# Patient Record
Sex: MALE | Race: WHITE | NOT HISPANIC OR LATINO | Employment: OTHER | URBAN - METROPOLITAN AREA
[De-identification: names, ages, dates, MRNs, and addresses within clinical notes are randomized per-mention and may not be internally consistent; named-entity substitution may affect disease eponyms.]

---

## 2017-10-11 VITALS — WEIGHT: 193.38 LBS | BODY MASS INDEX: 29.31 KG/M2 | HEIGHT: 68 IN

## 2017-12-20 ENCOUNTER — OFFICE VISIT (OUTPATIENT)
Dept: FAMILY MEDICINE | Facility: CLINIC | Age: 57
End: 2017-12-20
Payer: COMMERCIAL

## 2017-12-20 VITALS
SYSTOLIC BLOOD PRESSURE: 120 MMHG | HEART RATE: 97 BPM | BODY MASS INDEX: 27.58 KG/M2 | HEIGHT: 68 IN | DIASTOLIC BLOOD PRESSURE: 82 MMHG | OXYGEN SATURATION: 98 % | WEIGHT: 182 LBS

## 2017-12-20 DIAGNOSIS — L02.13 CARBUNCLE AND FURUNCLE OF NECK: ICD-10-CM

## 2017-12-20 DIAGNOSIS — L02.12 CARBUNCLE AND FURUNCLE OF NECK: ICD-10-CM

## 2017-12-20 PROCEDURE — 99213 OFFICE O/P EST LOW 20 MIN: CPT | Mod: ,,, | Performed by: FAMILY MEDICINE

## 2017-12-20 RX ORDER — DOXYCYCLINE HYCLATE 100 MG
100 TABLET ORAL 2 TIMES DAILY
Qty: 20 TABLET | Refills: 0 | Status: SHIPPED | OUTPATIENT
Start: 2017-12-20 | End: 2021-08-17

## 2017-12-20 RX ORDER — MUPIROCIN 20 MG/G
OINTMENT TOPICAL 3 TIMES DAILY
Qty: 1 TUBE | Refills: 1 | Status: SHIPPED | OUTPATIENT
Start: 2017-12-20 | End: 2019-01-07 | Stop reason: SDUPTHER

## 2017-12-20 NOTE — PROGRESS NOTES
Subjective:       Patient ID: Ernst May is a 57 y.o. male.    Chief Complaint: Rash (on neck for the past three months. treating topicall only  blistering)    Rash   This is a chronic problem. The current episode started more than 1 year ago. The problem has been gradually worsening since onset. The affected locations include the neck. The rash is characterized by blistering and draining. He was exposed to nothing. Pertinent negatives include no congestion, cough, diarrhea, fever, shortness of breath or sore throat. Past treatments include antibiotics and antibiotic cream. The treatment provided no relief. There is no history of allergies.     Review of Systems   Constitutional: Negative for activity change, appetite change and fever.   HENT: Negative for congestion and sore throat.    Eyes: Negative for visual disturbance.   Respiratory: Negative for cough, chest tightness and shortness of breath.    Cardiovascular: Negative for chest pain.   Gastrointestinal: Negative for abdominal pain, constipation, diarrhea and nausea.   Genitourinary: Negative for difficulty urinating.   Musculoskeletal: Negative for back pain and myalgias.   Skin: Positive for rash.   Neurological: Negative for headaches.   Hematological: Negative for adenopathy.   Psychiatric/Behavioral: Negative for suicidal ideas.       Past Medical History:   Diagnosis Date    Hypertension       Past Surgical History:   Procedure Laterality Date    CYST REMOVAL  2012       Family History   Problem Relation Age of Onset    Diabetes Mother     Heart disease Mother     Arthritis Father     Diabetes Father     Heart disease Father     Hypertension Father     Diabetes Maternal Grandmother        Social History     Social History    Marital status: Single     Spouse name: N/A    Number of children: N/A    Years of education: N/A     Social History Main Topics    Smoking status: Current Some Day Smoker     Types: Cigars    Smokeless tobacco:  "Never Used    Alcohol use No    Drug use: No    Sexual activity: Not Asked     Other Topics Concern    None     Social History Narrative    None       No current outpatient prescriptions on file.     No current facility-administered medications for this visit.        Review of patient's allergies indicates:  No Known Allergies  Objective:    HPI     Rash    Additional comments: on neck for the past three months. treating topicall   only  blistering       Last edited by ABEL Gonzales MD on 12/20/2017  2:57 PM. (History)      Blood pressure 120/82, pulse 97, height 5' 7.5" (1.715 m), weight 82.6 kg (182 lb), SpO2 98 %. Body mass index is 28.08 kg/m².   Physical Exam   Constitutional: He is oriented to person, place, and time. He appears well-developed and well-nourished.   HENT:   Head: Atraumatic.   Eyes: EOM are normal. Pupils are equal, round, and reactive to light. Right pupil is round. Left pupil is round.   Neck: Normal range of motion. Neck supple. No thyromegaly present.       Draining skin lesions with carbuncles   Cardiovascular: Normal rate and regular rhythm.    No murmur heard.  Pulmonary/Chest: Effort normal and breath sounds normal. No respiratory distress.   Abdominal: There is no hepatosplenomegaly. There is no tenderness.   Musculoskeletal: Normal range of motion. He exhibits no edema.   Lymphadenopathy:     He has no cervical adenopathy.        Right: No supraclavicular adenopathy present.        Left: No supraclavicular adenopathy present.   Neurological: He is alert and oriented to person, place, and time. He has normal strength. No cranial nerve deficit or sensory deficit.   Skin: Skin is warm and dry.   Psychiatric: He has a normal mood and affect. His behavior is normal. Judgment and thought content normal. He expresses no suicidal plans.           Assessment:       1. Carbuncle and furuncle of neck        Plan:       Ernst was seen today for rash.    Diagnoses and all orders for this " visit:    Carbuncle and furuncle of neck    Asked to come back within one month or worsening. Biopsy next. Culture pending

## 2019-01-07 ENCOUNTER — OFFICE VISIT (OUTPATIENT)
Dept: URGENT CARE | Facility: CLINIC | Age: 59
End: 2019-01-07
Payer: COMMERCIAL

## 2019-01-07 VITALS
HEART RATE: 88 BPM | DIASTOLIC BLOOD PRESSURE: 88 MMHG | OXYGEN SATURATION: 97 % | HEIGHT: 67 IN | SYSTOLIC BLOOD PRESSURE: 133 MMHG | RESPIRATION RATE: 16 BRPM | TEMPERATURE: 99 F | BODY MASS INDEX: 28.88 KG/M2 | WEIGHT: 184 LBS

## 2019-01-07 DIAGNOSIS — L02.13 CARBUNCLE AND FURUNCLE OF NECK: ICD-10-CM

## 2019-01-07 DIAGNOSIS — L02.12 CARBUNCLE AND FURUNCLE OF NECK: ICD-10-CM

## 2019-01-07 DIAGNOSIS — L02.811 ABSCESS OF HEAD: Primary | ICD-10-CM

## 2019-01-07 PROCEDURE — 99204 OFFICE O/P NEW MOD 45 MIN: CPT | Mod: S$GLB,,, | Performed by: NURSE PRACTITIONER

## 2019-01-07 PROCEDURE — 99204 PR OFFICE/OUTPT VISIT, NEW, LEVL IV, 45-59 MIN: ICD-10-PCS | Mod: S$GLB,,, | Performed by: NURSE PRACTITIONER

## 2019-01-07 RX ORDER — MUPIROCIN 20 MG/G
OINTMENT TOPICAL 3 TIMES DAILY
Qty: 1 TUBE | Refills: 1 | Status: SHIPPED | OUTPATIENT
Start: 2019-01-07 | End: 2021-08-17 | Stop reason: SDUPTHER

## 2019-01-07 RX ORDER — SULFAMETHOXAZOLE AND TRIMETHOPRIM 800; 160 MG/1; MG/1
1 TABLET ORAL 2 TIMES DAILY
Qty: 14 TABLET | Refills: 0 | Status: SHIPPED | OUTPATIENT
Start: 2019-01-07 | End: 2019-01-14

## 2019-01-07 NOTE — PATIENT INSTRUCTIONS
Abscess (Antibiotic Treatment Only)  An abscess (sometimes called a boil) happens when bacteria get trapped under the skin and start to grow. Pus forms inside the abscess as the body responds to the bacteria. An abscess can happen with an insect bite, ingrown hair, blocked oil gland, pimple, cyst, or puncture wound.  In the early stages, your wound may be red and tender. For this stage, you may get antibiotics. If the abscess does not get better with antibiotics, it will need to be drained with a small cut.  Home care  These tips will help you care for your abscess at home:  · Soak the wound in hot water or apply hot packs (small towel soaked in hot water) to the area for 20 minutes at a time. Do this 3 to 4 times a day.  · Do not cut, squeeze, or pop the boil yourself.  · Apply antibiotic cream or ointment to the skin 3 to 4 times a day, unless something else was prescribed. Some ointments include an antibiotic plus a pain reliever.  · If your doctor prescribed antibiotics, do not stop taking them until you have finished the medicine or the doctor tells you to stop.  · You may use an over-the-counter pain medicine to control pain, unless another pain medicine was prescribed. If you have chronic liver or kidney disease or ever had a stomach ulcer or gastrointestinal bleeding, talk with your doctor before using these any of these.  Follow-up care  Follow up with your healthcare provider, or as advised. Check your wound each day for the signs of worsening infection listed below.  When to seek medical advice  Get prompt medical attention if any of these occur:  · An increase in redness or swelling  · Red streaks in the skin leading away from the abscess  · An increase in local pain or swelling  · Fever of 100.4ºF (38ºC) or higher, or as directed by your healthcare provider  · Pus or fluid coming from the abscess  · Boil returns after getting better  Date Last Reviewed: 9/1/2016  © 5127-4092 [employer] [address] [place] [address]. All rights reserved. This information is not intended as a substitute for professional medical care. Always follow your healthcare professional's instructions.

## 2019-01-07 NOTE — PROGRESS NOTES
"Subjective:       Patient ID: Ernst May is a 58 y.o. male.    Vitals:  height is 5' 7" (1.702 m) and weight is 83.5 kg (184 lb). His oral temperature is 99.1 °F (37.3 °C). His blood pressure is 133/88 and his pulse is 88. His respiration is 16 and oxygen saturation is 97%.     Chief Complaint: Abscess    Patient complains of an abscess to right side of posterior head for 1 week. States he has been putting black suave on it and it is "drawing the pus out". Denies fever. States he was treated 3 months ago for multiple abscesses in his head.       Abscess   Chronicity:  RecurrentProgression Since Onset: worsening  Size:  3-5cm  Location:  Head/neck  Associated Symptoms: no fever, no chills  Characteristics: itching and blistering    Treatments Tried:  Topical antibiotics and oral antibiotics  Worsened by:  Topical antibiotics      Constitution: Negative for chills, fatigue and fever.   HENT: Negative for congestion and sore throat.    Neck: Negative for painful lymph nodes.   Cardiovascular: Negative for chest pain and leg swelling.   Eyes: Negative for double vision and blurred vision.   Respiratory: Negative for cough and shortness of breath.    Gastrointestinal: Negative for nausea, vomiting and diarrhea.   Genitourinary: Negative for dysuria, frequency and urgency.   Musculoskeletal: Negative for joint pain, joint swelling, muscle cramps and muscle ache.   Skin: Positive for abscess. Negative for color change, pale and rash.   Allergic/Immunologic: Negative for seasonal allergies.   Neurological: Negative for dizziness, history of vertigo, light-headedness, passing out and headaches.   Hematologic/Lymphatic: Negative for swollen lymph nodes, easy bruising/bleeding and history of blood clots. Does not bruise/bleed easily.   Psychiatric/Behavioral: Negative for nervous/anxious, sleep disturbance and depression. The patient is not nervous/anxious.        Objective:      Physical Exam   Constitutional: He is " oriented to person, place, and time. Vital signs are normal. He appears well-developed and well-nourished. He is cooperative.  Non-toxic appearance. He does not have a sickly appearance. He does not appear ill. No distress.   HENT:   Head: Normocephalic and atraumatic.   Right Ear: Hearing, tympanic membrane, external ear and ear canal normal.   Left Ear: Hearing, tympanic membrane, external ear and ear canal normal.   Nose: Nose normal. No mucosal edema, rhinorrhea or nasal deformity. No epistaxis. Right sinus exhibits no maxillary sinus tenderness and no frontal sinus tenderness. Left sinus exhibits no maxillary sinus tenderness and no frontal sinus tenderness.   Mouth/Throat: Uvula is midline, oropharynx is clear and moist and mucous membranes are normal. No trismus in the jaw. Normal dentition. No uvula swelling. No posterior oropharyngeal erythema.   Eyes: Conjunctivae and lids are normal. Right eye exhibits no discharge. Left eye exhibits no discharge. No scleral icterus.   Sclera clear bilat   Neck: Trachea normal, normal range of motion, full passive range of motion without pain and phonation normal. Neck supple.   Cardiovascular: Normal rate, regular rhythm, normal heart sounds, intact distal pulses and normal pulses.   Pulmonary/Chest: Effort normal and breath sounds normal. No respiratory distress.   Abdominal: Soft. Normal appearance and bowel sounds are normal. He exhibits no distension, no pulsatile midline mass and no mass. There is no tenderness.   Musculoskeletal: Normal range of motion. He exhibits no edema or deformity.   Lymphadenopathy:     He has no cervical adenopathy.   Neurological: He is alert and oriented to person, place, and time. He exhibits normal muscle tone. Coordination normal. GCS eye subscore is 4. GCS verbal subscore is 5. GCS motor subscore is 6.   Skin: Skin is warm, dry and intact. He is not diaphoretic. No pallor.   2cm abscess to right side of posterior head, +purulent  drainage. No swelling. Mild induration. No fluctuance. +erythema.    Psychiatric: He has a normal mood and affect. His speech is normal and behavior is normal. Judgment and thought content normal. Cognition and memory are normal.   Nursing note and vitals reviewed.      Assessment:       1. Abscess of head    2. Carbuncle and furuncle of neck        Plan:       Abscess was already draining, will not I&D at this time.  The patient will be placed on antibiotics.  The patient has no signs of systemic symptoms or significant cellulitis to warrant admission at this time.  The patient has been instructed to follow up with their regular doctor, dermatology or the clinic in 2 - 3 days.  Advised patient to take the full course of antibiotics. I have given the patient specific return precautions.  Return for any worsening of symptoms.      Abscess of head    Carbuncle and furuncle of neck  -     mupirocin (BACTROBAN) 2 % ointment; Apply topically 3 (three) times daily.  Dispense: 1 Tube; Refill: 1    Other orders  -     sulfamethoxazole-trimethoprim 800-160mg (BACTRIM DS) 800-160 mg Tab; Take 1 tablet by mouth 2 (two) times daily. for 7 days  Dispense: 14 tablet; Refill: 0

## 2021-08-17 ENCOUNTER — OFFICE VISIT (OUTPATIENT)
Dept: FAMILY MEDICINE | Facility: CLINIC | Age: 61
End: 2021-08-17
Payer: COMMERCIAL

## 2021-08-17 VITALS
HEART RATE: 101 BPM | DIASTOLIC BLOOD PRESSURE: 72 MMHG | BODY MASS INDEX: 27.78 KG/M2 | OXYGEN SATURATION: 98 % | HEIGHT: 67 IN | RESPIRATION RATE: 18 BRPM | SYSTOLIC BLOOD PRESSURE: 128 MMHG | WEIGHT: 177 LBS

## 2021-08-17 DIAGNOSIS — L02.13 CARBUNCLE AND FURUNCLE OF NECK: ICD-10-CM

## 2021-08-17 DIAGNOSIS — L02.12 CARBUNCLE AND FURUNCLE OF NECK: ICD-10-CM

## 2021-08-17 DIAGNOSIS — L03.90 CELLULITIS, UNSPECIFIED CELLULITIS SITE: ICD-10-CM

## 2021-08-17 DIAGNOSIS — L98.499 SKIN ULCER, UNSPECIFIED ULCER STAGE: Primary | ICD-10-CM

## 2021-08-17 PROCEDURE — 99213 PR OFFICE/OUTPT VISIT, EST, LEVL III, 20-29 MIN: ICD-10-PCS | Mod: S$GLB,,, | Performed by: FAMILY MEDICINE

## 2021-08-17 PROCEDURE — 99213 OFFICE O/P EST LOW 20 MIN: CPT | Mod: S$GLB,,, | Performed by: FAMILY MEDICINE

## 2021-08-17 RX ORDER — MUPIROCIN 20 MG/G
OINTMENT TOPICAL 3 TIMES DAILY
Qty: 1 TUBE | Refills: 1 | Status: ON HOLD | OUTPATIENT
Start: 2021-08-17 | End: 2021-09-29 | Stop reason: HOSPADM

## 2021-08-17 RX ORDER — TRAMADOL HYDROCHLORIDE 50 MG/1
50 TABLET ORAL EVERY 6 HOURS PRN
Qty: 28 TABLET | Refills: 3 | Status: SHIPPED | OUTPATIENT
Start: 2021-08-17 | End: 2021-08-24

## 2021-08-17 RX ORDER — CLINDAMYCIN HYDROCHLORIDE 300 MG/1
300 CAPSULE ORAL 3 TIMES DAILY
Qty: 30 CAPSULE | Refills: 0 | Status: ON HOLD | OUTPATIENT
Start: 2021-08-17 | End: 2021-09-29 | Stop reason: HOSPADM

## 2021-08-19 ENCOUNTER — DOCUMENTATION ONLY (OUTPATIENT)
Dept: FAMILY MEDICINE | Facility: CLINIC | Age: 61
End: 2021-08-19

## 2021-08-23 ENCOUNTER — TELEPHONE (OUTPATIENT)
Dept: FAMILY MEDICINE | Facility: CLINIC | Age: 61
End: 2021-08-23

## 2021-08-26 ENCOUNTER — OFFICE VISIT (OUTPATIENT)
Dept: WOUND CARE | Facility: HOSPITAL | Age: 61
End: 2021-08-26
Attending: FAMILY MEDICINE
Payer: COMMERCIAL

## 2021-08-26 VITALS
TEMPERATURE: 96 F | HEART RATE: 75 BPM | SYSTOLIC BLOOD PRESSURE: 126 MMHG | RESPIRATION RATE: 18 BRPM | DIASTOLIC BLOOD PRESSURE: 70 MMHG

## 2021-08-26 DIAGNOSIS — S21.202A OPEN WOUND OF LEFT SIDE OF BACK, INITIAL ENCOUNTER: Primary | ICD-10-CM

## 2021-08-26 DIAGNOSIS — S91.002A OPEN WOUND OF LEFT ANKLE, INITIAL ENCOUNTER: ICD-10-CM

## 2021-08-26 PROCEDURE — 99202 PR OFFICE/OUTPT VISIT, NEW, LEVL II, 15-29 MIN: ICD-10-PCS | Mod: ,,, | Performed by: FAMILY MEDICINE

## 2021-08-26 PROCEDURE — 99202 OFFICE O/P NEW SF 15 MIN: CPT | Mod: ,,, | Performed by: FAMILY MEDICINE

## 2021-08-26 PROCEDURE — 99214 OFFICE O/P EST MOD 30 MIN: CPT | Mod: PN | Performed by: FAMILY MEDICINE

## 2021-09-02 ENCOUNTER — HOSPITAL ENCOUNTER (INPATIENT)
Facility: HOSPITAL | Age: 61
LOS: 27 days | Discharge: NURSING, REHAB OR LONG-TERM CARE | DRG: 853 | End: 2021-09-29
Attending: EMERGENCY MEDICINE | Admitting: INTERNAL MEDICINE
Payer: MEDICAID

## 2021-09-02 DIAGNOSIS — E11.10 DIABETIC KETOACIDOSIS WITHOUT COMA ASSOCIATED WITH TYPE 2 DIABETES MELLITUS: ICD-10-CM

## 2021-09-02 DIAGNOSIS — I73.9 PVD (PERIPHERAL VASCULAR DISEASE): ICD-10-CM

## 2021-09-02 DIAGNOSIS — E11.65 UNCONTROLLED TYPE 2 DIABETES MELLITUS WITH HYPERGLYCEMIA: ICD-10-CM

## 2021-09-02 DIAGNOSIS — I49.9 ARRHYTHMIA: ICD-10-CM

## 2021-09-02 DIAGNOSIS — R07.9 CHEST PAIN: ICD-10-CM

## 2021-09-02 DIAGNOSIS — R73.9 HYPERGLYCEMIA: ICD-10-CM

## 2021-09-02 DIAGNOSIS — L97.501: ICD-10-CM

## 2021-09-02 DIAGNOSIS — I73.9: ICD-10-CM

## 2021-09-02 DIAGNOSIS — L02.91 ABSCESS: Primary | ICD-10-CM

## 2021-09-02 DIAGNOSIS — I47.20 V-TACH: ICD-10-CM

## 2021-09-02 DIAGNOSIS — R78.81 BACTEREMIA: ICD-10-CM

## 2021-09-02 DIAGNOSIS — A40.9 SEPSIS DUE TO STREPTOCOCCUS SPECIES, UNSPECIFIED WHETHER ACUTE ORGAN DYSFUNCTION PRESENT: ICD-10-CM

## 2021-09-02 DIAGNOSIS — Z20.822 SUSPECTED COVID-19 VIRUS INFECTION: ICD-10-CM

## 2021-09-02 LAB
ALBUMIN SERPL BCP-MCNC: 2.1 G/DL (ref 3.5–5.2)
ALP SERPL-CCNC: 92 U/L (ref 55–135)
ALT SERPL W/O P-5'-P-CCNC: 17 U/L (ref 10–44)
ANION GAP SERPL CALC-SCNC: 16 MMOL/L (ref 8–16)
APTT PPP: 35.6 SEC (ref 25.6–35.8)
APTT PPP: 35.6 SEC (ref 25.6–35.8)
AST SERPL-CCNC: 22 U/L (ref 10–40)
BACTERIA #/AREA URNS HPF: NEGATIVE /HPF
BASOPHILS # BLD AUTO: 0.04 K/UL (ref 0–0.2)
BASOPHILS NFR BLD: 0.3 % (ref 0–1.9)
BILIRUB SERPL-MCNC: 0.8 MG/DL (ref 0.1–1)
BILIRUB UR QL STRIP: NEGATIVE
BNP SERPL-MCNC: 186 PG/ML (ref 0–99)
BNP SERPL-MCNC: 186 PG/ML (ref 0–99)
BUN SERPL-MCNC: 10 MG/DL (ref 8–23)
CALCIUM SERPL-MCNC: 8.5 MG/DL (ref 8.7–10.5)
CHLORIDE SERPL-SCNC: 87 MMOL/L (ref 95–110)
CK SERPL-CCNC: 32 U/L (ref 20–200)
CK SERPL-CCNC: 32 U/L (ref 20–200)
CLARITY UR: CLEAR
CO2 SERPL-SCNC: 24 MMOL/L (ref 23–29)
COLOR UR: YELLOW
CREAT SERPL-MCNC: 1 MG/DL (ref 0.5–1.4)
CRP SERPL-MCNC: 34.69 MG/DL
DIFFERENTIAL METHOD: ABNORMAL
EOSINOPHIL # BLD AUTO: 0 K/UL (ref 0–0.5)
EOSINOPHIL NFR BLD: 0 % (ref 0–8)
ERYTHROCYTE [DISTWIDTH] IN BLOOD BY AUTOMATED COUNT: 12.7 % (ref 11.5–14.5)
EST. GFR  (AFRICAN AMERICAN): >60 ML/MIN/1.73 M^2
EST. GFR  (NON AFRICAN AMERICAN): >60 ML/MIN/1.73 M^2
GLUCOSE SERPL-MCNC: 487 MG/DL (ref 70–110)
GLUCOSE UR QL STRIP: ABNORMAL
HCT VFR BLD AUTO: 32.9 % (ref 40–54)
HGB BLD-MCNC: 11.3 G/DL (ref 14–18)
HGB UR QL STRIP: NEGATIVE
HYALINE CASTS #/AREA URNS LPF: 1 /LPF
IMM GRANULOCYTES # BLD AUTO: 0.21 K/UL (ref 0–0.04)
IMM GRANULOCYTES NFR BLD AUTO: 1.4 % (ref 0–0.5)
INR PPP: 1.3
INR PPP: 1.3
KETONES UR QL STRIP: ABNORMAL
LACTATE SERPL-SCNC: 2.1 MMOL/L (ref 0.5–1.9)
LEUKOCYTE ESTERASE UR QL STRIP: NEGATIVE
LIPASE SERPL-CCNC: 22 U/L (ref 4–60)
LIPASE SERPL-CCNC: 22 U/L (ref 4–60)
LYMPHOCYTES # BLD AUTO: 0.9 K/UL (ref 1–4.8)
LYMPHOCYTES NFR BLD: 5.6 % (ref 18–48)
MAGNESIUM SERPL-MCNC: 1.5 MG/DL (ref 1.6–2.6)
MAGNESIUM SERPL-MCNC: 1.5 MG/DL (ref 1.6–2.6)
MCH RBC QN AUTO: 28.8 PG (ref 27–31)
MCHC RBC AUTO-ENTMCNC: 34.3 G/DL (ref 32–36)
MCV RBC AUTO: 84 FL (ref 82–98)
MICROSCOPIC COMMENT: NORMAL
MONOCYTES # BLD AUTO: 1.7 K/UL (ref 0.3–1)
MONOCYTES NFR BLD: 11 % (ref 4–15)
NEUTROPHILS # BLD AUTO: 12.4 K/UL (ref 1.8–7.7)
NEUTROPHILS NFR BLD: 81.7 % (ref 38–73)
NITRITE UR QL STRIP: NEGATIVE
NRBC BLD-RTO: 0 /100 WBC
PH UR STRIP: 6 [PH] (ref 5–8)
PHOSPHATE SERPL-MCNC: 3.5 MG/DL (ref 2.7–4.5)
PLATELET # BLD AUTO: 370 K/UL (ref 150–450)
PMV BLD AUTO: 9.6 FL (ref 9.2–12.9)
POTASSIUM SERPL-SCNC: 4.2 MMOL/L (ref 3.5–5.1)
PROCALCITONIN SERPL IA-MCNC: 1.47 NG/ML (ref 0–0.5)
PROCALCITONIN SERPL IA-MCNC: 1.47 NG/ML (ref 0–0.5)
PROT SERPL-MCNC: 7 G/DL (ref 6–8.4)
PROT UR QL STRIP: ABNORMAL
PROTHROMBIN TIME: 15.2 SEC (ref 11.8–14.3)
PROTHROMBIN TIME: 15.2 SEC (ref 11.8–14.3)
RBC # BLD AUTO: 3.92 M/UL (ref 4.6–6.2)
RBC #/AREA URNS HPF: 2 /HPF (ref 0–4)
SARS-COV-2 RDRP RESP QL NAA+PROBE: NEGATIVE
SODIUM SERPL-SCNC: 127 MMOL/L (ref 136–145)
SP GR UR STRIP: >1.03 (ref 1–1.03)
SQUAMOUS #/AREA URNS HPF: 1 /HPF
TROPONIN I SERPL DL<=0.01 NG/ML-MCNC: 0.03 NG/ML
TROPONIN I SERPL DL<=0.01 NG/ML-MCNC: 0.03 NG/ML
TSH SERPL DL<=0.005 MIU/L-ACNC: 1.73 UIU/ML (ref 0.34–5.6)
URN SPEC COLLECT METH UR: ABNORMAL
UROBILINOGEN UR STRIP-ACNC: NEGATIVE EU/DL
WBC # BLD AUTO: 15.12 K/UL (ref 3.9–12.7)
WBC #/AREA URNS HPF: 0 /HPF (ref 0–5)
YEAST URNS QL MICRO: NORMAL

## 2021-09-02 PROCEDURE — 85730 THROMBOPLASTIN TIME PARTIAL: CPT | Performed by: EMERGENCY MEDICINE

## 2021-09-02 PROCEDURE — 83880 ASSAY OF NATRIURETIC PEPTIDE: CPT | Performed by: EMERGENCY MEDICINE

## 2021-09-02 PROCEDURE — U0002 COVID-19 LAB TEST NON-CDC: HCPCS | Performed by: EMERGENCY MEDICINE

## 2021-09-02 PROCEDURE — 87186 SC STD MICRODIL/AGAR DIL: CPT | Performed by: EMERGENCY MEDICINE

## 2021-09-02 PROCEDURE — 63600175 PHARM REV CODE 636 W HCPCS: Performed by: EMERGENCY MEDICINE

## 2021-09-02 PROCEDURE — 82550 ASSAY OF CK (CPK): CPT | Performed by: EMERGENCY MEDICINE

## 2021-09-02 PROCEDURE — 96365 THER/PROPH/DIAG IV INF INIT: CPT

## 2021-09-02 PROCEDURE — 12000002 HC ACUTE/MED SURGE SEMI-PRIVATE ROOM

## 2021-09-02 PROCEDURE — 81001 URINALYSIS AUTO W/SCOPE: CPT | Performed by: EMERGENCY MEDICINE

## 2021-09-02 PROCEDURE — 25000003 PHARM REV CODE 250: Performed by: EMERGENCY MEDICINE

## 2021-09-02 PROCEDURE — 85610 PROTHROMBIN TIME: CPT | Performed by: EMERGENCY MEDICINE

## 2021-09-02 PROCEDURE — 87077 CULTURE AEROBIC IDENTIFY: CPT | Performed by: EMERGENCY MEDICINE

## 2021-09-02 PROCEDURE — 84145 PROCALCITONIN (PCT): CPT | Performed by: EMERGENCY MEDICINE

## 2021-09-02 PROCEDURE — 99285 EMERGENCY DEPT VISIT HI MDM: CPT | Mod: 25

## 2021-09-02 PROCEDURE — 83605 ASSAY OF LACTIC ACID: CPT | Performed by: EMERGENCY MEDICINE

## 2021-09-02 PROCEDURE — 83036 HEMOGLOBIN GLYCOSYLATED A1C: CPT | Performed by: EMERGENCY MEDICINE

## 2021-09-02 PROCEDURE — 84100 ASSAY OF PHOSPHORUS: CPT | Performed by: EMERGENCY MEDICINE

## 2021-09-02 PROCEDURE — 80053 COMPREHEN METABOLIC PANEL: CPT | Performed by: EMERGENCY MEDICINE

## 2021-09-02 PROCEDURE — 87040 BLOOD CULTURE FOR BACTERIA: CPT | Performed by: EMERGENCY MEDICINE

## 2021-09-02 PROCEDURE — 83690 ASSAY OF LIPASE: CPT | Performed by: EMERGENCY MEDICINE

## 2021-09-02 PROCEDURE — 83735 ASSAY OF MAGNESIUM: CPT | Performed by: EMERGENCY MEDICINE

## 2021-09-02 PROCEDURE — 96367 TX/PROPH/DG ADDL SEQ IV INF: CPT

## 2021-09-02 PROCEDURE — 96375 TX/PRO/DX INJ NEW DRUG ADDON: CPT

## 2021-09-02 PROCEDURE — 84484 ASSAY OF TROPONIN QUANT: CPT | Performed by: EMERGENCY MEDICINE

## 2021-09-02 PROCEDURE — 85025 COMPLETE CBC W/AUTO DIFF WBC: CPT | Performed by: EMERGENCY MEDICINE

## 2021-09-02 PROCEDURE — 25500020 PHARM REV CODE 255: Performed by: NURSE PRACTITIONER

## 2021-09-02 PROCEDURE — 86140 C-REACTIVE PROTEIN: CPT | Performed by: EMERGENCY MEDICINE

## 2021-09-02 PROCEDURE — 84443 ASSAY THYROID STIM HORMONE: CPT | Performed by: EMERGENCY MEDICINE

## 2021-09-02 RX ORDER — CLINDAMYCIN PHOSPHATE 900 MG/50ML
900 INJECTION, SOLUTION INTRAVENOUS
Status: COMPLETED | OUTPATIENT
Start: 2021-09-02 | End: 2021-09-03

## 2021-09-02 RX ORDER — HYDROMORPHONE HYDROCHLORIDE 1 MG/ML
1 INJECTION, SOLUTION INTRAMUSCULAR; INTRAVENOUS; SUBCUTANEOUS
Status: COMPLETED | OUTPATIENT
Start: 2021-09-02 | End: 2021-09-02

## 2021-09-02 RX ADMIN — IOHEXOL 100 ML: 350 INJECTION, SOLUTION INTRAVENOUS at 10:09

## 2021-09-02 RX ADMIN — SODIUM CHLORIDE, SODIUM LACTATE, POTASSIUM CHLORIDE, AND CALCIUM CHLORIDE 2313 ML: .6; .31; .03; .02 INJECTION, SOLUTION INTRAVENOUS at 10:09

## 2021-09-02 RX ADMIN — CLINDAMYCIN IN 5 PERCENT DEXTROSE 900 MG: 18 INJECTION, SOLUTION INTRAVENOUS at 11:09

## 2021-09-02 RX ADMIN — HYDROMORPHONE HYDROCHLORIDE 1 MG: 1 INJECTION, SOLUTION INTRAMUSCULAR; INTRAVENOUS; SUBCUTANEOUS at 11:09

## 2021-09-02 RX ADMIN — PIPERACILLIN AND TAZOBACTAM 4.5 G: 4; .5 INJECTION, POWDER, LYOPHILIZED, FOR SOLUTION INTRAVENOUS; PARENTERAL at 10:09

## 2021-09-03 PROBLEM — R73.9 HYPERGLYCEMIA: Status: ACTIVE | Noted: 2021-09-03

## 2021-09-03 PROBLEM — L02.91 ABSCESS: Status: ACTIVE | Noted: 2021-09-03

## 2021-09-03 LAB
ALBUMIN SERPL BCP-MCNC: 1.7 G/DL (ref 3.5–5.2)
ALP SERPL-CCNC: 85 U/L (ref 55–135)
ALT SERPL W/O P-5'-P-CCNC: 14 U/L (ref 10–44)
ANION GAP SERPL CALC-SCNC: 9 MMOL/L (ref 8–16)
ANISOCYTOSIS BLD QL SMEAR: SLIGHT
AST SERPL-CCNC: 15 U/L (ref 10–40)
BASOPHILS NFR BLD: 0 % (ref 0–1.9)
BILIRUB SERPL-MCNC: 1 MG/DL (ref 0.1–1)
BUN SERPL-MCNC: 9 MG/DL (ref 8–23)
CALCIUM SERPL-MCNC: 7.8 MG/DL (ref 8.7–10.5)
CHLORIDE SERPL-SCNC: 91 MMOL/L (ref 95–110)
CO2 SERPL-SCNC: 26 MMOL/L (ref 23–29)
CREAT SERPL-MCNC: 0.7 MG/DL (ref 0.5–1.4)
DIFFERENTIAL METHOD: ABNORMAL
EOSINOPHIL NFR BLD: 0 % (ref 0–8)
ERYTHROCYTE [DISTWIDTH] IN BLOOD BY AUTOMATED COUNT: 12.4 % (ref 11.5–14.5)
EST. GFR  (AFRICAN AMERICAN): >60 ML/MIN/1.73 M^2
EST. GFR  (NON AFRICAN AMERICAN): >60 ML/MIN/1.73 M^2
ESTIMATED AVG GLUCOSE: 355 MG/DL (ref 68–131)
GLUCOSE SERPL-MCNC: 260 MG/DL (ref 70–110)
GLUCOSE SERPL-MCNC: 271 MG/DL (ref 70–110)
GLUCOSE SERPL-MCNC: 307 MG/DL (ref 70–110)
GLUCOSE SERPL-MCNC: 339 MG/DL (ref 70–110)
GLUCOSE SERPL-MCNC: 344 MG/DL (ref 70–110)
GLUCOSE SERPL-MCNC: 409 MG/DL (ref 70–110)
HBA1C MFR BLD: 14 % (ref 4.5–6.2)
HCT VFR BLD AUTO: 29.3 % (ref 40–54)
HGB BLD-MCNC: 10 G/DL (ref 14–18)
HYPOCHROMIA BLD QL SMEAR: ABNORMAL
IMM GRANULOCYTES # BLD AUTO: ABNORMAL K/UL (ref 0–0.04)
IMM GRANULOCYTES NFR BLD AUTO: ABNORMAL % (ref 0–0.5)
LACTATE SERPL-SCNC: 1.5 MMOL/L (ref 0.5–1.9)
LYMPHOCYTES NFR BLD: 5 % (ref 18–48)
MAGNESIUM SERPL-MCNC: 1.7 MG/DL (ref 1.6–2.6)
MCH RBC QN AUTO: 29.3 PG (ref 27–31)
MCHC RBC AUTO-ENTMCNC: 34.1 G/DL (ref 32–36)
MCV RBC AUTO: 86 FL (ref 82–98)
MONOCYTES NFR BLD: 12 % (ref 4–15)
NEUTROPHILS NFR BLD: 49 % (ref 38–73)
NEUTS BAND NFR BLD MANUAL: 34 %
NRBC BLD-RTO: 0 /100 WBC
PLATELET # BLD AUTO: 312 K/UL (ref 150–450)
PLATELET BLD QL SMEAR: ABNORMAL
PMV BLD AUTO: 10.3 FL (ref 9.2–12.9)
POTASSIUM SERPL-SCNC: 3.8 MMOL/L (ref 3.5–5.1)
PROT SERPL-MCNC: 5.8 G/DL (ref 6–8.4)
RBC # BLD AUTO: 3.41 M/UL (ref 4.6–6.2)
SODIUM SERPL-SCNC: 126 MMOL/L (ref 136–145)
WBC # BLD AUTO: 15.29 K/UL (ref 3.9–12.7)

## 2021-09-03 PROCEDURE — 85027 COMPLETE CBC AUTOMATED: CPT | Performed by: NURSE PRACTITIONER

## 2021-09-03 PROCEDURE — 25000003 PHARM REV CODE 250: Performed by: INTERNAL MEDICINE

## 2021-09-03 PROCEDURE — 25000003 PHARM REV CODE 250: Performed by: EMERGENCY MEDICINE

## 2021-09-03 PROCEDURE — 83605 ASSAY OF LACTIC ACID: CPT | Performed by: EMERGENCY MEDICINE

## 2021-09-03 PROCEDURE — 96367 TX/PROPH/DG ADDL SEQ IV INF: CPT

## 2021-09-03 PROCEDURE — C9399 UNCLASSIFIED DRUGS OR BIOLOG: HCPCS | Performed by: STUDENT IN AN ORGANIZED HEALTH CARE EDUCATION/TRAINING PROGRAM

## 2021-09-03 PROCEDURE — 96361 HYDRATE IV INFUSION ADD-ON: CPT

## 2021-09-03 PROCEDURE — 83735 ASSAY OF MAGNESIUM: CPT | Performed by: NURSE PRACTITIONER

## 2021-09-03 PROCEDURE — 36415 COLL VENOUS BLD VENIPUNCTURE: CPT | Performed by: EMERGENCY MEDICINE

## 2021-09-03 PROCEDURE — 85007 BL SMEAR W/DIFF WBC COUNT: CPT | Performed by: NURSE PRACTITIONER

## 2021-09-03 PROCEDURE — 80053 COMPREHEN METABOLIC PANEL: CPT | Performed by: NURSE PRACTITIONER

## 2021-09-03 PROCEDURE — 12000002 HC ACUTE/MED SURGE SEMI-PRIVATE ROOM

## 2021-09-03 PROCEDURE — 96375 TX/PRO/DX INJ NEW DRUG ADDON: CPT

## 2021-09-03 PROCEDURE — 63600175 PHARM REV CODE 636 W HCPCS: Performed by: NURSE PRACTITIONER

## 2021-09-03 PROCEDURE — 25000003 PHARM REV CODE 250: Performed by: NURSE PRACTITIONER

## 2021-09-03 PROCEDURE — 63600175 PHARM REV CODE 636 W HCPCS: Performed by: EMERGENCY MEDICINE

## 2021-09-03 PROCEDURE — 96372 THER/PROPH/DIAG INJ SC/IM: CPT

## 2021-09-03 PROCEDURE — 82962 GLUCOSE BLOOD TEST: CPT

## 2021-09-03 PROCEDURE — 63600175 PHARM REV CODE 636 W HCPCS: Performed by: INTERNAL MEDICINE

## 2021-09-03 PROCEDURE — 87040 BLOOD CULTURE FOR BACTERIA: CPT | Performed by: STUDENT IN AN ORGANIZED HEALTH CARE EDUCATION/TRAINING PROGRAM

## 2021-09-03 PROCEDURE — 93010 EKG 12-LEAD: ICD-10-PCS | Mod: ,,, | Performed by: INTERNAL MEDICINE

## 2021-09-03 PROCEDURE — 96376 TX/PRO/DX INJ SAME DRUG ADON: CPT

## 2021-09-03 PROCEDURE — 93010 ELECTROCARDIOGRAM REPORT: CPT | Mod: ,,, | Performed by: INTERNAL MEDICINE

## 2021-09-03 PROCEDURE — 63600175 PHARM REV CODE 636 W HCPCS: Performed by: STUDENT IN AN ORGANIZED HEALTH CARE EDUCATION/TRAINING PROGRAM

## 2021-09-03 PROCEDURE — 99221 PR INITIAL HOSPITAL CARE,LEVL I: ICD-10-PCS | Mod: ,,, | Performed by: SURGERY

## 2021-09-03 PROCEDURE — 36415 COLL VENOUS BLD VENIPUNCTURE: CPT | Performed by: STUDENT IN AN ORGANIZED HEALTH CARE EDUCATION/TRAINING PROGRAM

## 2021-09-03 PROCEDURE — 99221 1ST HOSP IP/OBS SF/LOW 40: CPT | Mod: ,,, | Performed by: SURGERY

## 2021-09-03 PROCEDURE — 93005 ELECTROCARDIOGRAM TRACING: CPT | Performed by: INTERNAL MEDICINE

## 2021-09-03 PROCEDURE — 36415 COLL VENOUS BLD VENIPUNCTURE: CPT | Performed by: NURSE PRACTITIONER

## 2021-09-03 PROCEDURE — 25000003 PHARM REV CODE 250: Performed by: STUDENT IN AN ORGANIZED HEALTH CARE EDUCATION/TRAINING PROGRAM

## 2021-09-03 RX ORDER — POTASSIUM CHLORIDE 7.45 MG/ML
20 INJECTION INTRAVENOUS
Status: DISCONTINUED | OUTPATIENT
Start: 2021-09-03 | End: 2021-09-29 | Stop reason: HOSPADM

## 2021-09-03 RX ORDER — MAGNESIUM SULFATE HEPTAHYDRATE 40 MG/ML
2 INJECTION, SOLUTION INTRAVENOUS
Status: DISCONTINUED | OUTPATIENT
Start: 2021-09-03 | End: 2021-09-29 | Stop reason: HOSPADM

## 2021-09-03 RX ORDER — GLUCAGON 1 MG
1 KIT INJECTION
Status: DISCONTINUED | OUTPATIENT
Start: 2021-09-03 | End: 2021-09-29 | Stop reason: HOSPADM

## 2021-09-03 RX ORDER — SODIUM CHLORIDE 9 MG/ML
INJECTION, SOLUTION INTRAVENOUS CONTINUOUS
Status: DISCONTINUED | OUTPATIENT
Start: 2021-09-03 | End: 2021-09-10

## 2021-09-03 RX ORDER — SODIUM CHLORIDE 0.9 % (FLUSH) 0.9 %
10 SYRINGE (ML) INJECTION
Status: DISCONTINUED | OUTPATIENT
Start: 2021-09-03 | End: 2021-09-29 | Stop reason: HOSPADM

## 2021-09-03 RX ORDER — INSULIN ASPART 100 [IU]/ML
1-10 INJECTION, SOLUTION INTRAVENOUS; SUBCUTANEOUS
Status: DISCONTINUED | OUTPATIENT
Start: 2021-09-03 | End: 2021-09-10

## 2021-09-03 RX ORDER — AMOXICILLIN 250 MG
1 CAPSULE ORAL 2 TIMES DAILY PRN
Status: DISCONTINUED | OUTPATIENT
Start: 2021-09-03 | End: 2021-09-29 | Stop reason: HOSPADM

## 2021-09-03 RX ORDER — POTASSIUM CHLORIDE 7.45 MG/ML
40 INJECTION INTRAVENOUS
Status: DISCONTINUED | OUTPATIENT
Start: 2021-09-03 | End: 2021-09-29 | Stop reason: HOSPADM

## 2021-09-03 RX ORDER — MAGNESIUM SULFATE HEPTAHYDRATE 40 MG/ML
4 INJECTION, SOLUTION INTRAVENOUS
Status: DISCONTINUED | OUTPATIENT
Start: 2021-09-03 | End: 2021-09-29 | Stop reason: HOSPADM

## 2021-09-03 RX ORDER — MAGNESIUM SULFATE 1 G/100ML
1 INJECTION INTRAVENOUS
Status: DISCONTINUED | OUTPATIENT
Start: 2021-09-03 | End: 2021-09-29 | Stop reason: HOSPADM

## 2021-09-03 RX ORDER — POTASSIUM CHLORIDE 20 MEQ/1
40 TABLET, EXTENDED RELEASE ORAL
Status: DISCONTINUED | OUTPATIENT
Start: 2021-09-03 | End: 2021-09-29 | Stop reason: HOSPADM

## 2021-09-03 RX ORDER — ONDANSETRON 2 MG/ML
4 INJECTION INTRAMUSCULAR; INTRAVENOUS EVERY 6 HOURS PRN
Status: DISCONTINUED | OUTPATIENT
Start: 2021-09-03 | End: 2021-09-29 | Stop reason: HOSPADM

## 2021-09-03 RX ORDER — POTASSIUM CHLORIDE 20 MEQ/1
20 TABLET, EXTENDED RELEASE ORAL
Status: DISCONTINUED | OUTPATIENT
Start: 2021-09-03 | End: 2021-09-29 | Stop reason: HOSPADM

## 2021-09-03 RX ORDER — HYDROMORPHONE HYDROCHLORIDE 1 MG/ML
1 INJECTION, SOLUTION INTRAMUSCULAR; INTRAVENOUS; SUBCUTANEOUS EVERY 6 HOURS PRN
Status: DISCONTINUED | OUTPATIENT
Start: 2021-09-03 | End: 2021-09-29 | Stop reason: HOSPADM

## 2021-09-03 RX ORDER — MAGNESIUM SULFATE 1 G/100ML
1 INJECTION INTRAVENOUS ONCE
Status: COMPLETED | OUTPATIENT
Start: 2021-09-03 | End: 2021-09-03

## 2021-09-03 RX ORDER — ACETAMINOPHEN 325 MG/1
650 TABLET ORAL EVERY 4 HOURS PRN
Status: DISCONTINUED | OUTPATIENT
Start: 2021-09-03 | End: 2021-09-29 | Stop reason: HOSPADM

## 2021-09-03 RX ORDER — HYDROCODONE BITARTRATE AND ACETAMINOPHEN 5; 325 MG/1; MG/1
1 TABLET ORAL EVERY 6 HOURS PRN
Status: DISCONTINUED | OUTPATIENT
Start: 2021-09-03 | End: 2021-09-29 | Stop reason: HOSPADM

## 2021-09-03 RX ORDER — LANOLIN ALCOHOL/MO/W.PET/CERES
800 CREAM (GRAM) TOPICAL
Status: DISCONTINUED | OUTPATIENT
Start: 2021-09-03 | End: 2021-09-29 | Stop reason: HOSPADM

## 2021-09-03 RX ORDER — IBUPROFEN 200 MG
24 TABLET ORAL
Status: DISCONTINUED | OUTPATIENT
Start: 2021-09-03 | End: 2021-09-29 | Stop reason: HOSPADM

## 2021-09-03 RX ORDER — IBUPROFEN 200 MG
16 TABLET ORAL
Status: DISCONTINUED | OUTPATIENT
Start: 2021-09-03 | End: 2021-09-29 | Stop reason: HOSPADM

## 2021-09-03 RX ADMIN — VANCOMYCIN HYDROCHLORIDE 1250 MG: 1.25 INJECTION, POWDER, LYOPHILIZED, FOR SOLUTION INTRAVENOUS at 09:09

## 2021-09-03 RX ADMIN — VANCOMYCIN HYDROCHLORIDE 1500 MG: 1.5 INJECTION, POWDER, LYOPHILIZED, FOR SOLUTION INTRAVENOUS at 12:09

## 2021-09-03 RX ADMIN — SODIUM CHLORIDE: 0.9 INJECTION, SOLUTION INTRAVENOUS at 03:09

## 2021-09-03 RX ADMIN — HYDROMORPHONE HYDROCHLORIDE 1 MG: 1 INJECTION, SOLUTION INTRAMUSCULAR; INTRAVENOUS; SUBCUTANEOUS at 12:09

## 2021-09-03 RX ADMIN — PIPERACILLIN SODIUM AND TAZOBACTAM SODIUM 3.38 G: 3; .375 INJECTION, POWDER, LYOPHILIZED, FOR SOLUTION INTRAVENOUS at 03:09

## 2021-09-03 RX ADMIN — HUMAN INSULIN 6 UNITS: 100 INJECTION, SOLUTION SUBCUTANEOUS at 01:09

## 2021-09-03 RX ADMIN — HYDROCODONE BITARTRATE AND ACETAMINOPHEN 1 TABLET: 5; 325 TABLET ORAL at 03:09

## 2021-09-03 RX ADMIN — HYDROMORPHONE HYDROCHLORIDE 1 MG: 1 INJECTION, SOLUTION INTRAMUSCULAR; INTRAVENOUS; SUBCUTANEOUS at 09:09

## 2021-09-03 RX ADMIN — HYDROCODONE BITARTRATE AND ACETAMINOPHEN 1 TABLET: 5; 325 TABLET ORAL at 05:09

## 2021-09-03 RX ADMIN — HUMAN INSULIN 10 UNITS: 100 INJECTION, SOLUTION SUBCUTANEOUS at 08:09

## 2021-09-03 RX ADMIN — HYDROCODONE BITARTRATE AND ACETAMINOPHEN 1 TABLET: 5; 325 TABLET ORAL at 10:09

## 2021-09-03 RX ADMIN — INSULIN DETEMIR 10 UNITS: 100 INJECTION, SOLUTION SUBCUTANEOUS at 09:09

## 2021-09-03 RX ADMIN — MAGNESIUM SULFATE 1 G: 1 INJECTION INTRAVENOUS at 12:09

## 2021-09-03 RX ADMIN — INSULIN ASPART 8 UNITS: 100 INJECTION, SOLUTION INTRAVENOUS; SUBCUTANEOUS at 02:09

## 2021-09-03 RX ADMIN — PIPERACILLIN SODIUM AND TAZOBACTAM SODIUM 3.38 G: 3; .375 INJECTION, POWDER, LYOPHILIZED, FOR SOLUTION INTRAVENOUS at 05:09

## 2021-09-04 ENCOUNTER — ANESTHESIA (OUTPATIENT)
Dept: SURGERY | Facility: HOSPITAL | Age: 61
DRG: 853 | End: 2021-09-04
Payer: MEDICAID

## 2021-09-04 ENCOUNTER — ANESTHESIA EVENT (OUTPATIENT)
Dept: SURGERY | Facility: HOSPITAL | Age: 61
DRG: 853 | End: 2021-09-04
Payer: MEDICAID

## 2021-09-04 PROBLEM — E11.65 UNCONTROLLED TYPE 2 DIABETES MELLITUS WITH HYPERGLYCEMIA: Status: ACTIVE | Noted: 2021-09-04

## 2021-09-04 LAB
ALBUMIN SERPL BCP-MCNC: 1.7 G/DL (ref 3.5–5.2)
ALP SERPL-CCNC: 78 U/L (ref 55–135)
ALT SERPL W/O P-5'-P-CCNC: 13 U/L (ref 10–44)
ANION GAP SERPL CALC-SCNC: 9 MMOL/L (ref 8–16)
AST SERPL-CCNC: 19 U/L (ref 10–40)
BASOPHILS NFR BLD: 0 % (ref 0–1.9)
BILIRUB SERPL-MCNC: 0.5 MG/DL (ref 0.1–1)
BUN SERPL-MCNC: 13 MG/DL (ref 8–23)
CALCIUM SERPL-MCNC: 7.8 MG/DL (ref 8.7–10.5)
CHLORIDE SERPL-SCNC: 91 MMOL/L (ref 95–110)
CO2 SERPL-SCNC: 27 MMOL/L (ref 23–29)
CREAT SERPL-MCNC: 1 MG/DL (ref 0.5–1.4)
DIFFERENTIAL METHOD: ABNORMAL
EOSINOPHIL NFR BLD: 0 % (ref 0–8)
ERYTHROCYTE [DISTWIDTH] IN BLOOD BY AUTOMATED COUNT: 12.5 % (ref 11.5–14.5)
EST. GFR  (AFRICAN AMERICAN): >60 ML/MIN/1.73 M^2
EST. GFR  (NON AFRICAN AMERICAN): >60 ML/MIN/1.73 M^2
GLUCOSE SERPL-MCNC: 183 MG/DL (ref 70–110)
GLUCOSE SERPL-MCNC: 240 MG/DL (ref 70–110)
GLUCOSE SERPL-MCNC: 264 MG/DL (ref 70–110)
GLUCOSE SERPL-MCNC: 279 MG/DL (ref 70–110)
GLUCOSE SERPL-MCNC: 285 MG/DL (ref 70–110)
GLUCOSE SERPL-MCNC: 366 MG/DL (ref 70–110)
HCT VFR BLD AUTO: 31.4 % (ref 40–54)
HGB BLD-MCNC: 10.7 G/DL (ref 14–18)
IMM GRANULOCYTES # BLD AUTO: ABNORMAL K/UL (ref 0–0.04)
IMM GRANULOCYTES NFR BLD AUTO: ABNORMAL % (ref 0–0.5)
LYMPHOCYTES NFR BLD: 6 % (ref 18–48)
MAGNESIUM SERPL-MCNC: 1.5 MG/DL (ref 1.6–2.6)
MCH RBC QN AUTO: 29.2 PG (ref 27–31)
MCHC RBC AUTO-ENTMCNC: 34.1 G/DL (ref 32–36)
MCV RBC AUTO: 86 FL (ref 82–98)
MONOCYTES NFR BLD: 5 % (ref 4–15)
NEUTROPHILS NFR BLD: 65 % (ref 38–73)
NEUTS BAND NFR BLD MANUAL: 24 %
NRBC BLD-RTO: 0 /100 WBC
PLATELET # BLD AUTO: 352 K/UL (ref 150–450)
PMV BLD AUTO: 10 FL (ref 9.2–12.9)
POTASSIUM SERPL-SCNC: 3.8 MMOL/L (ref 3.5–5.1)
PROT SERPL-MCNC: 5.9 G/DL (ref 6–8.4)
RBC # BLD AUTO: 3.66 M/UL (ref 4.6–6.2)
SODIUM SERPL-SCNC: 127 MMOL/L (ref 136–145)
WBC # BLD AUTO: 13.35 K/UL (ref 3.9–12.7)

## 2021-09-04 PROCEDURE — 63600175 PHARM REV CODE 636 W HCPCS: Performed by: NURSE PRACTITIONER

## 2021-09-04 PROCEDURE — 87116 MYCOBACTERIA CULTURE: CPT | Performed by: SURGERY

## 2021-09-04 PROCEDURE — 80053 COMPREHEN METABOLIC PANEL: CPT | Performed by: NURSE PRACTITIONER

## 2021-09-04 PROCEDURE — 82962 GLUCOSE BLOOD TEST: CPT

## 2021-09-04 PROCEDURE — 87070 CULTURE OTHR SPECIMN AEROBIC: CPT | Performed by: SURGERY

## 2021-09-04 PROCEDURE — 27000656 HC EYE GOGGLES: Performed by: STUDENT IN AN ORGANIZED HEALTH CARE EDUCATION/TRAINING PROGRAM

## 2021-09-04 PROCEDURE — 10061 PR DRAIN SKIN ABSCESS COMPLIC: ICD-10-PCS | Mod: ,,, | Performed by: SURGERY

## 2021-09-04 PROCEDURE — 27000671 HC TUBING MICROBORE EXT: Performed by: STUDENT IN AN ORGANIZED HEALTH CARE EDUCATION/TRAINING PROGRAM

## 2021-09-04 PROCEDURE — 63600175 PHARM REV CODE 636 W HCPCS: Performed by: NURSE ANESTHETIST, CERTIFIED REGISTERED

## 2021-09-04 PROCEDURE — 27000080 OPTIME MED/SURG SUP & DEVICES GENERAL CLASSIFICATION: Performed by: SURGERY

## 2021-09-04 PROCEDURE — 85027 COMPLETE CBC AUTOMATED: CPT | Performed by: NURSE PRACTITIONER

## 2021-09-04 PROCEDURE — 63600175 PHARM REV CODE 636 W HCPCS: Performed by: SURGERY

## 2021-09-04 PROCEDURE — 87102 FUNGUS ISOLATION CULTURE: CPT | Performed by: SURGERY

## 2021-09-04 PROCEDURE — 36415 COLL VENOUS BLD VENIPUNCTURE: CPT | Performed by: NURSE PRACTITIONER

## 2021-09-04 PROCEDURE — 25000003 PHARM REV CODE 250: Performed by: INTERNAL MEDICINE

## 2021-09-04 PROCEDURE — 27201480 HC GLIDESCOPE: Performed by: STUDENT IN AN ORGANIZED HEALTH CARE EDUCATION/TRAINING PROGRAM

## 2021-09-04 PROCEDURE — 71000039 HC RECOVERY, EACH ADD'L HOUR: Performed by: SURGERY

## 2021-09-04 PROCEDURE — 87075 CULTR BACTERIA EXCEPT BLOOD: CPT | Performed by: SURGERY

## 2021-09-04 PROCEDURE — 87205 SMEAR GRAM STAIN: CPT | Performed by: SURGERY

## 2021-09-04 PROCEDURE — 83735 ASSAY OF MAGNESIUM: CPT | Performed by: NURSE PRACTITIONER

## 2021-09-04 PROCEDURE — 36000705 HC OR TIME LEV I EA ADD 15 MIN: Performed by: SURGERY

## 2021-09-04 PROCEDURE — 12000002 HC ACUTE/MED SURGE SEMI-PRIVATE ROOM

## 2021-09-04 PROCEDURE — 87147 CULTURE TYPE IMMUNOLOGIC: CPT | Performed by: SURGERY

## 2021-09-04 PROCEDURE — 25000003 PHARM REV CODE 250: Performed by: NURSE ANESTHETIST, CERTIFIED REGISTERED

## 2021-09-04 PROCEDURE — 10061 I&D ABSCESS COMP/MULTIPLE: CPT | Mod: ,,, | Performed by: SURGERY

## 2021-09-04 PROCEDURE — 85007 BL SMEAR W/DIFF WBC COUNT: CPT | Performed by: NURSE PRACTITIONER

## 2021-09-04 PROCEDURE — 37000009 HC ANESTHESIA EA ADD 15 MINS: Performed by: SURGERY

## 2021-09-04 PROCEDURE — 27202107 HC XP QUATRO SENSOR: Performed by: STUDENT IN AN ORGANIZED HEALTH CARE EDUCATION/TRAINING PROGRAM

## 2021-09-04 PROCEDURE — 87118 MYCOBACTERIC IDENTIFICATION: CPT | Performed by: SURGERY

## 2021-09-04 PROCEDURE — 71000033 HC RECOVERY, INTIAL HOUR: Performed by: SURGERY

## 2021-09-04 PROCEDURE — 25000003 PHARM REV CODE 250: Performed by: NURSE PRACTITIONER

## 2021-09-04 PROCEDURE — 82962 GLUCOSE BLOOD TEST: CPT | Performed by: SURGERY

## 2021-09-04 PROCEDURE — 63600175 PHARM REV CODE 636 W HCPCS: Performed by: INTERNAL MEDICINE

## 2021-09-04 PROCEDURE — 27000663 HC PAD, ARM CRADLE PRONE: Performed by: STUDENT IN AN ORGANIZED HEALTH CARE EDUCATION/TRAINING PROGRAM

## 2021-09-04 PROCEDURE — 27000657 HC HEADREST, PRONE: Performed by: STUDENT IN AN ORGANIZED HEALTH CARE EDUCATION/TRAINING PROGRAM

## 2021-09-04 PROCEDURE — 63600175 PHARM REV CODE 636 W HCPCS: Performed by: STUDENT IN AN ORGANIZED HEALTH CARE EDUCATION/TRAINING PROGRAM

## 2021-09-04 PROCEDURE — 27000284 HC CANNULA NASAL: Performed by: STUDENT IN AN ORGANIZED HEALTH CARE EDUCATION/TRAINING PROGRAM

## 2021-09-04 PROCEDURE — 87206 SMEAR FLUORESCENT/ACID STAI: CPT | Performed by: SURGERY

## 2021-09-04 PROCEDURE — 36000704 HC OR TIME LEV I 1ST 15 MIN: Performed by: SURGERY

## 2021-09-04 PROCEDURE — 27000673 HC TUBING BLOOD Y: Performed by: STUDENT IN AN ORGANIZED HEALTH CARE EDUCATION/TRAINING PROGRAM

## 2021-09-04 PROCEDURE — 25000003 PHARM REV CODE 250: Performed by: STUDENT IN AN ORGANIZED HEALTH CARE EDUCATION/TRAINING PROGRAM

## 2021-09-04 PROCEDURE — 27201107 HC STYLET, STANDARD: Performed by: STUDENT IN AN ORGANIZED HEALTH CARE EDUCATION/TRAINING PROGRAM

## 2021-09-04 PROCEDURE — 37000008 HC ANESTHESIA 1ST 15 MINUTES: Performed by: SURGERY

## 2021-09-04 PROCEDURE — 25000003 PHARM REV CODE 250: Performed by: SURGERY

## 2021-09-04 RX ORDER — LIDOCAINE HYDROCHLORIDE 20 MG/ML
INJECTION, SOLUTION EPIDURAL; INFILTRATION; INTRACAUDAL; PERINEURAL
Status: DISCONTINUED | OUTPATIENT
Start: 2021-09-04 | End: 2021-09-04

## 2021-09-04 RX ORDER — PROPOFOL 10 MG/ML
VIAL (ML) INTRAVENOUS
Status: DISCONTINUED | OUTPATIENT
Start: 2021-09-04 | End: 2021-09-04

## 2021-09-04 RX ORDER — DIPHENHYDRAMINE HYDROCHLORIDE 50 MG/ML
12.5 INJECTION INTRAMUSCULAR; INTRAVENOUS
Status: DISCONTINUED | OUTPATIENT
Start: 2021-09-04 | End: 2021-09-04

## 2021-09-04 RX ORDER — FAMOTIDINE 10 MG/ML
INJECTION INTRAVENOUS
Status: DISCONTINUED | OUTPATIENT
Start: 2021-09-04 | End: 2021-09-04

## 2021-09-04 RX ORDER — LIDOCAINE HYDROCHLORIDE 20 MG/ML
JELLY TOPICAL
Status: DISCONTINUED | OUTPATIENT
Start: 2021-09-04 | End: 2021-09-04

## 2021-09-04 RX ORDER — HYDROMORPHONE HYDROCHLORIDE 1 MG/ML
0.2 INJECTION, SOLUTION INTRAMUSCULAR; INTRAVENOUS; SUBCUTANEOUS
Status: DISCONTINUED | OUTPATIENT
Start: 2021-09-04 | End: 2021-09-04

## 2021-09-04 RX ORDER — SODIUM CHLORIDE 0.9 % (FLUSH) 0.9 %
10 SYRINGE (ML) INJECTION
Status: DISCONTINUED | OUTPATIENT
Start: 2021-09-04 | End: 2021-09-29 | Stop reason: HOSPADM

## 2021-09-04 RX ORDER — ROCURONIUM BROMIDE 10 MG/ML
INJECTION, SOLUTION INTRAVENOUS
Status: DISCONTINUED | OUTPATIENT
Start: 2021-09-04 | End: 2021-09-04

## 2021-09-04 RX ORDER — MIDAZOLAM HYDROCHLORIDE 1 MG/ML
INJECTION INTRAMUSCULAR; INTRAVENOUS
Status: DISCONTINUED | OUTPATIENT
Start: 2021-09-04 | End: 2021-09-04

## 2021-09-04 RX ORDER — ONDANSETRON 2 MG/ML
INJECTION INTRAMUSCULAR; INTRAVENOUS
Status: DISCONTINUED | OUTPATIENT
Start: 2021-09-04 | End: 2021-09-04

## 2021-09-04 RX ORDER — MEPERIDINE HYDROCHLORIDE 50 MG/ML
12.5 INJECTION INTRAMUSCULAR; INTRAVENOUS; SUBCUTANEOUS EVERY 10 MIN PRN
Status: DISCONTINUED | OUTPATIENT
Start: 2021-09-04 | End: 2021-09-04

## 2021-09-04 RX ORDER — PHENYLEPHRINE HYDROCHLORIDE 10 MG/ML
INJECTION INTRAVENOUS
Status: DISCONTINUED | OUTPATIENT
Start: 2021-09-04 | End: 2021-09-04

## 2021-09-04 RX ORDER — ACETAMINOPHEN 10 MG/ML
INJECTION, SOLUTION INTRAVENOUS
Status: DISCONTINUED | OUTPATIENT
Start: 2021-09-04 | End: 2021-09-04

## 2021-09-04 RX ORDER — SUCCINYLCHOLINE CHLORIDE 20 MG/ML
INJECTION INTRAMUSCULAR; INTRAVENOUS
Status: DISCONTINUED | OUTPATIENT
Start: 2021-09-04 | End: 2021-09-04

## 2021-09-04 RX ORDER — SODIUM CHLORIDE, SODIUM LACTATE, POTASSIUM CHLORIDE, CALCIUM CHLORIDE 600; 310; 30; 20 MG/100ML; MG/100ML; MG/100ML; MG/100ML
INJECTION, SOLUTION INTRAVENOUS CONTINUOUS PRN
Status: DISCONTINUED | OUTPATIENT
Start: 2021-09-04 | End: 2021-09-04

## 2021-09-04 RX ORDER — ONDANSETRON 2 MG/ML
4 INJECTION INTRAMUSCULAR; INTRAVENOUS DAILY PRN
Status: DISCONTINUED | OUTPATIENT
Start: 2021-09-04 | End: 2021-09-04

## 2021-09-04 RX ORDER — OXYCODONE HYDROCHLORIDE 5 MG/1
5 TABLET ORAL
Status: DISCONTINUED | OUTPATIENT
Start: 2021-09-04 | End: 2021-09-04

## 2021-09-04 RX ORDER — FENTANYL CITRATE 50 UG/ML
INJECTION, SOLUTION INTRAMUSCULAR; INTRAVENOUS
Status: DISCONTINUED | OUTPATIENT
Start: 2021-09-04 | End: 2021-09-04

## 2021-09-04 RX ADMIN — ROCURONIUM BROMIDE 5 MG: 10 INJECTION, SOLUTION INTRAVENOUS at 08:09

## 2021-09-04 RX ADMIN — LIDOCAINE HYDROCHLORIDE 80 MG: 20 INJECTION, SOLUTION INTRAVENOUS at 08:09

## 2021-09-04 RX ADMIN — LIDOCAINE HYDROCHLORIDE 3 ML: 20 JELLY TOPICAL at 08:09

## 2021-09-04 RX ADMIN — PIPERACILLIN SODIUM AND TAZOBACTAM SODIUM 3.38 G: 3; .375 INJECTION, POWDER, LYOPHILIZED, FOR SOLUTION INTRAVENOUS at 01:09

## 2021-09-04 RX ADMIN — PHENYLEPHRINE HYDROCHLORIDE 100 MCG: 10 INJECTION INTRAVENOUS at 09:09

## 2021-09-04 RX ADMIN — VANCOMYCIN HYDROCHLORIDE 1250 MG: 1.25 INJECTION, POWDER, LYOPHILIZED, FOR SOLUTION INTRAVENOUS at 02:09

## 2021-09-04 RX ADMIN — PHENYLEPHRINE HYDROCHLORIDE 100 MCG: 10 INJECTION INTRAVENOUS at 08:09

## 2021-09-04 RX ADMIN — FENTANYL CITRATE 50 MCG: 50 INJECTION INTRAMUSCULAR; INTRAVENOUS at 09:09

## 2021-09-04 RX ADMIN — HYDROCODONE BITARTRATE AND ACETAMINOPHEN 1 TABLET: 5; 325 TABLET ORAL at 09:09

## 2021-09-04 RX ADMIN — ONDANSETRON 4 MG: 2 INJECTION INTRAMUSCULAR; INTRAVENOUS at 08:09

## 2021-09-04 RX ADMIN — FENTANYL CITRATE 50 MCG: 50 INJECTION INTRAMUSCULAR; INTRAVENOUS at 08:09

## 2021-09-04 RX ADMIN — HYDROMORPHONE HYDROCHLORIDE 0.2 MG: 1 INJECTION, SOLUTION INTRAMUSCULAR; INTRAVENOUS; SUBCUTANEOUS at 10:09

## 2021-09-04 RX ADMIN — PIPERACILLIN SODIUM AND TAZOBACTAM SODIUM 3.38 G: 3; .375 INJECTION, POWDER, LYOPHILIZED, FOR SOLUTION INTRAVENOUS at 05:09

## 2021-09-04 RX ADMIN — PIPERACILLIN SODIUM AND TAZOBACTAM SODIUM 3.38 G: 3; .375 INJECTION, POWDER, LYOPHILIZED, FOR SOLUTION INTRAVENOUS at 08:09

## 2021-09-04 RX ADMIN — FAMOTIDINE 20 MG: 10 INJECTION, SOLUTION INTRAVENOUS at 08:09

## 2021-09-04 RX ADMIN — ACETAMINOPHEN 1000 MG: 10 INJECTION, SOLUTION INTRAVENOUS at 08:09

## 2021-09-04 RX ADMIN — HYDROCODONE BITARTRATE AND ACETAMINOPHEN 1 TABLET: 5; 325 TABLET ORAL at 03:09

## 2021-09-04 RX ADMIN — PHENYLEPHRINE HYDROCHLORIDE 200 MCG: 10 INJECTION INTRAVENOUS at 08:09

## 2021-09-04 RX ADMIN — MIDAZOLAM HYDROCHLORIDE 1 MG: 1 INJECTION, SOLUTION INTRAMUSCULAR; INTRAVENOUS at 08:09

## 2021-09-04 RX ADMIN — INSULIN ASPART 5 UNITS: 100 INJECTION, SOLUTION INTRAVENOUS; SUBCUTANEOUS at 09:09

## 2021-09-04 RX ADMIN — SODIUM CHLORIDE, SODIUM LACTATE, POTASSIUM CHLORIDE, AND CALCIUM CHLORIDE: .6; .31; .03; .02 INJECTION, SOLUTION INTRAVENOUS at 08:09

## 2021-09-04 RX ADMIN — SUCCINYLCHOLINE CHLORIDE 120 MG: 20 INJECTION, SOLUTION INTRAMUSCULAR; INTRAVENOUS at 08:09

## 2021-09-04 RX ADMIN — OXYCODONE HYDROCHLORIDE 5 MG: 5 TABLET ORAL at 10:09

## 2021-09-04 RX ADMIN — PROPOFOL 20 MG: 10 INJECTION, EMULSION INTRAVENOUS at 09:09

## 2021-09-04 RX ADMIN — HUMAN INSULIN 6 UNITS: 100 INJECTION, SOLUTION SUBCUTANEOUS at 10:09

## 2021-09-04 RX ADMIN — PROPOFOL 120 MG: 10 INJECTION, EMULSION INTRAVENOUS at 08:09

## 2021-09-05 LAB
ALBUMIN SERPL BCP-MCNC: 1.7 G/DL (ref 3.5–5.2)
ALP SERPL-CCNC: 96 U/L (ref 55–135)
ALT SERPL W/O P-5'-P-CCNC: 14 U/L (ref 10–44)
ANION GAP SERPL CALC-SCNC: 11 MMOL/L (ref 8–16)
ANION GAP SERPL CALC-SCNC: 9 MMOL/L (ref 8–16)
AST SERPL-CCNC: 19 U/L (ref 10–40)
B-OH-BUTYR BLD STRIP-SCNC: 0.1 MMOL/L (ref 0–0.5)
BACTERIA BLD CULT: ABNORMAL
BASOPHILS # BLD AUTO: 0.02 K/UL (ref 0–0.2)
BASOPHILS NFR BLD: 0.1 % (ref 0–1.9)
BILIRUB SERPL-MCNC: 0.9 MG/DL (ref 0.1–1)
BUN SERPL-MCNC: 22 MG/DL (ref 8–23)
BUN SERPL-MCNC: 22 MG/DL (ref 8–23)
CALCIUM SERPL-MCNC: 7.8 MG/DL (ref 8.7–10.5)
CALCIUM SERPL-MCNC: 7.8 MG/DL (ref 8.7–10.5)
CHLORIDE SERPL-SCNC: 95 MMOL/L (ref 95–110)
CHLORIDE SERPL-SCNC: 98 MMOL/L (ref 95–110)
CO2 SERPL-SCNC: 25 MMOL/L (ref 23–29)
CO2 SERPL-SCNC: 26 MMOL/L (ref 23–29)
CREAT SERPL-MCNC: 2 MG/DL (ref 0.5–1.4)
CREAT SERPL-MCNC: 2.3 MG/DL (ref 0.5–1.4)
CRP SERPL-MCNC: 17.82 MG/DL
DIFFERENTIAL METHOD: ABNORMAL
EOSINOPHIL # BLD AUTO: 0.1 K/UL (ref 0–0.5)
EOSINOPHIL NFR BLD: 0.6 % (ref 0–8)
ERYTHROCYTE [DISTWIDTH] IN BLOOD BY AUTOMATED COUNT: 13.1 % (ref 11.5–14.5)
EST. GFR  (AFRICAN AMERICAN): 34.2 ML/MIN/1.73 M^2
EST. GFR  (AFRICAN AMERICAN): 40.4 ML/MIN/1.73 M^2
EST. GFR  (NON AFRICAN AMERICAN): 29.5 ML/MIN/1.73 M^2
EST. GFR  (NON AFRICAN AMERICAN): 35 ML/MIN/1.73 M^2
GLUCOSE SERPL-MCNC: 340 MG/DL (ref 70–110)
GLUCOSE SERPL-MCNC: 380 MG/DL (ref 70–110)
GLUCOSE SERPL-MCNC: 420 MG/DL (ref 70–110)
GLUCOSE SERPL-MCNC: 426 MG/DL (ref 70–110)
GLUCOSE SERPL-MCNC: 441 MG/DL (ref 70–110)
GLUCOSE SERPL-MCNC: 480 MG/DL (ref 70–110)
HCT VFR BLD AUTO: 27.3 % (ref 40–54)
HGB BLD-MCNC: 9.1 G/DL (ref 14–18)
IMM GRANULOCYTES # BLD AUTO: 0.23 K/UL (ref 0–0.04)
IMM GRANULOCYTES NFR BLD AUTO: 1.5 % (ref 0–0.5)
LYMPHOCYTES # BLD AUTO: 1.2 K/UL (ref 1–4.8)
LYMPHOCYTES NFR BLD: 7.8 % (ref 18–48)
MAGNESIUM SERPL-MCNC: 1.6 MG/DL (ref 1.6–2.6)
MCH RBC QN AUTO: 29.1 PG (ref 27–31)
MCHC RBC AUTO-ENTMCNC: 33.3 G/DL (ref 32–36)
MCV RBC AUTO: 87 FL (ref 82–98)
MONOCYTES # BLD AUTO: 1.1 K/UL (ref 0.3–1)
MONOCYTES NFR BLD: 7.5 % (ref 4–15)
NEUTROPHILS # BLD AUTO: 12.6 K/UL (ref 1.8–7.7)
NEUTROPHILS NFR BLD: 82.5 % (ref 38–73)
NRBC BLD-RTO: 0 /100 WBC
PLATELET # BLD AUTO: 343 K/UL (ref 150–450)
PMV BLD AUTO: 9.7 FL (ref 9.2–12.9)
POTASSIUM SERPL-SCNC: 4 MMOL/L (ref 3.5–5.1)
POTASSIUM SERPL-SCNC: 4.4 MMOL/L (ref 3.5–5.1)
PROT SERPL-MCNC: 5.9 G/DL (ref 6–8.4)
RBC # BLD AUTO: 3.13 M/UL (ref 4.6–6.2)
SODIUM SERPL-SCNC: 132 MMOL/L (ref 136–145)
SODIUM SERPL-SCNC: 132 MMOL/L (ref 136–145)
VANCOMYCIN TROUGH SERPL-MCNC: 24.8 UG/ML
WBC # BLD AUTO: 15.23 K/UL (ref 3.9–12.7)

## 2021-09-05 PROCEDURE — 99900031 HC PATIENT EDUCATION (STAT)

## 2021-09-05 PROCEDURE — 94761 N-INVAS EAR/PLS OXIMETRY MLT: CPT

## 2021-09-05 PROCEDURE — 86140 C-REACTIVE PROTEIN: CPT | Performed by: STUDENT IN AN ORGANIZED HEALTH CARE EDUCATION/TRAINING PROGRAM

## 2021-09-05 PROCEDURE — 36415 COLL VENOUS BLD VENIPUNCTURE: CPT | Performed by: SURGERY

## 2021-09-05 PROCEDURE — 63600175 PHARM REV CODE 636 W HCPCS: Performed by: STUDENT IN AN ORGANIZED HEALTH CARE EDUCATION/TRAINING PROGRAM

## 2021-09-05 PROCEDURE — 82962 GLUCOSE BLOOD TEST: CPT

## 2021-09-05 PROCEDURE — 82010 KETONE BODYS QUAN: CPT | Performed by: STUDENT IN AN ORGANIZED HEALTH CARE EDUCATION/TRAINING PROGRAM

## 2021-09-05 PROCEDURE — 85025 COMPLETE CBC W/AUTO DIFF WBC: CPT | Performed by: SURGERY

## 2021-09-05 PROCEDURE — 80048 BASIC METABOLIC PNL TOTAL CA: CPT | Performed by: STUDENT IN AN ORGANIZED HEALTH CARE EDUCATION/TRAINING PROGRAM

## 2021-09-05 PROCEDURE — 94799 UNLISTED PULMONARY SVC/PX: CPT

## 2021-09-05 PROCEDURE — 99223 1ST HOSP IP/OBS HIGH 75: CPT | Mod: ,,, | Performed by: INTERNAL MEDICINE

## 2021-09-05 PROCEDURE — 12000002 HC ACUTE/MED SURGE SEMI-PRIVATE ROOM

## 2021-09-05 PROCEDURE — 80202 ASSAY OF VANCOMYCIN: CPT | Performed by: SURGERY

## 2021-09-05 PROCEDURE — 36415 COLL VENOUS BLD VENIPUNCTURE: CPT | Performed by: STUDENT IN AN ORGANIZED HEALTH CARE EDUCATION/TRAINING PROGRAM

## 2021-09-05 PROCEDURE — 99223 PR INITIAL HOSPITAL CARE,LEVL III: ICD-10-PCS | Mod: ,,, | Performed by: INTERNAL MEDICINE

## 2021-09-05 PROCEDURE — 25000003 PHARM REV CODE 250: Performed by: SURGERY

## 2021-09-05 PROCEDURE — 83735 ASSAY OF MAGNESIUM: CPT | Performed by: SURGERY

## 2021-09-05 PROCEDURE — 25000003 PHARM REV CODE 250: Performed by: STUDENT IN AN ORGANIZED HEALTH CARE EDUCATION/TRAINING PROGRAM

## 2021-09-05 PROCEDURE — 63600175 PHARM REV CODE 636 W HCPCS: Performed by: SURGERY

## 2021-09-05 PROCEDURE — 80053 COMPREHEN METABOLIC PANEL: CPT | Performed by: SURGERY

## 2021-09-05 PROCEDURE — 99900035 HC TECH TIME PER 15 MIN (STAT)

## 2021-09-05 RX ORDER — INSULIN ASPART 100 [IU]/ML
10 INJECTION, SOLUTION INTRAVENOUS; SUBCUTANEOUS
Status: DISCONTINUED | OUTPATIENT
Start: 2021-09-05 | End: 2021-09-07

## 2021-09-05 RX ADMIN — PIPERACILLIN SODIUM AND TAZOBACTAM SODIUM 3.38 G: 3; .375 INJECTION, POWDER, LYOPHILIZED, FOR SOLUTION INTRAVENOUS at 04:09

## 2021-09-05 RX ADMIN — SODIUM CHLORIDE: 0.9 INJECTION, SOLUTION INTRAVENOUS at 10:09

## 2021-09-05 RX ADMIN — PIPERACILLIN SODIUM AND TAZOBACTAM SODIUM 3.38 G: 3; .375 INJECTION, POWDER, LYOPHILIZED, FOR SOLUTION INTRAVENOUS at 02:09

## 2021-09-05 RX ADMIN — SODIUM CHLORIDE: 0.9 INJECTION, SOLUTION INTRAVENOUS at 02:09

## 2021-09-05 RX ADMIN — INSULIN ASPART 10 UNITS: 100 INJECTION, SOLUTION INTRAVENOUS; SUBCUTANEOUS at 01:09

## 2021-09-05 RX ADMIN — HYDROMORPHONE HYDROCHLORIDE 1 MG: 1 INJECTION, SOLUTION INTRAMUSCULAR; INTRAVENOUS; SUBCUTANEOUS at 12:09

## 2021-09-05 RX ADMIN — HYDROCODONE BITARTRATE AND ACETAMINOPHEN 1 TABLET: 5; 325 TABLET ORAL at 11:09

## 2021-09-05 RX ADMIN — PIPERACILLIN SODIUM AND TAZOBACTAM SODIUM 3.38 G: 3; .375 INJECTION, POWDER, LYOPHILIZED, FOR SOLUTION INTRAVENOUS at 09:09

## 2021-09-05 RX ADMIN — HYDROMORPHONE HYDROCHLORIDE 1 MG: 1 INJECTION, SOLUTION INTRAMUSCULAR; INTRAVENOUS; SUBCUTANEOUS at 11:09

## 2021-09-05 RX ADMIN — INSULIN ASPART 5 UNITS: 100 INJECTION, SOLUTION INTRAVENOUS; SUBCUTANEOUS at 10:09

## 2021-09-05 RX ADMIN — SODIUM CHLORIDE 1000 ML: 0.9 INJECTION, SOLUTION INTRAVENOUS at 09:09

## 2021-09-05 RX ADMIN — INSULIN ASPART 10 UNITS: 100 INJECTION, SOLUTION INTRAVENOUS; SUBCUTANEOUS at 04:09

## 2021-09-06 LAB
ALBUMIN SERPL BCP-MCNC: 1.7 G/DL (ref 3.5–5.2)
ALP SERPL-CCNC: 97 U/L (ref 55–135)
ALT SERPL W/O P-5'-P-CCNC: 13 U/L (ref 10–44)
ANION GAP SERPL CALC-SCNC: 14 MMOL/L (ref 8–16)
AST SERPL-CCNC: 20 U/L (ref 10–40)
BACTERIA BLD CULT: ABNORMAL
BACTERIA SPEC AEROBE CULT: ABNORMAL
BACTERIA SPEC ANAEROBE CULT: NORMAL
BASOPHILS # BLD AUTO: 0.04 K/UL (ref 0–0.2)
BASOPHILS NFR BLD: 0.3 % (ref 0–1.9)
BILIRUB SERPL-MCNC: 0.7 MG/DL (ref 0.1–1)
BUN SERPL-MCNC: 22 MG/DL (ref 8–23)
CALCIUM SERPL-MCNC: 7.7 MG/DL (ref 8.7–10.5)
CHLORIDE SERPL-SCNC: 95 MMOL/L (ref 95–110)
CO2 SERPL-SCNC: 21 MMOL/L (ref 23–29)
CREAT SERPL-MCNC: 2.2 MG/DL (ref 0.5–1.4)
CRP SERPL-MCNC: 11 MG/DL
DIFFERENTIAL METHOD: ABNORMAL
EOSINOPHIL # BLD AUTO: 0.2 K/UL (ref 0–0.5)
EOSINOPHIL NFR BLD: 1.7 % (ref 0–8)
ERYTHROCYTE [DISTWIDTH] IN BLOOD BY AUTOMATED COUNT: 12.9 % (ref 11.5–14.5)
EST. GFR  (AFRICAN AMERICAN): 36 ML/MIN/1.73 M^2
EST. GFR  (NON AFRICAN AMERICAN): 31.2 ML/MIN/1.73 M^2
GLUCOSE SERPL-MCNC: 106 MG/DL (ref 70–110)
GLUCOSE SERPL-MCNC: 125 MG/DL (ref 70–110)
GLUCOSE SERPL-MCNC: 177 MG/DL (ref 70–110)
GLUCOSE SERPL-MCNC: 214 MG/DL (ref 70–110)
GLUCOSE SERPL-MCNC: 262 MG/DL (ref 70–110)
GLUCOSE SERPL-MCNC: 276 MG/DL (ref 70–110)
GLUCOSE SERPL-MCNC: 424 MG/DL (ref 70–110)
HCT VFR BLD AUTO: 27.4 % (ref 40–54)
HGB BLD-MCNC: 9.1 G/DL (ref 14–18)
IMM GRANULOCYTES # BLD AUTO: 0.17 K/UL (ref 0–0.04)
IMM GRANULOCYTES NFR BLD AUTO: 1.3 % (ref 0–0.5)
LYMPHOCYTES # BLD AUTO: 1.4 K/UL (ref 1–4.8)
LYMPHOCYTES NFR BLD: 11.2 % (ref 18–48)
MAGNESIUM SERPL-MCNC: 1.6 MG/DL (ref 1.6–2.6)
MCH RBC QN AUTO: 29.5 PG (ref 27–31)
MCHC RBC AUTO-ENTMCNC: 33.2 G/DL (ref 32–36)
MCV RBC AUTO: 89 FL (ref 82–98)
MONOCYTES # BLD AUTO: 1.1 K/UL (ref 0.3–1)
MONOCYTES NFR BLD: 8.2 % (ref 4–15)
NEUTROPHILS # BLD AUTO: 9.9 K/UL (ref 1.8–7.7)
NEUTROPHILS NFR BLD: 77.3 % (ref 38–73)
NRBC BLD-RTO: 0 /100 WBC
PLATELET # BLD AUTO: 365 K/UL (ref 150–450)
PMV BLD AUTO: 9.5 FL (ref 9.2–12.9)
POTASSIUM SERPL-SCNC: 4.4 MMOL/L (ref 3.5–5.1)
PROT SERPL-MCNC: 5.5 G/DL (ref 6–8.4)
RBC # BLD AUTO: 3.08 M/UL (ref 4.6–6.2)
SODIUM SERPL-SCNC: 130 MMOL/L (ref 136–145)
WBC # BLD AUTO: 12.8 K/UL (ref 3.9–12.7)

## 2021-09-06 PROCEDURE — 12000002 HC ACUTE/MED SURGE SEMI-PRIVATE ROOM

## 2021-09-06 PROCEDURE — 99900035 HC TECH TIME PER 15 MIN (STAT)

## 2021-09-06 PROCEDURE — C9399 UNCLASSIFIED DRUGS OR BIOLOG: HCPCS | Performed by: STUDENT IN AN ORGANIZED HEALTH CARE EDUCATION/TRAINING PROGRAM

## 2021-09-06 PROCEDURE — 36415 COLL VENOUS BLD VENIPUNCTURE: CPT | Performed by: SURGERY

## 2021-09-06 PROCEDURE — 99232 SBSQ HOSP IP/OBS MODERATE 35: CPT | Mod: ,,, | Performed by: INTERNAL MEDICINE

## 2021-09-06 PROCEDURE — 97161 PT EVAL LOW COMPLEX 20 MIN: CPT

## 2021-09-06 PROCEDURE — 25000003 PHARM REV CODE 250: Performed by: INTERNAL MEDICINE

## 2021-09-06 PROCEDURE — 80053 COMPREHEN METABOLIC PANEL: CPT | Performed by: SURGERY

## 2021-09-06 PROCEDURE — 99900031 HC PATIENT EDUCATION (STAT)

## 2021-09-06 PROCEDURE — 85025 COMPLETE CBC W/AUTO DIFF WBC: CPT | Performed by: SURGERY

## 2021-09-06 PROCEDURE — 25000003 PHARM REV CODE 250: Performed by: SURGERY

## 2021-09-06 PROCEDURE — 99232 PR SUBSEQUENT HOSPITAL CARE,LEVL II: ICD-10-PCS | Mod: ,,, | Performed by: INTERNAL MEDICINE

## 2021-09-06 PROCEDURE — 99222 1ST HOSP IP/OBS MODERATE 55: CPT | Mod: ,,, | Performed by: PODIATRIST

## 2021-09-06 PROCEDURE — 25000003 PHARM REV CODE 250: Performed by: PODIATRIST

## 2021-09-06 PROCEDURE — 83735 ASSAY OF MAGNESIUM: CPT | Performed by: SURGERY

## 2021-09-06 PROCEDURE — 97607 NEG PRS WND THR NDME<=50SQCM: CPT

## 2021-09-06 PROCEDURE — 99222 PR INITIAL HOSPITAL CARE,LEVL II: ICD-10-PCS | Mod: ,,, | Performed by: PODIATRIST

## 2021-09-06 PROCEDURE — 63600175 PHARM REV CODE 636 W HCPCS: Performed by: SURGERY

## 2021-09-06 PROCEDURE — 25000003 PHARM REV CODE 250: Performed by: STUDENT IN AN ORGANIZED HEALTH CARE EDUCATION/TRAINING PROGRAM

## 2021-09-06 PROCEDURE — 94761 N-INVAS EAR/PLS OXIMETRY MLT: CPT

## 2021-09-06 PROCEDURE — 97116 GAIT TRAINING THERAPY: CPT

## 2021-09-06 PROCEDURE — 86140 C-REACTIVE PROTEIN: CPT | Performed by: STUDENT IN AN ORGANIZED HEALTH CARE EDUCATION/TRAINING PROGRAM

## 2021-09-06 RX ORDER — DIPHENHYDRAMINE HCL 25 MG
25 CAPSULE ORAL EVERY 6 HOURS PRN
Status: DISCONTINUED | OUTPATIENT
Start: 2021-09-06 | End: 2021-09-29 | Stop reason: HOSPADM

## 2021-09-06 RX ADMIN — INSULIN DETEMIR 10 UNITS: 100 INJECTION, SOLUTION SUBCUTANEOUS at 08:09

## 2021-09-06 RX ADMIN — HYDROMORPHONE HYDROCHLORIDE 1 MG: 1 INJECTION, SOLUTION INTRAMUSCULAR; INTRAVENOUS; SUBCUTANEOUS at 01:09

## 2021-09-06 RX ADMIN — INSULIN ASPART 10 UNITS: 100 INJECTION, SOLUTION INTRAVENOUS; SUBCUTANEOUS at 08:09

## 2021-09-06 RX ADMIN — HYDROMORPHONE HYDROCHLORIDE 1 MG: 1 INJECTION, SOLUTION INTRAMUSCULAR; INTRAVENOUS; SUBCUTANEOUS at 08:09

## 2021-09-06 RX ADMIN — SODIUM CHLORIDE: 0.9 INJECTION, SOLUTION INTRAVENOUS at 09:09

## 2021-09-06 RX ADMIN — DIPHENHYDRAMINE HYDROCHLORIDE 25 MG: 25 CAPSULE ORAL at 09:09

## 2021-09-06 RX ADMIN — PIPERACILLIN SODIUM AND TAZOBACTAM SODIUM 3.38 G: 3; .375 INJECTION, POWDER, LYOPHILIZED, FOR SOLUTION INTRAVENOUS at 08:09

## 2021-09-06 RX ADMIN — INSULIN ASPART 10 UNITS: 100 INJECTION, SOLUTION INTRAVENOUS; SUBCUTANEOUS at 04:09

## 2021-09-06 RX ADMIN — PIPERACILLIN SODIUM AND TAZOBACTAM SODIUM 3.38 G: 3; .375 INJECTION, POWDER, LYOPHILIZED, FOR SOLUTION INTRAVENOUS at 01:09

## 2021-09-06 RX ADMIN — HYDROCODONE BITARTRATE AND ACETAMINOPHEN 1 TABLET: 5; 325 TABLET ORAL at 11:09

## 2021-09-06 RX ADMIN — COLLAGENASE SANTYL: 250 OINTMENT TOPICAL at 11:09

## 2021-09-06 RX ADMIN — PIPERACILLIN SODIUM AND TAZOBACTAM SODIUM 3.38 G: 3; .375 INJECTION, POWDER, LYOPHILIZED, FOR SOLUTION INTRAVENOUS at 04:09

## 2021-09-07 ENCOUNTER — CLINICAL SUPPORT (OUTPATIENT)
Dept: CARDIOLOGY | Facility: HOSPITAL | Age: 61
DRG: 853 | End: 2021-09-07
Attending: PODIATRIST
Payer: MEDICAID

## 2021-09-07 LAB
ALBUMIN SERPL BCP-MCNC: 1.6 G/DL (ref 3.5–5.2)
ALP SERPL-CCNC: 75 U/L (ref 55–135)
ALT SERPL W/O P-5'-P-CCNC: 11 U/L (ref 10–44)
ANION GAP SERPL CALC-SCNC: 8 MMOL/L (ref 8–16)
AST SERPL-CCNC: 15 U/L (ref 10–40)
BACTERIA BLD CULT: NORMAL
BASOPHILS # BLD AUTO: 0.03 K/UL (ref 0–0.2)
BASOPHILS NFR BLD: 0.3 % (ref 0–1.9)
BILIRUB SERPL-MCNC: 0.8 MG/DL (ref 0.1–1)
BUN SERPL-MCNC: 23 MG/DL (ref 8–23)
CALCIUM SERPL-MCNC: 7.6 MG/DL (ref 8.7–10.5)
CHLORIDE SERPL-SCNC: 101 MMOL/L (ref 95–110)
CO2 SERPL-SCNC: 26 MMOL/L (ref 23–29)
CREAT SERPL-MCNC: 2.3 MG/DL (ref 0.5–1.4)
CRP SERPL-MCNC: 8.67 MG/DL
DIFFERENTIAL METHOD: ABNORMAL
EOSINOPHIL # BLD AUTO: 0.2 K/UL (ref 0–0.5)
EOSINOPHIL NFR BLD: 2.1 % (ref 0–8)
ERYTHROCYTE [DISTWIDTH] IN BLOOD BY AUTOMATED COUNT: 13.2 % (ref 11.5–14.5)
EST. GFR  (AFRICAN AMERICAN): 34.2 ML/MIN/1.73 M^2
EST. GFR  (NON AFRICAN AMERICAN): 29.5 ML/MIN/1.73 M^2
GLUCOSE SERPL-MCNC: 150 MG/DL (ref 70–110)
GLUCOSE SERPL-MCNC: 152 MG/DL (ref 70–110)
GLUCOSE SERPL-MCNC: 171 MG/DL (ref 70–110)
GLUCOSE SERPL-MCNC: 295 MG/DL (ref 70–110)
GLUCOSE SERPL-MCNC: 321 MG/DL (ref 70–110)
GRAM STN SPEC: NORMAL
GRAM STN SPEC: NORMAL
HCT VFR BLD AUTO: 25.1 % (ref 40–54)
HGB BLD-MCNC: 8.1 G/DL (ref 14–18)
IMM GRANULOCYTES # BLD AUTO: 0.17 K/UL (ref 0–0.04)
IMM GRANULOCYTES NFR BLD AUTO: 1.5 % (ref 0–0.5)
LYMPHOCYTES # BLD AUTO: 1.6 K/UL (ref 1–4.8)
LYMPHOCYTES NFR BLD: 13.9 % (ref 18–48)
MAGNESIUM SERPL-MCNC: 1.7 MG/DL (ref 1.6–2.6)
MCH RBC QN AUTO: 28.5 PG (ref 27–31)
MCHC RBC AUTO-ENTMCNC: 32.3 G/DL (ref 32–36)
MCV RBC AUTO: 88 FL (ref 82–98)
MONOCYTES # BLD AUTO: 1.1 K/UL (ref 0.3–1)
MONOCYTES NFR BLD: 9.5 % (ref 4–15)
NEUTROPHILS # BLD AUTO: 8.1 K/UL (ref 1.8–7.7)
NEUTROPHILS NFR BLD: 72.7 % (ref 38–73)
NRBC BLD-RTO: 0 /100 WBC
PLATELET # BLD AUTO: 348 K/UL (ref 150–450)
PMV BLD AUTO: 9.5 FL (ref 9.2–12.9)
POTASSIUM SERPL-SCNC: 3.9 MMOL/L (ref 3.5–5.1)
PROT SERPL-MCNC: 5.7 G/DL (ref 6–8.4)
RBC # BLD AUTO: 2.84 M/UL (ref 4.6–6.2)
SODIUM SERPL-SCNC: 135 MMOL/L (ref 136–145)
WBC # BLD AUTO: 11.17 K/UL (ref 3.9–12.7)

## 2021-09-07 PROCEDURE — 25000003 PHARM REV CODE 250: Performed by: SURGERY

## 2021-09-07 PROCEDURE — 80053 COMPREHEN METABOLIC PANEL: CPT | Performed by: SURGERY

## 2021-09-07 PROCEDURE — 93923 SEGMENTAL PRESSURE LOWER EXTREMITY: ICD-10-PCS | Mod: 26,,, | Performed by: INTERNAL MEDICINE

## 2021-09-07 PROCEDURE — 97165 OT EVAL LOW COMPLEX 30 MIN: CPT

## 2021-09-07 PROCEDURE — 12000002 HC ACUTE/MED SURGE SEMI-PRIVATE ROOM

## 2021-09-07 PROCEDURE — 93923 UPR/LXTR ART STDY 3+ LVLS: CPT | Mod: 26,,, | Performed by: INTERNAL MEDICINE

## 2021-09-07 PROCEDURE — 85025 COMPLETE CBC W/AUTO DIFF WBC: CPT | Performed by: SURGERY

## 2021-09-07 PROCEDURE — 99232 PR SUBSEQUENT HOSPITAL CARE,LEVL II: ICD-10-PCS | Mod: ,,, | Performed by: INTERNAL MEDICINE

## 2021-09-07 PROCEDURE — 25000003 PHARM REV CODE 250: Performed by: INTERNAL MEDICINE

## 2021-09-07 PROCEDURE — 99232 SBSQ HOSP IP/OBS MODERATE 35: CPT | Mod: ,,, | Performed by: INTERNAL MEDICINE

## 2021-09-07 PROCEDURE — 83735 ASSAY OF MAGNESIUM: CPT | Performed by: SURGERY

## 2021-09-07 PROCEDURE — 63600175 PHARM REV CODE 636 W HCPCS: Performed by: SURGERY

## 2021-09-07 PROCEDURE — 97535 SELF CARE MNGMENT TRAINING: CPT

## 2021-09-07 PROCEDURE — 86140 C-REACTIVE PROTEIN: CPT | Performed by: STUDENT IN AN ORGANIZED HEALTH CARE EDUCATION/TRAINING PROGRAM

## 2021-09-07 PROCEDURE — 93923 UPR/LXTR ART STDY 3+ LVLS: CPT | Mod: 50

## 2021-09-07 PROCEDURE — 25000003 PHARM REV CODE 250: Performed by: STUDENT IN AN ORGANIZED HEALTH CARE EDUCATION/TRAINING PROGRAM

## 2021-09-07 PROCEDURE — 87040 BLOOD CULTURE FOR BACTERIA: CPT | Performed by: INTERNAL MEDICINE

## 2021-09-07 PROCEDURE — 97116 GAIT TRAINING THERAPY: CPT | Mod: CQ

## 2021-09-07 PROCEDURE — 63600175 PHARM REV CODE 636 W HCPCS: Performed by: STUDENT IN AN ORGANIZED HEALTH CARE EDUCATION/TRAINING PROGRAM

## 2021-09-07 RX ORDER — MUPIROCIN 20 MG/G
OINTMENT TOPICAL 3 TIMES DAILY
Status: DISCONTINUED | OUTPATIENT
Start: 2021-09-07 | End: 2021-09-29 | Stop reason: HOSPADM

## 2021-09-07 RX ORDER — VALACYCLOVIR HYDROCHLORIDE 500 MG/1
1000 TABLET, FILM COATED ORAL 2 TIMES DAILY
Status: DISCONTINUED | OUTPATIENT
Start: 2021-09-07 | End: 2021-09-14

## 2021-09-07 RX ORDER — HYDRALAZINE HYDROCHLORIDE 25 MG/1
25 TABLET, FILM COATED ORAL EVERY 8 HOURS
Status: DISCONTINUED | OUTPATIENT
Start: 2021-09-07 | End: 2021-09-09

## 2021-09-07 RX ORDER — INSULIN ASPART 100 [IU]/ML
5 INJECTION, SOLUTION INTRAVENOUS; SUBCUTANEOUS
Status: DISCONTINUED | OUTPATIENT
Start: 2021-09-07 | End: 2021-09-10

## 2021-09-07 RX ORDER — HYDRALAZINE HYDROCHLORIDE 20 MG/ML
10 INJECTION INTRAMUSCULAR; INTRAVENOUS EVERY 6 HOURS PRN
Status: DISCONTINUED | OUTPATIENT
Start: 2021-09-07 | End: 2021-09-11

## 2021-09-07 RX ADMIN — HYDROCODONE BITARTRATE AND ACETAMINOPHEN 1 TABLET: 5; 325 TABLET ORAL at 02:09

## 2021-09-07 RX ADMIN — VALACYCLOVIR HYDROCHLORIDE 1000 MG: 500 TABLET, FILM COATED ORAL at 09:09

## 2021-09-07 RX ADMIN — HYDROMORPHONE HYDROCHLORIDE 1 MG: 1 INJECTION, SOLUTION INTRAMUSCULAR; INTRAVENOUS; SUBCUTANEOUS at 07:09

## 2021-09-07 RX ADMIN — INSULIN ASPART 4 UNITS: 100 INJECTION, SOLUTION INTRAVENOUS; SUBCUTANEOUS at 09:09

## 2021-09-07 RX ADMIN — HYDRALAZINE HYDROCHLORIDE 25 MG: 25 TABLET, FILM COATED ORAL at 09:09

## 2021-09-07 RX ADMIN — INSULIN ASPART 2 UNITS: 100 INJECTION, SOLUTION INTRAVENOUS; SUBCUTANEOUS at 07:09

## 2021-09-07 RX ADMIN — DIPHENHYDRAMINE HYDROCHLORIDE 25 MG: 25 CAPSULE ORAL at 09:09

## 2021-09-07 RX ADMIN — HYDROCODONE BITARTRATE AND ACETAMINOPHEN 1 TABLET: 5; 325 TABLET ORAL at 10:09

## 2021-09-07 RX ADMIN — PIPERACILLIN SODIUM AND TAZOBACTAM SODIUM 3.38 G: 3; .375 INJECTION, POWDER, LYOPHILIZED, FOR SOLUTION INTRAVENOUS at 12:09

## 2021-09-07 RX ADMIN — INSULIN ASPART 6 UNITS: 100 INJECTION, SOLUTION INTRAVENOUS; SUBCUTANEOUS at 04:09

## 2021-09-07 RX ADMIN — INSULIN ASPART 10 UNITS: 100 INJECTION, SOLUTION INTRAVENOUS; SUBCUTANEOUS at 07:09

## 2021-09-07 RX ADMIN — HYDRALAZINE HYDROCHLORIDE 10 MG: 20 INJECTION INTRAMUSCULAR; INTRAVENOUS at 05:09

## 2021-09-07 RX ADMIN — INSULIN ASPART 5 UNITS: 100 INJECTION, SOLUTION INTRAVENOUS; SUBCUTANEOUS at 04:09

## 2021-09-07 RX ADMIN — INSULIN ASPART 10 UNITS: 100 INJECTION, SOLUTION INTRAVENOUS; SUBCUTANEOUS at 11:09

## 2021-09-07 RX ADMIN — INSULIN ASPART 2 UNITS: 100 INJECTION, SOLUTION INTRAVENOUS; SUBCUTANEOUS at 11:09

## 2021-09-07 RX ADMIN — HYDROCODONE BITARTRATE AND ACETAMINOPHEN 1 TABLET: 5; 325 TABLET ORAL at 04:09

## 2021-09-07 RX ADMIN — COLLAGENASE SANTYL: 250 OINTMENT TOPICAL at 07:09

## 2021-09-07 RX ADMIN — HYDROMORPHONE HYDROCHLORIDE 1 MG: 1 INJECTION, SOLUTION INTRAMUSCULAR; INTRAVENOUS; SUBCUTANEOUS at 09:09

## 2021-09-07 RX ADMIN — MUPIROCIN: 20 OINTMENT TOPICAL at 09:09

## 2021-09-07 RX ADMIN — PIPERACILLIN SODIUM AND TAZOBACTAM SODIUM 3.38 G: 3; .375 INJECTION, POWDER, LYOPHILIZED, FOR SOLUTION INTRAVENOUS at 04:09

## 2021-09-07 RX ADMIN — PIPERACILLIN SODIUM AND TAZOBACTAM SODIUM 3.38 G: 3; .375 INJECTION, POWDER, LYOPHILIZED, FOR SOLUTION INTRAVENOUS at 07:09

## 2021-09-08 LAB
ANION GAP SERPL CALC-SCNC: 9 MMOL/L (ref 8–16)
BUN SERPL-MCNC: 22 MG/DL (ref 8–23)
CALCIUM SERPL-MCNC: 7.4 MG/DL (ref 8.7–10.5)
CHLORIDE SERPL-SCNC: 102 MMOL/L (ref 95–110)
CO2 SERPL-SCNC: 24 MMOL/L (ref 23–29)
CREAT SERPL-MCNC: 2.1 MG/DL (ref 0.5–1.4)
CRP SERPL-MCNC: 8.8 MG/DL
ERYTHROCYTE [DISTWIDTH] IN BLOOD BY AUTOMATED COUNT: 13.2 % (ref 11.5–14.5)
EST. GFR  (AFRICAN AMERICAN): 38.1 ML/MIN/1.73 M^2
EST. GFR  (NON AFRICAN AMERICAN): 33 ML/MIN/1.73 M^2
GLUCOSE SERPL-MCNC: 225 MG/DL (ref 70–110)
GLUCOSE SERPL-MCNC: 262 MG/DL (ref 70–110)
GLUCOSE SERPL-MCNC: 423 MG/DL (ref 70–110)
GLUCOSE SERPL-MCNC: 89 MG/DL (ref 70–110)
GLUCOSE SERPL-MCNC: 90 MG/DL (ref 70–110)
HCT VFR BLD AUTO: 25.8 % (ref 40–54)
HGB BLD-MCNC: 8.7 G/DL (ref 14–18)
MAGNESIUM SERPL-MCNC: 1.6 MG/DL (ref 1.6–2.6)
MCH RBC QN AUTO: 29.9 PG (ref 27–31)
MCHC RBC AUTO-ENTMCNC: 33.7 G/DL (ref 32–36)
MCV RBC AUTO: 89 FL (ref 82–98)
PLATELET # BLD AUTO: 383 K/UL (ref 150–450)
PMV BLD AUTO: 8.9 FL (ref 9.2–12.9)
POTASSIUM SERPL-SCNC: 3.3 MMOL/L (ref 3.5–5.1)
POTASSIUM SERPL-SCNC: 4.6 MMOL/L (ref 3.5–5.1)
RBC # BLD AUTO: 2.91 M/UL (ref 4.6–6.2)
SODIUM SERPL-SCNC: 135 MMOL/L (ref 136–145)
WBC # BLD AUTO: 12.14 K/UL (ref 3.9–12.7)

## 2021-09-08 PROCEDURE — 80048 BASIC METABOLIC PNL TOTAL CA: CPT | Performed by: STUDENT IN AN ORGANIZED HEALTH CARE EDUCATION/TRAINING PROGRAM

## 2021-09-08 PROCEDURE — 36415 COLL VENOUS BLD VENIPUNCTURE: CPT | Performed by: SURGERY

## 2021-09-08 PROCEDURE — 25000003 PHARM REV CODE 250: Performed by: SURGERY

## 2021-09-08 PROCEDURE — 99231 SBSQ HOSP IP/OBS SF/LOW 25: CPT | Mod: ,,, | Performed by: INTERNAL MEDICINE

## 2021-09-08 PROCEDURE — 12000002 HC ACUTE/MED SURGE SEMI-PRIVATE ROOM

## 2021-09-08 PROCEDURE — 25000003 PHARM REV CODE 250: Performed by: INTERNAL MEDICINE

## 2021-09-08 PROCEDURE — 84132 ASSAY OF SERUM POTASSIUM: CPT | Performed by: STUDENT IN AN ORGANIZED HEALTH CARE EDUCATION/TRAINING PROGRAM

## 2021-09-08 PROCEDURE — 83735 ASSAY OF MAGNESIUM: CPT | Performed by: SURGERY

## 2021-09-08 PROCEDURE — 85027 COMPLETE CBC AUTOMATED: CPT | Performed by: STUDENT IN AN ORGANIZED HEALTH CARE EDUCATION/TRAINING PROGRAM

## 2021-09-08 PROCEDURE — 25000003 PHARM REV CODE 250: Performed by: STUDENT IN AN ORGANIZED HEALTH CARE EDUCATION/TRAINING PROGRAM

## 2021-09-08 PROCEDURE — 97116 GAIT TRAINING THERAPY: CPT

## 2021-09-08 PROCEDURE — 36415 COLL VENOUS BLD VENIPUNCTURE: CPT | Performed by: STUDENT IN AN ORGANIZED HEALTH CARE EDUCATION/TRAINING PROGRAM

## 2021-09-08 PROCEDURE — 86140 C-REACTIVE PROTEIN: CPT | Performed by: STUDENT IN AN ORGANIZED HEALTH CARE EDUCATION/TRAINING PROGRAM

## 2021-09-08 PROCEDURE — 99231 PR SUBSEQUENT HOSPITAL CARE,LEVL I: ICD-10-PCS | Mod: ,,, | Performed by: INTERNAL MEDICINE

## 2021-09-08 PROCEDURE — 63600175 PHARM REV CODE 636 W HCPCS: Performed by: SURGERY

## 2021-09-08 RX ORDER — POTASSIUM CHLORIDE 20 MEQ/1
20 TABLET, EXTENDED RELEASE ORAL 2 TIMES DAILY
Status: DISCONTINUED | OUTPATIENT
Start: 2021-09-08 | End: 2021-09-10

## 2021-09-08 RX ADMIN — HYDRALAZINE HYDROCHLORIDE 25 MG: 25 TABLET, FILM COATED ORAL at 10:09

## 2021-09-08 RX ADMIN — HYDROCODONE BITARTRATE AND ACETAMINOPHEN 1 TABLET: 5; 325 TABLET ORAL at 10:09

## 2021-09-08 RX ADMIN — HYDROMORPHONE HYDROCHLORIDE 1 MG: 1 INJECTION, SOLUTION INTRAMUSCULAR; INTRAVENOUS; SUBCUTANEOUS at 08:09

## 2021-09-08 RX ADMIN — INSULIN ASPART 4 UNITS: 100 INJECTION, SOLUTION INTRAVENOUS; SUBCUTANEOUS at 04:09

## 2021-09-08 RX ADMIN — HYDRALAZINE HYDROCHLORIDE 25 MG: 25 TABLET, FILM COATED ORAL at 05:09

## 2021-09-08 RX ADMIN — INSULIN ASPART 5 UNITS: 100 INJECTION, SOLUTION INTRAVENOUS; SUBCUTANEOUS at 10:09

## 2021-09-08 RX ADMIN — VALACYCLOVIR HYDROCHLORIDE 1000 MG: 500 TABLET, FILM COATED ORAL at 08:09

## 2021-09-08 RX ADMIN — MUPIROCIN: 20 OINTMENT TOPICAL at 03:09

## 2021-09-08 RX ADMIN — POTASSIUM CHLORIDE 20 MEQ: 20 TABLET, EXTENDED RELEASE ORAL at 08:09

## 2021-09-08 RX ADMIN — PIPERACILLIN SODIUM AND TAZOBACTAM SODIUM 3.38 G: 3; .375 INJECTION, POWDER, LYOPHILIZED, FOR SOLUTION INTRAVENOUS at 08:09

## 2021-09-08 RX ADMIN — PIPERACILLIN SODIUM AND TAZOBACTAM SODIUM 3.38 G: 3; .375 INJECTION, POWDER, LYOPHILIZED, FOR SOLUTION INTRAVENOUS at 12:09

## 2021-09-08 RX ADMIN — POTASSIUM CHLORIDE 40 MEQ: 20 TABLET, EXTENDED RELEASE ORAL at 10:09

## 2021-09-08 RX ADMIN — COLLAGENASE SANTYL: 250 OINTMENT TOPICAL at 08:09

## 2021-09-08 RX ADMIN — INSULIN ASPART 5 UNITS: 100 INJECTION, SOLUTION INTRAVENOUS; SUBCUTANEOUS at 04:09

## 2021-09-08 RX ADMIN — HYDRALAZINE HYDROCHLORIDE 25 MG: 25 TABLET, FILM COATED ORAL at 03:09

## 2021-09-08 RX ADMIN — INSULIN ASPART 6 UNITS: 100 INJECTION, SOLUTION INTRAVENOUS; SUBCUTANEOUS at 12:09

## 2021-09-08 RX ADMIN — MUPIROCIN: 20 OINTMENT TOPICAL at 08:09

## 2021-09-08 RX ADMIN — HYDROCODONE BITARTRATE AND ACETAMINOPHEN 1 TABLET: 5; 325 TABLET ORAL at 04:09

## 2021-09-08 RX ADMIN — MUPIROCIN: 20 OINTMENT TOPICAL at 07:09

## 2021-09-08 RX ADMIN — INSULIN ASPART 5 UNITS: 100 INJECTION, SOLUTION INTRAVENOUS; SUBCUTANEOUS at 12:09

## 2021-09-08 RX ADMIN — PIPERACILLIN SODIUM AND TAZOBACTAM SODIUM 3.38 G: 3; .375 INJECTION, POWDER, LYOPHILIZED, FOR SOLUTION INTRAVENOUS at 06:09

## 2021-09-08 RX ADMIN — DIPHENHYDRAMINE HYDROCHLORIDE 25 MG: 25 CAPSULE ORAL at 08:09

## 2021-09-09 LAB
ANION GAP SERPL CALC-SCNC: 10 MMOL/L (ref 8–16)
BUN SERPL-MCNC: 23 MG/DL (ref 8–23)
CALCIUM SERPL-MCNC: 8.5 MG/DL (ref 8.7–10.5)
CHLORIDE SERPL-SCNC: 105 MMOL/L (ref 95–110)
CO2 SERPL-SCNC: 25 MMOL/L (ref 23–29)
CREAT SERPL-MCNC: 2 MG/DL (ref 0.5–1.4)
ERYTHROCYTE [DISTWIDTH] IN BLOOD BY AUTOMATED COUNT: 13.6 % (ref 11.5–14.5)
EST. GFR  (AFRICAN AMERICAN): 40.4 ML/MIN/1.73 M^2
EST. GFR  (NON AFRICAN AMERICAN): 35 ML/MIN/1.73 M^2
GLUCOSE SERPL-MCNC: 206 MG/DL (ref 70–110)
GLUCOSE SERPL-MCNC: 244 MG/DL (ref 70–110)
GLUCOSE SERPL-MCNC: 269 MG/DL (ref 70–110)
GLUCOSE SERPL-MCNC: 291 MG/DL (ref 70–110)
GLUCOSE SERPL-MCNC: 84 MG/DL (ref 70–110)
GLUCOSE SERPL-MCNC: 92 MG/DL (ref 70–110)
HCT VFR BLD AUTO: 26.4 % (ref 40–54)
HGB BLD-MCNC: 8.6 G/DL (ref 14–18)
MAGNESIUM SERPL-MCNC: 1.8 MG/DL (ref 1.6–2.6)
MCH RBC QN AUTO: 29.2 PG (ref 27–31)
MCHC RBC AUTO-ENTMCNC: 32.6 G/DL (ref 32–36)
MCV RBC AUTO: 90 FL (ref 82–98)
PLATELET # BLD AUTO: 493 K/UL (ref 150–450)
PMV BLD AUTO: 9.4 FL (ref 9.2–12.9)
POTASSIUM SERPL-SCNC: 4.7 MMOL/L (ref 3.5–5.1)
RBC # BLD AUTO: 2.95 M/UL (ref 4.6–6.2)
SODIUM SERPL-SCNC: 140 MMOL/L (ref 136–145)
WBC # BLD AUTO: 11.37 K/UL (ref 3.9–12.7)

## 2021-09-09 PROCEDURE — 25000003 PHARM REV CODE 250: Performed by: SURGERY

## 2021-09-09 PROCEDURE — 63600175 PHARM REV CODE 636 W HCPCS: Performed by: SURGERY

## 2021-09-09 PROCEDURE — 25000003 PHARM REV CODE 250: Performed by: INTERNAL MEDICINE

## 2021-09-09 PROCEDURE — 12000002 HC ACUTE/MED SURGE SEMI-PRIVATE ROOM

## 2021-09-09 PROCEDURE — 25000003 PHARM REV CODE 250: Performed by: STUDENT IN AN ORGANIZED HEALTH CARE EDUCATION/TRAINING PROGRAM

## 2021-09-09 PROCEDURE — 85027 COMPLETE CBC AUTOMATED: CPT | Performed by: STUDENT IN AN ORGANIZED HEALTH CARE EDUCATION/TRAINING PROGRAM

## 2021-09-09 PROCEDURE — 80048 BASIC METABOLIC PNL TOTAL CA: CPT | Performed by: STUDENT IN AN ORGANIZED HEALTH CARE EDUCATION/TRAINING PROGRAM

## 2021-09-09 PROCEDURE — 63600175 PHARM REV CODE 636 W HCPCS: Performed by: STUDENT IN AN ORGANIZED HEALTH CARE EDUCATION/TRAINING PROGRAM

## 2021-09-09 PROCEDURE — 36415 COLL VENOUS BLD VENIPUNCTURE: CPT | Performed by: STUDENT IN AN ORGANIZED HEALTH CARE EDUCATION/TRAINING PROGRAM

## 2021-09-09 PROCEDURE — 83735 ASSAY OF MAGNESIUM: CPT | Performed by: SURGERY

## 2021-09-09 PROCEDURE — 97606 NEG PRS WND THER DME>50 SQCM: CPT

## 2021-09-09 RX ORDER — HYDRALAZINE HYDROCHLORIDE 25 MG/1
25 TABLET, FILM COATED ORAL ONCE
Status: COMPLETED | OUTPATIENT
Start: 2021-09-09 | End: 2021-09-09

## 2021-09-09 RX ORDER — HYDRALAZINE HYDROCHLORIDE 25 MG/1
50 TABLET, FILM COATED ORAL EVERY 8 HOURS
Status: DISCONTINUED | OUTPATIENT
Start: 2021-09-09 | End: 2021-09-11

## 2021-09-09 RX ADMIN — POTASSIUM CHLORIDE 20 MEQ: 20 TABLET, EXTENDED RELEASE ORAL at 08:09

## 2021-09-09 RX ADMIN — VALACYCLOVIR HYDROCHLORIDE 1000 MG: 500 TABLET, FILM COATED ORAL at 08:09

## 2021-09-09 RX ADMIN — HYDRALAZINE HYDROCHLORIDE 50 MG: 25 TABLET, FILM COATED ORAL at 09:09

## 2021-09-09 RX ADMIN — INSULIN ASPART 6 UNITS: 100 INJECTION, SOLUTION INTRAVENOUS; SUBCUTANEOUS at 05:09

## 2021-09-09 RX ADMIN — COLLAGENASE SANTYL: 250 OINTMENT TOPICAL at 09:09

## 2021-09-09 RX ADMIN — PIPERACILLIN SODIUM AND TAZOBACTAM SODIUM 3.38 G: 3; .375 INJECTION, POWDER, LYOPHILIZED, FOR SOLUTION INTRAVENOUS at 01:09

## 2021-09-09 RX ADMIN — SODIUM CHLORIDE: 0.9 INJECTION, SOLUTION INTRAVENOUS at 01:09

## 2021-09-09 RX ADMIN — INSULIN ASPART 2 UNITS: 100 INJECTION, SOLUTION INTRAVENOUS; SUBCUTANEOUS at 09:09

## 2021-09-09 RX ADMIN — INSULIN ASPART 5 UNITS: 100 INJECTION, SOLUTION INTRAVENOUS; SUBCUTANEOUS at 12:09

## 2021-09-09 RX ADMIN — MUPIROCIN: 20 OINTMENT TOPICAL at 03:09

## 2021-09-09 RX ADMIN — HYDRALAZINE HYDROCHLORIDE 25 MG: 25 TABLET, FILM COATED ORAL at 09:09

## 2021-09-09 RX ADMIN — HYDRALAZINE HYDROCHLORIDE 10 MG: 20 INJECTION INTRAMUSCULAR; INTRAVENOUS at 05:09

## 2021-09-09 RX ADMIN — HYDROMORPHONE HYDROCHLORIDE 1 MG: 1 INJECTION, SOLUTION INTRAMUSCULAR; INTRAVENOUS; SUBCUTANEOUS at 09:09

## 2021-09-09 RX ADMIN — INSULIN ASPART 5 UNITS: 100 INJECTION, SOLUTION INTRAVENOUS; SUBCUTANEOUS at 05:09

## 2021-09-09 RX ADMIN — HYDRALAZINE HYDROCHLORIDE 50 MG: 25 TABLET, FILM COATED ORAL at 02:09

## 2021-09-09 RX ADMIN — HYDROMORPHONE HYDROCHLORIDE 1 MG: 1 INJECTION, SOLUTION INTRAMUSCULAR; INTRAVENOUS; SUBCUTANEOUS at 08:09

## 2021-09-09 RX ADMIN — PIPERACILLIN SODIUM AND TAZOBACTAM SODIUM 3.38 G: 3; .375 INJECTION, POWDER, LYOPHILIZED, FOR SOLUTION INTRAVENOUS at 05:09

## 2021-09-09 RX ADMIN — HYDRALAZINE HYDROCHLORIDE 25 MG: 25 TABLET, FILM COATED ORAL at 06:09

## 2021-09-09 RX ADMIN — MUPIROCIN: 20 OINTMENT TOPICAL at 08:09

## 2021-09-09 RX ADMIN — HYDROCODONE BITARTRATE AND ACETAMINOPHEN 1 TABLET: 5; 325 TABLET ORAL at 03:09

## 2021-09-09 RX ADMIN — PIPERACILLIN SODIUM AND TAZOBACTAM SODIUM 3.38 G: 3; .375 INJECTION, POWDER, LYOPHILIZED, FOR SOLUTION INTRAVENOUS at 08:09

## 2021-09-10 PROBLEM — E87.1 HYPONATREMIA: Status: ACTIVE | Noted: 2021-09-10

## 2021-09-10 PROBLEM — N17.9 AKI (ACUTE KIDNEY INJURY): Status: ACTIVE | Noted: 2021-09-10

## 2021-09-10 PROBLEM — D64.9 ANEMIA: Chronic | Status: ACTIVE | Noted: 2021-09-10

## 2021-09-10 PROBLEM — I10 ESSENTIAL HYPERTENSION: Chronic | Status: ACTIVE | Noted: 2021-09-10

## 2021-09-10 PROBLEM — E87.1 HYPONATREMIA: Status: RESOLVED | Noted: 2021-09-10 | Resolved: 2021-09-10

## 2021-09-10 PROBLEM — K80.20 CHOLELITHIASES: Chronic | Status: ACTIVE | Noted: 2021-09-10

## 2021-09-10 PROBLEM — L98.499: Chronic | Status: ACTIVE | Noted: 2021-09-10

## 2021-09-10 PROBLEM — R78.81 BACTEREMIA: Status: ACTIVE | Noted: 2021-09-10

## 2021-09-10 LAB
ANION GAP SERPL CALC-SCNC: 6 MMOL/L (ref 8–16)
BUN SERPL-MCNC: 27 MG/DL (ref 8–23)
CALCIUM SERPL-MCNC: 8.4 MG/DL (ref 8.7–10.5)
CHLORIDE SERPL-SCNC: 106 MMOL/L (ref 95–110)
CO2 SERPL-SCNC: 25 MMOL/L (ref 23–29)
CREAT SERPL-MCNC: 2.2 MG/DL (ref 0.5–1.4)
ERYTHROCYTE [DISTWIDTH] IN BLOOD BY AUTOMATED COUNT: 13.9 % (ref 11.5–14.5)
EST. GFR  (AFRICAN AMERICAN): 36 ML/MIN/1.73 M^2
EST. GFR  (NON AFRICAN AMERICAN): 31.2 ML/MIN/1.73 M^2
GLUCOSE SERPL-MCNC: 135 MG/DL (ref 70–110)
GLUCOSE SERPL-MCNC: 141 MG/DL (ref 70–110)
GLUCOSE SERPL-MCNC: 291 MG/DL (ref 70–110)
GLUCOSE SERPL-MCNC: 330 MG/DL (ref 70–110)
GLUCOSE SERPL-MCNC: 330 MG/DL (ref 70–110)
HCT VFR BLD AUTO: 24.1 % (ref 40–54)
HGB BLD-MCNC: 7.9 G/DL (ref 14–18)
MAGNESIUM SERPL-MCNC: 1.8 MG/DL (ref 1.6–2.6)
MCH RBC QN AUTO: 29.3 PG (ref 27–31)
MCHC RBC AUTO-ENTMCNC: 32.8 G/DL (ref 32–36)
MCV RBC AUTO: 89 FL (ref 82–98)
PLATELET # BLD AUTO: 459 K/UL (ref 150–450)
PMV BLD AUTO: 9.2 FL (ref 9.2–12.9)
POTASSIUM SERPL-SCNC: 5.1 MMOL/L (ref 3.5–5.1)
RBC # BLD AUTO: 2.7 M/UL (ref 4.6–6.2)
SODIUM SERPL-SCNC: 137 MMOL/L (ref 136–145)
WBC # BLD AUTO: 12.25 K/UL (ref 3.9–12.7)

## 2021-09-10 PROCEDURE — 36415 COLL VENOUS BLD VENIPUNCTURE: CPT | Performed by: STUDENT IN AN ORGANIZED HEALTH CARE EDUCATION/TRAINING PROGRAM

## 2021-09-10 PROCEDURE — 25000003 PHARM REV CODE 250: Performed by: SURGERY

## 2021-09-10 PROCEDURE — 85027 COMPLETE CBC AUTOMATED: CPT | Performed by: STUDENT IN AN ORGANIZED HEALTH CARE EDUCATION/TRAINING PROGRAM

## 2021-09-10 PROCEDURE — 25000003 PHARM REV CODE 250: Performed by: INTERNAL MEDICINE

## 2021-09-10 PROCEDURE — 63600175 PHARM REV CODE 636 W HCPCS: Performed by: SURGERY

## 2021-09-10 PROCEDURE — 80048 BASIC METABOLIC PNL TOTAL CA: CPT | Performed by: STUDENT IN AN ORGANIZED HEALTH CARE EDUCATION/TRAINING PROGRAM

## 2021-09-10 PROCEDURE — 25000003 PHARM REV CODE 250: Performed by: STUDENT IN AN ORGANIZED HEALTH CARE EDUCATION/TRAINING PROGRAM

## 2021-09-10 PROCEDURE — 99232 PR SUBSEQUENT HOSPITAL CARE,LEVL II: ICD-10-PCS | Mod: ,,, | Performed by: INTERNAL MEDICINE

## 2021-09-10 PROCEDURE — 83735 ASSAY OF MAGNESIUM: CPT | Performed by: SURGERY

## 2021-09-10 PROCEDURE — 12000002 HC ACUTE/MED SURGE SEMI-PRIVATE ROOM

## 2021-09-10 PROCEDURE — 63600175 PHARM REV CODE 636 W HCPCS: Performed by: STUDENT IN AN ORGANIZED HEALTH CARE EDUCATION/TRAINING PROGRAM

## 2021-09-10 PROCEDURE — 63600175 PHARM REV CODE 636 W HCPCS: Performed by: INTERNAL MEDICINE

## 2021-09-10 PROCEDURE — 99232 SBSQ HOSP IP/OBS MODERATE 35: CPT | Mod: ,,, | Performed by: INTERNAL MEDICINE

## 2021-09-10 RX ORDER — ENOXAPARIN SODIUM 100 MG/ML
40 INJECTION SUBCUTANEOUS
Status: DISCONTINUED | OUTPATIENT
Start: 2021-09-10 | End: 2021-09-29 | Stop reason: HOSPADM

## 2021-09-10 RX ORDER — HYDROCODONE BITARTRATE AND ACETAMINOPHEN 10; 325 MG/1; MG/1
1 TABLET ORAL EVERY 6 HOURS PRN
Status: DISCONTINUED | OUTPATIENT
Start: 2021-09-10 | End: 2021-09-29 | Stop reason: HOSPADM

## 2021-09-10 RX ADMIN — INSULIN ASPART 5 UNITS: 100 INJECTION, SOLUTION INTRAVENOUS; SUBCUTANEOUS at 08:09

## 2021-09-10 RX ADMIN — ENOXAPARIN SODIUM 40 MG: 40 INJECTION SUBCUTANEOUS at 10:09

## 2021-09-10 RX ADMIN — PIPERACILLIN SODIUM AND TAZOBACTAM SODIUM 3.38 G: 3; .375 INJECTION, POWDER, LYOPHILIZED, FOR SOLUTION INTRAVENOUS at 09:09

## 2021-09-10 RX ADMIN — INSULIN ASPART 5 UNITS: 100 INJECTION, SOLUTION INTRAVENOUS; SUBCUTANEOUS at 04:09

## 2021-09-10 RX ADMIN — HYDRALAZINE HYDROCHLORIDE 50 MG: 25 TABLET, FILM COATED ORAL at 10:09

## 2021-09-10 RX ADMIN — HYDROCODONE BITARTRATE AND ACETAMINOPHEN 1 TABLET: 5; 325 TABLET ORAL at 06:09

## 2021-09-10 RX ADMIN — VALACYCLOVIR HYDROCHLORIDE 1000 MG: 500 TABLET, FILM COATED ORAL at 10:09

## 2021-09-10 RX ADMIN — PIPERACILLIN SODIUM AND TAZOBACTAM SODIUM 3.38 G: 3; .375 INJECTION, POWDER, LYOPHILIZED, FOR SOLUTION INTRAVENOUS at 01:09

## 2021-09-10 RX ADMIN — COLLAGENASE SANTYL: 250 OINTMENT TOPICAL at 09:09

## 2021-09-10 RX ADMIN — INSULIN ASPART 5 UNITS: 100 INJECTION, SOLUTION INTRAVENOUS; SUBCUTANEOUS at 11:09

## 2021-09-10 RX ADMIN — PIPERACILLIN SODIUM AND TAZOBACTAM SODIUM 3.38 G: 3; .375 INJECTION, POWDER, LYOPHILIZED, FOR SOLUTION INTRAVENOUS at 04:09

## 2021-09-10 RX ADMIN — HYDRALAZINE HYDROCHLORIDE 10 MG: 20 INJECTION INTRAMUSCULAR; INTRAVENOUS at 03:09

## 2021-09-10 RX ADMIN — MUPIROCIN: 20 OINTMENT TOPICAL at 10:09

## 2021-09-10 RX ADMIN — HYDROCODONE BITARTRATE AND ACETAMINOPHEN 1 TABLET: 5; 325 TABLET ORAL at 03:09

## 2021-09-10 RX ADMIN — INSULIN DETEMIR 30 UNITS: 100 INJECTION, SOLUTION SUBCUTANEOUS at 10:09

## 2021-09-10 RX ADMIN — HUMAN INSULIN 12 UNITS: 100 INJECTION, SOLUTION SUBCUTANEOUS at 10:09

## 2021-09-10 RX ADMIN — DIPHENHYDRAMINE HYDROCHLORIDE 25 MG: 25 CAPSULE ORAL at 10:09

## 2021-09-10 RX ADMIN — HYDROCODONE BITARTRATE AND ACETAMINOPHEN 1 TABLET: 10; 325 TABLET ORAL at 10:09

## 2021-09-10 RX ADMIN — MUPIROCIN: 20 OINTMENT TOPICAL at 02:09

## 2021-09-10 RX ADMIN — VALACYCLOVIR HYDROCHLORIDE 1000 MG: 500 TABLET, FILM COATED ORAL at 09:09

## 2021-09-10 RX ADMIN — POTASSIUM CHLORIDE 20 MEQ: 20 TABLET, EXTENDED RELEASE ORAL at 09:09

## 2021-09-10 RX ADMIN — HYDRALAZINE HYDROCHLORIDE 50 MG: 25 TABLET, FILM COATED ORAL at 02:09

## 2021-09-10 RX ADMIN — HYDRALAZINE HYDROCHLORIDE 50 MG: 25 TABLET, FILM COATED ORAL at 06:09

## 2021-09-10 RX ADMIN — MUPIROCIN: 20 OINTMENT TOPICAL at 09:09

## 2021-09-11 PROBLEM — E87.5 HYPERKALEMIA: Status: ACTIVE | Noted: 2021-09-11

## 2021-09-11 PROBLEM — D75.839 THROMBOCYTOSIS: Status: ACTIVE | Noted: 2021-09-11

## 2021-09-11 LAB
ANION GAP SERPL CALC-SCNC: 10 MMOL/L (ref 8–16)
BUN SERPL-MCNC: 31 MG/DL (ref 8–23)
CALCIUM SERPL-MCNC: 8.2 MG/DL (ref 8.7–10.5)
CHLORIDE SERPL-SCNC: 101 MMOL/L (ref 95–110)
CO2 SERPL-SCNC: 25 MMOL/L (ref 23–29)
CREAT SERPL-MCNC: 2.1 MG/DL (ref 0.5–1.4)
ERYTHROCYTE [DISTWIDTH] IN BLOOD BY AUTOMATED COUNT: 13.9 % (ref 11.5–14.5)
EST. GFR  (AFRICAN AMERICAN): 38.1 ML/MIN/1.73 M^2
EST. GFR  (NON AFRICAN AMERICAN): 33 ML/MIN/1.73 M^2
FERRITIN SERPL-MCNC: 248 NG/ML (ref 20–300)
FOLATE SERPL-MCNC: 10.2 NG/ML (ref 4–24)
GLUCOSE SERPL-MCNC: 220 MG/DL (ref 70–110)
GLUCOSE SERPL-MCNC: 230 MG/DL (ref 70–110)
GLUCOSE SERPL-MCNC: 236 MG/DL (ref 70–110)
GLUCOSE SERPL-MCNC: 70 MG/DL (ref 70–110)
GLUCOSE SERPL-MCNC: 81 MG/DL (ref 70–110)
HCT VFR BLD AUTO: 22.5 % (ref 40–54)
HGB BLD-MCNC: 7.2 G/DL (ref 14–18)
IRON SERPL-MCNC: 28 UG/DL (ref 45–160)
MAGNESIUM SERPL-MCNC: 1.9 MG/DL (ref 1.6–2.6)
MCH RBC QN AUTO: 29.3 PG (ref 27–31)
MCHC RBC AUTO-ENTMCNC: 32 G/DL (ref 32–36)
MCV RBC AUTO: 92 FL (ref 82–98)
PHOSPHATE SERPL-MCNC: 3.8 MG/DL (ref 2.7–4.5)
PLATELET # BLD AUTO: 479 K/UL (ref 150–450)
PMV BLD AUTO: 9.2 FL (ref 9.2–12.9)
POTASSIUM SERPL-SCNC: 5.3 MMOL/L (ref 3.5–5.1)
RBC # BLD AUTO: 2.46 M/UL (ref 4.6–6.2)
SATURATED IRON: 15 % (ref 20–50)
SODIUM SERPL-SCNC: 136 MMOL/L (ref 136–145)
TOTAL IRON BINDING CAPACITY: 182 UG/DL (ref 250–450)
TRANSFERRIN SERPL-MCNC: 130 MG/DL (ref 200–375)
TRANSFERRIN SERPL-MCNC: 130 MG/DL (ref 200–375)
VIT B12 SERPL-MCNC: 1425 PG/ML (ref 210–950)
WBC # BLD AUTO: 12.26 K/UL (ref 3.9–12.7)

## 2021-09-11 PROCEDURE — 25000003 PHARM REV CODE 250: Performed by: SURGERY

## 2021-09-11 PROCEDURE — 82607 VITAMIN B-12: CPT | Performed by: INTERNAL MEDICINE

## 2021-09-11 PROCEDURE — C9399 UNCLASSIFIED DRUGS OR BIOLOG: HCPCS | Performed by: INTERNAL MEDICINE

## 2021-09-11 PROCEDURE — 25000003 PHARM REV CODE 250: Performed by: INTERNAL MEDICINE

## 2021-09-11 PROCEDURE — 84100 ASSAY OF PHOSPHORUS: CPT | Performed by: INTERNAL MEDICINE

## 2021-09-11 PROCEDURE — 85027 COMPLETE CBC AUTOMATED: CPT | Performed by: INTERNAL MEDICINE

## 2021-09-11 PROCEDURE — 99024 POSTOP FOLLOW-UP VISIT: CPT | Mod: ,,, | Performed by: STUDENT IN AN ORGANIZED HEALTH CARE EDUCATION/TRAINING PROGRAM

## 2021-09-11 PROCEDURE — 83735 ASSAY OF MAGNESIUM: CPT | Performed by: INTERNAL MEDICINE

## 2021-09-11 PROCEDURE — 12000002 HC ACUTE/MED SURGE SEMI-PRIVATE ROOM

## 2021-09-11 PROCEDURE — 25000003 PHARM REV CODE 250: Performed by: STUDENT IN AN ORGANIZED HEALTH CARE EDUCATION/TRAINING PROGRAM

## 2021-09-11 PROCEDURE — 63600175 PHARM REV CODE 636 W HCPCS: Performed by: INTERNAL MEDICINE

## 2021-09-11 PROCEDURE — 63600175 PHARM REV CODE 636 W HCPCS: Performed by: SURGERY

## 2021-09-11 PROCEDURE — 36415 COLL VENOUS BLD VENIPUNCTURE: CPT | Performed by: INTERNAL MEDICINE

## 2021-09-11 PROCEDURE — 84466 ASSAY OF TRANSFERRIN: CPT | Performed by: INTERNAL MEDICINE

## 2021-09-11 PROCEDURE — 82746 ASSAY OF FOLIC ACID SERUM: CPT | Performed by: INTERNAL MEDICINE

## 2021-09-11 PROCEDURE — 80048 BASIC METABOLIC PNL TOTAL CA: CPT | Performed by: INTERNAL MEDICINE

## 2021-09-11 PROCEDURE — 82728 ASSAY OF FERRITIN: CPT | Performed by: INTERNAL MEDICINE

## 2021-09-11 PROCEDURE — 99024 PR POST-OP FOLLOW-UP VISIT: ICD-10-PCS | Mod: ,,, | Performed by: STUDENT IN AN ORGANIZED HEALTH CARE EDUCATION/TRAINING PROGRAM

## 2021-09-11 RX ORDER — HYDRALAZINE HYDROCHLORIDE 20 MG/ML
10 INJECTION INTRAMUSCULAR; INTRAVENOUS EVERY 6 HOURS PRN
Status: DISCONTINUED | OUTPATIENT
Start: 2021-09-11 | End: 2021-09-29 | Stop reason: HOSPADM

## 2021-09-11 RX ORDER — CARVEDILOL 6.25 MG/1
6.25 TABLET ORAL 2 TIMES DAILY
Status: DISCONTINUED | OUTPATIENT
Start: 2021-09-11 | End: 2021-09-16

## 2021-09-11 RX ADMIN — HYDRALAZINE HYDROCHLORIDE 50 MG: 25 TABLET, FILM COATED ORAL at 05:09

## 2021-09-11 RX ADMIN — PIPERACILLIN SODIUM AND TAZOBACTAM SODIUM 3.38 G: 3; .375 INJECTION, POWDER, LYOPHILIZED, FOR SOLUTION INTRAVENOUS at 05:09

## 2021-09-11 RX ADMIN — MUPIROCIN: 20 OINTMENT TOPICAL at 08:09

## 2021-09-11 RX ADMIN — CARVEDILOL 6.25 MG: 6.25 TABLET, FILM COATED ORAL at 08:09

## 2021-09-11 RX ADMIN — MUPIROCIN: 20 OINTMENT TOPICAL at 02:09

## 2021-09-11 RX ADMIN — HUMAN INSULIN 6 UNITS: 100 INJECTION, SOLUTION SUBCUTANEOUS at 11:09

## 2021-09-11 RX ADMIN — PIPERACILLIN SODIUM AND TAZOBACTAM SODIUM 3.38 G: 3; .375 INJECTION, POWDER, LYOPHILIZED, FOR SOLUTION INTRAVENOUS at 01:09

## 2021-09-11 RX ADMIN — HYDROCODONE BITARTRATE AND ACETAMINOPHEN 1 TABLET: 10; 325 TABLET ORAL at 08:09

## 2021-09-11 RX ADMIN — MUPIROCIN: 20 OINTMENT TOPICAL at 09:09

## 2021-09-11 RX ADMIN — INSULIN DETEMIR 30 UNITS: 100 INJECTION, SOLUTION SUBCUTANEOUS at 08:09

## 2021-09-11 RX ADMIN — ENOXAPARIN SODIUM 40 MG: 40 INJECTION SUBCUTANEOUS at 08:09

## 2021-09-11 RX ADMIN — HUMAN INSULIN 6 UNITS: 100 INJECTION, SOLUTION SUBCUTANEOUS at 05:09

## 2021-09-11 RX ADMIN — SODIUM CHLORIDE 125 MG: 9 INJECTION, SOLUTION INTRAVENOUS at 09:09

## 2021-09-11 RX ADMIN — VALACYCLOVIR HYDROCHLORIDE 1000 MG: 500 TABLET, FILM COATED ORAL at 08:09

## 2021-09-11 RX ADMIN — PIPERACILLIN SODIUM AND TAZOBACTAM SODIUM 3.38 G: 3; .375 INJECTION, POWDER, LYOPHILIZED, FOR SOLUTION INTRAVENOUS at 09:09

## 2021-09-11 RX ADMIN — SODIUM ZIRCONIUM CYCLOSILICATE 10 G: 10 POWDER, FOR SUSPENSION ORAL at 11:09

## 2021-09-11 RX ADMIN — COLLAGENASE SANTYL: 250 OINTMENT TOPICAL at 09:09

## 2021-09-11 RX ADMIN — CARVEDILOL 6.25 MG: 6.25 TABLET, FILM COATED ORAL at 09:09

## 2021-09-11 RX ADMIN — VALACYCLOVIR HYDROCHLORIDE 1000 MG: 500 TABLET, FILM COATED ORAL at 09:09

## 2021-09-11 RX ADMIN — HYDROCODONE BITARTRATE AND ACETAMINOPHEN 1 TABLET: 5; 325 TABLET ORAL at 02:09

## 2021-09-12 PROBLEM — E87.5 HYPERKALEMIA: Status: RESOLVED | Noted: 2021-09-11 | Resolved: 2021-09-12

## 2021-09-12 PROBLEM — E16.2 HYPOGLYCEMIA: Status: ACTIVE | Noted: 2021-09-12

## 2021-09-12 PROBLEM — M79.89 LEFT ARM SWELLING: Status: ACTIVE | Noted: 2021-09-12

## 2021-09-12 LAB
ABO + RH BLD: NORMAL
ANION GAP SERPL CALC-SCNC: 12 MMOL/L (ref 8–16)
BACTERIA BLD CULT: NORMAL
BLD GP AB SCN CELLS X3 SERPL QL: NORMAL
BUN SERPL-MCNC: 30 MG/DL (ref 8–23)
CALCIUM SERPL-MCNC: 8.3 MG/DL (ref 8.7–10.5)
CHLORIDE SERPL-SCNC: 98 MMOL/L (ref 95–110)
CO2 SERPL-SCNC: 24 MMOL/L (ref 23–29)
CREAT SERPL-MCNC: 2.1 MG/DL (ref 0.5–1.4)
ERYTHROCYTE [DISTWIDTH] IN BLOOD BY AUTOMATED COUNT: 14 % (ref 11.5–14.5)
EST. GFR  (AFRICAN AMERICAN): 38.1 ML/MIN/1.73 M^2
EST. GFR  (NON AFRICAN AMERICAN): 33 ML/MIN/1.73 M^2
GLUCOSE SERPL-MCNC: 101 MG/DL (ref 70–110)
GLUCOSE SERPL-MCNC: 167 MG/DL (ref 70–110)
GLUCOSE SERPL-MCNC: 242 MG/DL (ref 70–110)
GLUCOSE SERPL-MCNC: 270 MG/DL (ref 70–110)
GLUCOSE SERPL-MCNC: 48 MG/DL (ref 70–110)
GLUCOSE SERPL-MCNC: 55 MG/DL (ref 70–110)
GLUCOSE SERPL-MCNC: 68 MG/DL (ref 70–110)
GLUCOSE SERPL-MCNC: 81 MG/DL (ref 70–110)
HCT VFR BLD AUTO: 22.1 % (ref 40–54)
HGB BLD-MCNC: 7.3 G/DL (ref 14–18)
MAGNESIUM SERPL-MCNC: 1.9 MG/DL (ref 1.6–2.6)
MCH RBC QN AUTO: 29.3 PG (ref 27–31)
MCHC RBC AUTO-ENTMCNC: 33 G/DL (ref 32–36)
MCV RBC AUTO: 89 FL (ref 82–98)
PHOSPHATE SERPL-MCNC: 4.5 MG/DL (ref 2.7–4.5)
PLATELET # BLD AUTO: 480 K/UL (ref 150–450)
PMV BLD AUTO: 9 FL (ref 9.2–12.9)
POTASSIUM SERPL-SCNC: 4.6 MMOL/L (ref 3.5–5.1)
RBC # BLD AUTO: 2.49 M/UL (ref 4.6–6.2)
SODIUM SERPL-SCNC: 134 MMOL/L (ref 136–145)
WBC # BLD AUTO: 12.19 K/UL (ref 3.9–12.7)

## 2021-09-12 PROCEDURE — 25000003 PHARM REV CODE 250: Performed by: FAMILY MEDICINE

## 2021-09-12 PROCEDURE — 86900 BLOOD TYPING SEROLOGIC ABO: CPT | Performed by: INTERNAL MEDICINE

## 2021-09-12 PROCEDURE — 25000003 PHARM REV CODE 250: Performed by: SURGERY

## 2021-09-12 PROCEDURE — 80048 BASIC METABOLIC PNL TOTAL CA: CPT | Performed by: INTERNAL MEDICINE

## 2021-09-12 PROCEDURE — 25000003 PHARM REV CODE 250: Performed by: INTERNAL MEDICINE

## 2021-09-12 PROCEDURE — 85027 COMPLETE CBC AUTOMATED: CPT | Performed by: INTERNAL MEDICINE

## 2021-09-12 PROCEDURE — 84100 ASSAY OF PHOSPHORUS: CPT | Performed by: INTERNAL MEDICINE

## 2021-09-12 PROCEDURE — 63600175 PHARM REV CODE 636 W HCPCS: Performed by: SURGERY

## 2021-09-12 PROCEDURE — 83735 ASSAY OF MAGNESIUM: CPT | Performed by: INTERNAL MEDICINE

## 2021-09-12 PROCEDURE — 63600175 PHARM REV CODE 636 W HCPCS: Performed by: INTERNAL MEDICINE

## 2021-09-12 PROCEDURE — 36415 COLL VENOUS BLD VENIPUNCTURE: CPT | Performed by: INTERNAL MEDICINE

## 2021-09-12 PROCEDURE — 86920 COMPATIBILITY TEST SPIN: CPT | Performed by: FAMILY MEDICINE

## 2021-09-12 PROCEDURE — 12000002 HC ACUTE/MED SURGE SEMI-PRIVATE ROOM

## 2021-09-12 RX ORDER — BISMUTH SUBSALICYLATE 525 MG/30ML
30 LIQUID ORAL EVERY 6 HOURS PRN
Status: DISCONTINUED | OUTPATIENT
Start: 2021-09-12 | End: 2021-09-29 | Stop reason: HOSPADM

## 2021-09-12 RX ORDER — AMOXICILLIN 250 MG
1 CAPSULE ORAL DAILY
Status: DISCONTINUED | OUTPATIENT
Start: 2021-09-12 | End: 2021-09-14

## 2021-09-12 RX ADMIN — HUMAN INSULIN 9 UNITS: 100 INJECTION, SOLUTION SUBCUTANEOUS at 09:09

## 2021-09-12 RX ADMIN — CARVEDILOL 6.25 MG: 6.25 TABLET, FILM COATED ORAL at 08:09

## 2021-09-12 RX ADMIN — VALACYCLOVIR HYDROCHLORIDE 1000 MG: 500 TABLET, FILM COATED ORAL at 08:09

## 2021-09-12 RX ADMIN — PIPERACILLIN SODIUM AND TAZOBACTAM SODIUM 3.38 G: 3; .375 INJECTION, POWDER, LYOPHILIZED, FOR SOLUTION INTRAVENOUS at 08:09

## 2021-09-12 RX ADMIN — HYDROCODONE BITARTRATE AND ACETAMINOPHEN 1 TABLET: 10; 325 TABLET ORAL at 09:09

## 2021-09-12 RX ADMIN — PIPERACILLIN SODIUM AND TAZOBACTAM SODIUM 3.38 G: 3; .375 INJECTION, POWDER, LYOPHILIZED, FOR SOLUTION INTRAVENOUS at 01:09

## 2021-09-12 RX ADMIN — MUPIROCIN: 20 OINTMENT TOPICAL at 09:09

## 2021-09-12 RX ADMIN — HYDROMORPHONE HYDROCHLORIDE 1 MG: 1 INJECTION, SOLUTION INTRAMUSCULAR; INTRAVENOUS; SUBCUTANEOUS at 10:09

## 2021-09-12 RX ADMIN — CARVEDILOL 6.25 MG: 6.25 TABLET, FILM COATED ORAL at 09:09

## 2021-09-12 RX ADMIN — MUPIROCIN: 20 OINTMENT TOPICAL at 02:09

## 2021-09-12 RX ADMIN — BISMUTH SUBSALICYLATE 525 MG 30 ML: 525 LIQUID ORAL at 11:09

## 2021-09-12 RX ADMIN — VALACYCLOVIR HYDROCHLORIDE 1000 MG: 500 TABLET, FILM COATED ORAL at 09:09

## 2021-09-12 RX ADMIN — ENOXAPARIN SODIUM 40 MG: 40 INJECTION SUBCUTANEOUS at 09:09

## 2021-09-12 RX ADMIN — DEXTROSE MONOHYDRATE 25 G: 25 INJECTION, SOLUTION INTRAVENOUS at 08:09

## 2021-09-12 RX ADMIN — HYDROCODONE BITARTRATE AND ACETAMINOPHEN 1 TABLET: 10; 325 TABLET ORAL at 04:09

## 2021-09-12 RX ADMIN — PIPERACILLIN SODIUM AND TAZOBACTAM SODIUM 3.38 G: 3; .375 INJECTION, POWDER, LYOPHILIZED, FOR SOLUTION INTRAVENOUS at 04:09

## 2021-09-12 RX ADMIN — MUPIROCIN: 20 OINTMENT TOPICAL at 08:09

## 2021-09-12 RX ADMIN — COLLAGENASE SANTYL: 250 OINTMENT TOPICAL at 08:09

## 2021-09-13 PROBLEM — E16.2 HYPOGLYCEMIA: Status: RESOLVED | Noted: 2021-09-12 | Resolved: 2021-09-13

## 2021-09-13 LAB
ANION GAP SERPL CALC-SCNC: 8 MMOL/L (ref 8–16)
BUN SERPL-MCNC: 31 MG/DL (ref 8–23)
CALCIUM SERPL-MCNC: 8.3 MG/DL (ref 8.7–10.5)
CHLORIDE SERPL-SCNC: 103 MMOL/L (ref 95–110)
CO2 SERPL-SCNC: 26 MMOL/L (ref 23–29)
CREAT SERPL-MCNC: 1.9 MG/DL (ref 0.5–1.4)
CRP SERPL-MCNC: 4.56 MG/DL
ERYTHROCYTE [DISTWIDTH] IN BLOOD BY AUTOMATED COUNT: 14 % (ref 11.5–14.5)
EST. GFR  (AFRICAN AMERICAN): 43 ML/MIN/1.73 M^2
EST. GFR  (NON AFRICAN AMERICAN): 37.2 ML/MIN/1.73 M^2
GLUCOSE SERPL-MCNC: 107 MG/DL (ref 70–110)
GLUCOSE SERPL-MCNC: 120 MG/DL (ref 70–110)
GLUCOSE SERPL-MCNC: 136 MG/DL (ref 70–110)
GLUCOSE SERPL-MCNC: 219 MG/DL (ref 70–110)
GLUCOSE SERPL-MCNC: 276 MG/DL (ref 70–110)
HCT VFR BLD AUTO: 21.9 % (ref 40–54)
HGB BLD-MCNC: 7.5 G/DL (ref 14–18)
MAGNESIUM SERPL-MCNC: 1.8 MG/DL (ref 1.6–2.6)
MCH RBC QN AUTO: 30.5 PG (ref 27–31)
MCHC RBC AUTO-ENTMCNC: 34.2 G/DL (ref 32–36)
MCV RBC AUTO: 89 FL (ref 82–98)
PHOSPHATE SERPL-MCNC: 4.6 MG/DL (ref 2.7–4.5)
PLATELET # BLD AUTO: 477 K/UL (ref 150–450)
PMV BLD AUTO: 9.4 FL (ref 9.2–12.9)
POTASSIUM SERPL-SCNC: 5 MMOL/L (ref 3.5–5.1)
RBC # BLD AUTO: 2.46 M/UL (ref 4.6–6.2)
SODIUM SERPL-SCNC: 137 MMOL/L (ref 136–145)
WBC # BLD AUTO: 10.92 K/UL (ref 3.9–12.7)

## 2021-09-13 PROCEDURE — 99222 PR INITIAL HOSPITAL CARE,LEVL II: ICD-10-PCS | Mod: ,,, | Performed by: NEUROLOGICAL SURGERY

## 2021-09-13 PROCEDURE — 86140 C-REACTIVE PROTEIN: CPT | Performed by: INTERNAL MEDICINE

## 2021-09-13 PROCEDURE — 84100 ASSAY OF PHOSPHORUS: CPT | Performed by: INTERNAL MEDICINE

## 2021-09-13 PROCEDURE — 25500020 PHARM REV CODE 255: Performed by: INTERNAL MEDICINE

## 2021-09-13 PROCEDURE — 63600175 PHARM REV CODE 636 W HCPCS: Performed by: INTERNAL MEDICINE

## 2021-09-13 PROCEDURE — 84630 ASSAY OF ZINC: CPT | Performed by: INTERNAL MEDICINE

## 2021-09-13 PROCEDURE — 36415 COLL VENOUS BLD VENIPUNCTURE: CPT | Performed by: INTERNAL MEDICINE

## 2021-09-13 PROCEDURE — 99232 PR SUBSEQUENT HOSPITAL CARE,LEVL II: ICD-10-PCS | Mod: ,,, | Performed by: INTERNAL MEDICINE

## 2021-09-13 PROCEDURE — 80048 BASIC METABOLIC PNL TOTAL CA: CPT | Performed by: INTERNAL MEDICINE

## 2021-09-13 PROCEDURE — 97607 NEG PRS WND THR NDME<=50SQCM: CPT

## 2021-09-13 PROCEDURE — 99222 1ST HOSP IP/OBS MODERATE 55: CPT | Mod: ,,, | Performed by: NEUROLOGICAL SURGERY

## 2021-09-13 PROCEDURE — 25000003 PHARM REV CODE 250: Performed by: FAMILY MEDICINE

## 2021-09-13 PROCEDURE — 25000003 PHARM REV CODE 250: Performed by: INTERNAL MEDICINE

## 2021-09-13 PROCEDURE — 83735 ASSAY OF MAGNESIUM: CPT | Performed by: INTERNAL MEDICINE

## 2021-09-13 PROCEDURE — 99232 SBSQ HOSP IP/OBS MODERATE 35: CPT | Mod: ,,, | Performed by: INTERNAL MEDICINE

## 2021-09-13 PROCEDURE — 85027 COMPLETE CBC AUTOMATED: CPT | Performed by: INTERNAL MEDICINE

## 2021-09-13 PROCEDURE — 12000002 HC ACUTE/MED SURGE SEMI-PRIVATE ROOM

## 2021-09-13 PROCEDURE — A9585 GADOBUTROL INJECTION: HCPCS | Performed by: INTERNAL MEDICINE

## 2021-09-13 PROCEDURE — 25000003 PHARM REV CODE 250: Performed by: SURGERY

## 2021-09-13 PROCEDURE — 63600175 PHARM REV CODE 636 W HCPCS: Performed by: SURGERY

## 2021-09-13 RX ORDER — GADOBUTROL 604.72 MG/ML
7.5 INJECTION INTRAVENOUS
Status: COMPLETED | OUTPATIENT
Start: 2021-09-13 | End: 2021-09-13

## 2021-09-13 RX ORDER — ZINC SULFATE 50(220)MG
220 CAPSULE ORAL DAILY
Status: DISPENSED | OUTPATIENT
Start: 2021-09-14 | End: 2021-09-28

## 2021-09-13 RX ADMIN — PIPERACILLIN SODIUM AND TAZOBACTAM SODIUM 3.38 G: 3; .375 INJECTION, POWDER, LYOPHILIZED, FOR SOLUTION INTRAVENOUS at 05:09

## 2021-09-13 RX ADMIN — PIPERACILLIN SODIUM AND TAZOBACTAM SODIUM 3.38 G: 3; .375 INJECTION, POWDER, LYOPHILIZED, FOR SOLUTION INTRAVENOUS at 01:09

## 2021-09-13 RX ADMIN — HUMAN INSULIN 9 UNITS: 100 INJECTION, SOLUTION SUBCUTANEOUS at 08:09

## 2021-09-13 RX ADMIN — MUPIROCIN: 20 OINTMENT TOPICAL at 08:09

## 2021-09-13 RX ADMIN — MUPIROCIN: 20 OINTMENT TOPICAL at 01:09

## 2021-09-13 RX ADMIN — HYDROCODONE BITARTRATE AND ACETAMINOPHEN 1 TABLET: 10; 325 TABLET ORAL at 08:09

## 2021-09-13 RX ADMIN — VALACYCLOVIR HYDROCHLORIDE 1000 MG: 500 TABLET, FILM COATED ORAL at 08:09

## 2021-09-13 RX ADMIN — BISMUTH SUBSALICYLATE 525 MG 30 ML: 525 LIQUID ORAL at 08:09

## 2021-09-13 RX ADMIN — ENOXAPARIN SODIUM 40 MG: 40 INJECTION SUBCUTANEOUS at 08:09

## 2021-09-13 RX ADMIN — HYDROCODONE BITARTRATE AND ACETAMINOPHEN 1 TABLET: 10; 325 TABLET ORAL at 01:09

## 2021-09-13 RX ADMIN — CARVEDILOL 6.25 MG: 6.25 TABLET, FILM COATED ORAL at 08:09

## 2021-09-13 RX ADMIN — PIPERACILLIN SODIUM AND TAZOBACTAM SODIUM 3.38 G: 3; .375 INJECTION, POWDER, LYOPHILIZED, FOR SOLUTION INTRAVENOUS at 08:09

## 2021-09-13 RX ADMIN — GADOBUTROL 7.5 ML: 604.72 INJECTION INTRAVENOUS at 11:09

## 2021-09-14 ENCOUNTER — ANESTHESIA (OUTPATIENT)
Dept: SURGERY | Facility: HOSPITAL | Age: 61
DRG: 853 | End: 2021-09-14
Payer: MEDICAID

## 2021-09-14 ENCOUNTER — ANESTHESIA EVENT (OUTPATIENT)
Dept: SURGERY | Facility: HOSPITAL | Age: 61
DRG: 853 | End: 2021-09-14
Payer: MEDICAID

## 2021-09-14 PROBLEM — M79.89 LEFT ARM SWELLING: Status: RESOLVED | Noted: 2021-09-12 | Resolved: 2021-09-14

## 2021-09-14 LAB
ANION GAP SERPL CALC-SCNC: 10 MMOL/L (ref 8–16)
ANION GAP SERPL CALC-SCNC: 8 MMOL/L (ref 8–16)
BLD PROD TYP BPU: NORMAL
BLD PROD TYP BPU: NORMAL
BLOOD UNIT EXPIRATION DATE: NORMAL
BLOOD UNIT EXPIRATION DATE: NORMAL
BLOOD UNIT TYPE CODE: 5100
BLOOD UNIT TYPE CODE: 5100
BLOOD UNIT TYPE: NORMAL
BLOOD UNIT TYPE: NORMAL
BUN SERPL-MCNC: 34 MG/DL (ref 8–23)
BUN SERPL-MCNC: 36 MG/DL (ref 8–23)
CALCIUM SERPL-MCNC: 8.3 MG/DL (ref 8.7–10.5)
CALCIUM SERPL-MCNC: 8.4 MG/DL (ref 8.7–10.5)
CHLORIDE SERPL-SCNC: 100 MMOL/L (ref 95–110)
CHLORIDE SERPL-SCNC: 103 MMOL/L (ref 95–110)
CO2 SERPL-SCNC: 25 MMOL/L (ref 23–29)
CO2 SERPL-SCNC: 25 MMOL/L (ref 23–29)
CODING SYSTEM: NORMAL
CODING SYSTEM: NORMAL
CREAT SERPL-MCNC: 2.1 MG/DL (ref 0.5–1.4)
CREAT SERPL-MCNC: 2.2 MG/DL (ref 0.5–1.4)
DISPENSE STATUS: NORMAL
DISPENSE STATUS: NORMAL
ERYTHROCYTE [DISTWIDTH] IN BLOOD BY AUTOMATED COUNT: 14.1 % (ref 11.5–14.5)
EST. GFR  (AFRICAN AMERICAN): 36 ML/MIN/1.73 M^2
EST. GFR  (AFRICAN AMERICAN): 38.1 ML/MIN/1.73 M^2
EST. GFR  (NON AFRICAN AMERICAN): 31.2 ML/MIN/1.73 M^2
EST. GFR  (NON AFRICAN AMERICAN): 33 ML/MIN/1.73 M^2
GLUCOSE SERPL-MCNC: 104 MG/DL (ref 70–110)
GLUCOSE SERPL-MCNC: 212 MG/DL (ref 70–110)
GLUCOSE SERPL-MCNC: 245 MG/DL (ref 70–110)
GLUCOSE SERPL-MCNC: 63 MG/DL (ref 70–110)
GLUCOSE SERPL-MCNC: 69 MG/DL (ref 70–110)
HCT VFR BLD AUTO: 20.9 % (ref 40–54)
HCT VFR BLD AUTO: 29.5 % (ref 40–54)
HGB BLD-MCNC: 6.8 G/DL (ref 14–18)
HGB BLD-MCNC: 9.7 G/DL (ref 14–18)
MAGNESIUM SERPL-MCNC: 1.9 MG/DL (ref 1.6–2.6)
MAGNESIUM SERPL-MCNC: 1.9 MG/DL (ref 1.6–2.6)
MCH RBC QN AUTO: 29.6 PG (ref 27–31)
MCHC RBC AUTO-ENTMCNC: 32.5 G/DL (ref 32–36)
MCV RBC AUTO: 91 FL (ref 82–98)
NUM UNITS TRANS PACKED RBC: NORMAL
NUM UNITS TRANS PACKED RBC: NORMAL
PHOSPHATE SERPL-MCNC: 4.5 MG/DL (ref 2.7–4.5)
PLATELET # BLD AUTO: 460 K/UL (ref 150–450)
PMV BLD AUTO: 9.2 FL (ref 9.2–12.9)
POTASSIUM SERPL-SCNC: 4.4 MMOL/L (ref 3.5–5.1)
POTASSIUM SERPL-SCNC: 4.5 MMOL/L (ref 3.5–5.1)
RBC # BLD AUTO: 2.3 M/UL (ref 4.6–6.2)
SODIUM SERPL-SCNC: 135 MMOL/L (ref 136–145)
SODIUM SERPL-SCNC: 136 MMOL/L (ref 136–145)
WBC # BLD AUTO: 10.69 K/UL (ref 3.9–12.7)

## 2021-09-14 PROCEDURE — 25000003 PHARM REV CODE 250: Performed by: SURGERY

## 2021-09-14 PROCEDURE — 37000008 HC ANESTHESIA 1ST 15 MINUTES: Performed by: SURGERY

## 2021-09-14 PROCEDURE — 36000704 HC OR TIME LEV I 1ST 15 MIN: Performed by: SURGERY

## 2021-09-14 PROCEDURE — 27000663 HC PAD, ARM CRADLE PRONE: Performed by: ANESTHESIOLOGY

## 2021-09-14 PROCEDURE — 99900035 HC TECH TIME PER 15 MIN (STAT)

## 2021-09-14 PROCEDURE — 27000221 HC OXYGEN, UP TO 24 HOURS

## 2021-09-14 PROCEDURE — 36569 INSJ PICC 5 YR+ W/O IMAGING: CPT

## 2021-09-14 PROCEDURE — 36415 COLL VENOUS BLD VENIPUNCTURE: CPT | Performed by: INTERNAL MEDICINE

## 2021-09-14 PROCEDURE — 63600175 PHARM REV CODE 636 W HCPCS: Performed by: NURSE ANESTHETIST, CERTIFIED REGISTERED

## 2021-09-14 PROCEDURE — 99232 SBSQ HOSP IP/OBS MODERATE 35: CPT | Mod: ,,, | Performed by: INTERNAL MEDICINE

## 2021-09-14 PROCEDURE — 11043 DBRDMT MUSC&/FSCA 1ST 20/<: CPT | Mod: 58,,, | Performed by: SURGERY

## 2021-09-14 PROCEDURE — 27000671 HC TUBING MICROBORE EXT: Performed by: ANESTHESIOLOGY

## 2021-09-14 PROCEDURE — 25000003 PHARM REV CODE 250: Performed by: NURSE ANESTHETIST, CERTIFIED REGISTERED

## 2021-09-14 PROCEDURE — 27201107 HC STYLET, STANDARD: Performed by: ANESTHESIOLOGY

## 2021-09-14 PROCEDURE — 27000080 OPTIME MED/SURG SUP & DEVICES GENERAL CLASSIFICATION: Performed by: SURGERY

## 2021-09-14 PROCEDURE — 93005 ELECTROCARDIOGRAM TRACING: CPT | Performed by: SPECIALIST

## 2021-09-14 PROCEDURE — 27000656 HC EYE GOGGLES: Performed by: ANESTHESIOLOGY

## 2021-09-14 PROCEDURE — 27000673 HC TUBING BLOOD Y: Performed by: ANESTHESIOLOGY

## 2021-09-14 PROCEDURE — 85018 HEMOGLOBIN: CPT | Performed by: FAMILY MEDICINE

## 2021-09-14 PROCEDURE — 80048 BASIC METABOLIC PNL TOTAL CA: CPT | Performed by: INTERNAL MEDICINE

## 2021-09-14 PROCEDURE — 82962 GLUCOSE BLOOD TEST: CPT

## 2021-09-14 PROCEDURE — 63600175 PHARM REV CODE 636 W HCPCS: Performed by: SURGERY

## 2021-09-14 PROCEDURE — 71000033 HC RECOVERY, INTIAL HOUR: Performed by: SURGERY

## 2021-09-14 PROCEDURE — 93010 ELECTROCARDIOGRAM REPORT: CPT | Mod: ,,, | Performed by: SPECIALIST

## 2021-09-14 PROCEDURE — 25000003 PHARM REV CODE 250: Performed by: INTERNAL MEDICINE

## 2021-09-14 PROCEDURE — 37000009 HC ANESTHESIA EA ADD 15 MINS: Performed by: SURGERY

## 2021-09-14 PROCEDURE — 94760 N-INVAS EAR/PLS OXIMETRY 1: CPT

## 2021-09-14 PROCEDURE — 80048 BASIC METABOLIC PNL TOTAL CA: CPT | Mod: 91 | Performed by: INTERNAL MEDICINE

## 2021-09-14 PROCEDURE — 94799 UNLISTED PULMONARY SVC/PX: CPT

## 2021-09-14 PROCEDURE — 11046 PR DEB MUSC/FASCIA ADD-ON: ICD-10-PCS | Mod: 58,,, | Performed by: SURGERY

## 2021-09-14 PROCEDURE — 27000657 HC HEADREST, PRONE: Performed by: ANESTHESIOLOGY

## 2021-09-14 PROCEDURE — 27201423 OPTIME MED/SURG SUP & DEVICES STERILE SUPPLY: Performed by: SURGERY

## 2021-09-14 PROCEDURE — 36000705 HC OR TIME LEV I EA ADD 15 MIN: Performed by: SURGERY

## 2021-09-14 PROCEDURE — 85014 HEMATOCRIT: CPT | Performed by: FAMILY MEDICINE

## 2021-09-14 PROCEDURE — 83735 ASSAY OF MAGNESIUM: CPT | Performed by: INTERNAL MEDICINE

## 2021-09-14 PROCEDURE — 85027 COMPLETE CBC AUTOMATED: CPT | Performed by: INTERNAL MEDICINE

## 2021-09-14 PROCEDURE — 84100 ASSAY OF PHOSPHORUS: CPT | Performed by: INTERNAL MEDICINE

## 2021-09-14 PROCEDURE — 11043 PR DEBRIDEMENT, SKIN, SUB-Q TISSUE,MUSCLE,=<20 SQ CM: ICD-10-PCS | Mod: 58,,, | Performed by: SURGERY

## 2021-09-14 PROCEDURE — 11046 DBRDMT MUSC&/FSCA EA ADDL: CPT | Mod: 58,,, | Performed by: SURGERY

## 2021-09-14 PROCEDURE — 12000002 HC ACUTE/MED SURGE SEMI-PRIVATE ROOM

## 2021-09-14 PROCEDURE — 83735 ASSAY OF MAGNESIUM: CPT | Mod: 91 | Performed by: INTERNAL MEDICINE

## 2021-09-14 PROCEDURE — 99232 PR SUBSEQUENT HOSPITAL CARE,LEVL II: ICD-10-PCS | Mod: ,,, | Performed by: INTERNAL MEDICINE

## 2021-09-14 PROCEDURE — P9016 RBC LEUKOCYTES REDUCED: HCPCS | Performed by: FAMILY MEDICINE

## 2021-09-14 PROCEDURE — 93010 EKG 12-LEAD: ICD-10-PCS | Mod: 76,,, | Performed by: SPECIALIST

## 2021-09-14 PROCEDURE — 27202105 HC BIS BILATERAL SENSOR: Performed by: ANESTHESIOLOGY

## 2021-09-14 RX ORDER — DIPHENHYDRAMINE HYDROCHLORIDE 50 MG/ML
12.5 INJECTION INTRAMUSCULAR; INTRAVENOUS
Status: DISCONTINUED | OUTPATIENT
Start: 2021-09-14 | End: 2021-09-14 | Stop reason: HOSPADM

## 2021-09-14 RX ORDER — FENTANYL CITRATE 50 UG/ML
INJECTION, SOLUTION INTRAMUSCULAR; INTRAVENOUS
Status: DISCONTINUED | OUTPATIENT
Start: 2021-09-14 | End: 2021-09-14

## 2021-09-14 RX ORDER — PROPOFOL 10 MG/ML
VIAL (ML) INTRAVENOUS
Status: DISCONTINUED | OUTPATIENT
Start: 2021-09-14 | End: 2021-09-14

## 2021-09-14 RX ORDER — HYDROCODONE BITARTRATE AND ACETAMINOPHEN 500; 5 MG/1; MG/1
TABLET ORAL
Status: DISCONTINUED | OUTPATIENT
Start: 2021-09-14 | End: 2021-09-14

## 2021-09-14 RX ORDER — MEPERIDINE HYDROCHLORIDE 50 MG/ML
12.5 INJECTION INTRAMUSCULAR; INTRAVENOUS; SUBCUTANEOUS EVERY 10 MIN PRN
Status: DISCONTINUED | OUTPATIENT
Start: 2021-09-14 | End: 2021-09-14 | Stop reason: HOSPADM

## 2021-09-14 RX ORDER — FAMOTIDINE 10 MG/ML
INJECTION INTRAVENOUS
Status: DISCONTINUED | OUTPATIENT
Start: 2021-09-14 | End: 2021-09-14

## 2021-09-14 RX ORDER — SODIUM CHLORIDE 0.9 % (FLUSH) 0.9 %
10 SYRINGE (ML) INJECTION
Status: DISCONTINUED | OUTPATIENT
Start: 2021-09-14 | End: 2021-09-29 | Stop reason: HOSPADM

## 2021-09-14 RX ORDER — OXYCODONE HYDROCHLORIDE 5 MG/1
5 TABLET ORAL
Status: DISCONTINUED | OUTPATIENT
Start: 2021-09-14 | End: 2021-09-14 | Stop reason: HOSPADM

## 2021-09-14 RX ORDER — FENTANYL CITRATE 50 UG/ML
25 INJECTION, SOLUTION INTRAMUSCULAR; INTRAVENOUS
Status: DISCONTINUED | OUTPATIENT
Start: 2021-09-14 | End: 2021-09-14 | Stop reason: HOSPADM

## 2021-09-14 RX ORDER — SUCCINYLCHOLINE CHLORIDE 20 MG/ML
INJECTION INTRAMUSCULAR; INTRAVENOUS
Status: DISCONTINUED | OUTPATIENT
Start: 2021-09-14 | End: 2021-09-14

## 2021-09-14 RX ORDER — HYOSCYAMINE SULFATE 0.12 MG/1
0.12 TABLET SUBLINGUAL ONCE
Status: COMPLETED | OUTPATIENT
Start: 2021-09-14 | End: 2021-09-14

## 2021-09-14 RX ORDER — ONDANSETRON 2 MG/ML
INJECTION INTRAMUSCULAR; INTRAVENOUS
Status: DISCONTINUED | OUTPATIENT
Start: 2021-09-14 | End: 2021-09-14

## 2021-09-14 RX ORDER — ROCURONIUM BROMIDE 10 MG/ML
INJECTION, SOLUTION INTRAVENOUS
Status: DISCONTINUED | OUTPATIENT
Start: 2021-09-14 | End: 2021-09-14

## 2021-09-14 RX ORDER — FUROSEMIDE 10 MG/ML
40 INJECTION INTRAMUSCULAR; INTRAVENOUS ONCE
Status: COMPLETED | OUTPATIENT
Start: 2021-09-14 | End: 2021-09-14

## 2021-09-14 RX ORDER — ONDANSETRON 2 MG/ML
4 INJECTION INTRAMUSCULAR; INTRAVENOUS DAILY PRN
Status: DISCONTINUED | OUTPATIENT
Start: 2021-09-14 | End: 2021-09-14 | Stop reason: HOSPADM

## 2021-09-14 RX ORDER — SODIUM CHLORIDE, SODIUM LACTATE, POTASSIUM CHLORIDE, CALCIUM CHLORIDE 600; 310; 30; 20 MG/100ML; MG/100ML; MG/100ML; MG/100ML
INJECTION, SOLUTION INTRAVENOUS CONTINUOUS PRN
Status: DISCONTINUED | OUTPATIENT
Start: 2021-09-14 | End: 2021-09-14

## 2021-09-14 RX ORDER — MIDAZOLAM HYDROCHLORIDE 1 MG/ML
INJECTION INTRAMUSCULAR; INTRAVENOUS
Status: DISCONTINUED | OUTPATIENT
Start: 2021-09-14 | End: 2021-09-14

## 2021-09-14 RX ADMIN — PIPERACILLIN SODIUM AND TAZOBACTAM SODIUM 3.38 G: 3; .375 INJECTION, POWDER, LYOPHILIZED, FOR SOLUTION INTRAVENOUS at 12:09

## 2021-09-14 RX ADMIN — SODIUM CHLORIDE 150 ML: 0.9 INJECTION, SOLUTION INTRAVENOUS at 10:09

## 2021-09-14 RX ADMIN — DEXTROSE MONOHYDRATE 12.5 G: 25 INJECTION, SOLUTION INTRAVENOUS at 06:09

## 2021-09-14 RX ADMIN — PIPERACILLIN SODIUM AND TAZOBACTAM SODIUM 3.38 G: 3; .375 INJECTION, POWDER, LYOPHILIZED, FOR SOLUTION INTRAVENOUS at 05:09

## 2021-09-14 RX ADMIN — PIPERACILLIN SODIUM AND TAZOBACTAM SODIUM 3.38 G: 2; .25 INJECTION, POWDER, LYOPHILIZED, FOR SOLUTION INTRAVENOUS at 09:09

## 2021-09-14 RX ADMIN — SODIUM CHLORIDE, SODIUM LACTATE, POTASSIUM CHLORIDE, AND CALCIUM CHLORIDE: .6; .31; .03; .02 INJECTION, SOLUTION INTRAVENOUS at 09:09

## 2021-09-14 RX ADMIN — MUPIROCIN: 20 OINTMENT TOPICAL at 08:09

## 2021-09-14 RX ADMIN — FENTANYL CITRATE 50 MCG: 50 INJECTION INTRAMUSCULAR; INTRAVENOUS at 09:09

## 2021-09-14 RX ADMIN — FUROSEMIDE 40 MG: 10 INJECTION, SOLUTION INTRAMUSCULAR; INTRAVENOUS at 11:09

## 2021-09-14 RX ADMIN — SUCCINYLCHOLINE CHLORIDE 160 MG: 20 INJECTION, SOLUTION INTRAMUSCULAR; INTRAVENOUS at 09:09

## 2021-09-14 RX ADMIN — HYDROCODONE BITARTRATE AND ACETAMINOPHEN 1 TABLET: 10; 325 TABLET ORAL at 08:09

## 2021-09-14 RX ADMIN — BISMUTH SUBSALICYLATE 525 MG 30 ML: 525 LIQUID ORAL at 09:09

## 2021-09-14 RX ADMIN — MIDAZOLAM HYDROCHLORIDE 2 MG: 1 INJECTION, SOLUTION INTRAMUSCULAR; INTRAVENOUS at 09:09

## 2021-09-14 RX ADMIN — ENOXAPARIN SODIUM 40 MG: 40 INJECTION SUBCUTANEOUS at 09:09

## 2021-09-14 RX ADMIN — ROCURONIUM BROMIDE 5 MG: 10 INJECTION, SOLUTION INTRAVENOUS at 09:09

## 2021-09-14 RX ADMIN — MUPIROCIN: 20 OINTMENT TOPICAL at 02:09

## 2021-09-14 RX ADMIN — CARVEDILOL 6.25 MG: 6.25 TABLET, FILM COATED ORAL at 05:09

## 2021-09-14 RX ADMIN — BISMUTH SUBSALICYLATE 525 MG 30 ML: 525 LIQUID ORAL at 02:09

## 2021-09-14 RX ADMIN — SUGAMMADEX 200 MG: 100 INJECTION, SOLUTION INTRAVENOUS at 10:09

## 2021-09-14 RX ADMIN — PROPOFOL 100 MG: 10 INJECTION, EMULSION INTRAVENOUS at 09:09

## 2021-09-14 RX ADMIN — FAMOTIDINE 20 MG: 10 INJECTION, SOLUTION INTRAVENOUS at 09:09

## 2021-09-14 RX ADMIN — HYOSCYAMINE SULFATE 0.12 MG: 0.12 TABLET ORAL at 05:09

## 2021-09-14 RX ADMIN — ONDANSETRON 4 MG: 2 INJECTION INTRAMUSCULAR; INTRAVENOUS at 09:09

## 2021-09-14 RX ADMIN — PIPERACILLIN SODIUM AND TAZOBACTAM SODIUM 3.38 G: 3; .375 INJECTION, POWDER, LYOPHILIZED, FOR SOLUTION INTRAVENOUS at 09:09

## 2021-09-15 ENCOUNTER — CLINICAL SUPPORT (OUTPATIENT)
Dept: CARDIOLOGY | Facility: HOSPITAL | Age: 61
DRG: 853 | End: 2021-09-15
Attending: INTERNAL MEDICINE
Payer: MEDICAID

## 2021-09-15 VITALS — HEIGHT: 67 IN | BODY MASS INDEX: 26.53 KG/M2 | WEIGHT: 169 LBS

## 2021-09-15 PROBLEM — I47.29 NSVT (NONSUSTAINED VENTRICULAR TACHYCARDIA): Status: ACTIVE | Noted: 2021-09-15

## 2021-09-15 LAB
ANION GAP SERPL CALC-SCNC: 12 MMOL/L (ref 8–16)
AORTIC ROOT ANNULUS: 3.27 CM
AORTIC VALVE CUSP SEPERATION: 2.6 CM
AV INDEX (PROSTH): 0.67
AV MEAN GRADIENT: 4 MMHG
AV PEAK GRADIENT: 6 MMHG
AV VALVE AREA: 2.16 CM2
AV VELOCITY RATIO: 71.3
BSA FOR ECHO PROCEDURE: 1.9 M2
BUN SERPL-MCNC: 37 MG/DL (ref 8–23)
CALCIUM SERPL-MCNC: 8.1 MG/DL (ref 8.7–10.5)
CHLORIDE SERPL-SCNC: 98 MMOL/L (ref 95–110)
CO2 SERPL-SCNC: 26 MMOL/L (ref 23–29)
CREAT SERPL-MCNC: 2.2 MG/DL (ref 0.5–1.4)
CV ECHO LV RWT: 0.55 CM
DOP CALC AO PEAK VEL: 1.2 M/S
DOP CALC AO VTI: 25.5 CM
DOP CALC LVOT AREA: 3.2 CM2
DOP CALC LVOT DIAMETER: 2.02 CM
DOP CALC LVOT PEAK VEL: 85.56 M/S
DOP CALC LVOT STROKE VOLUME: 55 CM3
DOP CALCLVOT PEAK VEL VTI: 17.17 CM
E WAVE DECELERATION TIME: 163.95 MSEC
E/A RATIO: 1.38
E/E' RATIO: 11 M/S
ECHO LV POSTERIOR WALL: 1.1 CM (ref 0.6–1.1)
EJECTION FRACTION: 60 %
ERYTHROCYTE [DISTWIDTH] IN BLOOD BY AUTOMATED COUNT: 14.1 % (ref 11.5–14.5)
EST. GFR  (AFRICAN AMERICAN): 36 ML/MIN/1.73 M^2
EST. GFR  (NON AFRICAN AMERICAN): 31.2 ML/MIN/1.73 M^2
FRACTIONAL SHORTENING: 26 % (ref 28–44)
GLUCOSE SERPL-MCNC: 181 MG/DL (ref 70–110)
GLUCOSE SERPL-MCNC: 214 MG/DL (ref 70–110)
GLUCOSE SERPL-MCNC: 215 MG/DL (ref 70–110)
GLUCOSE SERPL-MCNC: 230 MG/DL (ref 70–110)
GLUCOSE SERPL-MCNC: 293 MG/DL (ref 70–110)
HCT VFR BLD AUTO: 25.9 % (ref 40–54)
HGB BLD-MCNC: 8.6 G/DL (ref 14–18)
INTERVENTRICULAR SEPTUM: 1 CM (ref 0.6–1.1)
LEFT ATRIUM SIZE: 3.52 CM
LEFT INTERNAL DIMENSION IN SYSTOLE: 2.97 CM (ref 2.1–4)
LEFT VENTRICLE MASS INDEX: 73 G/M2
LEFT VENTRICULAR INTERNAL DIMENSION IN DIASTOLE: 4.01 CM (ref 3.5–6)
LEFT VENTRICULAR MASS: 136.73 G
LV LATERAL E/E' RATIO: 10.08 M/S
LV SEPTAL E/E' RATIO: 12.1 M/S
MAGNESIUM SERPL-MCNC: 1.9 MG/DL (ref 1.6–2.6)
MCH RBC QN AUTO: 29.7 PG (ref 27–31)
MCHC RBC AUTO-ENTMCNC: 33.2 G/DL (ref 32–36)
MCV RBC AUTO: 89 FL (ref 82–98)
MV PEAK A VEL: 0.88 M/S
MV PEAK E VEL: 1.21 M/S
PHOSPHATE SERPL-MCNC: 4.2 MG/DL (ref 2.7–4.5)
PLATELET # BLD AUTO: 396 K/UL (ref 150–450)
PMV BLD AUTO: 9.3 FL (ref 9.2–12.9)
POTASSIUM SERPL-SCNC: 4.4 MMOL/L (ref 3.5–5.1)
PV PEAK VELOCITY: 97.78 CM/S
RA PRESSURE: 8 MMHG
RBC # BLD AUTO: 2.9 M/UL (ref 4.6–6.2)
RIGHT VENTRICULAR END-DIASTOLIC DIMENSION: 295 CM
SODIUM SERPL-SCNC: 136 MMOL/L (ref 136–145)
TDI LATERAL: 0.12 M/S
TDI SEPTAL: 0.1 M/S
TDI: 0.11 M/S
WBC # BLD AUTO: 9.37 K/UL (ref 3.9–12.7)

## 2021-09-15 PROCEDURE — 25000003 PHARM REV CODE 250: Performed by: INTERNAL MEDICINE

## 2021-09-15 PROCEDURE — 99231 SBSQ HOSP IP/OBS SF/LOW 25: CPT | Mod: ,,, | Performed by: INTERNAL MEDICINE

## 2021-09-15 PROCEDURE — 80048 BASIC METABOLIC PNL TOTAL CA: CPT | Performed by: SURGERY

## 2021-09-15 PROCEDURE — 93306 TTE W/DOPPLER COMPLETE: CPT | Mod: 26,,, | Performed by: INTERNAL MEDICINE

## 2021-09-15 PROCEDURE — 63600175 PHARM REV CODE 636 W HCPCS: Performed by: INTERNAL MEDICINE

## 2021-09-15 PROCEDURE — 93306 ECHO (CUPID ONLY): ICD-10-PCS | Mod: 26,,, | Performed by: INTERNAL MEDICINE

## 2021-09-15 PROCEDURE — 25000003 PHARM REV CODE 250: Performed by: SURGERY

## 2021-09-15 PROCEDURE — 63600175 PHARM REV CODE 636 W HCPCS: Performed by: SURGERY

## 2021-09-15 PROCEDURE — 85027 COMPLETE CBC AUTOMATED: CPT | Performed by: SURGERY

## 2021-09-15 PROCEDURE — 63600175 PHARM REV CODE 636 W HCPCS: Performed by: NURSE PRACTITIONER

## 2021-09-15 PROCEDURE — 27000221 HC OXYGEN, UP TO 24 HOURS

## 2021-09-15 PROCEDURE — 36415 COLL VENOUS BLD VENIPUNCTURE: CPT | Performed by: SURGERY

## 2021-09-15 PROCEDURE — 12000002 HC ACUTE/MED SURGE SEMI-PRIVATE ROOM

## 2021-09-15 PROCEDURE — 99900035 HC TECH TIME PER 15 MIN (STAT)

## 2021-09-15 PROCEDURE — 94761 N-INVAS EAR/PLS OXIMETRY MLT: CPT

## 2021-09-15 PROCEDURE — 99231 PR SUBSEQUENT HOSPITAL CARE,LEVL I: ICD-10-PCS | Mod: ,,, | Performed by: INTERNAL MEDICINE

## 2021-09-15 PROCEDURE — 83735 ASSAY OF MAGNESIUM: CPT | Performed by: SURGERY

## 2021-09-15 PROCEDURE — 84100 ASSAY OF PHOSPHORUS: CPT | Performed by: SURGERY

## 2021-09-15 PROCEDURE — 99223 1ST HOSP IP/OBS HIGH 75: CPT | Mod: 25,,, | Performed by: INTERNAL MEDICINE

## 2021-09-15 PROCEDURE — 99223 PR INITIAL HOSPITAL CARE,LEVL III: ICD-10-PCS | Mod: 25,,, | Performed by: INTERNAL MEDICINE

## 2021-09-15 PROCEDURE — 93306 TTE W/DOPPLER COMPLETE: CPT

## 2021-09-15 RX ORDER — LANOLIN ALCOHOL/MO/W.PET/CERES
400 CREAM (GRAM) TOPICAL ONCE
Status: COMPLETED | OUTPATIENT
Start: 2021-09-16 | End: 2021-09-15

## 2021-09-15 RX ORDER — MAGNESIUM SULFATE 1 G/100ML
1 INJECTION INTRAVENOUS ONCE
Status: COMPLETED | OUTPATIENT
Start: 2021-09-15 | End: 2021-09-15

## 2021-09-15 RX ADMIN — COLLAGENASE SANTYL: 250 OINTMENT TOPICAL at 09:09

## 2021-09-15 RX ADMIN — MUPIROCIN: 20 OINTMENT TOPICAL at 08:09

## 2021-09-15 RX ADMIN — CARVEDILOL 6.25 MG: 6.25 TABLET, FILM COATED ORAL at 08:09

## 2021-09-15 RX ADMIN — HYDROCODONE BITARTRATE AND ACETAMINOPHEN 1 TABLET: 10; 325 TABLET ORAL at 08:09

## 2021-09-15 RX ADMIN — MAGNESIUM OXIDE 400 MG: 400 TABLET ORAL at 11:09

## 2021-09-15 RX ADMIN — PIPERACILLIN SODIUM AND TAZOBACTAM SODIUM 3.38 G: 3; .375 INJECTION, POWDER, LYOPHILIZED, FOR SOLUTION INTRAVENOUS at 01:09

## 2021-09-15 RX ADMIN — HUMAN INSULIN 9 UNITS: 100 INJECTION, SOLUTION SUBCUTANEOUS at 01:09

## 2021-09-15 RX ADMIN — ENOXAPARIN SODIUM 40 MG: 40 INJECTION SUBCUTANEOUS at 08:09

## 2021-09-15 RX ADMIN — HYDROCODONE BITARTRATE AND ACETAMINOPHEN 1 TABLET: 5; 325 TABLET ORAL at 10:09

## 2021-09-15 RX ADMIN — BISMUTH SUBSALICYLATE 525 MG 30 ML: 525 LIQUID ORAL at 08:09

## 2021-09-15 RX ADMIN — INSULIN DETEMIR 25 UNITS: 100 INJECTION, SOLUTION SUBCUTANEOUS at 08:09

## 2021-09-15 RX ADMIN — MULTIPLE VITAMINS W/ MINERALS TAB 1 TABLET: TAB at 09:09

## 2021-09-15 RX ADMIN — ZINC SULFATE 220 MG (50 MG) CAPSULE 220 MG: CAPSULE at 09:09

## 2021-09-15 RX ADMIN — PIPERACILLIN SODIUM AND TAZOBACTAM SODIUM 3.38 G: 3; .375 INJECTION, POWDER, LYOPHILIZED, FOR SOLUTION INTRAVENOUS at 05:09

## 2021-09-15 RX ADMIN — PIPERACILLIN SODIUM AND TAZOBACTAM SODIUM 3.38 G: 3; .375 INJECTION, POWDER, LYOPHILIZED, FOR SOLUTION INTRAVENOUS at 09:09

## 2021-09-15 RX ADMIN — CARVEDILOL 6.25 MG: 6.25 TABLET, FILM COATED ORAL at 09:09

## 2021-09-15 RX ADMIN — MAGNESIUM SULFATE HEPTAHYDRATE 1 G: 1 INJECTION, SOLUTION INTRAVENOUS at 12:09

## 2021-09-16 LAB
ANION GAP SERPL CALC-SCNC: 8 MMOL/L (ref 8–16)
BUN SERPL-MCNC: 44 MG/DL (ref 8–23)
CALCIUM SERPL-MCNC: 8.8 MG/DL (ref 8.7–10.5)
CHLORIDE SERPL-SCNC: 101 MMOL/L (ref 95–110)
CO2 SERPL-SCNC: 30 MMOL/L (ref 23–29)
CREAT SERPL-MCNC: 2.1 MG/DL (ref 0.5–1.4)
ERYTHROCYTE [DISTWIDTH] IN BLOOD BY AUTOMATED COUNT: 14.6 % (ref 11.5–14.5)
EST. GFR  (AFRICAN AMERICAN): 38.1 ML/MIN/1.73 M^2
EST. GFR  (NON AFRICAN AMERICAN): 33 ML/MIN/1.73 M^2
GLUCOSE SERPL-MCNC: 109 MG/DL (ref 70–110)
GLUCOSE SERPL-MCNC: 138 MG/DL (ref 70–110)
GLUCOSE SERPL-MCNC: 273 MG/DL (ref 70–110)
GLUCOSE SERPL-MCNC: 290 MG/DL (ref 70–110)
HCT VFR BLD AUTO: 27.3 % (ref 40–54)
HGB BLD-MCNC: 8.9 G/DL (ref 14–18)
MAGNESIUM SERPL-MCNC: 2.2 MG/DL (ref 1.6–2.6)
MCH RBC QN AUTO: 29 PG (ref 27–31)
MCHC RBC AUTO-ENTMCNC: 32.6 G/DL (ref 32–36)
MCV RBC AUTO: 89 FL (ref 82–98)
PHOSPHATE SERPL-MCNC: 4 MG/DL (ref 2.7–4.5)
PLATELET # BLD AUTO: 383 K/UL (ref 150–450)
PMV BLD AUTO: 9.1 FL (ref 9.2–12.9)
POTASSIUM SERPL-SCNC: 4.7 MMOL/L (ref 3.5–5.1)
RBC # BLD AUTO: 3.07 M/UL (ref 4.6–6.2)
SODIUM SERPL-SCNC: 139 MMOL/L (ref 136–145)
WBC # BLD AUTO: 10.16 K/UL (ref 3.9–12.7)

## 2021-09-16 PROCEDURE — 25000003 PHARM REV CODE 250: Performed by: INTERNAL MEDICINE

## 2021-09-16 PROCEDURE — 36415 COLL VENOUS BLD VENIPUNCTURE: CPT | Performed by: INTERNAL MEDICINE

## 2021-09-16 PROCEDURE — 82962 GLUCOSE BLOOD TEST: CPT

## 2021-09-16 PROCEDURE — 97607 NEG PRS WND THR NDME<=50SQCM: CPT

## 2021-09-16 PROCEDURE — 84100 ASSAY OF PHOSPHORUS: CPT | Performed by: INTERNAL MEDICINE

## 2021-09-16 PROCEDURE — 80048 BASIC METABOLIC PNL TOTAL CA: CPT | Performed by: INTERNAL MEDICINE

## 2021-09-16 PROCEDURE — 12000002 HC ACUTE/MED SURGE SEMI-PRIVATE ROOM

## 2021-09-16 PROCEDURE — 63600175 PHARM REV CODE 636 W HCPCS: Performed by: INTERNAL MEDICINE

## 2021-09-16 PROCEDURE — 83735 ASSAY OF MAGNESIUM: CPT | Performed by: INTERNAL MEDICINE

## 2021-09-16 PROCEDURE — 94761 N-INVAS EAR/PLS OXIMETRY MLT: CPT

## 2021-09-16 PROCEDURE — 85027 COMPLETE CBC AUTOMATED: CPT | Performed by: INTERNAL MEDICINE

## 2021-09-16 PROCEDURE — 99900035 HC TECH TIME PER 15 MIN (STAT)

## 2021-09-16 RX ORDER — CARVEDILOL 12.5 MG/1
12.5 TABLET ORAL 2 TIMES DAILY
Status: DISCONTINUED | OUTPATIENT
Start: 2021-09-16 | End: 2021-09-29 | Stop reason: HOSPADM

## 2021-09-16 RX ADMIN — MUPIROCIN: 20 OINTMENT TOPICAL at 08:09

## 2021-09-16 RX ADMIN — HYDROCODONE BITARTRATE AND ACETAMINOPHEN 1 TABLET: 10; 325 TABLET ORAL at 09:09

## 2021-09-16 RX ADMIN — CARVEDILOL 12.5 MG: 12.5 TABLET, FILM COATED ORAL at 09:09

## 2021-09-16 RX ADMIN — ZINC SULFATE 220 MG (50 MG) CAPSULE 220 MG: CAPSULE at 08:09

## 2021-09-16 RX ADMIN — PIPERACILLIN SODIUM AND TAZOBACTAM SODIUM 3.38 G: 3; .375 INJECTION, POWDER, LYOPHILIZED, FOR SOLUTION INTRAVENOUS at 10:09

## 2021-09-16 RX ADMIN — PIPERACILLIN SODIUM AND TAZOBACTAM SODIUM 3.38 G: 3; .375 INJECTION, POWDER, LYOPHILIZED, FOR SOLUTION INTRAVENOUS at 12:09

## 2021-09-16 RX ADMIN — COLLAGENASE SANTYL: 250 OINTMENT TOPICAL at 09:09

## 2021-09-16 RX ADMIN — CARVEDILOL 12.5 MG: 12.5 TABLET, FILM COATED ORAL at 08:09

## 2021-09-16 RX ADMIN — HUMAN INSULIN 9 UNITS: 100 INJECTION, SOLUTION SUBCUTANEOUS at 09:09

## 2021-09-16 RX ADMIN — INSULIN DETEMIR 25 UNITS: 100 INJECTION, SOLUTION SUBCUTANEOUS at 09:09

## 2021-09-16 RX ADMIN — ENOXAPARIN SODIUM 40 MG: 40 INJECTION SUBCUTANEOUS at 09:09

## 2021-09-16 RX ADMIN — MUPIROCIN: 20 OINTMENT TOPICAL at 02:09

## 2021-09-16 RX ADMIN — MUPIROCIN: 20 OINTMENT TOPICAL at 09:09

## 2021-09-16 RX ADMIN — PIPERACILLIN SODIUM AND TAZOBACTAM SODIUM 3.38 G: 3; .375 INJECTION, POWDER, LYOPHILIZED, FOR SOLUTION INTRAVENOUS at 06:09

## 2021-09-16 RX ADMIN — MULTIPLE VITAMINS W/ MINERALS TAB 1 TABLET: TAB at 08:09

## 2021-09-16 RX ADMIN — BISMUTH SUBSALICYLATE 525 MG 30 ML: 525 LIQUID ORAL at 09:09

## 2021-09-17 LAB
ALBUMIN SERPL BCP-MCNC: 2.3 G/DL (ref 3.5–5.2)
ANION GAP SERPL CALC-SCNC: 8 MMOL/L (ref 8–16)
BUN SERPL-MCNC: 57 MG/DL (ref 8–23)
CALCIUM SERPL-MCNC: 9 MG/DL (ref 8.7–10.5)
CHLORIDE SERPL-SCNC: 96 MMOL/L (ref 95–110)
CO2 SERPL-SCNC: 30 MMOL/L (ref 23–29)
CREAT SERPL-MCNC: 2 MG/DL (ref 0.5–1.4)
EST. GFR  (AFRICAN AMERICAN): 40.4 ML/MIN/1.73 M^2
EST. GFR  (NON AFRICAN AMERICAN): 35 ML/MIN/1.73 M^2
GLUCOSE SERPL-MCNC: 104 MG/DL (ref 70–110)
GLUCOSE SERPL-MCNC: 134 MG/DL (ref 70–110)
GLUCOSE SERPL-MCNC: 217 MG/DL (ref 70–110)
GLUCOSE SERPL-MCNC: 233 MG/DL (ref 70–110)
GLUCOSE SERPL-MCNC: 239 MG/DL (ref 70–110)
GLUCOSE SERPL-MCNC: 273 MG/DL (ref 70–110)
GLUCOSE SERPL-MCNC: 55 MG/DL (ref 70–110)
PHOSPHATE SERPL-MCNC: 4.7 MG/DL (ref 2.7–4.5)
POTASSIUM SERPL-SCNC: 4.7 MMOL/L (ref 3.5–5.1)
SODIUM SERPL-SCNC: 134 MMOL/L (ref 136–145)

## 2021-09-17 PROCEDURE — 63600175 PHARM REV CODE 636 W HCPCS: Performed by: INTERNAL MEDICINE

## 2021-09-17 PROCEDURE — 25000003 PHARM REV CODE 250: Performed by: INTERNAL MEDICINE

## 2021-09-17 PROCEDURE — 80069 RENAL FUNCTION PANEL: CPT | Performed by: INTERNAL MEDICINE

## 2021-09-17 PROCEDURE — 12000002 HC ACUTE/MED SURGE SEMI-PRIVATE ROOM

## 2021-09-17 PROCEDURE — 99231 SBSQ HOSP IP/OBS SF/LOW 25: CPT | Mod: ,,, | Performed by: INTERNAL MEDICINE

## 2021-09-17 PROCEDURE — 36415 COLL VENOUS BLD VENIPUNCTURE: CPT | Performed by: INTERNAL MEDICINE

## 2021-09-17 PROCEDURE — 82962 GLUCOSE BLOOD TEST: CPT

## 2021-09-17 PROCEDURE — 99231 PR SUBSEQUENT HOSPITAL CARE,LEVL I: ICD-10-PCS | Mod: ,,, | Performed by: INTERNAL MEDICINE

## 2021-09-17 RX ORDER — AMLODIPINE BESYLATE 2.5 MG/1
2.5 TABLET ORAL DAILY
Status: DISCONTINUED | OUTPATIENT
Start: 2021-09-17 | End: 2021-09-18

## 2021-09-17 RX ADMIN — MUPIROCIN: 20 OINTMENT TOPICAL at 08:09

## 2021-09-17 RX ADMIN — CARVEDILOL 12.5 MG: 12.5 TABLET, FILM COATED ORAL at 09:09

## 2021-09-17 RX ADMIN — HYDROCODONE BITARTRATE AND ACETAMINOPHEN 1 TABLET: 10; 325 TABLET ORAL at 08:09

## 2021-09-17 RX ADMIN — HUMAN INSULIN 6 UNITS: 100 INJECTION, SOLUTION SUBCUTANEOUS at 05:09

## 2021-09-17 RX ADMIN — INSULIN DETEMIR 25 UNITS: 100 INJECTION, SOLUTION SUBCUTANEOUS at 08:09

## 2021-09-17 RX ADMIN — PIPERACILLIN SODIUM AND TAZOBACTAM SODIUM 3.38 G: 3; .375 INJECTION, POWDER, LYOPHILIZED, FOR SOLUTION INTRAVENOUS at 05:09

## 2021-09-17 RX ADMIN — ENOXAPARIN SODIUM 40 MG: 40 INJECTION SUBCUTANEOUS at 08:09

## 2021-09-17 RX ADMIN — PIPERACILLIN SODIUM AND TAZOBACTAM SODIUM 3.38 G: 3; .375 INJECTION, POWDER, LYOPHILIZED, FOR SOLUTION INTRAVENOUS at 09:09

## 2021-09-17 RX ADMIN — ZINC SULFATE 220 MG (50 MG) CAPSULE 220 MG: CAPSULE at 09:09

## 2021-09-17 RX ADMIN — DEXTROSE MONOHYDRATE 12.5 G: 25 INJECTION, SOLUTION INTRAVENOUS at 05:09

## 2021-09-17 RX ADMIN — MUPIROCIN: 20 OINTMENT TOPICAL at 09:09

## 2021-09-17 RX ADMIN — BISMUTH SUBSALICYLATE 525 MG 30 ML: 525 LIQUID ORAL at 08:09

## 2021-09-17 RX ADMIN — MULTIPLE VITAMINS W/ MINERALS TAB 1 TABLET: TAB at 09:09

## 2021-09-17 RX ADMIN — MUPIROCIN: 20 OINTMENT TOPICAL at 02:09

## 2021-09-17 RX ADMIN — CARVEDILOL 12.5 MG: 12.5 TABLET, FILM COATED ORAL at 08:09

## 2021-09-17 RX ADMIN — PIPERACILLIN SODIUM AND TAZOBACTAM SODIUM 3.38 G: 3; .375 INJECTION, POWDER, LYOPHILIZED, FOR SOLUTION INTRAVENOUS at 12:09

## 2021-09-17 RX ADMIN — AMLODIPINE BESYLATE 2.5 MG: 2.5 TABLET ORAL at 05:09

## 2021-09-17 RX ADMIN — COLLAGENASE SANTYL: 250 OINTMENT TOPICAL at 02:09

## 2021-09-18 LAB
ALBUMIN SERPL BCP-MCNC: 2.1 G/DL (ref 3.5–5.2)
ANION GAP SERPL CALC-SCNC: 10 MMOL/L (ref 8–16)
ANION GAP SERPL CALC-SCNC: 10 MMOL/L (ref 8–16)
BUN SERPL-MCNC: 62 MG/DL (ref 8–23)
BUN SERPL-MCNC: 62 MG/DL (ref 8–23)
CALCIUM SERPL-MCNC: 8.6 MG/DL (ref 8.7–10.5)
CALCIUM SERPL-MCNC: 8.6 MG/DL (ref 8.7–10.5)
CHLORIDE SERPL-SCNC: 97 MMOL/L (ref 95–110)
CHLORIDE SERPL-SCNC: 97 MMOL/L (ref 95–110)
CO2 SERPL-SCNC: 27 MMOL/L (ref 23–29)
CO2 SERPL-SCNC: 27 MMOL/L (ref 23–29)
CREAT SERPL-MCNC: 2 MG/DL (ref 0.5–1.4)
CREAT SERPL-MCNC: 2 MG/DL (ref 0.5–1.4)
ERYTHROCYTE [DISTWIDTH] IN BLOOD BY AUTOMATED COUNT: 14.9 % (ref 11.5–14.5)
EST. GFR  (AFRICAN AMERICAN): 40.4 ML/MIN/1.73 M^2
EST. GFR  (AFRICAN AMERICAN): 40.4 ML/MIN/1.73 M^2
EST. GFR  (NON AFRICAN AMERICAN): 35 ML/MIN/1.73 M^2
EST. GFR  (NON AFRICAN AMERICAN): 35 ML/MIN/1.73 M^2
GLUCOSE SERPL-MCNC: 110 MG/DL (ref 70–110)
GLUCOSE SERPL-MCNC: 157 MG/DL (ref 70–110)
GLUCOSE SERPL-MCNC: 157 MG/DL (ref 70–110)
GLUCOSE SERPL-MCNC: 218 MG/DL (ref 70–110)
GLUCOSE SERPL-MCNC: 245 MG/DL (ref 70–110)
GLUCOSE SERPL-MCNC: 256 MG/DL (ref 70–110)
HCT VFR BLD AUTO: 25.2 % (ref 40–54)
HGB BLD-MCNC: 8.2 G/DL (ref 14–18)
MAGNESIUM SERPL-MCNC: 2.2 MG/DL (ref 1.6–2.6)
MCH RBC QN AUTO: 29.8 PG (ref 27–31)
MCHC RBC AUTO-ENTMCNC: 32.5 G/DL (ref 32–36)
MCV RBC AUTO: 92 FL (ref 82–98)
PHOSPHATE SERPL-MCNC: 4.5 MG/DL (ref 2.7–4.5)
PHOSPHATE SERPL-MCNC: 4.5 MG/DL (ref 2.7–4.5)
PLATELET # BLD AUTO: 318 K/UL (ref 150–450)
PMV BLD AUTO: 9.1 FL (ref 9.2–12.9)
POTASSIUM SERPL-SCNC: 4.8 MMOL/L (ref 3.5–5.1)
POTASSIUM SERPL-SCNC: 4.8 MMOL/L (ref 3.5–5.1)
RBC # BLD AUTO: 2.75 M/UL (ref 4.6–6.2)
SODIUM SERPL-SCNC: 134 MMOL/L (ref 136–145)
SODIUM SERPL-SCNC: 134 MMOL/L (ref 136–145)
WBC # BLD AUTO: 8.79 K/UL (ref 3.9–12.7)

## 2021-09-18 PROCEDURE — 63600175 PHARM REV CODE 636 W HCPCS: Performed by: INTERNAL MEDICINE

## 2021-09-18 PROCEDURE — 25000003 PHARM REV CODE 250: Performed by: INTERNAL MEDICINE

## 2021-09-18 PROCEDURE — 85027 COMPLETE CBC AUTOMATED: CPT | Performed by: INTERNAL MEDICINE

## 2021-09-18 PROCEDURE — 80069 RENAL FUNCTION PANEL: CPT | Performed by: INTERNAL MEDICINE

## 2021-09-18 PROCEDURE — 83735 ASSAY OF MAGNESIUM: CPT | Performed by: INTERNAL MEDICINE

## 2021-09-18 PROCEDURE — 12000002 HC ACUTE/MED SURGE SEMI-PRIVATE ROOM

## 2021-09-18 PROCEDURE — C9399 UNCLASSIFIED DRUGS OR BIOLOG: HCPCS | Performed by: INTERNAL MEDICINE

## 2021-09-18 RX ORDER — AMLODIPINE BESYLATE 5 MG/1
5 TABLET ORAL DAILY
Status: DISCONTINUED | OUTPATIENT
Start: 2021-09-19 | End: 2021-09-29 | Stop reason: HOSPADM

## 2021-09-18 RX ADMIN — INSULIN DETEMIR 25 UNITS: 100 INJECTION, SOLUTION SUBCUTANEOUS at 08:09

## 2021-09-18 RX ADMIN — CARVEDILOL 12.5 MG: 12.5 TABLET, FILM COATED ORAL at 09:09

## 2021-09-18 RX ADMIN — HUMAN INSULIN 6 UNITS: 100 INJECTION, SOLUTION SUBCUTANEOUS at 05:09

## 2021-09-18 RX ADMIN — PIPERACILLIN SODIUM AND TAZOBACTAM SODIUM 3.38 G: 3; .375 INJECTION, POWDER, LYOPHILIZED, FOR SOLUTION INTRAVENOUS at 09:09

## 2021-09-18 RX ADMIN — CARVEDILOL 12.5 MG: 12.5 TABLET, FILM COATED ORAL at 08:09

## 2021-09-18 RX ADMIN — PIPERACILLIN SODIUM AND TAZOBACTAM SODIUM 3.38 G: 3; .375 INJECTION, POWDER, LYOPHILIZED, FOR SOLUTION INTRAVENOUS at 01:09

## 2021-09-18 RX ADMIN — BISMUTH SUBSALICYLATE 525 MG 30 ML: 525 LIQUID ORAL at 08:09

## 2021-09-18 RX ADMIN — ZINC SULFATE 220 MG (50 MG) CAPSULE 220 MG: CAPSULE at 09:09

## 2021-09-18 RX ADMIN — COLLAGENASE SANTYL: 250 OINTMENT TOPICAL at 09:09

## 2021-09-18 RX ADMIN — AMLODIPINE BESYLATE 2.5 MG: 2.5 TABLET ORAL at 09:09

## 2021-09-18 RX ADMIN — ACETAMINOPHEN 650 MG: 325 TABLET, FILM COATED ORAL at 08:09

## 2021-09-18 RX ADMIN — PIPERACILLIN SODIUM AND TAZOBACTAM SODIUM 3.38 G: 3; .375 INJECTION, POWDER, LYOPHILIZED, FOR SOLUTION INTRAVENOUS at 05:09

## 2021-09-18 RX ADMIN — ENOXAPARIN SODIUM 40 MG: 40 INJECTION SUBCUTANEOUS at 08:09

## 2021-09-18 RX ADMIN — MUPIROCIN: 20 OINTMENT TOPICAL at 09:09

## 2021-09-18 RX ADMIN — MULTIPLE VITAMINS W/ MINERALS TAB 1 TABLET: TAB at 09:09

## 2021-09-18 RX ADMIN — MUPIROCIN: 20 OINTMENT TOPICAL at 08:09

## 2021-09-19 LAB
ANION GAP SERPL CALC-SCNC: 10 MMOL/L (ref 8–16)
BUN SERPL-MCNC: 66 MG/DL (ref 8–23)
CALCIUM SERPL-MCNC: 8.7 MG/DL (ref 8.7–10.5)
CHLORIDE SERPL-SCNC: 99 MMOL/L (ref 95–110)
CO2 SERPL-SCNC: 27 MMOL/L (ref 23–29)
CREAT SERPL-MCNC: 2 MG/DL (ref 0.5–1.4)
ERYTHROCYTE [DISTWIDTH] IN BLOOD BY AUTOMATED COUNT: 15.3 % (ref 11.5–14.5)
EST. GFR  (AFRICAN AMERICAN): 40.4 ML/MIN/1.73 M^2
EST. GFR  (NON AFRICAN AMERICAN): 35 ML/MIN/1.73 M^2
GLUCOSE SERPL-MCNC: 143 MG/DL (ref 70–110)
GLUCOSE SERPL-MCNC: 284 MG/DL (ref 70–110)
GLUCOSE SERPL-MCNC: 321 MG/DL (ref 70–110)
GLUCOSE SERPL-MCNC: 66 MG/DL (ref 70–110)
GLUCOSE SERPL-MCNC: 82 MG/DL (ref 70–110)
HCT VFR BLD AUTO: 26.2 % (ref 40–54)
HGB BLD-MCNC: 8.5 G/DL (ref 14–18)
MAGNESIUM SERPL-MCNC: 2.2 MG/DL (ref 1.6–2.6)
MCH RBC QN AUTO: 29.7 PG (ref 27–31)
MCHC RBC AUTO-ENTMCNC: 32.4 G/DL (ref 32–36)
MCV RBC AUTO: 92 FL (ref 82–98)
PHOSPHATE SERPL-MCNC: 4.6 MG/DL (ref 2.7–4.5)
PLATELET # BLD AUTO: 309 K/UL (ref 150–450)
PMV BLD AUTO: 8.9 FL (ref 9.2–12.9)
POTASSIUM SERPL-SCNC: 4.3 MMOL/L (ref 3.5–5.1)
RBC # BLD AUTO: 2.86 M/UL (ref 4.6–6.2)
SODIUM SERPL-SCNC: 136 MMOL/L (ref 136–145)
WBC # BLD AUTO: 8.38 K/UL (ref 3.9–12.7)

## 2021-09-19 PROCEDURE — 63600175 PHARM REV CODE 636 W HCPCS: Performed by: INTERNAL MEDICINE

## 2021-09-19 PROCEDURE — 25000003 PHARM REV CODE 250: Performed by: INTERNAL MEDICINE

## 2021-09-19 PROCEDURE — 80048 BASIC METABOLIC PNL TOTAL CA: CPT | Performed by: INTERNAL MEDICINE

## 2021-09-19 PROCEDURE — 84100 ASSAY OF PHOSPHORUS: CPT | Performed by: INTERNAL MEDICINE

## 2021-09-19 PROCEDURE — 83735 ASSAY OF MAGNESIUM: CPT | Performed by: INTERNAL MEDICINE

## 2021-09-19 PROCEDURE — 12000002 HC ACUTE/MED SURGE SEMI-PRIVATE ROOM

## 2021-09-19 PROCEDURE — 85027 COMPLETE CBC AUTOMATED: CPT | Performed by: INTERNAL MEDICINE

## 2021-09-19 RX ORDER — FUROSEMIDE 10 MG/ML
20 INJECTION INTRAMUSCULAR; INTRAVENOUS ONCE
Status: COMPLETED | OUTPATIENT
Start: 2021-09-19 | End: 2021-09-19

## 2021-09-19 RX ADMIN — PIPERACILLIN SODIUM AND TAZOBACTAM SODIUM 3.38 G: 3; .375 INJECTION, POWDER, LYOPHILIZED, FOR SOLUTION INTRAVENOUS at 12:09

## 2021-09-19 RX ADMIN — PIPERACILLIN SODIUM AND TAZOBACTAM SODIUM 3.38 G: 3; .375 INJECTION, POWDER, LYOPHILIZED, FOR SOLUTION INTRAVENOUS at 05:09

## 2021-09-19 RX ADMIN — ACETAMINOPHEN 650 MG: 325 TABLET, FILM COATED ORAL at 09:09

## 2021-09-19 RX ADMIN — COLLAGENASE SANTYL: 250 OINTMENT TOPICAL at 09:09

## 2021-09-19 RX ADMIN — CARVEDILOL 12.5 MG: 12.5 TABLET, FILM COATED ORAL at 09:09

## 2021-09-19 RX ADMIN — MUPIROCIN: 20 OINTMENT TOPICAL at 03:09

## 2021-09-19 RX ADMIN — FUROSEMIDE 20 MG: 10 INJECTION INTRAMUSCULAR; INTRAVENOUS at 10:09

## 2021-09-19 RX ADMIN — MUPIROCIN: 20 OINTMENT TOPICAL at 09:09

## 2021-09-19 RX ADMIN — ACETAMINOPHEN 650 MG: 325 TABLET, FILM COATED ORAL at 05:09

## 2021-09-19 RX ADMIN — PIPERACILLIN SODIUM AND TAZOBACTAM SODIUM 3.38 G: 3; .375 INJECTION, POWDER, LYOPHILIZED, FOR SOLUTION INTRAVENOUS at 09:09

## 2021-09-19 RX ADMIN — HUMAN INSULIN 8 UNITS: 100 INJECTION, SOLUTION SUBCUTANEOUS at 05:09

## 2021-09-19 RX ADMIN — MULTIPLE VITAMINS W/ MINERALS TAB 1 TABLET: TAB at 09:09

## 2021-09-19 RX ADMIN — ZINC SULFATE 220 MG (50 MG) CAPSULE 220 MG: CAPSULE at 09:09

## 2021-09-19 RX ADMIN — AMLODIPINE BESYLATE 5 MG: 5 TABLET ORAL at 09:09

## 2021-09-20 LAB
ALBUMIN SERPL BCP-MCNC: 2.2 G/DL (ref 3.5–5.2)
ALP SERPL-CCNC: 94 U/L (ref 55–135)
ALT SERPL W/O P-5'-P-CCNC: 15 U/L (ref 10–44)
ANION GAP SERPL CALC-SCNC: 14 MMOL/L (ref 8–16)
AST SERPL-CCNC: 15 U/L (ref 10–40)
BASOPHILS # BLD AUTO: 0.08 K/UL (ref 0–0.2)
BASOPHILS NFR BLD: 0.8 % (ref 0–1.9)
BILIRUB SERPL-MCNC: 0.5 MG/DL (ref 0.1–1)
BUN SERPL-MCNC: 69 MG/DL (ref 8–23)
CALCIUM SERPL-MCNC: 8.5 MG/DL (ref 8.7–10.5)
CHLORIDE SERPL-SCNC: 96 MMOL/L (ref 95–110)
CO2 SERPL-SCNC: 26 MMOL/L (ref 23–29)
CREAT SERPL-MCNC: 2.2 MG/DL (ref 0.5–1.4)
DIFFERENTIAL METHOD: ABNORMAL
EOSINOPHIL # BLD AUTO: 0.2 K/UL (ref 0–0.5)
EOSINOPHIL NFR BLD: 1.6 % (ref 0–8)
ERYTHROCYTE [DISTWIDTH] IN BLOOD BY AUTOMATED COUNT: 15.2 % (ref 11.5–14.5)
EST. GFR  (AFRICAN AMERICAN): 36 ML/MIN/1.73 M^2
EST. GFR  (NON AFRICAN AMERICAN): 31.2 ML/MIN/1.73 M^2
GLUCOSE SERPL-MCNC: 194 MG/DL (ref 70–110)
GLUCOSE SERPL-MCNC: 219 MG/DL (ref 70–110)
GLUCOSE SERPL-MCNC: 236 MG/DL (ref 70–110)
GLUCOSE SERPL-MCNC: 262 MG/DL (ref 70–110)
GLUCOSE SERPL-MCNC: 304 MG/DL (ref 70–110)
HCT VFR BLD AUTO: 24.6 % (ref 40–54)
HGB BLD-MCNC: 8 G/DL (ref 14–18)
IMM GRANULOCYTES # BLD AUTO: 0.03 K/UL (ref 0–0.04)
IMM GRANULOCYTES NFR BLD AUTO: 0.3 % (ref 0–0.5)
LYMPHOCYTES # BLD AUTO: 1.2 K/UL (ref 1–4.8)
LYMPHOCYTES NFR BLD: 12.6 % (ref 18–48)
MAGNESIUM SERPL-MCNC: 2.2 MG/DL (ref 1.6–2.6)
MCH RBC QN AUTO: 29.7 PG (ref 27–31)
MCHC RBC AUTO-ENTMCNC: 32.5 G/DL (ref 32–36)
MCV RBC AUTO: 91 FL (ref 82–98)
MONOCYTES # BLD AUTO: 0.9 K/UL (ref 0.3–1)
MONOCYTES NFR BLD: 8.9 % (ref 4–15)
NEUTROPHILS # BLD AUTO: 7.3 K/UL (ref 1.8–7.7)
NEUTROPHILS NFR BLD: 75.8 % (ref 38–73)
NRBC BLD-RTO: 0 /100 WBC
PLATELET # BLD AUTO: 307 K/UL (ref 150–450)
PMV BLD AUTO: 9.6 FL (ref 9.2–12.9)
POTASSIUM SERPL-SCNC: 4.7 MMOL/L (ref 3.5–5.1)
PROT SERPL-MCNC: 6.9 G/DL (ref 6–8.4)
RBC # BLD AUTO: 2.69 M/UL (ref 4.6–6.2)
SODIUM SERPL-SCNC: 136 MMOL/L (ref 136–145)
WBC # BLD AUTO: 9.65 K/UL (ref 3.9–12.7)

## 2021-09-20 PROCEDURE — 63600175 PHARM REV CODE 636 W HCPCS: Performed by: INTERNAL MEDICINE

## 2021-09-20 PROCEDURE — 25000003 PHARM REV CODE 250: Performed by: INTERNAL MEDICINE

## 2021-09-20 PROCEDURE — 80053 COMPREHEN METABOLIC PANEL: CPT | Performed by: INTERNAL MEDICINE

## 2021-09-20 PROCEDURE — 63600175 PHARM REV CODE 636 W HCPCS: Performed by: FAMILY MEDICINE

## 2021-09-20 PROCEDURE — 12000002 HC ACUTE/MED SURGE SEMI-PRIVATE ROOM

## 2021-09-20 PROCEDURE — 83735 ASSAY OF MAGNESIUM: CPT | Performed by: INTERNAL MEDICINE

## 2021-09-20 PROCEDURE — 97607 NEG PRS WND THR NDME<=50SQCM: CPT

## 2021-09-20 PROCEDURE — 85025 COMPLETE CBC W/AUTO DIFF WBC: CPT | Performed by: INTERNAL MEDICINE

## 2021-09-20 RX ORDER — FUROSEMIDE 10 MG/ML
20 INJECTION INTRAMUSCULAR; INTRAVENOUS ONCE
Status: COMPLETED | OUTPATIENT
Start: 2021-09-20 | End: 2021-09-20

## 2021-09-20 RX ADMIN — HUMAN INSULIN 8 UNITS: 100 INJECTION, SOLUTION SUBCUTANEOUS at 07:09

## 2021-09-20 RX ADMIN — HUMAN INSULIN 4 UNITS: 100 INJECTION, SOLUTION SUBCUTANEOUS at 08:09

## 2021-09-20 RX ADMIN — CARVEDILOL 12.5 MG: 12.5 TABLET, FILM COATED ORAL at 09:09

## 2021-09-20 RX ADMIN — COLLAGENASE SANTYL: 250 OINTMENT TOPICAL at 10:09

## 2021-09-20 RX ADMIN — MULTIPLE VITAMINS W/ MINERALS TAB 1 TABLET: TAB at 08:09

## 2021-09-20 RX ADMIN — FUROSEMIDE 20 MG: 10 INJECTION, SOLUTION INTRAVENOUS at 10:09

## 2021-09-20 RX ADMIN — PIPERACILLIN SODIUM AND TAZOBACTAM SODIUM 3.38 G: 3; .375 INJECTION, POWDER, LYOPHILIZED, FOR SOLUTION INTRAVENOUS at 05:09

## 2021-09-20 RX ADMIN — MUPIROCIN: 20 OINTMENT TOPICAL at 09:09

## 2021-09-20 RX ADMIN — PIPERACILLIN SODIUM AND TAZOBACTAM SODIUM 3.38 G: 3; .375 INJECTION, POWDER, LYOPHILIZED, FOR SOLUTION INTRAVENOUS at 09:09

## 2021-09-20 RX ADMIN — MUPIROCIN: 20 OINTMENT TOPICAL at 10:09

## 2021-09-20 RX ADMIN — HUMAN INSULIN 6 UNITS: 100 INJECTION, SOLUTION SUBCUTANEOUS at 12:09

## 2021-09-20 RX ADMIN — MUPIROCIN: 20 OINTMENT TOPICAL at 03:09

## 2021-09-20 RX ADMIN — AMLODIPINE BESYLATE 5 MG: 5 TABLET ORAL at 08:09

## 2021-09-20 RX ADMIN — ZINC SULFATE 220 MG (50 MG) CAPSULE 220 MG: CAPSULE at 08:09

## 2021-09-20 RX ADMIN — HUMAN INSULIN 2 UNITS: 100 INJECTION, SOLUTION SUBCUTANEOUS at 05:09

## 2021-09-20 RX ADMIN — PIPERACILLIN SODIUM AND TAZOBACTAM SODIUM 3.38 G: 3; .375 INJECTION, POWDER, LYOPHILIZED, FOR SOLUTION INTRAVENOUS at 12:09

## 2021-09-20 RX ADMIN — CARVEDILOL 12.5 MG: 12.5 TABLET, FILM COATED ORAL at 08:09

## 2021-09-21 LAB
ANION GAP SERPL CALC-SCNC: 9 MMOL/L (ref 8–16)
BASOPHILS # BLD AUTO: 0.13 K/UL (ref 0–0.2)
BASOPHILS NFR BLD: 1.3 % (ref 0–1.9)
BUN SERPL-MCNC: 73 MG/DL (ref 8–23)
CALCIUM SERPL-MCNC: 8.5 MG/DL (ref 8.7–10.5)
CHLORIDE SERPL-SCNC: 98 MMOL/L (ref 95–110)
CO2 SERPL-SCNC: 27 MMOL/L (ref 23–29)
CREAT SERPL-MCNC: 2.3 MG/DL (ref 0.5–1.4)
DIFFERENTIAL METHOD: ABNORMAL
EOSINOPHIL # BLD AUTO: 0.2 K/UL (ref 0–0.5)
EOSINOPHIL NFR BLD: 1.5 % (ref 0–8)
ERYTHROCYTE [DISTWIDTH] IN BLOOD BY AUTOMATED COUNT: 15.4 % (ref 11.5–14.5)
EST. GFR  (AFRICAN AMERICAN): 34.2 ML/MIN/1.73 M^2
EST. GFR  (NON AFRICAN AMERICAN): 29.5 ML/MIN/1.73 M^2
GLUCOSE SERPL-MCNC: 213 MG/DL (ref 70–110)
GLUCOSE SERPL-MCNC: 233 MG/DL (ref 70–110)
GLUCOSE SERPL-MCNC: 239 MG/DL (ref 70–110)
GLUCOSE SERPL-MCNC: 268 MG/DL (ref 70–110)
GLUCOSE SERPL-MCNC: 302 MG/DL (ref 70–110)
HCT VFR BLD AUTO: 26.9 % (ref 40–54)
HGB BLD-MCNC: 8.7 G/DL (ref 14–18)
IMM GRANULOCYTES # BLD AUTO: 0.05 K/UL (ref 0–0.04)
IMM GRANULOCYTES NFR BLD AUTO: 0.5 % (ref 0–0.5)
LEFT ABI: 1.62
LEFT ARM BP: 152 MMHG
LEFT CALF BP: 122 MMHG
LEFT DORSALIS PEDIS: 251 MMHG
LEFT TBI: 0.28
LEFT TOE PRESSURE: 43 MMHG
LEFT UPPER LEG BP: 265 MMHG
LYMPHOCYTES # BLD AUTO: 1.4 K/UL (ref 1–4.8)
LYMPHOCYTES NFR BLD: 14.2 % (ref 18–48)
MAGNESIUM SERPL-MCNC: 2.2 MG/DL (ref 1.6–2.6)
MCH RBC QN AUTO: 29.6 PG (ref 27–31)
MCHC RBC AUTO-ENTMCNC: 32.3 G/DL (ref 32–36)
MCV RBC AUTO: 92 FL (ref 82–98)
MONOCYTES # BLD AUTO: 0.8 K/UL (ref 0.3–1)
MONOCYTES NFR BLD: 8.2 % (ref 4–15)
NEUTROPHILS # BLD AUTO: 7.2 K/UL (ref 1.8–7.7)
NEUTROPHILS NFR BLD: 74.3 % (ref 38–73)
NRBC BLD-RTO: 0 /100 WBC
PHOSPHATE SERPL-MCNC: 4.2 MG/DL (ref 2.7–4.5)
PLATELET # BLD AUTO: 339 K/UL (ref 150–450)
PMV BLD AUTO: 9.7 FL (ref 9.2–12.9)
POTASSIUM SERPL-SCNC: 4.5 MMOL/L (ref 3.5–5.1)
RBC # BLD AUTO: 2.94 M/UL (ref 4.6–6.2)
RIGHT ABI: 1.55
RIGHT ARM BP: 155 MMHG
RIGHT CALF BP: 200 MMHG
RIGHT DORSALIS PEDIS: 240 MMHG
RIGHT TBI: 0.48
RIGHT TOE PRESSURE: 75 MMHG
RIGHT UPPER LEG BP: 249 MMHG
SODIUM SERPL-SCNC: 134 MMOL/L (ref 136–145)
WBC # BLD AUTO: 9.69 K/UL (ref 3.9–12.7)

## 2021-09-21 PROCEDURE — 12000002 HC ACUTE/MED SURGE SEMI-PRIVATE ROOM

## 2021-09-21 PROCEDURE — 80048 BASIC METABOLIC PNL TOTAL CA: CPT | Performed by: FAMILY MEDICINE

## 2021-09-21 PROCEDURE — 63600175 PHARM REV CODE 636 W HCPCS: Performed by: INTERNAL MEDICINE

## 2021-09-21 PROCEDURE — 85025 COMPLETE CBC W/AUTO DIFF WBC: CPT | Performed by: FAMILY MEDICINE

## 2021-09-21 PROCEDURE — 83735 ASSAY OF MAGNESIUM: CPT | Performed by: FAMILY MEDICINE

## 2021-09-21 PROCEDURE — 25000003 PHARM REV CODE 250: Performed by: INTERNAL MEDICINE

## 2021-09-21 PROCEDURE — 84100 ASSAY OF PHOSPHORUS: CPT | Performed by: FAMILY MEDICINE

## 2021-09-21 RX ADMIN — HUMAN INSULIN 6 UNITS: 100 INJECTION, SOLUTION SUBCUTANEOUS at 08:09

## 2021-09-21 RX ADMIN — MULTIPLE VITAMINS W/ MINERALS TAB 1 TABLET: TAB at 09:09

## 2021-09-21 RX ADMIN — CARVEDILOL 12.5 MG: 12.5 TABLET, FILM COATED ORAL at 08:09

## 2021-09-21 RX ADMIN — PIPERACILLIN SODIUM AND TAZOBACTAM SODIUM 3.38 G: 3; .375 INJECTION, POWDER, LYOPHILIZED, FOR SOLUTION INTRAVENOUS at 01:09

## 2021-09-21 RX ADMIN — ZINC SULFATE 220 MG (50 MG) CAPSULE 220 MG: CAPSULE at 09:09

## 2021-09-21 RX ADMIN — COLLAGENASE SANTYL: 250 OINTMENT TOPICAL at 12:09

## 2021-09-21 RX ADMIN — MUPIROCIN: 20 OINTMENT TOPICAL at 08:09

## 2021-09-21 RX ADMIN — HUMAN INSULIN 6 UNITS: 100 INJECTION, SOLUTION SUBCUTANEOUS at 05:09

## 2021-09-21 RX ADMIN — CARVEDILOL 12.5 MG: 12.5 TABLET, FILM COATED ORAL at 09:09

## 2021-09-21 RX ADMIN — HUMAN INSULIN 4 UNITS: 100 INJECTION, SOLUTION SUBCUTANEOUS at 12:09

## 2021-09-21 RX ADMIN — MUPIROCIN: 20 OINTMENT TOPICAL at 02:09

## 2021-09-21 RX ADMIN — HYDROCODONE BITARTRATE AND ACETAMINOPHEN 1 TABLET: 10; 325 TABLET ORAL at 08:09

## 2021-09-21 RX ADMIN — HUMAN INSULIN 4 UNITS: 100 INJECTION, SOLUTION SUBCUTANEOUS at 09:09

## 2021-09-21 RX ADMIN — AMLODIPINE BESYLATE 5 MG: 5 TABLET ORAL at 09:09

## 2021-09-21 RX ADMIN — PIPERACILLIN SODIUM AND TAZOBACTAM SODIUM 3.38 G: 3; .375 INJECTION, POWDER, LYOPHILIZED, FOR SOLUTION INTRAVENOUS at 09:09

## 2021-09-21 RX ADMIN — PIPERACILLIN SODIUM AND TAZOBACTAM SODIUM 3.38 G: 3; .375 INJECTION, POWDER, LYOPHILIZED, FOR SOLUTION INTRAVENOUS at 04:09

## 2021-09-21 RX ADMIN — MUPIROCIN: 20 OINTMENT TOPICAL at 09:09

## 2021-09-22 LAB
ANION GAP SERPL CALC-SCNC: 9 MMOL/L (ref 8–16)
BASOPHILS # BLD AUTO: 0.11 K/UL (ref 0–0.2)
BASOPHILS NFR BLD: 1.2 % (ref 0–1.9)
BUN SERPL-MCNC: 75 MG/DL (ref 8–23)
CALCIUM SERPL-MCNC: 8.6 MG/DL (ref 8.7–10.5)
CHLORIDE SERPL-SCNC: 101 MMOL/L (ref 95–110)
CO2 SERPL-SCNC: 27 MMOL/L (ref 23–29)
CREAT SERPL-MCNC: 2.3 MG/DL (ref 0.5–1.4)
DIFFERENTIAL METHOD: ABNORMAL
EOSINOPHIL # BLD AUTO: 0.2 K/UL (ref 0–0.5)
EOSINOPHIL NFR BLD: 2.7 % (ref 0–8)
ERYTHROCYTE [DISTWIDTH] IN BLOOD BY AUTOMATED COUNT: 15.4 % (ref 11.5–14.5)
EST. GFR  (AFRICAN AMERICAN): 34.2 ML/MIN/1.73 M^2
EST. GFR  (NON AFRICAN AMERICAN): 29.5 ML/MIN/1.73 M^2
GLUCOSE SERPL-MCNC: 145 MG/DL (ref 70–110)
GLUCOSE SERPL-MCNC: 262 MG/DL (ref 70–110)
GLUCOSE SERPL-MCNC: 272 MG/DL (ref 70–110)
GLUCOSE SERPL-MCNC: 73 MG/DL (ref 70–110)
GLUCOSE SERPL-MCNC: 75 MG/DL (ref 70–110)
HCT VFR BLD AUTO: 24 % (ref 40–54)
HCT VFR BLD AUTO: 24.9 % (ref 40–54)
HGB BLD-MCNC: 7.8 G/DL (ref 14–18)
HGB BLD-MCNC: 8.1 G/DL (ref 14–18)
IMM GRANULOCYTES # BLD AUTO: 0.04 K/UL (ref 0–0.04)
IMM GRANULOCYTES NFR BLD AUTO: 0.4 % (ref 0–0.5)
LYMPHOCYTES # BLD AUTO: 1.5 K/UL (ref 1–4.8)
LYMPHOCYTES NFR BLD: 17 % (ref 18–48)
MAGNESIUM SERPL-MCNC: 2.3 MG/DL (ref 1.6–2.6)
MCH RBC QN AUTO: 30.2 PG (ref 27–31)
MCHC RBC AUTO-ENTMCNC: 32.5 G/DL (ref 32–36)
MCV RBC AUTO: 93 FL (ref 82–98)
MONOCYTES # BLD AUTO: 1 K/UL (ref 0.3–1)
MONOCYTES NFR BLD: 10.7 % (ref 4–15)
NEUTROPHILS # BLD AUTO: 6.1 K/UL (ref 1.8–7.7)
NEUTROPHILS NFR BLD: 68 % (ref 38–73)
NRBC BLD-RTO: 0 /100 WBC
PHOSPHATE SERPL-MCNC: 4.9 MG/DL (ref 2.7–4.5)
PLATELET # BLD AUTO: 288 K/UL (ref 150–450)
PMV BLD AUTO: 9.4 FL (ref 9.2–12.9)
POTASSIUM SERPL-SCNC: 4.4 MMOL/L (ref 3.5–5.1)
RBC # BLD AUTO: 2.58 M/UL (ref 4.6–6.2)
SODIUM SERPL-SCNC: 137 MMOL/L (ref 136–145)
WBC # BLD AUTO: 8.99 K/UL (ref 3.9–12.7)
ZINC SERPL-MCNC: 68 UG/DL (ref 44–115)

## 2021-09-22 PROCEDURE — 83735 ASSAY OF MAGNESIUM: CPT | Performed by: FAMILY MEDICINE

## 2021-09-22 PROCEDURE — 99231 PR SUBSEQUENT HOSPITAL CARE,LEVL I: ICD-10-PCS | Mod: ,,, | Performed by: INTERNAL MEDICINE

## 2021-09-22 PROCEDURE — 84100 ASSAY OF PHOSPHORUS: CPT | Performed by: FAMILY MEDICINE

## 2021-09-22 PROCEDURE — 85014 HEMATOCRIT: CPT | Performed by: FAMILY MEDICINE

## 2021-09-22 PROCEDURE — 12000002 HC ACUTE/MED SURGE SEMI-PRIVATE ROOM

## 2021-09-22 PROCEDURE — 85025 COMPLETE CBC W/AUTO DIFF WBC: CPT | Performed by: FAMILY MEDICINE

## 2021-09-22 PROCEDURE — 63600175 PHARM REV CODE 636 W HCPCS: Performed by: INTERNAL MEDICINE

## 2021-09-22 PROCEDURE — 25000003 PHARM REV CODE 250: Performed by: INTERNAL MEDICINE

## 2021-09-22 PROCEDURE — 99231 SBSQ HOSP IP/OBS SF/LOW 25: CPT | Mod: ,,, | Performed by: INTERNAL MEDICINE

## 2021-09-22 PROCEDURE — 80048 BASIC METABOLIC PNL TOTAL CA: CPT | Performed by: FAMILY MEDICINE

## 2021-09-22 PROCEDURE — 82962 GLUCOSE BLOOD TEST: CPT

## 2021-09-22 PROCEDURE — 85018 HEMOGLOBIN: CPT | Performed by: FAMILY MEDICINE

## 2021-09-22 RX ADMIN — MUPIROCIN: 20 OINTMENT TOPICAL at 08:09

## 2021-09-22 RX ADMIN — ZINC SULFATE 220 MG (50 MG) CAPSULE 220 MG: CAPSULE at 08:09

## 2021-09-22 RX ADMIN — CARVEDILOL 12.5 MG: 12.5 TABLET, FILM COATED ORAL at 09:09

## 2021-09-22 RX ADMIN — PIPERACILLIN SODIUM AND TAZOBACTAM SODIUM 3.38 G: 3; .375 INJECTION, POWDER, LYOPHILIZED, FOR SOLUTION INTRAVENOUS at 08:09

## 2021-09-22 RX ADMIN — MUPIROCIN: 20 OINTMENT TOPICAL at 06:09

## 2021-09-22 RX ADMIN — MUPIROCIN: 20 OINTMENT TOPICAL at 09:09

## 2021-09-22 RX ADMIN — PIPERACILLIN SODIUM AND TAZOBACTAM SODIUM 3.38 G: 3; .375 INJECTION, POWDER, LYOPHILIZED, FOR SOLUTION INTRAVENOUS at 06:09

## 2021-09-22 RX ADMIN — MULTIPLE VITAMINS W/ MINERALS TAB 1 TABLET: TAB at 08:09

## 2021-09-22 RX ADMIN — COLLAGENASE SANTYL: 250 OINTMENT TOPICAL at 08:09

## 2021-09-22 RX ADMIN — PIPERACILLIN SODIUM AND TAZOBACTAM SODIUM 3.38 G: 3; .375 INJECTION, POWDER, LYOPHILIZED, FOR SOLUTION INTRAVENOUS at 01:09

## 2021-09-22 RX ADMIN — CARVEDILOL 12.5 MG: 12.5 TABLET, FILM COATED ORAL at 08:09

## 2021-09-22 RX ADMIN — HYDROCODONE BITARTRATE AND ACETAMINOPHEN 1 TABLET: 10; 325 TABLET ORAL at 09:09

## 2021-09-22 RX ADMIN — HUMAN INSULIN 6 UNITS: 100 INJECTION, SOLUTION SUBCUTANEOUS at 09:09

## 2021-09-22 RX ADMIN — AMLODIPINE BESYLATE 5 MG: 5 TABLET ORAL at 08:09

## 2021-09-23 LAB
ANION GAP SERPL CALC-SCNC: 10 MMOL/L (ref 8–16)
BASOPHILS # BLD AUTO: 0.13 K/UL (ref 0–0.2)
BASOPHILS NFR BLD: 1.3 % (ref 0–1.9)
BUN SERPL-MCNC: 76 MG/DL (ref 8–23)
CALCIUM SERPL-MCNC: 9 MG/DL (ref 8.7–10.5)
CHLORIDE SERPL-SCNC: 105 MMOL/L (ref 95–110)
CO2 SERPL-SCNC: 26 MMOL/L (ref 23–29)
CREAT SERPL-MCNC: 2.1 MG/DL (ref 0.5–1.4)
DIFFERENTIAL METHOD: ABNORMAL
EOSINOPHIL # BLD AUTO: 0.3 K/UL (ref 0–0.5)
EOSINOPHIL NFR BLD: 3 % (ref 0–8)
ERYTHROCYTE [DISTWIDTH] IN BLOOD BY AUTOMATED COUNT: 15.2 % (ref 11.5–14.5)
EST. GFR  (AFRICAN AMERICAN): 38.1 ML/MIN/1.73 M^2
EST. GFR  (NON AFRICAN AMERICAN): 33 ML/MIN/1.73 M^2
GLUCOSE SERPL-MCNC: 129 MG/DL (ref 70–110)
GLUCOSE SERPL-MCNC: 144 MG/DL (ref 70–110)
GLUCOSE SERPL-MCNC: 196 MG/DL (ref 70–110)
GLUCOSE SERPL-MCNC: 284 MG/DL (ref 70–110)
HCT VFR BLD AUTO: 26.4 % (ref 40–54)
HGB BLD-MCNC: 8.4 G/DL (ref 14–18)
IMM GRANULOCYTES # BLD AUTO: 0.04 K/UL (ref 0–0.04)
IMM GRANULOCYTES NFR BLD AUTO: 0.4 % (ref 0–0.5)
LYMPHOCYTES # BLD AUTO: 1.7 K/UL (ref 1–4.8)
LYMPHOCYTES NFR BLD: 17 % (ref 18–48)
MAGNESIUM SERPL-MCNC: 2.3 MG/DL (ref 1.6–2.6)
MCH RBC QN AUTO: 29.7 PG (ref 27–31)
MCHC RBC AUTO-ENTMCNC: 31.8 G/DL (ref 32–36)
MCV RBC AUTO: 93 FL (ref 82–98)
MONOCYTES # BLD AUTO: 1 K/UL (ref 0.3–1)
MONOCYTES NFR BLD: 10.2 % (ref 4–15)
NEUTROPHILS # BLD AUTO: 6.7 K/UL (ref 1.8–7.7)
NEUTROPHILS NFR BLD: 68.1 % (ref 38–73)
NRBC BLD-RTO: 0 /100 WBC
PHOSPHATE SERPL-MCNC: 5 MG/DL (ref 2.7–4.5)
PLATELET # BLD AUTO: 321 K/UL (ref 150–450)
PMV BLD AUTO: 9.8 FL (ref 9.2–12.9)
POTASSIUM SERPL-SCNC: 4.8 MMOL/L (ref 3.5–5.1)
RBC # BLD AUTO: 2.83 M/UL (ref 4.6–6.2)
SODIUM SERPL-SCNC: 141 MMOL/L (ref 136–145)
WBC # BLD AUTO: 9.89 K/UL (ref 3.9–12.7)

## 2021-09-23 PROCEDURE — 85025 COMPLETE CBC W/AUTO DIFF WBC: CPT | Performed by: FAMILY MEDICINE

## 2021-09-23 PROCEDURE — 25000003 PHARM REV CODE 250: Performed by: INTERNAL MEDICINE

## 2021-09-23 PROCEDURE — 94761 N-INVAS EAR/PLS OXIMETRY MLT: CPT

## 2021-09-23 PROCEDURE — 99900035 HC TECH TIME PER 15 MIN (STAT)

## 2021-09-23 PROCEDURE — 84100 ASSAY OF PHOSPHORUS: CPT | Performed by: FAMILY MEDICINE

## 2021-09-23 PROCEDURE — 63600175 PHARM REV CODE 636 W HCPCS: Performed by: INTERNAL MEDICINE

## 2021-09-23 PROCEDURE — 83735 ASSAY OF MAGNESIUM: CPT | Performed by: FAMILY MEDICINE

## 2021-09-23 PROCEDURE — 87205 SMEAR GRAM STAIN: CPT | Performed by: INTERNAL MEDICINE

## 2021-09-23 PROCEDURE — 12000002 HC ACUTE/MED SURGE SEMI-PRIVATE ROOM

## 2021-09-23 PROCEDURE — 80048 BASIC METABOLIC PNL TOTAL CA: CPT | Performed by: FAMILY MEDICINE

## 2021-09-23 RX ADMIN — COLLAGENASE SANTYL: 250 OINTMENT TOPICAL at 09:09

## 2021-09-23 RX ADMIN — MUPIROCIN: 20 OINTMENT TOPICAL at 04:09

## 2021-09-23 RX ADMIN — CARVEDILOL 12.5 MG: 12.5 TABLET, FILM COATED ORAL at 09:09

## 2021-09-23 RX ADMIN — PIPERACILLIN SODIUM AND TAZOBACTAM SODIUM 3.38 G: 3; .375 INJECTION, POWDER, LYOPHILIZED, FOR SOLUTION INTRAVENOUS at 01:09

## 2021-09-23 RX ADMIN — HUMAN INSULIN 2 UNITS: 100 INJECTION, SOLUTION SUBCUTANEOUS at 04:09

## 2021-09-23 RX ADMIN — MUPIROCIN: 20 OINTMENT TOPICAL at 09:09

## 2021-09-23 RX ADMIN — HUMAN INSULIN 6 UNITS: 100 INJECTION, SOLUTION SUBCUTANEOUS at 08:09

## 2021-09-23 RX ADMIN — PIPERACILLIN SODIUM AND TAZOBACTAM SODIUM 3.38 G: 3; .375 INJECTION, POWDER, LYOPHILIZED, FOR SOLUTION INTRAVENOUS at 09:09

## 2021-09-23 RX ADMIN — ZINC SULFATE 220 MG (50 MG) CAPSULE 220 MG: CAPSULE at 09:09

## 2021-09-23 RX ADMIN — AMLODIPINE BESYLATE 5 MG: 5 TABLET ORAL at 09:09

## 2021-09-23 RX ADMIN — MULTIPLE VITAMINS W/ MINERALS TAB 1 TABLET: TAB at 09:09

## 2021-09-23 RX ADMIN — PIPERACILLIN SODIUM AND TAZOBACTAM SODIUM 3.38 G: 3; .375 INJECTION, POWDER, LYOPHILIZED, FOR SOLUTION INTRAVENOUS at 04:09

## 2021-09-24 LAB
ANION GAP SERPL CALC-SCNC: 10 MMOL/L (ref 8–16)
BASOPHILS # BLD AUTO: 0.09 K/UL (ref 0–0.2)
BASOPHILS NFR BLD: 1 % (ref 0–1.9)
BUN SERPL-MCNC: 78 MG/DL (ref 8–23)
CALCIUM SERPL-MCNC: 8.5 MG/DL (ref 8.7–10.5)
CHLORIDE SERPL-SCNC: 100 MMOL/L (ref 95–110)
CO2 SERPL-SCNC: 25 MMOL/L (ref 23–29)
CREAT SERPL-MCNC: 2.1 MG/DL (ref 0.5–1.4)
DIFFERENTIAL METHOD: ABNORMAL
EOSINOPHIL # BLD AUTO: 0.3 K/UL (ref 0–0.5)
EOSINOPHIL NFR BLD: 2.8 % (ref 0–8)
ERYTHROCYTE [DISTWIDTH] IN BLOOD BY AUTOMATED COUNT: 15.3 % (ref 11.5–14.5)
EST. GFR  (AFRICAN AMERICAN): 38.1 ML/MIN/1.73 M^2
EST. GFR  (NON AFRICAN AMERICAN): 33 ML/MIN/1.73 M^2
GLUCOSE SERPL-MCNC: 132 MG/DL (ref 70–110)
GLUCOSE SERPL-MCNC: 142 MG/DL (ref 70–110)
GLUCOSE SERPL-MCNC: 176 MG/DL (ref 70–110)
GLUCOSE SERPL-MCNC: 200 MG/DL (ref 70–110)
GLUCOSE SERPL-MCNC: 319 MG/DL (ref 70–110)
GLUCOSE SERPL-MCNC: 352 MG/DL (ref 70–110)
HCT VFR BLD AUTO: 24.7 % (ref 40–54)
HGB BLD-MCNC: 8 G/DL (ref 14–18)
IMM GRANULOCYTES # BLD AUTO: 0.03 K/UL (ref 0–0.04)
IMM GRANULOCYTES NFR BLD AUTO: 0.3 % (ref 0–0.5)
LYMPHOCYTES # BLD AUTO: 1.1 K/UL (ref 1–4.8)
LYMPHOCYTES NFR BLD: 11.7 % (ref 18–48)
MAGNESIUM SERPL-MCNC: 2.4 MG/DL (ref 1.6–2.6)
MCH RBC QN AUTO: 29.9 PG (ref 27–31)
MCHC RBC AUTO-ENTMCNC: 32.4 G/DL (ref 32–36)
MCV RBC AUTO: 92 FL (ref 82–98)
MONOCYTES # BLD AUTO: 0.9 K/UL (ref 0.3–1)
MONOCYTES NFR BLD: 9.4 % (ref 4–15)
NEUTROPHILS # BLD AUTO: 7 K/UL (ref 1.8–7.7)
NEUTROPHILS NFR BLD: 74.8 % (ref 38–73)
NRBC BLD-RTO: 0 /100 WBC
PHOSPHATE SERPL-MCNC: 4.6 MG/DL (ref 2.7–4.5)
PLATELET # BLD AUTO: 301 K/UL (ref 150–450)
PMV BLD AUTO: 9.7 FL (ref 9.2–12.9)
POTASSIUM SERPL-SCNC: 4.6 MMOL/L (ref 3.5–5.1)
RBC # BLD AUTO: 2.68 M/UL (ref 4.6–6.2)
SODIUM SERPL-SCNC: 135 MMOL/L (ref 136–145)
WBC # BLD AUTO: 9.38 K/UL (ref 3.9–12.7)

## 2021-09-24 PROCEDURE — 83735 ASSAY OF MAGNESIUM: CPT | Performed by: FAMILY MEDICINE

## 2021-09-24 PROCEDURE — 27000221 HC OXYGEN, UP TO 24 HOURS

## 2021-09-24 PROCEDURE — 85025 COMPLETE CBC W/AUTO DIFF WBC: CPT | Performed by: FAMILY MEDICINE

## 2021-09-24 PROCEDURE — 63600175 PHARM REV CODE 636 W HCPCS: Performed by: INTERNAL MEDICINE

## 2021-09-24 PROCEDURE — 84100 ASSAY OF PHOSPHORUS: CPT | Performed by: FAMILY MEDICINE

## 2021-09-24 PROCEDURE — 80048 BASIC METABOLIC PNL TOTAL CA: CPT | Performed by: FAMILY MEDICINE

## 2021-09-24 PROCEDURE — 63600175 PHARM REV CODE 636 W HCPCS: Performed by: FAMILY MEDICINE

## 2021-09-24 PROCEDURE — 99900031 HC PATIENT EDUCATION (STAT)

## 2021-09-24 PROCEDURE — 25000003 PHARM REV CODE 250: Performed by: INTERNAL MEDICINE

## 2021-09-24 PROCEDURE — 94799 UNLISTED PULMONARY SVC/PX: CPT

## 2021-09-24 PROCEDURE — 12000002 HC ACUTE/MED SURGE SEMI-PRIVATE ROOM

## 2021-09-24 PROCEDURE — 94761 N-INVAS EAR/PLS OXIMETRY MLT: CPT

## 2021-09-24 PROCEDURE — 99900035 HC TECH TIME PER 15 MIN (STAT)

## 2021-09-24 PROCEDURE — 25000003 PHARM REV CODE 250: Performed by: FAMILY MEDICINE

## 2021-09-24 PROCEDURE — 99231 SBSQ HOSP IP/OBS SF/LOW 25: CPT | Mod: ,,, | Performed by: INTERNAL MEDICINE

## 2021-09-24 PROCEDURE — 99231 PR SUBSEQUENT HOSPITAL CARE,LEVL I: ICD-10-PCS | Mod: ,,, | Performed by: INTERNAL MEDICINE

## 2021-09-24 RX ORDER — FUROSEMIDE 10 MG/ML
40 INJECTION INTRAMUSCULAR; INTRAVENOUS ONCE
Status: COMPLETED | OUTPATIENT
Start: 2021-09-24 | End: 2021-09-24

## 2021-09-24 RX ORDER — AMOXICILLIN AND CLAVULANATE POTASSIUM 875; 125 MG/1; MG/1
1 TABLET, FILM COATED ORAL EVERY 12 HOURS
Status: DISCONTINUED | OUTPATIENT
Start: 2021-09-24 | End: 2021-09-24

## 2021-09-24 RX ORDER — SODIUM CHLORIDE, SODIUM LACTATE, POTASSIUM CHLORIDE, CALCIUM CHLORIDE 600; 310; 30; 20 MG/100ML; MG/100ML; MG/100ML; MG/100ML
INJECTION, SOLUTION INTRAVENOUS CONTINUOUS
Status: DISCONTINUED | OUTPATIENT
Start: 2021-09-24 | End: 2021-09-25

## 2021-09-24 RX ADMIN — ZINC SULFATE 220 MG (50 MG) CAPSULE 220 MG: CAPSULE at 11:09

## 2021-09-24 RX ADMIN — COLLAGENASE SANTYL: 250 OINTMENT TOPICAL at 09:09

## 2021-09-24 RX ADMIN — AMLODIPINE BESYLATE 5 MG: 5 TABLET ORAL at 11:09

## 2021-09-24 RX ADMIN — PIPERACILLIN SODIUM AND TAZOBACTAM SODIUM 3.38 G: 3; .375 INJECTION, POWDER, LYOPHILIZED, FOR SOLUTION INTRAVENOUS at 02:09

## 2021-09-24 RX ADMIN — MUPIROCIN: 20 OINTMENT TOPICAL at 11:09

## 2021-09-24 RX ADMIN — HUMAN INSULIN 10 UNITS: 100 INJECTION, SOLUTION SUBCUTANEOUS at 08:09

## 2021-09-24 RX ADMIN — MUPIROCIN: 20 OINTMENT TOPICAL at 09:09

## 2021-09-24 RX ADMIN — FUROSEMIDE 40 MG: 10 INJECTION, SOLUTION INTRAVENOUS at 11:09

## 2021-09-24 RX ADMIN — MULTIPLE VITAMINS W/ MINERALS TAB 1 TABLET: TAB at 11:09

## 2021-09-24 RX ADMIN — SODIUM CHLORIDE, SODIUM LACTATE, POTASSIUM CHLORIDE, AND CALCIUM CHLORIDE: .6; .31; .03; .02 INJECTION, SOLUTION INTRAVENOUS at 01:09

## 2021-09-24 RX ADMIN — CARVEDILOL 12.5 MG: 12.5 TABLET, FILM COATED ORAL at 09:09

## 2021-09-24 RX ADMIN — PIPERACILLIN SODIUM AND TAZOBACTAM SODIUM 3.38 G: 3; .375 INJECTION, POWDER, LYOPHILIZED, FOR SOLUTION INTRAVENOUS at 01:09

## 2021-09-24 RX ADMIN — PIPERACILLIN SODIUM AND TAZOBACTAM SODIUM 3.38 G: 3; .375 INJECTION, POWDER, LYOPHILIZED, FOR SOLUTION INTRAVENOUS at 11:09

## 2021-09-24 RX ADMIN — MUPIROCIN: 20 OINTMENT TOPICAL at 03:09

## 2021-09-24 RX ADMIN — CARVEDILOL 12.5 MG: 12.5 TABLET, FILM COATED ORAL at 11:09

## 2021-09-25 LAB
ANION GAP SERPL CALC-SCNC: 10 MMOL/L (ref 8–16)
BASOPHILS # BLD AUTO: 0.07 K/UL (ref 0–0.2)
BASOPHILS NFR BLD: 0.8 % (ref 0–1.9)
BUN SERPL-MCNC: 76 MG/DL (ref 8–23)
CALCIUM SERPL-MCNC: 8.5 MG/DL (ref 8.7–10.5)
CHLORIDE SERPL-SCNC: 102 MMOL/L (ref 95–110)
CO2 SERPL-SCNC: 25 MMOL/L (ref 23–29)
CREAT SERPL-MCNC: 1.9 MG/DL (ref 0.5–1.4)
DIFFERENTIAL METHOD: ABNORMAL
EOSINOPHIL # BLD AUTO: 0.3 K/UL (ref 0–0.5)
EOSINOPHIL NFR BLD: 3.7 % (ref 0–8)
ERYTHROCYTE [DISTWIDTH] IN BLOOD BY AUTOMATED COUNT: 15.1 % (ref 11.5–14.5)
EST. GFR  (AFRICAN AMERICAN): 43 ML/MIN/1.73 M^2
EST. GFR  (NON AFRICAN AMERICAN): 37.2 ML/MIN/1.73 M^2
GLUCOSE SERPL-MCNC: 123 MG/DL (ref 70–110)
GLUCOSE SERPL-MCNC: 133 MG/DL (ref 70–110)
GLUCOSE SERPL-MCNC: 276 MG/DL (ref 70–110)
GLUCOSE SERPL-MCNC: 296 MG/DL (ref 70–110)
GLUCOSE SERPL-MCNC: 69 MG/DL (ref 70–110)
HCT VFR BLD AUTO: 24 % (ref 40–54)
HGB BLD-MCNC: 7.7 G/DL (ref 14–18)
IMM GRANULOCYTES # BLD AUTO: 0.07 K/UL (ref 0–0.04)
IMM GRANULOCYTES NFR BLD AUTO: 0.8 % (ref 0–0.5)
LYMPHOCYTES # BLD AUTO: 1.1 K/UL (ref 1–4.8)
LYMPHOCYTES NFR BLD: 13.5 % (ref 18–48)
MAGNESIUM SERPL-MCNC: 2.2 MG/DL (ref 1.6–2.6)
MCH RBC QN AUTO: 29.7 PG (ref 27–31)
MCHC RBC AUTO-ENTMCNC: 32.1 G/DL (ref 32–36)
MCV RBC AUTO: 93 FL (ref 82–98)
MONOCYTES # BLD AUTO: 0.9 K/UL (ref 0.3–1)
MONOCYTES NFR BLD: 10.4 % (ref 4–15)
NEUTROPHILS # BLD AUTO: 5.8 K/UL (ref 1.8–7.7)
NEUTROPHILS NFR BLD: 70.8 % (ref 38–73)
NRBC BLD-RTO: 0 /100 WBC
PHOSPHATE SERPL-MCNC: 4 MG/DL (ref 2.7–4.5)
PLATELET # BLD AUTO: 317 K/UL (ref 150–450)
PMV BLD AUTO: 10 FL (ref 9.2–12.9)
POTASSIUM SERPL-SCNC: 4.2 MMOL/L (ref 3.5–5.1)
RBC # BLD AUTO: 2.59 M/UL (ref 4.6–6.2)
SODIUM SERPL-SCNC: 137 MMOL/L (ref 136–145)
WBC # BLD AUTO: 8.27 K/UL (ref 3.9–12.7)

## 2021-09-25 PROCEDURE — 94799 UNLISTED PULMONARY SVC/PX: CPT

## 2021-09-25 PROCEDURE — 83735 ASSAY OF MAGNESIUM: CPT | Performed by: FAMILY MEDICINE

## 2021-09-25 PROCEDURE — 25000003 PHARM REV CODE 250: Performed by: FAMILY MEDICINE

## 2021-09-25 PROCEDURE — 63600175 PHARM REV CODE 636 W HCPCS: Performed by: FAMILY MEDICINE

## 2021-09-25 PROCEDURE — 25000003 PHARM REV CODE 250: Performed by: INTERNAL MEDICINE

## 2021-09-25 PROCEDURE — 80048 BASIC METABOLIC PNL TOTAL CA: CPT | Performed by: FAMILY MEDICINE

## 2021-09-25 PROCEDURE — 85025 COMPLETE CBC W/AUTO DIFF WBC: CPT | Performed by: FAMILY MEDICINE

## 2021-09-25 PROCEDURE — 84100 ASSAY OF PHOSPHORUS: CPT | Performed by: FAMILY MEDICINE

## 2021-09-25 PROCEDURE — 99900035 HC TECH TIME PER 15 MIN (STAT)

## 2021-09-25 PROCEDURE — 99900031 HC PATIENT EDUCATION (STAT)

## 2021-09-25 PROCEDURE — 12000002 HC ACUTE/MED SURGE SEMI-PRIVATE ROOM

## 2021-09-25 RX ORDER — SODIUM CHLORIDE, SODIUM LACTATE, POTASSIUM CHLORIDE, CALCIUM CHLORIDE 600; 310; 30; 20 MG/100ML; MG/100ML; MG/100ML; MG/100ML
INJECTION, SOLUTION INTRAVENOUS CONTINUOUS
Status: ACTIVE | OUTPATIENT
Start: 2021-09-25 | End: 2021-09-26

## 2021-09-25 RX ADMIN — SODIUM CHLORIDE, SODIUM LACTATE, POTASSIUM CHLORIDE, AND CALCIUM CHLORIDE: .6; .31; .03; .02 INJECTION, SOLUTION INTRAVENOUS at 10:09

## 2021-09-25 RX ADMIN — PIPERACILLIN SODIUM AND TAZOBACTAM SODIUM 3.38 G: 3; .375 INJECTION, POWDER, LYOPHILIZED, FOR SOLUTION INTRAVENOUS at 09:09

## 2021-09-25 RX ADMIN — MUPIROCIN: 20 OINTMENT TOPICAL at 03:09

## 2021-09-25 RX ADMIN — CARVEDILOL 12.5 MG: 12.5 TABLET, FILM COATED ORAL at 09:09

## 2021-09-25 RX ADMIN — MUPIROCIN: 20 OINTMENT TOPICAL at 08:09

## 2021-09-25 RX ADMIN — COLLAGENASE SANTYL: 250 OINTMENT TOPICAL at 09:09

## 2021-09-25 RX ADMIN — PIPERACILLIN SODIUM AND TAZOBACTAM SODIUM 3.38 G: 3; .375 INJECTION, POWDER, LYOPHILIZED, FOR SOLUTION INTRAVENOUS at 05:09

## 2021-09-25 RX ADMIN — AMLODIPINE BESYLATE 5 MG: 5 TABLET ORAL at 09:09

## 2021-09-25 RX ADMIN — ZINC SULFATE 220 MG (50 MG) CAPSULE 220 MG: CAPSULE at 09:09

## 2021-09-25 RX ADMIN — CARVEDILOL 12.5 MG: 12.5 TABLET, FILM COATED ORAL at 08:09

## 2021-09-25 RX ADMIN — MUPIROCIN: 20 OINTMENT TOPICAL at 09:09

## 2021-09-25 RX ADMIN — MULTIPLE VITAMINS W/ MINERALS TAB 1 TABLET: TAB at 09:09

## 2021-09-25 RX ADMIN — INSULIN DETEMIR 15 UNITS: 100 INJECTION, SOLUTION SUBCUTANEOUS at 08:09

## 2021-09-25 RX ADMIN — PIPERACILLIN SODIUM AND TAZOBACTAM SODIUM 3.38 G: 3; .375 INJECTION, POWDER, LYOPHILIZED, FOR SOLUTION INTRAVENOUS at 01:09

## 2021-09-26 LAB
ANION GAP SERPL CALC-SCNC: 10 MMOL/L (ref 8–16)
BASOPHILS # BLD AUTO: 0.07 K/UL (ref 0–0.2)
BASOPHILS NFR BLD: 0.8 % (ref 0–1.9)
BUN SERPL-MCNC: 69 MG/DL (ref 8–23)
CALCIUM SERPL-MCNC: 8.7 MG/DL (ref 8.7–10.5)
CHLORIDE SERPL-SCNC: 106 MMOL/L (ref 95–110)
CO2 SERPL-SCNC: 24 MMOL/L (ref 23–29)
CREAT SERPL-MCNC: 1.9 MG/DL (ref 0.5–1.4)
DIFFERENTIAL METHOD: ABNORMAL
EOSINOPHIL # BLD AUTO: 0.3 K/UL (ref 0–0.5)
EOSINOPHIL NFR BLD: 4 % (ref 0–8)
ERYTHROCYTE [DISTWIDTH] IN BLOOD BY AUTOMATED COUNT: 15.4 % (ref 11.5–14.5)
EST. GFR  (AFRICAN AMERICAN): 43 ML/MIN/1.73 M^2
EST. GFR  (NON AFRICAN AMERICAN): 37.2 ML/MIN/1.73 M^2
GLUCOSE SERPL-MCNC: 127 MG/DL (ref 70–110)
GLUCOSE SERPL-MCNC: 153 MG/DL (ref 70–110)
GLUCOSE SERPL-MCNC: 230 MG/DL (ref 70–110)
GLUCOSE SERPL-MCNC: 263 MG/DL (ref 70–110)
GLUCOSE SERPL-MCNC: 272 MG/DL (ref 70–110)
HCT VFR BLD AUTO: 24.4 % (ref 40–54)
HGB BLD-MCNC: 7.9 G/DL (ref 14–18)
IMM GRANULOCYTES # BLD AUTO: 0.05 K/UL (ref 0–0.04)
IMM GRANULOCYTES NFR BLD AUTO: 0.6 % (ref 0–0.5)
LYMPHOCYTES # BLD AUTO: 1.2 K/UL (ref 1–4.8)
LYMPHOCYTES NFR BLD: 13.9 % (ref 18–48)
MAGNESIUM SERPL-MCNC: 2.2 MG/DL (ref 1.6–2.6)
MCH RBC QN AUTO: 30.3 PG (ref 27–31)
MCHC RBC AUTO-ENTMCNC: 32.4 G/DL (ref 32–36)
MCV RBC AUTO: 94 FL (ref 82–98)
MONOCYTES # BLD AUTO: 0.8 K/UL (ref 0.3–1)
MONOCYTES NFR BLD: 9.6 % (ref 4–15)
NEUTROPHILS # BLD AUTO: 6 K/UL (ref 1.8–7.7)
NEUTROPHILS NFR BLD: 71.1 % (ref 38–73)
NRBC BLD-RTO: 0 /100 WBC
PHOSPHATE SERPL-MCNC: 4.2 MG/DL (ref 2.7–4.5)
PLATELET # BLD AUTO: 313 K/UL (ref 150–450)
PLATELET BLD QL SMEAR: ABNORMAL
PMV BLD AUTO: 10.2 FL (ref 9.2–12.9)
POTASSIUM SERPL-SCNC: 4.3 MMOL/L (ref 3.5–5.1)
RBC # BLD AUTO: 2.61 M/UL (ref 4.6–6.2)
SODIUM SERPL-SCNC: 140 MMOL/L (ref 136–145)
WBC # BLD AUTO: 8.46 K/UL (ref 3.9–12.7)

## 2021-09-26 PROCEDURE — 25000003 PHARM REV CODE 250: Performed by: INTERNAL MEDICINE

## 2021-09-26 PROCEDURE — 63600175 PHARM REV CODE 636 W HCPCS: Performed by: FAMILY MEDICINE

## 2021-09-26 PROCEDURE — 85025 COMPLETE CBC W/AUTO DIFF WBC: CPT | Performed by: FAMILY MEDICINE

## 2021-09-26 PROCEDURE — 80048 BASIC METABOLIC PNL TOTAL CA: CPT | Performed by: FAMILY MEDICINE

## 2021-09-26 PROCEDURE — 84100 ASSAY OF PHOSPHORUS: CPT | Performed by: FAMILY MEDICINE

## 2021-09-26 PROCEDURE — 12000002 HC ACUTE/MED SURGE SEMI-PRIVATE ROOM

## 2021-09-26 PROCEDURE — 83735 ASSAY OF MAGNESIUM: CPT | Performed by: FAMILY MEDICINE

## 2021-09-26 PROCEDURE — 25000003 PHARM REV CODE 250: Performed by: FAMILY MEDICINE

## 2021-09-26 PROCEDURE — 63600175 PHARM REV CODE 636 W HCPCS: Performed by: INTERNAL MEDICINE

## 2021-09-26 PROCEDURE — C9399 UNCLASSIFIED DRUGS OR BIOLOG: HCPCS | Performed by: FAMILY MEDICINE

## 2021-09-26 RX ORDER — SODIUM CHLORIDE, SODIUM LACTATE, POTASSIUM CHLORIDE, CALCIUM CHLORIDE 600; 310; 30; 20 MG/100ML; MG/100ML; MG/100ML; MG/100ML
INJECTION, SOLUTION INTRAVENOUS CONTINUOUS
Status: DISCONTINUED | OUTPATIENT
Start: 2021-09-26 | End: 2021-09-27

## 2021-09-26 RX ADMIN — INSULIN DETEMIR 15 UNITS: 100 INJECTION, SOLUTION SUBCUTANEOUS at 09:09

## 2021-09-26 RX ADMIN — AMLODIPINE BESYLATE 5 MG: 5 TABLET ORAL at 08:09

## 2021-09-26 RX ADMIN — CARVEDILOL 12.5 MG: 12.5 TABLET, FILM COATED ORAL at 09:09

## 2021-09-26 RX ADMIN — MULTIPLE VITAMINS W/ MINERALS TAB 1 TABLET: TAB at 08:09

## 2021-09-26 RX ADMIN — SODIUM CHLORIDE, SODIUM LACTATE, POTASSIUM CHLORIDE, AND CALCIUM CHLORIDE: .6; .31; .03; .02 INJECTION, SOLUTION INTRAVENOUS at 04:09

## 2021-09-26 RX ADMIN — HUMAN INSULIN 6 UNITS: 100 INJECTION, SOLUTION SUBCUTANEOUS at 04:09

## 2021-09-26 RX ADMIN — PIPERACILLIN SODIUM AND TAZOBACTAM SODIUM 3.38 G: 3; .375 INJECTION, POWDER, LYOPHILIZED, FOR SOLUTION INTRAVENOUS at 08:09

## 2021-09-26 RX ADMIN — CARVEDILOL 12.5 MG: 12.5 TABLET, FILM COATED ORAL at 08:09

## 2021-09-26 RX ADMIN — MUPIROCIN: 20 OINTMENT TOPICAL at 04:09

## 2021-09-26 RX ADMIN — MUPIROCIN: 20 OINTMENT TOPICAL at 08:09

## 2021-09-26 RX ADMIN — PIPERACILLIN SODIUM AND TAZOBACTAM SODIUM 3.38 G: 3; .375 INJECTION, POWDER, LYOPHILIZED, FOR SOLUTION INTRAVENOUS at 12:09

## 2021-09-26 RX ADMIN — PIPERACILLIN SODIUM AND TAZOBACTAM SODIUM 3.38 G: 3; .375 INJECTION, POWDER, LYOPHILIZED, FOR SOLUTION INTRAVENOUS at 04:09

## 2021-09-26 RX ADMIN — MUPIROCIN: 20 OINTMENT TOPICAL at 09:09

## 2021-09-26 RX ADMIN — ZINC SULFATE 220 MG (50 MG) CAPSULE 220 MG: CAPSULE at 08:09

## 2021-09-26 RX ADMIN — COLLAGENASE SANTYL: 250 OINTMENT TOPICAL at 04:09

## 2021-09-27 LAB
ANION GAP SERPL CALC-SCNC: 8 MMOL/L (ref 8–16)
BASOPHILS # BLD AUTO: 0.07 K/UL (ref 0–0.2)
BASOPHILS NFR BLD: 0.8 % (ref 0–1.9)
BUN SERPL-MCNC: 68 MG/DL (ref 8–23)
CALCIUM SERPL-MCNC: 8.5 MG/DL (ref 8.7–10.5)
CHLORIDE SERPL-SCNC: 103 MMOL/L (ref 95–110)
CO2 SERPL-SCNC: 25 MMOL/L (ref 23–29)
CREAT SERPL-MCNC: 1.9 MG/DL (ref 0.5–1.4)
DIFFERENTIAL METHOD: ABNORMAL
EOSINOPHIL # BLD AUTO: 0.4 K/UL (ref 0–0.5)
EOSINOPHIL NFR BLD: 5.2 % (ref 0–8)
ERYTHROCYTE [DISTWIDTH] IN BLOOD BY AUTOMATED COUNT: 15.3 % (ref 11.5–14.5)
EST. GFR  (AFRICAN AMERICAN): 43 ML/MIN/1.73 M^2
EST. GFR  (NON AFRICAN AMERICAN): 37.2 ML/MIN/1.73 M^2
GLUCOSE SERPL-MCNC: 119 MG/DL (ref 70–110)
GLUCOSE SERPL-MCNC: 141 MG/DL (ref 70–110)
GLUCOSE SERPL-MCNC: 146 MG/DL (ref 70–110)
GLUCOSE SERPL-MCNC: 237 MG/DL (ref 70–110)
GLUCOSE SERPL-MCNC: 330 MG/DL (ref 70–110)
HCT VFR BLD AUTO: 23.8 % (ref 40–54)
HGB BLD-MCNC: 7.7 G/DL (ref 14–18)
IMM GRANULOCYTES # BLD AUTO: 0.03 K/UL (ref 0–0.04)
IMM GRANULOCYTES NFR BLD AUTO: 0.4 % (ref 0–0.5)
LYMPHOCYTES # BLD AUTO: 1.2 K/UL (ref 1–4.8)
LYMPHOCYTES NFR BLD: 14.5 % (ref 18–48)
MAGNESIUM SERPL-MCNC: 2.1 MG/DL (ref 1.6–2.6)
MCH RBC QN AUTO: 30.2 PG (ref 27–31)
MCHC RBC AUTO-ENTMCNC: 32.4 G/DL (ref 32–36)
MCV RBC AUTO: 93 FL (ref 82–98)
MONOCYTES # BLD AUTO: 0.8 K/UL (ref 0.3–1)
MONOCYTES NFR BLD: 9.2 % (ref 4–15)
NEUTROPHILS # BLD AUTO: 6 K/UL (ref 1.8–7.7)
NEUTROPHILS NFR BLD: 69.9 % (ref 38–73)
NRBC BLD-RTO: 0 /100 WBC
PHOSPHATE SERPL-MCNC: 4.2 MG/DL (ref 2.7–4.5)
PLATELET # BLD AUTO: 316 K/UL (ref 150–450)
PMV BLD AUTO: 9.8 FL (ref 9.2–12.9)
POTASSIUM SERPL-SCNC: 4.2 MMOL/L (ref 3.5–5.1)
RBC # BLD AUTO: 2.55 M/UL (ref 4.6–6.2)
SODIUM SERPL-SCNC: 136 MMOL/L (ref 136–145)
WBC # BLD AUTO: 8.5 K/UL (ref 3.9–12.7)

## 2021-09-27 PROCEDURE — 80048 BASIC METABOLIC PNL TOTAL CA: CPT | Performed by: FAMILY MEDICINE

## 2021-09-27 PROCEDURE — 25000003 PHARM REV CODE 250: Performed by: INTERNAL MEDICINE

## 2021-09-27 PROCEDURE — 83735 ASSAY OF MAGNESIUM: CPT | Performed by: FAMILY MEDICINE

## 2021-09-27 PROCEDURE — 84100 ASSAY OF PHOSPHORUS: CPT | Performed by: FAMILY MEDICINE

## 2021-09-27 PROCEDURE — 12000002 HC ACUTE/MED SURGE SEMI-PRIVATE ROOM

## 2021-09-27 PROCEDURE — 94799 UNLISTED PULMONARY SVC/PX: CPT

## 2021-09-27 PROCEDURE — 25000003 PHARM REV CODE 250: Performed by: FAMILY MEDICINE

## 2021-09-27 PROCEDURE — 99900035 HC TECH TIME PER 15 MIN (STAT)

## 2021-09-27 PROCEDURE — 85025 COMPLETE CBC W/AUTO DIFF WBC: CPT | Performed by: FAMILY MEDICINE

## 2021-09-27 PROCEDURE — 63600175 PHARM REV CODE 636 W HCPCS: Performed by: FAMILY MEDICINE

## 2021-09-27 PROCEDURE — 99900031 HC PATIENT EDUCATION (STAT)

## 2021-09-27 RX ORDER — SODIUM CHLORIDE, SODIUM LACTATE, POTASSIUM CHLORIDE, CALCIUM CHLORIDE 600; 310; 30; 20 MG/100ML; MG/100ML; MG/100ML; MG/100ML
INJECTION, SOLUTION INTRAVENOUS CONTINUOUS
Status: ACTIVE | OUTPATIENT
Start: 2021-09-27 | End: 2021-09-28

## 2021-09-27 RX ORDER — AMOXICILLIN AND CLAVULANATE POTASSIUM 875; 125 MG/1; MG/1
1 TABLET, FILM COATED ORAL EVERY 12 HOURS
Status: DISCONTINUED | OUTPATIENT
Start: 2021-09-27 | End: 2021-09-29

## 2021-09-27 RX ORDER — LORAZEPAM 2 MG/ML
0.5 INJECTION INTRAMUSCULAR ONCE
Status: COMPLETED | OUTPATIENT
Start: 2021-09-27 | End: 2021-09-27

## 2021-09-27 RX ADMIN — AMOXICILLIN AND CLAVULANATE POTASSIUM 1 TABLET: 875; 125 TABLET, FILM COATED ORAL at 09:09

## 2021-09-27 RX ADMIN — ZINC SULFATE 220 MG (50 MG) CAPSULE 220 MG: CAPSULE at 08:09

## 2021-09-27 RX ADMIN — PIPERACILLIN SODIUM AND TAZOBACTAM SODIUM 3.38 G: 3; .375 INJECTION, POWDER, LYOPHILIZED, FOR SOLUTION INTRAVENOUS at 12:09

## 2021-09-27 RX ADMIN — LORAZEPAM 1 MG: 2 INJECTION INTRAMUSCULAR; INTRAVENOUS at 03:09

## 2021-09-27 RX ADMIN — CARVEDILOL 12.5 MG: 12.5 TABLET, FILM COATED ORAL at 08:09

## 2021-09-27 RX ADMIN — ACETAMINOPHEN 650 MG: 325 TABLET, FILM COATED ORAL at 12:09

## 2021-09-27 RX ADMIN — MULTIPLE VITAMINS W/ MINERALS TAB 1 TABLET: TAB at 08:09

## 2021-09-27 RX ADMIN — PIPERACILLIN SODIUM AND TAZOBACTAM SODIUM 3.38 G: 3; .375 INJECTION, POWDER, LYOPHILIZED, FOR SOLUTION INTRAVENOUS at 08:09

## 2021-09-27 RX ADMIN — HYDROCODONE BITARTRATE AND ACETAMINOPHEN 1 TABLET: 10; 325 TABLET ORAL at 09:09

## 2021-09-27 RX ADMIN — BISMUTH SUBSALICYLATE 525 MG 30 ML: 525 LIQUID ORAL at 09:09

## 2021-09-27 RX ADMIN — MUPIROCIN: 20 OINTMENT TOPICAL at 08:09

## 2021-09-27 RX ADMIN — SODIUM CHLORIDE, SODIUM LACTATE, POTASSIUM CHLORIDE, AND CALCIUM CHLORIDE: .6; .31; .03; .02 INJECTION, SOLUTION INTRAVENOUS at 03:09

## 2021-09-27 RX ADMIN — AMLODIPINE BESYLATE 5 MG: 5 TABLET ORAL at 08:09

## 2021-09-27 RX ADMIN — COLLAGENASE SANTYL: 250 OINTMENT TOPICAL at 03:09

## 2021-09-27 RX ADMIN — CARVEDILOL 12.5 MG: 12.5 TABLET, FILM COATED ORAL at 09:09

## 2021-09-28 LAB
ANION GAP SERPL CALC-SCNC: 7 MMOL/L (ref 8–16)
BASOPHILS # BLD AUTO: 0.05 K/UL (ref 0–0.2)
BASOPHILS NFR BLD: 0.7 % (ref 0–1.9)
BUN SERPL-MCNC: 57 MG/DL (ref 8–23)
CALCIUM SERPL-MCNC: 8.6 MG/DL (ref 8.7–10.5)
CHLORIDE SERPL-SCNC: 106 MMOL/L (ref 95–110)
CO2 SERPL-SCNC: 26 MMOL/L (ref 23–29)
CREAT SERPL-MCNC: 1.6 MG/DL (ref 0.5–1.4)
DIFFERENTIAL METHOD: ABNORMAL
EOSINOPHIL # BLD AUTO: 0.5 K/UL (ref 0–0.5)
EOSINOPHIL NFR BLD: 6.8 % (ref 0–8)
ERYTHROCYTE [DISTWIDTH] IN BLOOD BY AUTOMATED COUNT: 15.4 % (ref 11.5–14.5)
EST. GFR  (AFRICAN AMERICAN): 53 ML/MIN/1.73 M^2
EST. GFR  (NON AFRICAN AMERICAN): 45.8 ML/MIN/1.73 M^2
GLUCOSE SERPL-MCNC: 115 MG/DL (ref 70–110)
GLUCOSE SERPL-MCNC: 131 MG/DL (ref 70–110)
GLUCOSE SERPL-MCNC: 167 MG/DL (ref 70–110)
GLUCOSE SERPL-MCNC: 209 MG/DL (ref 70–110)
GLUCOSE SERPL-MCNC: 273 MG/DL (ref 70–110)
HCT VFR BLD AUTO: 22.6 % (ref 40–54)
HGB BLD-MCNC: 7.3 G/DL (ref 14–18)
IMM GRANULOCYTES # BLD AUTO: 0.03 K/UL (ref 0–0.04)
IMM GRANULOCYTES NFR BLD AUTO: 0.4 % (ref 0–0.5)
LYMPHOCYTES # BLD AUTO: 1.1 K/UL (ref 1–4.8)
LYMPHOCYTES NFR BLD: 14.4 % (ref 18–48)
MAGNESIUM SERPL-MCNC: 2 MG/DL (ref 1.6–2.6)
MCH RBC QN AUTO: 30.2 PG (ref 27–31)
MCHC RBC AUTO-ENTMCNC: 32.3 G/DL (ref 32–36)
MCV RBC AUTO: 93 FL (ref 82–98)
MONOCYTES # BLD AUTO: 0.8 K/UL (ref 0.3–1)
MONOCYTES NFR BLD: 10.2 % (ref 4–15)
NEUTROPHILS # BLD AUTO: 5 K/UL (ref 1.8–7.7)
NEUTROPHILS NFR BLD: 67.5 % (ref 38–73)
NRBC BLD-RTO: 0 /100 WBC
PHOSPHATE SERPL-MCNC: 3.8 MG/DL (ref 2.7–4.5)
PLATELET # BLD AUTO: 303 K/UL (ref 150–450)
PMV BLD AUTO: 9.7 FL (ref 9.2–12.9)
POTASSIUM SERPL-SCNC: 4.3 MMOL/L (ref 3.5–5.1)
RBC # BLD AUTO: 2.42 M/UL (ref 4.6–6.2)
SODIUM SERPL-SCNC: 139 MMOL/L (ref 136–145)
WBC # BLD AUTO: 7.37 K/UL (ref 3.9–12.7)

## 2021-09-28 PROCEDURE — 25000003 PHARM REV CODE 250: Performed by: INTERNAL MEDICINE

## 2021-09-28 PROCEDURE — 25000003 PHARM REV CODE 250: Performed by: FAMILY MEDICINE

## 2021-09-28 PROCEDURE — A9585 GADOBUTROL INJECTION: HCPCS | Performed by: STUDENT IN AN ORGANIZED HEALTH CARE EDUCATION/TRAINING PROGRAM

## 2021-09-28 PROCEDURE — 94799 UNLISTED PULMONARY SVC/PX: CPT

## 2021-09-28 PROCEDURE — 83735 ASSAY OF MAGNESIUM: CPT | Performed by: FAMILY MEDICINE

## 2021-09-28 PROCEDURE — 80048 BASIC METABOLIC PNL TOTAL CA: CPT | Performed by: FAMILY MEDICINE

## 2021-09-28 PROCEDURE — 25500020 PHARM REV CODE 255: Performed by: STUDENT IN AN ORGANIZED HEALTH CARE EDUCATION/TRAINING PROGRAM

## 2021-09-28 PROCEDURE — 12000002 HC ACUTE/MED SURGE SEMI-PRIVATE ROOM

## 2021-09-28 PROCEDURE — 99900031 HC PATIENT EDUCATION (STAT)

## 2021-09-28 PROCEDURE — 99900035 HC TECH TIME PER 15 MIN (STAT)

## 2021-09-28 PROCEDURE — 27000221 HC OXYGEN, UP TO 24 HOURS

## 2021-09-28 PROCEDURE — 85025 COMPLETE CBC W/AUTO DIFF WBC: CPT | Performed by: FAMILY MEDICINE

## 2021-09-28 PROCEDURE — 84100 ASSAY OF PHOSPHORUS: CPT | Performed by: FAMILY MEDICINE

## 2021-09-28 PROCEDURE — 94760 N-INVAS EAR/PLS OXIMETRY 1: CPT

## 2021-09-28 RX ORDER — GADOBUTROL 604.72 MG/ML
7.5 INJECTION INTRAVENOUS
Status: COMPLETED | OUTPATIENT
Start: 2021-09-28 | End: 2021-09-28

## 2021-09-28 RX ADMIN — COLLAGENASE SANTYL: 250 OINTMENT TOPICAL at 12:09

## 2021-09-28 RX ADMIN — HYDROCODONE BITARTRATE AND ACETAMINOPHEN 1 TABLET: 10; 325 TABLET ORAL at 08:09

## 2021-09-28 RX ADMIN — CARVEDILOL 12.5 MG: 12.5 TABLET, FILM COATED ORAL at 10:09

## 2021-09-28 RX ADMIN — MUPIROCIN: 20 OINTMENT TOPICAL at 08:09

## 2021-09-28 RX ADMIN — MUPIROCIN: 20 OINTMENT TOPICAL at 03:09

## 2021-09-28 RX ADMIN — MULTIPLE VITAMINS W/ MINERALS TAB 1 TABLET: TAB at 10:09

## 2021-09-28 RX ADMIN — AMOXICILLIN AND CLAVULANATE POTASSIUM 1 TABLET: 875; 125 TABLET, FILM COATED ORAL at 10:09

## 2021-09-28 RX ADMIN — GADOBUTROL 7.5 ML: 604.72 INJECTION INTRAVENOUS at 02:09

## 2021-09-28 RX ADMIN — CARVEDILOL 12.5 MG: 12.5 TABLET, FILM COATED ORAL at 08:09

## 2021-09-28 RX ADMIN — MUPIROCIN: 20 OINTMENT TOPICAL at 10:09

## 2021-09-28 RX ADMIN — AMOXICILLIN AND CLAVULANATE POTASSIUM 1 TABLET: 875; 125 TABLET, FILM COATED ORAL at 08:09

## 2021-09-28 RX ADMIN — AMLODIPINE BESYLATE 5 MG: 5 TABLET ORAL at 10:09

## 2021-09-29 VITALS
BODY MASS INDEX: 30.07 KG/M2 | HEIGHT: 67 IN | HEART RATE: 77 BPM | WEIGHT: 191.56 LBS | DIASTOLIC BLOOD PRESSURE: 78 MMHG | SYSTOLIC BLOOD PRESSURE: 141 MMHG | RESPIRATION RATE: 18 BRPM | TEMPERATURE: 98 F | OXYGEN SATURATION: 98 %

## 2021-09-29 PROBLEM — A41.9 SEPSIS: Status: ACTIVE | Noted: 2021-09-29

## 2021-09-29 LAB
ANION GAP SERPL CALC-SCNC: 8 MMOL/L (ref 8–16)
BASOPHILS # BLD AUTO: 0.07 K/UL (ref 0–0.2)
BASOPHILS NFR BLD: 0.9 % (ref 0–1.9)
BUN SERPL-MCNC: 56 MG/DL (ref 8–23)
CALCIUM SERPL-MCNC: 9 MG/DL (ref 8.7–10.5)
CHLORIDE SERPL-SCNC: 107 MMOL/L (ref 95–110)
CO2 SERPL-SCNC: 26 MMOL/L (ref 23–29)
CREAT SERPL-MCNC: 1.7 MG/DL (ref 0.5–1.4)
DIFFERENTIAL METHOD: ABNORMAL
EOSINOPHIL # BLD AUTO: 0.5 K/UL (ref 0–0.5)
EOSINOPHIL NFR BLD: 6 % (ref 0–8)
ERYTHROCYTE [DISTWIDTH] IN BLOOD BY AUTOMATED COUNT: 15.5 % (ref 11.5–14.5)
EST. GFR  (AFRICAN AMERICAN): 49.2 ML/MIN/1.73 M^2
EST. GFR  (NON AFRICAN AMERICAN): 42.6 ML/MIN/1.73 M^2
GLUCOSE SERPL-MCNC: 109 MG/DL (ref 70–110)
HCT VFR BLD AUTO: 24.9 % (ref 40–54)
HGB BLD-MCNC: 8 G/DL (ref 14–18)
IMM GRANULOCYTES # BLD AUTO: 0.02 K/UL (ref 0–0.04)
IMM GRANULOCYTES NFR BLD AUTO: 0.3 % (ref 0–0.5)
LYMPHOCYTES # BLD AUTO: 1.4 K/UL (ref 1–4.8)
LYMPHOCYTES NFR BLD: 18 % (ref 18–48)
MAGNESIUM SERPL-MCNC: 2.1 MG/DL (ref 1.6–2.6)
MCH RBC QN AUTO: 30.1 PG (ref 27–31)
MCHC RBC AUTO-ENTMCNC: 32.1 G/DL (ref 32–36)
MCV RBC AUTO: 94 FL (ref 82–98)
MONOCYTES # BLD AUTO: 0.8 K/UL (ref 0.3–1)
MONOCYTES NFR BLD: 11 % (ref 4–15)
NEUTROPHILS # BLD AUTO: 4.9 K/UL (ref 1.8–7.7)
NEUTROPHILS NFR BLD: 63.8 % (ref 38–73)
NRBC BLD-RTO: 0 /100 WBC
PHOSPHATE SERPL-MCNC: 4 MG/DL (ref 2.7–4.5)
PLATELET # BLD AUTO: 332 K/UL (ref 150–450)
PMV BLD AUTO: 9.7 FL (ref 9.2–12.9)
POTASSIUM SERPL-SCNC: 4.2 MMOL/L (ref 3.5–5.1)
RBC # BLD AUTO: 2.66 M/UL (ref 4.6–6.2)
SODIUM SERPL-SCNC: 141 MMOL/L (ref 136–145)
WBC # BLD AUTO: 7.63 K/UL (ref 3.9–12.7)

## 2021-09-29 PROCEDURE — 99900035 HC TECH TIME PER 15 MIN (STAT)

## 2021-09-29 PROCEDURE — 25000003 PHARM REV CODE 250: Performed by: STUDENT IN AN ORGANIZED HEALTH CARE EDUCATION/TRAINING PROGRAM

## 2021-09-29 PROCEDURE — 85025 COMPLETE CBC W/AUTO DIFF WBC: CPT | Performed by: FAMILY MEDICINE

## 2021-09-29 PROCEDURE — 83735 ASSAY OF MAGNESIUM: CPT | Performed by: FAMILY MEDICINE

## 2021-09-29 PROCEDURE — 27000221 HC OXYGEN, UP TO 24 HOURS

## 2021-09-29 PROCEDURE — 25000003 PHARM REV CODE 250: Performed by: INTERNAL MEDICINE

## 2021-09-29 PROCEDURE — 80048 BASIC METABOLIC PNL TOTAL CA: CPT | Performed by: FAMILY MEDICINE

## 2021-09-29 PROCEDURE — 94761 N-INVAS EAR/PLS OXIMETRY MLT: CPT

## 2021-09-29 PROCEDURE — 63600175 PHARM REV CODE 636 W HCPCS: Performed by: STUDENT IN AN ORGANIZED HEALTH CARE EDUCATION/TRAINING PROGRAM

## 2021-09-29 PROCEDURE — 84100 ASSAY OF PHOSPHORUS: CPT | Performed by: FAMILY MEDICINE

## 2021-09-29 RX ORDER — BISMUTH SUBSALICYLATE 525 MG/30ML
30 LIQUID ORAL EVERY 6 HOURS PRN
Refills: 0 | Status: ON HOLD
Start: 2021-09-29 | End: 2021-10-26 | Stop reason: HOSPADM

## 2021-09-29 RX ORDER — AMOXICILLIN 250 MG
1 CAPSULE ORAL 2 TIMES DAILY PRN
Status: ON HOLD
Start: 2021-09-29 | End: 2021-10-26 | Stop reason: HOSPADM

## 2021-09-29 RX ORDER — CARVEDILOL 12.5 MG/1
12.5 TABLET ORAL 2 TIMES DAILY
Qty: 60 TABLET | Refills: 11 | Status: ON HOLD
Start: 2021-09-29 | End: 2021-10-26 | Stop reason: HOSPADM

## 2021-09-29 RX ORDER — AMLODIPINE BESYLATE 5 MG/1
5 TABLET ORAL DAILY
Qty: 30 TABLET | Refills: 11 | Status: ON HOLD
Start: 2021-09-29 | End: 2021-10-26 | Stop reason: HOSPADM

## 2021-09-29 RX ADMIN — COLLAGENASE SANTYL: 250 OINTMENT TOPICAL at 11:09

## 2021-09-29 RX ADMIN — MULTIPLE VITAMINS W/ MINERALS TAB 1 TABLET: TAB at 09:09

## 2021-09-29 RX ADMIN — MUPIROCIN: 20 OINTMENT TOPICAL at 09:09

## 2021-09-29 RX ADMIN — CARVEDILOL 12.5 MG: 12.5 TABLET, FILM COATED ORAL at 09:09

## 2021-09-29 RX ADMIN — PIPERACILLIN AND TAZOBACTAM 3.38 G: 3; .375 INJECTION, POWDER, LYOPHILIZED, FOR SOLUTION INTRAVENOUS; PARENTERAL at 11:09

## 2021-09-29 RX ADMIN — AMLODIPINE BESYLATE 5 MG: 5 TABLET ORAL at 09:09

## 2021-09-30 PROBLEM — A41.9 SEPSIS: Status: RESOLVED | Noted: 2021-09-29 | Resolved: 2021-09-30

## 2021-10-01 PROBLEM — S91.301A OPEN WOUND OF RIGHT FOOT: Status: ACTIVE | Noted: 2021-10-01

## 2021-10-01 PROBLEM — S91.302A OPEN WOUND OF LEFT FOOT: Status: ACTIVE | Noted: 2021-10-01

## 2021-10-09 ENCOUNTER — HOSPITAL ENCOUNTER (EMERGENCY)
Facility: HOSPITAL | Age: 61
Discharge: HOME | End: 2021-10-10
Attending: EMERGENCY MEDICINE
Payer: MEDICAID

## 2021-10-09 DIAGNOSIS — E88.09 EDEMA DUE TO HYPOALBUMINEMIA: ICD-10-CM

## 2021-10-09 DIAGNOSIS — S30.1XXA ABDOMINAL WALL HEMATOMA, INITIAL ENCOUNTER: Primary | ICD-10-CM

## 2021-10-09 PROBLEM — A41.9 SEPSIS: Status: ACTIVE | Noted: 2021-10-09

## 2021-10-09 LAB
ALBUMIN SERPL BCP-MCNC: 2.5 G/DL (ref 3.5–5.2)
ALP SERPL-CCNC: 100 U/L (ref 55–135)
ALT SERPL W/O P-5'-P-CCNC: 16 U/L (ref 10–44)
ANION GAP SERPL CALC-SCNC: 8 MMOL/L (ref 8–16)
AST SERPL-CCNC: 18 U/L (ref 10–40)
BACTERIA #/AREA URNS HPF: NEGATIVE /HPF
BASOPHILS # BLD AUTO: 0.07 K/UL (ref 0–0.2)
BASOPHILS NFR BLD: 0.8 % (ref 0–1.9)
BILIRUB SERPL-MCNC: 0.7 MG/DL (ref 0.1–1)
BILIRUB UR QL STRIP: NEGATIVE
BUN SERPL-MCNC: 29 MG/DL (ref 8–23)
CALCIUM SERPL-MCNC: 8.5 MG/DL (ref 8.7–10.5)
CHLORIDE SERPL-SCNC: 98 MMOL/L (ref 95–110)
CLARITY UR: CLEAR
CO2 SERPL-SCNC: 26 MMOL/L (ref 23–29)
COLOR UR: YELLOW
CREAT SERPL-MCNC: 1.6 MG/DL (ref 0.5–1.4)
CREAT SERPL-MCNC: 1.6 MG/DL (ref 0.5–1.4)
DIFFERENTIAL METHOD: ABNORMAL
EOSINOPHIL # BLD AUTO: 0.3 K/UL (ref 0–0.5)
EOSINOPHIL NFR BLD: 4 % (ref 0–8)
ERYTHROCYTE [DISTWIDTH] IN BLOOD BY AUTOMATED COUNT: 14.4 % (ref 11.5–14.5)
EST. GFR  (AFRICAN AMERICAN): 53 ML/MIN/1.73 M^2
EST. GFR  (NON AFRICAN AMERICAN): 45.8 ML/MIN/1.73 M^2
GLUCOSE SERPL-MCNC: 296 MG/DL (ref 70–110)
GLUCOSE UR QL STRIP: ABNORMAL
HCT VFR BLD AUTO: 26.7 % (ref 40–54)
HGB BLD-MCNC: 8.9 G/DL (ref 14–18)
HGB UR QL STRIP: NEGATIVE
HYALINE CASTS #/AREA URNS LPF: 1 /LPF
IMM GRANULOCYTES # BLD AUTO: 0.02 K/UL (ref 0–0.04)
IMM GRANULOCYTES NFR BLD AUTO: 0.2 % (ref 0–0.5)
KETONES UR QL STRIP: NEGATIVE
LEUKOCYTE ESTERASE UR QL STRIP: NEGATIVE
LIPASE SERPL-CCNC: 57 U/L (ref 4–60)
LYMPHOCYTES # BLD AUTO: 1.1 K/UL (ref 1–4.8)
LYMPHOCYTES NFR BLD: 13.3 % (ref 18–48)
MCH RBC QN AUTO: 29.5 PG (ref 27–31)
MCHC RBC AUTO-ENTMCNC: 33.3 G/DL (ref 32–36)
MCV RBC AUTO: 88 FL (ref 82–98)
MICROSCOPIC COMMENT: NORMAL
MONOCYTES # BLD AUTO: 1 K/UL (ref 0.3–1)
MONOCYTES NFR BLD: 11.5 % (ref 4–15)
NEUTROPHILS # BLD AUTO: 5.8 K/UL (ref 1.8–7.7)
NEUTROPHILS NFR BLD: 70.2 % (ref 38–73)
NITRITE UR QL STRIP: NEGATIVE
NRBC BLD-RTO: 0 /100 WBC
PH UR STRIP: 7 [PH] (ref 5–8)
PLATELET # BLD AUTO: 280 K/UL (ref 150–450)
PMV BLD AUTO: 9.4 FL (ref 9.2–12.9)
POTASSIUM SERPL-SCNC: 4.2 MMOL/L (ref 3.5–5.1)
PROT SERPL-MCNC: 7.6 G/DL (ref 6–8.4)
PROT UR QL STRIP: ABNORMAL
RBC # BLD AUTO: 3.02 M/UL (ref 4.6–6.2)
RBC #/AREA URNS HPF: 1 /HPF (ref 0–4)
SAMPLE: ABNORMAL
SODIUM SERPL-SCNC: 132 MMOL/L (ref 136–145)
SP GR UR STRIP: 1.01 (ref 1–1.03)
SQUAMOUS #/AREA URNS HPF: 0 /HPF
URN SPEC COLLECT METH UR: ABNORMAL
UROBILINOGEN UR STRIP-ACNC: NEGATIVE EU/DL
WBC # BLD AUTO: 8.27 K/UL (ref 3.9–12.7)
WBC #/AREA URNS HPF: 0 /HPF (ref 0–5)
YEAST URNS QL MICRO: NORMAL

## 2021-10-09 PROCEDURE — 63600175 PHARM REV CODE 636 W HCPCS: Performed by: EMERGENCY MEDICINE

## 2021-10-09 PROCEDURE — 36415 COLL VENOUS BLD VENIPUNCTURE: CPT | Performed by: EMERGENCY MEDICINE

## 2021-10-09 PROCEDURE — 85025 COMPLETE CBC W/AUTO DIFF WBC: CPT | Performed by: EMERGENCY MEDICINE

## 2021-10-09 PROCEDURE — 80053 COMPREHEN METABOLIC PANEL: CPT | Performed by: EMERGENCY MEDICINE

## 2021-10-09 PROCEDURE — 96374 THER/PROPH/DIAG INJ IV PUSH: CPT

## 2021-10-09 PROCEDURE — 96375 TX/PRO/DX INJ NEW DRUG ADDON: CPT

## 2021-10-09 PROCEDURE — 81001 URINALYSIS AUTO W/SCOPE: CPT | Performed by: EMERGENCY MEDICINE

## 2021-10-09 PROCEDURE — 83690 ASSAY OF LIPASE: CPT | Performed by: EMERGENCY MEDICINE

## 2021-10-09 PROCEDURE — 99284 EMERGENCY DEPT VISIT MOD MDM: CPT | Mod: 25

## 2021-10-09 RX ORDER — MORPHINE SULFATE 4 MG/ML
4 INJECTION, SOLUTION INTRAMUSCULAR; INTRAVENOUS
Status: COMPLETED | OUTPATIENT
Start: 2021-10-09 | End: 2021-10-09

## 2021-10-09 RX ORDER — ONDANSETRON 2 MG/ML
4 INJECTION INTRAMUSCULAR; INTRAVENOUS
Status: COMPLETED | OUTPATIENT
Start: 2021-10-09 | End: 2021-10-09

## 2021-10-09 RX ADMIN — ONDANSETRON 4 MG: 2 INJECTION INTRAMUSCULAR; INTRAVENOUS at 11:10

## 2021-10-09 RX ADMIN — MORPHINE SULFATE 4 MG: 4 INJECTION, SOLUTION INTRAMUSCULAR; INTRAVENOUS at 11:10

## 2021-10-10 VITALS
HEART RATE: 84 BPM | WEIGHT: 194 LBS | TEMPERATURE: 99 F | SYSTOLIC BLOOD PRESSURE: 180 MMHG | BODY MASS INDEX: 29.4 KG/M2 | OXYGEN SATURATION: 96 % | HEIGHT: 68 IN | DIASTOLIC BLOOD PRESSURE: 92 MMHG | RESPIRATION RATE: 18 BRPM

## 2021-10-10 PROBLEM — S30.1XXA ABDOMINAL HEMATOMA: Status: ACTIVE | Noted: 2021-10-10

## 2021-10-11 LAB — FUNGUS SPEC CULT: NORMAL

## 2021-10-12 PROBLEM — M46.20 VERTEBRAL OSTEOMYELITIS, ACUTE: Status: ACTIVE | Noted: 2021-10-12

## 2021-10-15 PROBLEM — A41.9 SEPSIS: Status: RESOLVED | Noted: 2021-10-09 | Resolved: 2021-10-15

## 2021-10-19 PROBLEM — Z75.8 DISCHARGE PLANNING ISSUES: Status: ACTIVE | Noted: 2021-10-19

## 2021-10-20 LAB — EOSINOPHIL NFR URNS MANUAL: NORMAL %

## 2021-10-22 LAB
ACID FAST MOD KINY STN SPEC: NORMAL
MYCOBACTERIUM SPEC QL CULT: NORMAL

## 2021-10-26 PROBLEM — R78.81 BACTEREMIA: Status: RESOLVED | Noted: 2021-09-10 | Resolved: 2021-10-26

## 2021-11-01 ENCOUNTER — HOSPITAL ENCOUNTER (EMERGENCY)
Facility: HOSPITAL | Age: 61
Discharge: HOME | End: 2021-11-01
Attending: EMERGENCY MEDICINE
Payer: MEDICAID

## 2021-11-01 ENCOUNTER — OFFICE VISIT (OUTPATIENT)
Dept: INFECTIOUS DISEASES | Facility: CLINIC | Age: 61
End: 2021-11-01
Payer: MEDICAID

## 2021-11-01 VITALS
HEIGHT: 68 IN | OXYGEN SATURATION: 9 % | SYSTOLIC BLOOD PRESSURE: 70 MMHG | DIASTOLIC BLOOD PRESSURE: 43 MMHG | BODY MASS INDEX: 24.58 KG/M2 | WEIGHT: 162.19 LBS | TEMPERATURE: 97 F | HEART RATE: 81 BPM

## 2021-11-01 VITALS
DIASTOLIC BLOOD PRESSURE: 90 MMHG | SYSTOLIC BLOOD PRESSURE: 195 MMHG | TEMPERATURE: 98 F | RESPIRATION RATE: 18 BRPM | HEIGHT: 68 IN | BODY MASS INDEX: 24.55 KG/M2 | WEIGHT: 162 LBS | OXYGEN SATURATION: 99 % | HEART RATE: 70 BPM

## 2021-11-01 DIAGNOSIS — L02.212 BACK ABSCESS: Primary | ICD-10-CM

## 2021-11-01 DIAGNOSIS — R78.81 BACTEREMIA: ICD-10-CM

## 2021-11-01 DIAGNOSIS — I95.1 ORTHOSTATIC HYPOTENSION: Primary | ICD-10-CM

## 2021-11-01 DIAGNOSIS — R73.9 HYPERGLYCEMIA: ICD-10-CM

## 2021-11-01 DIAGNOSIS — E11.65 TYPE 2 DIABETES MELLITUS WITH HYPERGLYCEMIA, WITHOUT LONG-TERM CURRENT USE OF INSULIN: ICD-10-CM

## 2021-11-01 DIAGNOSIS — M46.20 VERTEBRAL OSTEOMYELITIS, ACUTE: ICD-10-CM

## 2021-11-01 DIAGNOSIS — S91.302S OPEN WOUND OF LEFT FOOT, SEQUELA: ICD-10-CM

## 2021-11-01 DIAGNOSIS — E86.1 HYPOTENSION DUE TO HYPOVOLEMIA: ICD-10-CM

## 2021-11-01 LAB
ALBUMIN SERPL BCP-MCNC: 3.5 G/DL (ref 3.5–5.2)
ALP SERPL-CCNC: 95 U/L (ref 55–135)
ALT SERPL W/O P-5'-P-CCNC: 31 U/L (ref 10–44)
ANION GAP SERPL CALC-SCNC: 10 MMOL/L (ref 8–16)
AST SERPL-CCNC: 25 U/L (ref 10–40)
BASOPHILS # BLD AUTO: 0.07 K/UL (ref 0–0.2)
BASOPHILS NFR BLD: 1 % (ref 0–1.9)
BILIRUB SERPL-MCNC: 0.8 MG/DL (ref 0.1–1)
BUN SERPL-MCNC: 29 MG/DL (ref 8–23)
CALCIUM SERPL-MCNC: 9.7 MG/DL (ref 8.7–10.5)
CHLORIDE SERPL-SCNC: 102 MMOL/L (ref 95–110)
CO2 SERPL-SCNC: 23 MMOL/L (ref 23–29)
CREAT SERPL-MCNC: 1.4 MG/DL (ref 0.5–1.4)
DIFFERENTIAL METHOD: ABNORMAL
EOSINOPHIL # BLD AUTO: 0.2 K/UL (ref 0–0.5)
EOSINOPHIL NFR BLD: 3.1 % (ref 0–8)
ERYTHROCYTE [DISTWIDTH] IN BLOOD BY AUTOMATED COUNT: 13.7 % (ref 11.5–14.5)
EST. GFR  (AFRICAN AMERICAN): >60 ML/MIN/1.73 M^2
EST. GFR  (NON AFRICAN AMERICAN): 53.8 ML/MIN/1.73 M^2
GLUCOSE SERPL-MCNC: 285 MG/DL (ref 70–110)
GLUCOSE SERPL-MCNC: 324 MG/DL (ref 70–110)
HCT VFR BLD AUTO: 35.7 % (ref 40–54)
HGB BLD-MCNC: 11.7 G/DL (ref 14–18)
IMM GRANULOCYTES # BLD AUTO: 0.02 K/UL (ref 0–0.04)
IMM GRANULOCYTES NFR BLD AUTO: 0.3 % (ref 0–0.5)
INR PPP: 1.2
LACTATE SERPL-SCNC: 1.7 MMOL/L (ref 0.5–1.9)
LYMPHOCYTES # BLD AUTO: 1.4 K/UL (ref 1–4.8)
LYMPHOCYTES NFR BLD: 21.4 % (ref 18–48)
MAGNESIUM SERPL-MCNC: 1.7 MG/DL (ref 1.6–2.6)
MCH RBC QN AUTO: 28.5 PG (ref 27–31)
MCHC RBC AUTO-ENTMCNC: 32.8 G/DL (ref 32–36)
MCV RBC AUTO: 87 FL (ref 82–98)
MONOCYTES # BLD AUTO: 0.6 K/UL (ref 0.3–1)
MONOCYTES NFR BLD: 8.7 % (ref 4–15)
NEUTROPHILS # BLD AUTO: 4.4 K/UL (ref 1.8–7.7)
NEUTROPHILS NFR BLD: 65.5 % (ref 38–73)
NRBC BLD-RTO: 0 /100 WBC
PLATELET # BLD AUTO: 297 K/UL (ref 150–450)
PMV BLD AUTO: 9.9 FL (ref 9.2–12.9)
POTASSIUM SERPL-SCNC: 4.8 MMOL/L (ref 3.5–5.1)
PROT SERPL-MCNC: 8.8 G/DL (ref 6–8.4)
PROTHROMBIN TIME: 14.1 SEC (ref 11.4–13.7)
RBC # BLD AUTO: 4.11 M/UL (ref 4.6–6.2)
SARS-COV-2 RDRP RESP QL NAA+PROBE: NEGATIVE
SODIUM SERPL-SCNC: 135 MMOL/L (ref 136–145)
TROPONIN I SERPL DL<=0.01 NG/ML-MCNC: <0.03 NG/ML
WBC # BLD AUTO: 6.68 K/UL (ref 3.9–12.7)

## 2021-11-01 PROCEDURE — 87040 BLOOD CULTURE FOR BACTERIA: CPT | Mod: 59 | Performed by: EMERGENCY MEDICINE

## 2021-11-01 PROCEDURE — 80053 COMPREHEN METABOLIC PANEL: CPT | Performed by: EMERGENCY MEDICINE

## 2021-11-01 PROCEDURE — 25000003 PHARM REV CODE 250: Performed by: EMERGENCY MEDICINE

## 2021-11-01 PROCEDURE — 93010 ELECTROCARDIOGRAM REPORT: CPT | Mod: ,,, | Performed by: INTERNAL MEDICINE

## 2021-11-01 PROCEDURE — 83735 ASSAY OF MAGNESIUM: CPT | Performed by: EMERGENCY MEDICINE

## 2021-11-01 PROCEDURE — 36000 PLACE NEEDLE IN VEIN: CPT

## 2021-11-01 PROCEDURE — 93010 EKG 12-LEAD: ICD-10-PCS | Mod: ,,, | Performed by: INTERNAL MEDICINE

## 2021-11-01 PROCEDURE — 82962 GLUCOSE BLOOD TEST: CPT

## 2021-11-01 PROCEDURE — 99215 OFFICE O/P EST HI 40 MIN: CPT | Mod: S$GLB,,, | Performed by: INTERNAL MEDICINE

## 2021-11-01 PROCEDURE — 85610 PROTHROMBIN TIME: CPT | Performed by: EMERGENCY MEDICINE

## 2021-11-01 PROCEDURE — U0002 COVID-19 LAB TEST NON-CDC: HCPCS | Performed by: EMERGENCY MEDICINE

## 2021-11-01 PROCEDURE — 99284 EMERGENCY DEPT VISIT MOD MDM: CPT | Mod: 25

## 2021-11-01 PROCEDURE — 93005 ELECTROCARDIOGRAM TRACING: CPT | Performed by: INTERNAL MEDICINE

## 2021-11-01 PROCEDURE — 83605 ASSAY OF LACTIC ACID: CPT | Performed by: EMERGENCY MEDICINE

## 2021-11-01 PROCEDURE — 84484 ASSAY OF TROPONIN QUANT: CPT | Performed by: EMERGENCY MEDICINE

## 2021-11-01 PROCEDURE — 99070 SPECIAL SUPPLIES PHYS/QHP: CPT | Mod: S$GLB,,, | Performed by: INTERNAL MEDICINE

## 2021-11-01 PROCEDURE — 99215 PR OFFICE/OUTPT VISIT, EST, LEVL V, 40-54 MIN: ICD-10-PCS | Mod: S$GLB,,, | Performed by: INTERNAL MEDICINE

## 2021-11-01 PROCEDURE — 85025 COMPLETE CBC W/AUTO DIFF WBC: CPT | Performed by: EMERGENCY MEDICINE

## 2021-11-01 PROCEDURE — 99070 PR SPECIAL SUPPLIES: ICD-10-PCS | Mod: S$GLB,,, | Performed by: INTERNAL MEDICINE

## 2021-11-01 RX ORDER — METFORMIN HYDROCHLORIDE 1000 MG/1
1000 TABLET ORAL 2 TIMES DAILY
Qty: 30 TABLET | Refills: 0 | Status: SHIPPED | OUTPATIENT
Start: 2021-11-01 | End: 2021-11-08

## 2021-11-01 RX ORDER — SODIUM CHLORIDE 9 MG/ML
INJECTION, SOLUTION INTRAVENOUS
Status: DISCONTINUED | OUTPATIENT
Start: 2021-11-01 | End: 2021-11-01 | Stop reason: HOSPADM

## 2021-11-01 RX ORDER — SODIUM CHLORIDE 9 MG/ML
INJECTION, SOLUTION INTRAVENOUS
Status: COMPLETED | OUTPATIENT
Start: 2021-11-01 | End: 2021-11-01

## 2021-11-01 RX ORDER — SODIUM CHLORIDE 0.9 % (FLUSH) 0.9 %
10 SYRINGE (ML) INJECTION
Status: DISCONTINUED | OUTPATIENT
Start: 2021-11-01 | End: 2021-11-01 | Stop reason: HOSPADM

## 2021-11-01 RX ADMIN — SODIUM CHLORIDE: 0.9 INJECTION, SOLUTION INTRAVENOUS at 11:11

## 2021-11-05 ENCOUNTER — EXTERNAL HOME HEALTH (OUTPATIENT)
Dept: HOME HEALTH SERVICES | Facility: HOSPITAL | Age: 61
End: 2021-11-05
Payer: MEDICAID

## 2021-11-06 LAB
BACTERIA BLD CULT: NORMAL
BACTERIA BLD CULT: NORMAL

## 2021-11-08 ENCOUNTER — OFFICE VISIT (OUTPATIENT)
Dept: PODIATRY | Facility: CLINIC | Age: 61
End: 2021-11-08
Payer: MEDICAID

## 2021-11-08 VITALS
WEIGHT: 162 LBS | HEIGHT: 68 IN | SYSTOLIC BLOOD PRESSURE: 131 MMHG | DIASTOLIC BLOOD PRESSURE: 78 MMHG | HEART RATE: 80 BPM | BODY MASS INDEX: 24.55 KG/M2

## 2021-11-08 DIAGNOSIS — E11.42 DIABETIC POLYNEUROPATHY ASSOCIATED WITH TYPE 2 DIABETES MELLITUS: Primary | ICD-10-CM

## 2021-11-08 DIAGNOSIS — E11.621 TYPE 2 DIABETES MELLITUS WITH FOOT ULCER, UNSPECIFIED WHETHER LONG TERM INSULIN USE: ICD-10-CM

## 2021-11-08 DIAGNOSIS — S91.302D OPEN WOUND OF LEFT FOOT, SUBSEQUENT ENCOUNTER: ICD-10-CM

## 2021-11-08 DIAGNOSIS — L97.509 TYPE 2 DIABETES MELLITUS WITH FOOT ULCER, UNSPECIFIED WHETHER LONG TERM INSULIN USE: ICD-10-CM

## 2021-11-08 DIAGNOSIS — L60.2 ONYCHOGRYPOSIS: ICD-10-CM

## 2021-11-08 DIAGNOSIS — L85.1 ACQUIRED KERATODERMA: ICD-10-CM

## 2021-11-08 DIAGNOSIS — L97.522 ULCER OF LEFT FOOT WITH FAT LAYER EXPOSED: ICD-10-CM

## 2021-11-08 PROCEDURE — 11045 WOUND DEBRIDEMENT: ICD-10-PCS | Mod: S$GLB,,, | Performed by: PODIATRIST

## 2021-11-08 PROCEDURE — 11042 DBRDMT SUBQ TIS 1ST 20SQCM/<: CPT | Mod: S$GLB,,, | Performed by: PODIATRIST

## 2021-11-08 PROCEDURE — 99214 PR OFFICE/OUTPT VISIT, EST, LEVL IV, 30-39 MIN: ICD-10-PCS | Mod: 25,S$GLB,, | Performed by: PODIATRIST

## 2021-11-08 PROCEDURE — 11045 DBRDMT SUBQ TISS EACH ADDL: CPT | Mod: S$GLB,,, | Performed by: PODIATRIST

## 2021-11-08 PROCEDURE — 99214 OFFICE O/P EST MOD 30 MIN: CPT | Mod: 25,S$GLB,, | Performed by: PODIATRIST

## 2021-11-08 PROCEDURE — 11042 WOUND DEBRIDEMENT: ICD-10-PCS | Mod: S$GLB,,, | Performed by: PODIATRIST

## 2021-11-08 RX ORDER — METFORMIN HYDROCHLORIDE 500 MG/1
500 TABLET ORAL 2 TIMES DAILY
Status: ON HOLD | COMMUNITY
Start: 2021-11-03 | End: 2022-10-06 | Stop reason: SDUPTHER

## 2021-11-23 ENCOUNTER — TELEPHONE (OUTPATIENT)
Dept: PODIATRY | Facility: CLINIC | Age: 61
End: 2021-11-23
Payer: MEDICAID

## 2021-11-24 ENCOUNTER — OFFICE VISIT (OUTPATIENT)
Dept: PODIATRY | Facility: CLINIC | Age: 61
End: 2021-11-24
Payer: MEDICAID

## 2021-11-24 VITALS — BODY MASS INDEX: 24.55 KG/M2 | HEIGHT: 68 IN | WEIGHT: 162 LBS

## 2021-11-24 DIAGNOSIS — L97.522 ULCER OF LEFT FOOT WITH FAT LAYER EXPOSED: ICD-10-CM

## 2021-11-24 DIAGNOSIS — L97.511 RIGHT FOOT ULCER, LIMITED TO BREAKDOWN OF SKIN: ICD-10-CM

## 2021-11-24 DIAGNOSIS — E11.42 DIABETIC POLYNEUROPATHY ASSOCIATED WITH TYPE 2 DIABETES MELLITUS: Primary | ICD-10-CM

## 2021-11-24 PROCEDURE — 11042 DBRDMT SUBQ TIS 1ST 20SQCM/<: CPT | Mod: S$GLB,,, | Performed by: PODIATRIST

## 2021-11-24 PROCEDURE — 99214 PR OFFICE/OUTPT VISIT, EST, LEVL IV, 30-39 MIN: ICD-10-PCS | Mod: 25,S$GLB,, | Performed by: PODIATRIST

## 2021-11-24 PROCEDURE — 99214 OFFICE O/P EST MOD 30 MIN: CPT | Mod: 25,S$GLB,, | Performed by: PODIATRIST

## 2021-11-24 PROCEDURE — 11042 WOUND DEBRIDEMENT: ICD-10-PCS | Mod: S$GLB,,, | Performed by: PODIATRIST

## 2021-12-29 ENCOUNTER — DOCUMENT SCAN (OUTPATIENT)
Dept: HOME HEALTH SERVICES | Facility: HOSPITAL | Age: 61
End: 2021-12-29
Payer: MEDICAID

## 2022-08-25 ENCOUNTER — HOSPITAL ENCOUNTER (INPATIENT)
Facility: HOSPITAL | Age: 62
LOS: 7 days | Discharge: ANOTHER HOSPITAL | DRG: 854 | End: 2022-09-01
Attending: EMERGENCY MEDICINE | Admitting: HOSPITALIST
Payer: MEDICAID

## 2022-08-25 DIAGNOSIS — M86.9 OSTEOMYELITIS OF RIGHT FOOT: ICD-10-CM

## 2022-08-25 DIAGNOSIS — A41.9 SEPSIS, DUE TO UNSPECIFIED ORGANISM, UNSPECIFIED WHETHER ACUTE ORGAN DYSFUNCTION PRESENT: ICD-10-CM

## 2022-08-25 DIAGNOSIS — L03.115 CELLULITIS OF RIGHT LOWER EXTREMITY: ICD-10-CM

## 2022-08-25 DIAGNOSIS — Z01.818 PRE-OP EXAM: ICD-10-CM

## 2022-08-25 DIAGNOSIS — M86.071 ACUTE HEMATOGENOUS OSTEOMYELITIS OF RIGHT FOOT: ICD-10-CM

## 2022-08-25 DIAGNOSIS — R07.9 CHEST PAIN: ICD-10-CM

## 2022-08-25 DIAGNOSIS — S91.301D OPEN WOUND OF RIGHT FOOT, SUBSEQUENT ENCOUNTER: Primary | ICD-10-CM

## 2022-08-25 DIAGNOSIS — I96 DRY GANGRENE: ICD-10-CM

## 2022-08-25 DIAGNOSIS — S91.301A OPEN WOUND OF RIGHT FOOT, INITIAL ENCOUNTER: ICD-10-CM

## 2022-08-25 DIAGNOSIS — I73.9 PAD (PERIPHERAL ARTERY DISEASE): ICD-10-CM

## 2022-08-25 LAB
ALBUMIN SERPL BCP-MCNC: 2 G/DL (ref 3.5–5.2)
ALP SERPL-CCNC: 121 U/L (ref 55–135)
ALT SERPL W/O P-5'-P-CCNC: 23 U/L (ref 10–44)
ANION GAP SERPL CALC-SCNC: 15 MMOL/L (ref 8–16)
AST SERPL-CCNC: 25 U/L (ref 10–40)
BASOPHILS # BLD AUTO: 0.04 K/UL (ref 0–0.2)
BASOPHILS NFR BLD: 0.2 % (ref 0–1.9)
BILIRUB SERPL-MCNC: 0.5 MG/DL (ref 0.1–1)
BUN SERPL-MCNC: 25 MG/DL (ref 8–23)
CALCIUM SERPL-MCNC: 9 MG/DL (ref 8.7–10.5)
CHLORIDE SERPL-SCNC: 91 MMOL/L (ref 95–110)
CO2 SERPL-SCNC: 22 MMOL/L (ref 23–29)
CREAT SERPL-MCNC: 1.4 MG/DL (ref 0.5–1.4)
DIFFERENTIAL METHOD: ABNORMAL
EOSINOPHIL # BLD AUTO: 0 K/UL (ref 0–0.5)
EOSINOPHIL NFR BLD: 0.1 % (ref 0–8)
ERYTHROCYTE [DISTWIDTH] IN BLOOD BY AUTOMATED COUNT: 11.9 % (ref 11.5–14.5)
EST. GFR  (NO RACE VARIABLE): 57 ML/MIN/1.73 M^2
GLUCOSE SERPL-MCNC: 448 MG/DL (ref 70–110)
HCT VFR BLD AUTO: 28.6 % (ref 40–54)
HGB BLD-MCNC: 9.9 G/DL (ref 14–18)
IMM GRANULOCYTES # BLD AUTO: 0.16 K/UL (ref 0–0.04)
IMM GRANULOCYTES NFR BLD AUTO: 0.9 % (ref 0–0.5)
LACTATE SERPL-SCNC: 0.9 MMOL/L (ref 0.5–2.2)
LYMPHOCYTES # BLD AUTO: 0.9 K/UL (ref 1–4.8)
LYMPHOCYTES NFR BLD: 4.9 % (ref 18–48)
MCH RBC QN AUTO: 28.2 PG (ref 27–31)
MCHC RBC AUTO-ENTMCNC: 34.6 G/DL (ref 32–36)
MCV RBC AUTO: 82 FL (ref 82–98)
MONOCYTES # BLD AUTO: 1.4 K/UL (ref 0.3–1)
MONOCYTES NFR BLD: 7.6 % (ref 4–15)
NEUTROPHILS # BLD AUTO: 16 K/UL (ref 1.8–7.7)
NEUTROPHILS NFR BLD: 86.3 % (ref 38–73)
NRBC BLD-RTO: 0 /100 WBC
PLATELET # BLD AUTO: 408 K/UL (ref 150–450)
PMV BLD AUTO: 9.7 FL (ref 9.2–12.9)
POTASSIUM SERPL-SCNC: 4.1 MMOL/L (ref 3.5–5.1)
PROT SERPL-MCNC: 7.5 G/DL (ref 6–8.4)
RBC # BLD AUTO: 3.51 M/UL (ref 4.6–6.2)
SARS-COV-2 RDRP RESP QL NAA+PROBE: NEGATIVE
SODIUM SERPL-SCNC: 128 MMOL/L (ref 136–145)
WBC # BLD AUTO: 18.51 K/UL (ref 3.9–12.7)

## 2022-08-25 PROCEDURE — U0002 COVID-19 LAB TEST NON-CDC: HCPCS | Performed by: EMERGENCY MEDICINE

## 2022-08-25 PROCEDURE — 87040 BLOOD CULTURE FOR BACTERIA: CPT | Mod: 59 | Performed by: EMERGENCY MEDICINE

## 2022-08-25 PROCEDURE — 85025 COMPLETE CBC W/AUTO DIFF WBC: CPT | Performed by: EMERGENCY MEDICINE

## 2022-08-25 PROCEDURE — 99291 CRITICAL CARE FIRST HOUR: CPT | Mod: 25

## 2022-08-25 PROCEDURE — 36415 COLL VENOUS BLD VENIPUNCTURE: CPT | Performed by: EMERGENCY MEDICINE

## 2022-08-25 PROCEDURE — 12000002 HC ACUTE/MED SURGE SEMI-PRIVATE ROOM

## 2022-08-25 PROCEDURE — 83605 ASSAY OF LACTIC ACID: CPT | Performed by: EMERGENCY MEDICINE

## 2022-08-25 PROCEDURE — 80053 COMPREHEN METABOLIC PANEL: CPT | Performed by: EMERGENCY MEDICINE

## 2022-08-25 RX ORDER — CEFEPIME HYDROCHLORIDE 1 G/50ML
2 INJECTION, SOLUTION INTRAVENOUS
Status: COMPLETED | OUTPATIENT
Start: 2022-08-25 | End: 2022-08-26

## 2022-08-26 ENCOUNTER — ANESTHESIA (OUTPATIENT)
Dept: SURGERY | Facility: HOSPITAL | Age: 62
DRG: 854 | End: 2022-08-26
Payer: MEDICAID

## 2022-08-26 ENCOUNTER — ANESTHESIA EVENT (OUTPATIENT)
Dept: SURGERY | Facility: HOSPITAL | Age: 62
DRG: 854 | End: 2022-08-26
Payer: MEDICAID

## 2022-08-26 PROBLEM — S91.002A OPEN WOUND OF LEFT ANKLE: Status: RESOLVED | Noted: 2021-08-26 | Resolved: 2022-08-26

## 2022-08-26 PROBLEM — M46.20 VERTEBRAL OSTEOMYELITIS, ACUTE: Status: RESOLVED | Noted: 2021-10-12 | Resolved: 2022-08-26

## 2022-08-26 PROBLEM — S21.202A OPEN WOUND OF LEFT SIDE OF BACK: Status: RESOLVED | Noted: 2021-08-26 | Resolved: 2022-08-26

## 2022-08-26 PROBLEM — L02.212 BACK ABSCESS: Status: RESOLVED | Noted: 2021-09-01 | Resolved: 2022-08-26

## 2022-08-26 PROBLEM — L03.115 CELLULITIS OF RIGHT LOWER EXTREMITY: Status: ACTIVE | Noted: 2022-08-26

## 2022-08-26 PROBLEM — D64.9 ANEMIA: Chronic | Status: RESOLVED | Noted: 2021-09-10 | Resolved: 2022-08-26

## 2022-08-26 PROBLEM — K80.20 CHOLELITHIASES: Chronic | Status: RESOLVED | Noted: 2021-09-10 | Resolved: 2022-08-26

## 2022-08-26 PROBLEM — E11.65 UNCONTROLLED TYPE 2 DIABETES MELLITUS WITH HYPERGLYCEMIA: Status: RESOLVED | Noted: 2021-09-04 | Resolved: 2022-08-26

## 2022-08-26 PROBLEM — A41.9 SEPSIS: Status: ACTIVE | Noted: 2022-08-26

## 2022-08-26 PROBLEM — D64.9 NORMOCYTIC ANEMIA: Status: ACTIVE | Noted: 2022-08-26

## 2022-08-26 PROBLEM — L98.499: Chronic | Status: RESOLVED | Noted: 2021-09-10 | Resolved: 2022-08-26

## 2022-08-26 PROBLEM — M86.9 OSTEOMYELITIS OF RIGHT FOOT: Status: ACTIVE | Noted: 2022-08-26

## 2022-08-26 PROBLEM — L02.12 CARBUNCLE AND FURUNCLE OF NECK: Status: RESOLVED | Noted: 2017-12-20 | Resolved: 2022-08-26

## 2022-08-26 PROBLEM — N17.9 AKI (ACUTE KIDNEY INJURY): Status: RESOLVED | Noted: 2021-09-10 | Resolved: 2022-08-26

## 2022-08-26 PROBLEM — I47.29 NSVT (NONSUSTAINED VENTRICULAR TACHYCARDIA): Status: RESOLVED | Noted: 2021-09-15 | Resolved: 2022-08-26

## 2022-08-26 PROBLEM — I96 DRY GANGRENE: Status: ACTIVE | Noted: 2022-08-26

## 2022-08-26 PROBLEM — S30.1XXA ABDOMINAL HEMATOMA: Status: RESOLVED | Noted: 2021-10-10 | Resolved: 2022-08-26

## 2022-08-26 PROBLEM — Z75.8 DISCHARGE PLANNING ISSUES: Status: RESOLVED | Noted: 2021-10-19 | Resolved: 2022-08-26

## 2022-08-26 PROBLEM — L02.13 CARBUNCLE AND FURUNCLE OF NECK: Status: RESOLVED | Noted: 2017-12-20 | Resolved: 2022-08-26

## 2022-08-26 PROBLEM — S91.302A OPEN WOUND OF LEFT FOOT: Status: RESOLVED | Noted: 2021-10-01 | Resolved: 2022-08-26

## 2022-08-26 LAB
ABO + RH BLD: NORMAL
ANION GAP SERPL CALC-SCNC: 12 MMOL/L (ref 8–16)
BASOPHILS # BLD AUTO: 0.03 K/UL (ref 0–0.2)
BASOPHILS NFR BLD: 0.2 % (ref 0–1.9)
BLD GP AB SCN CELLS X3 SERPL QL: NORMAL
BUN SERPL-MCNC: 22 MG/DL (ref 8–23)
CALCIUM SERPL-MCNC: 8.4 MG/DL (ref 8.7–10.5)
CHLORIDE SERPL-SCNC: 96 MMOL/L (ref 95–110)
CO2 SERPL-SCNC: 20 MMOL/L (ref 23–29)
CREAT SERPL-MCNC: 1.1 MG/DL (ref 0.5–1.4)
DIFFERENTIAL METHOD: ABNORMAL
EOSINOPHIL # BLD AUTO: 0 K/UL (ref 0–0.5)
EOSINOPHIL NFR BLD: 0.2 % (ref 0–8)
ERYTHROCYTE [DISTWIDTH] IN BLOOD BY AUTOMATED COUNT: 11.9 % (ref 11.5–14.5)
EST. GFR  (NO RACE VARIABLE): >60 ML/MIN/1.73 M^2
ESTIMATED AVG GLUCOSE: 306 MG/DL (ref 68–131)
GLUCOSE SERPL-MCNC: 351 MG/DL (ref 70–110)
HBA1C MFR BLD: 12.3 % (ref 4–5.6)
HCT VFR BLD AUTO: 24.9 % (ref 40–54)
HGB BLD-MCNC: 8.5 G/DL (ref 14–18)
IMM GRANULOCYTES # BLD AUTO: 0.16 K/UL (ref 0–0.04)
IMM GRANULOCYTES NFR BLD AUTO: 0.9 % (ref 0–0.5)
LACTATE SERPL-SCNC: 0.9 MMOL/L (ref 0.5–2.2)
LYMPHOCYTES # BLD AUTO: 1.6 K/UL (ref 1–4.8)
LYMPHOCYTES NFR BLD: 9.2 % (ref 18–48)
MAGNESIUM SERPL-MCNC: 1.7 MG/DL (ref 1.6–2.6)
MCH RBC QN AUTO: 28.3 PG (ref 27–31)
MCHC RBC AUTO-ENTMCNC: 34.1 G/DL (ref 32–36)
MCV RBC AUTO: 83 FL (ref 82–98)
MONOCYTES # BLD AUTO: 1.8 K/UL (ref 0.3–1)
MONOCYTES NFR BLD: 10.8 % (ref 4–15)
NEUTROPHILS # BLD AUTO: 13.4 K/UL (ref 1.8–7.7)
NEUTROPHILS NFR BLD: 78.7 % (ref 38–73)
NRBC BLD-RTO: 0 /100 WBC
PLATELET # BLD AUTO: 327 K/UL (ref 150–450)
PMV BLD AUTO: 11 FL (ref 9.2–12.9)
POCT GLUCOSE: 231 MG/DL (ref 70–110)
POCT GLUCOSE: 329 MG/DL (ref 70–110)
POCT GLUCOSE: 331 MG/DL (ref 70–110)
POCT GLUCOSE: 331 MG/DL (ref 70–110)
POCT GLUCOSE: 432 MG/DL (ref 70–110)
POTASSIUM SERPL-SCNC: 3.6 MMOL/L (ref 3.5–5.1)
PROCALCITONIN SERPL IA-MCNC: 1.57 NG/ML
RBC # BLD AUTO: 3 M/UL (ref 4.6–6.2)
SODIUM SERPL-SCNC: 128 MMOL/L (ref 136–145)
VANCOMYCIN SERPL-MCNC: 6.1 UG/ML
WBC # BLD AUTO: 16.96 K/UL (ref 3.9–12.7)

## 2022-08-26 PROCEDURE — 86850 RBC ANTIBODY SCREEN: CPT | Performed by: NURSE PRACTITIONER

## 2022-08-26 PROCEDURE — 87070 CULTURE OTHR SPECIMN AEROBIC: CPT | Performed by: PODIATRIST

## 2022-08-26 PROCEDURE — 25000003 PHARM REV CODE 250: Performed by: REGISTERED NURSE

## 2022-08-26 PROCEDURE — 25000003 PHARM REV CODE 250: Performed by: NURSE PRACTITIONER

## 2022-08-26 PROCEDURE — D9220A PRA ANESTHESIA: ICD-10-PCS | Mod: CRNA,,, | Performed by: REGISTERED NURSE

## 2022-08-26 PROCEDURE — 88305 TISSUE EXAM BY PATHOLOGIST: CPT | Mod: 59 | Performed by: PATHOLOGY

## 2022-08-26 PROCEDURE — 87077 CULTURE AEROBIC IDENTIFY: CPT | Performed by: PODIATRIST

## 2022-08-26 PROCEDURE — 84145 PROCALCITONIN (PCT): CPT | Performed by: NURSE PRACTITIONER

## 2022-08-26 PROCEDURE — 63600175 PHARM REV CODE 636 W HCPCS: Performed by: NURSE PRACTITIONER

## 2022-08-26 PROCEDURE — 36000707: Performed by: PODIATRIST

## 2022-08-26 PROCEDURE — D9220A PRA ANESTHESIA: Mod: CRNA,,, | Performed by: REGISTERED NURSE

## 2022-08-26 PROCEDURE — 83735 ASSAY OF MAGNESIUM: CPT | Performed by: NURSE PRACTITIONER

## 2022-08-26 PROCEDURE — 63600175 PHARM REV CODE 636 W HCPCS: Performed by: EMERGENCY MEDICINE

## 2022-08-26 PROCEDURE — 87205 SMEAR GRAM STAIN: CPT | Performed by: PODIATRIST

## 2022-08-26 PROCEDURE — 88311 DECALCIFY TISSUE: CPT | Performed by: PATHOLOGY

## 2022-08-26 PROCEDURE — 36415 COLL VENOUS BLD VENIPUNCTURE: CPT | Performed by: NURSE PRACTITIONER

## 2022-08-26 PROCEDURE — 83036 HEMOGLOBIN GLYCOSYLATED A1C: CPT | Performed by: NURSE PRACTITIONER

## 2022-08-26 PROCEDURE — 12000002 HC ACUTE/MED SURGE SEMI-PRIVATE ROOM

## 2022-08-26 PROCEDURE — 28810 PR AMPUTATION METATARSAL+TOE,SINGLE: ICD-10-PCS | Mod: 51,T9,, | Performed by: PODIATRIST

## 2022-08-26 PROCEDURE — 94799 UNLISTED PULMONARY SVC/PX: CPT

## 2022-08-26 PROCEDURE — 87075 CULTR BACTERIA EXCEPT BLOOD: CPT | Performed by: PODIATRIST

## 2022-08-26 PROCEDURE — 80048 BASIC METABOLIC PNL TOTAL CA: CPT | Performed by: NURSE PRACTITIONER

## 2022-08-26 PROCEDURE — 94761 N-INVAS EAR/PLS OXIMETRY MLT: CPT

## 2022-08-26 PROCEDURE — 87116 MYCOBACTERIA CULTURE: CPT | Performed by: PODIATRIST

## 2022-08-26 PROCEDURE — 28810 AMPUTATION TOE & METATARSAL: CPT | Mod: T8,,, | Performed by: PODIATRIST

## 2022-08-26 PROCEDURE — 85025 COMPLETE CBC W/AUTO DIFF WBC: CPT | Performed by: NURSE PRACTITIONER

## 2022-08-26 PROCEDURE — 87206 SMEAR FLUORESCENT/ACID STAI: CPT | Performed by: PODIATRIST

## 2022-08-26 PROCEDURE — 25000003 PHARM REV CODE 250: Performed by: PODIATRIST

## 2022-08-26 PROCEDURE — 96365 THER/PROPH/DIAG IV INF INIT: CPT

## 2022-08-26 PROCEDURE — 87076 CULTURE ANAEROBE IDENT EACH: CPT | Performed by: PODIATRIST

## 2022-08-26 PROCEDURE — 87102 FUNGUS ISOLATION CULTURE: CPT | Performed by: PODIATRIST

## 2022-08-26 PROCEDURE — 83605 ASSAY OF LACTIC ACID: CPT | Performed by: NURSE PRACTITIONER

## 2022-08-26 PROCEDURE — 36415 COLL VENOUS BLD VENIPUNCTURE: CPT | Performed by: EMERGENCY MEDICINE

## 2022-08-26 PROCEDURE — D9220A PRA ANESTHESIA: ICD-10-PCS | Mod: ANES,,, | Performed by: ANESTHESIOLOGY

## 2022-08-26 PROCEDURE — 80202 ASSAY OF VANCOMYCIN: CPT | Performed by: EMERGENCY MEDICINE

## 2022-08-26 PROCEDURE — 87186 SC STD MICRODIL/AGAR DIL: CPT | Performed by: PODIATRIST

## 2022-08-26 PROCEDURE — 37000008 HC ANESTHESIA 1ST 15 MINUTES: Performed by: PODIATRIST

## 2022-08-26 PROCEDURE — 37000009 HC ANESTHESIA EA ADD 15 MINS: Performed by: PODIATRIST

## 2022-08-26 PROCEDURE — 27201423 OPTIME MED/SURG SUP & DEVICES STERILE SUPPLY: Performed by: PODIATRIST

## 2022-08-26 PROCEDURE — 71000039 HC RECOVERY, EACH ADD'L HOUR: Performed by: PODIATRIST

## 2022-08-26 PROCEDURE — 36000706: Performed by: PODIATRIST

## 2022-08-26 PROCEDURE — D9220A PRA ANESTHESIA: Mod: ANES,,, | Performed by: ANESTHESIOLOGY

## 2022-08-26 PROCEDURE — 63600175 PHARM REV CODE 636 W HCPCS: Performed by: REGISTERED NURSE

## 2022-08-26 PROCEDURE — 71000033 HC RECOVERY, INTIAL HOUR: Performed by: PODIATRIST

## 2022-08-26 RX ORDER — PROPOFOL 10 MG/ML
VIAL (ML) INTRAVENOUS
Status: DISCONTINUED | OUTPATIENT
Start: 2022-08-26 | End: 2022-08-26

## 2022-08-26 RX ORDER — ONDANSETRON 2 MG/ML
4 INJECTION INTRAMUSCULAR; INTRAVENOUS EVERY 6 HOURS PRN
Status: DISCONTINUED | OUTPATIENT
Start: 2022-08-26 | End: 2022-09-01 | Stop reason: HOSPADM

## 2022-08-26 RX ORDER — FENTANYL CITRATE 50 UG/ML
25 INJECTION, SOLUTION INTRAMUSCULAR; INTRAVENOUS EVERY 5 MIN PRN
Status: DISCONTINUED | OUTPATIENT
Start: 2022-08-26 | End: 2022-08-26 | Stop reason: HOSPADM

## 2022-08-26 RX ORDER — ENOXAPARIN SODIUM 100 MG/ML
40 INJECTION SUBCUTANEOUS EVERY 24 HOURS
Status: DISCONTINUED | OUTPATIENT
Start: 2022-08-26 | End: 2022-09-01 | Stop reason: HOSPADM

## 2022-08-26 RX ORDER — FENTANYL CITRATE 50 UG/ML
INJECTION, SOLUTION INTRAMUSCULAR; INTRAVENOUS
Status: DISCONTINUED | OUTPATIENT
Start: 2022-08-26 | End: 2022-08-26

## 2022-08-26 RX ORDER — MIDAZOLAM HYDROCHLORIDE 1 MG/ML
INJECTION INTRAMUSCULAR; INTRAVENOUS
Status: DISCONTINUED | OUTPATIENT
Start: 2022-08-26 | End: 2022-08-26

## 2022-08-26 RX ORDER — IBUPROFEN 200 MG
16 TABLET ORAL
Status: DISCONTINUED | OUTPATIENT
Start: 2022-08-26 | End: 2022-09-01 | Stop reason: HOSPADM

## 2022-08-26 RX ORDER — HYDRALAZINE HYDROCHLORIDE 20 MG/ML
10 INJECTION INTRAMUSCULAR; INTRAVENOUS EVERY 6 HOURS PRN
Status: DISCONTINUED | OUTPATIENT
Start: 2022-08-26 | End: 2022-09-01 | Stop reason: HOSPADM

## 2022-08-26 RX ORDER — LANOLIN ALCOHOL/MO/W.PET/CERES
800 CREAM (GRAM) TOPICAL
Status: DISCONTINUED | OUTPATIENT
Start: 2022-08-26 | End: 2022-08-27

## 2022-08-26 RX ORDER — INSULIN ASPART 100 [IU]/ML
1-10 INJECTION, SOLUTION INTRAVENOUS; SUBCUTANEOUS
Status: DISCONTINUED | OUTPATIENT
Start: 2022-08-26 | End: 2022-09-01 | Stop reason: HOSPADM

## 2022-08-26 RX ORDER — KETAMINE HYDROCHLORIDE 10 MG/ML
INJECTION, SOLUTION INTRAMUSCULAR; INTRAVENOUS
Status: DISCONTINUED | OUTPATIENT
Start: 2022-08-26 | End: 2022-08-26

## 2022-08-26 RX ORDER — DIPHENHYDRAMINE HYDROCHLORIDE 50 MG/ML
12.5 INJECTION INTRAMUSCULAR; INTRAVENOUS ONCE AS NEEDED
Status: DISCONTINUED | OUTPATIENT
Start: 2022-08-26 | End: 2022-08-26 | Stop reason: HOSPADM

## 2022-08-26 RX ORDER — BUPIVACAINE HYDROCHLORIDE 5 MG/ML
INJECTION, SOLUTION EPIDURAL; INTRACAUDAL
Status: DISCONTINUED | OUTPATIENT
Start: 2022-08-26 | End: 2022-08-26 | Stop reason: HOSPADM

## 2022-08-26 RX ORDER — GLUCAGON 1 MG
1 KIT INJECTION
Status: DISCONTINUED | OUTPATIENT
Start: 2022-08-26 | End: 2022-09-01 | Stop reason: HOSPADM

## 2022-08-26 RX ORDER — HYDROMORPHONE HYDROCHLORIDE 2 MG/ML
0.2 INJECTION, SOLUTION INTRAMUSCULAR; INTRAVENOUS; SUBCUTANEOUS EVERY 5 MIN PRN
Status: DISCONTINUED | OUTPATIENT
Start: 2022-08-26 | End: 2022-08-26 | Stop reason: HOSPADM

## 2022-08-26 RX ORDER — CEFEPIME HYDROCHLORIDE 1 G/50ML
2 INJECTION, SOLUTION INTRAVENOUS
Status: DISCONTINUED | OUTPATIENT
Start: 2022-08-26 | End: 2022-09-01 | Stop reason: HOSPADM

## 2022-08-26 RX ORDER — POLYETHYLENE GLYCOL 3350 17 G/17G
17 POWDER, FOR SOLUTION ORAL 2 TIMES DAILY PRN
Status: DISCONTINUED | OUTPATIENT
Start: 2022-08-26 | End: 2022-09-01 | Stop reason: HOSPADM

## 2022-08-26 RX ORDER — LIDOCAINE HCL/PF 100 MG/5ML
SYRINGE (ML) INTRAVENOUS
Status: DISCONTINUED | OUTPATIENT
Start: 2022-08-26 | End: 2022-08-26

## 2022-08-26 RX ORDER — TALC
6 POWDER (GRAM) TOPICAL NIGHTLY PRN
Status: DISCONTINUED | OUTPATIENT
Start: 2022-08-26 | End: 2022-09-01 | Stop reason: HOSPADM

## 2022-08-26 RX ORDER — HYDROCODONE BITARTRATE AND ACETAMINOPHEN 5; 325 MG/1; MG/1
1 TABLET ORAL EVERY 6 HOURS PRN
Status: DISCONTINUED | OUTPATIENT
Start: 2022-08-26 | End: 2022-09-01 | Stop reason: HOSPADM

## 2022-08-26 RX ORDER — MAG HYDROX/ALUMINUM HYD/SIMETH 200-200-20
30 SUSPENSION, ORAL (FINAL DOSE FORM) ORAL 4 TIMES DAILY PRN
Status: DISCONTINUED | OUTPATIENT
Start: 2022-08-26 | End: 2022-08-27

## 2022-08-26 RX ORDER — PROCHLORPERAZINE EDISYLATE 5 MG/ML
5 INJECTION INTRAMUSCULAR; INTRAVENOUS EVERY 30 MIN PRN
Status: DISCONTINUED | OUTPATIENT
Start: 2022-08-26 | End: 2022-08-26 | Stop reason: HOSPADM

## 2022-08-26 RX ORDER — ACETAMINOPHEN 10 MG/ML
INJECTION, SOLUTION INTRAVENOUS
Status: DISCONTINUED | OUTPATIENT
Start: 2022-08-26 | End: 2022-08-26

## 2022-08-26 RX ORDER — IBUPROFEN 200 MG
24 TABLET ORAL
Status: DISCONTINUED | OUTPATIENT
Start: 2022-08-26 | End: 2022-09-01 | Stop reason: HOSPADM

## 2022-08-26 RX ORDER — MORPHINE SULFATE 4 MG/ML
4 INJECTION, SOLUTION INTRAMUSCULAR; INTRAVENOUS EVERY 4 HOURS PRN
Status: DISCONTINUED | OUTPATIENT
Start: 2022-08-26 | End: 2022-08-28

## 2022-08-26 RX ORDER — ACETAMINOPHEN 325 MG/1
650 TABLET ORAL EVERY 8 HOURS PRN
Status: DISCONTINUED | OUTPATIENT
Start: 2022-08-26 | End: 2022-09-01 | Stop reason: HOSPADM

## 2022-08-26 RX ORDER — NALOXONE HCL 0.4 MG/ML
0.02 VIAL (ML) INJECTION
Status: DISCONTINUED | OUTPATIENT
Start: 2022-08-26 | End: 2022-09-01 | Stop reason: HOSPADM

## 2022-08-26 RX ORDER — ACETAMINOPHEN 500 MG
1000 TABLET ORAL
Status: COMPLETED | OUTPATIENT
Start: 2022-08-26 | End: 2022-08-26

## 2022-08-26 RX ORDER — DEXAMETHASONE SODIUM PHOSPHATE 4 MG/ML
INJECTION, SOLUTION INTRA-ARTICULAR; INTRALESIONAL; INTRAMUSCULAR; INTRAVENOUS; SOFT TISSUE
Status: DISCONTINUED | OUTPATIENT
Start: 2022-08-26 | End: 2022-08-26

## 2022-08-26 RX ORDER — MEPERIDINE HYDROCHLORIDE 50 MG/ML
12.5 INJECTION INTRAMUSCULAR; INTRAVENOUS; SUBCUTANEOUS ONCE AS NEEDED
Status: DISCONTINUED | OUTPATIENT
Start: 2022-08-26 | End: 2022-08-26 | Stop reason: HOSPADM

## 2022-08-26 RX ORDER — SODIUM CHLORIDE 9 MG/ML
INJECTION, SOLUTION INTRAVENOUS CONTINUOUS
Status: DISCONTINUED | OUTPATIENT
Start: 2022-08-26 | End: 2022-09-01 | Stop reason: HOSPADM

## 2022-08-26 RX ORDER — MAGNESIUM SULFATE HEPTAHYDRATE 40 MG/ML
2 INJECTION, SOLUTION INTRAVENOUS ONCE
Status: COMPLETED | OUTPATIENT
Start: 2022-08-26 | End: 2022-08-26

## 2022-08-26 RX ORDER — ONDANSETRON 2 MG/ML
INJECTION INTRAMUSCULAR; INTRAVENOUS
Status: DISCONTINUED | OUTPATIENT
Start: 2022-08-26 | End: 2022-08-26

## 2022-08-26 RX ORDER — AMOXICILLIN 250 MG
1 CAPSULE ORAL 2 TIMES DAILY
Status: DISCONTINUED | OUTPATIENT
Start: 2022-08-26 | End: 2022-09-01 | Stop reason: HOSPADM

## 2022-08-26 RX ORDER — PHENYLEPHRINE HYDROCHLORIDE 10 MG/ML
INJECTION INTRAVENOUS
Status: DISCONTINUED | OUTPATIENT
Start: 2022-08-26 | End: 2022-08-26

## 2022-08-26 RX ORDER — SODIUM CHLORIDE 0.9 % (FLUSH) 0.9 %
10 SYRINGE (ML) INJECTION EVERY 12 HOURS PRN
Status: DISCONTINUED | OUTPATIENT
Start: 2022-08-26 | End: 2022-09-01 | Stop reason: HOSPADM

## 2022-08-26 RX ORDER — ONDANSETRON 2 MG/ML
4 INJECTION INTRAMUSCULAR; INTRAVENOUS ONCE AS NEEDED
Status: DISCONTINUED | OUTPATIENT
Start: 2022-08-26 | End: 2022-08-26 | Stop reason: HOSPADM

## 2022-08-26 RX ORDER — OXYCODONE HYDROCHLORIDE 5 MG/1
5 TABLET ORAL ONCE AS NEEDED
Status: DISCONTINUED | OUTPATIENT
Start: 2022-08-26 | End: 2022-08-26 | Stop reason: HOSPADM

## 2022-08-26 RX ADMIN — MIDAZOLAM HYDROCHLORIDE 2 MG: 1 INJECTION, SOLUTION INTRAMUSCULAR; INTRAVENOUS at 01:08

## 2022-08-26 RX ADMIN — ACETAMINOPHEN 1000 MG: 500 TABLET ORAL at 01:08

## 2022-08-26 RX ADMIN — MAGNESIUM SULFATE 2 G: 2 INJECTION INTRAVENOUS at 07:08

## 2022-08-26 RX ADMIN — INSULIN ASPART 4 UNITS: 100 INJECTION, SOLUTION INTRAVENOUS; SUBCUTANEOUS at 04:08

## 2022-08-26 RX ADMIN — CEFEPIME HYDROCHLORIDE 2 G: 2 INJECTION, SOLUTION INTRAVENOUS at 01:08

## 2022-08-26 RX ADMIN — HYDROCODONE BITARTRATE AND ACETAMINOPHEN 1 TABLET: 5; 325 TABLET ORAL at 09:08

## 2022-08-26 RX ADMIN — VANCOMYCIN HYDROCHLORIDE 1500 MG: 1.5 INJECTION, POWDER, LYOPHILIZED, FOR SOLUTION INTRAVENOUS at 02:08

## 2022-08-26 RX ADMIN — PHENYLEPHRINE HYDROCHLORIDE 200 MCG: 10 INJECTION INTRAVENOUS at 02:08

## 2022-08-26 RX ADMIN — PHENYLEPHRINE HYDROCHLORIDE 100 MCG: 10 INJECTION INTRAVENOUS at 01:08

## 2022-08-26 RX ADMIN — PROPOFOL 80 MG: 10 INJECTION, EMULSION INTRAVENOUS at 01:08

## 2022-08-26 RX ADMIN — SODIUM CHLORIDE 1000 ML: 0.9 INJECTION, SOLUTION INTRAVENOUS at 01:08

## 2022-08-26 RX ADMIN — INSULIN ASPART 8 UNITS: 100 INJECTION, SOLUTION INTRAVENOUS; SUBCUTANEOUS at 07:08

## 2022-08-26 RX ADMIN — ACETAMINOPHEN 1000 MG: 10 INJECTION, SOLUTION INTRAVENOUS at 01:08

## 2022-08-26 RX ADMIN — VANCOMYCIN HYDROCHLORIDE 1500 MG: 1.5 INJECTION, POWDER, LYOPHILIZED, FOR SOLUTION INTRAVENOUS at 09:08

## 2022-08-26 RX ADMIN — KETAMINE HYDROCHLORIDE 10 MG: 10 INJECTION, SOLUTION INTRAMUSCULAR; INTRAVENOUS at 02:08

## 2022-08-26 RX ADMIN — SODIUM CHLORIDE: 0.9 INJECTION, SOLUTION INTRAVENOUS at 05:08

## 2022-08-26 RX ADMIN — CEFEPIME HYDROCHLORIDE 2 G: 2 INJECTION, SOLUTION INTRAVENOUS at 12:08

## 2022-08-26 RX ADMIN — PHENYLEPHRINE HYDROCHLORIDE 8 MCG/MIN: 10 INJECTION INTRAVENOUS at 01:08

## 2022-08-26 RX ADMIN — PHENYLEPHRINE HYDROCHLORIDE 200 MCG: 10 INJECTION INTRAVENOUS at 01:08

## 2022-08-26 RX ADMIN — LIDOCAINE HYDROCHLORIDE 20 MG: 20 INJECTION INTRAVENOUS at 01:08

## 2022-08-26 RX ADMIN — PHENYLEPHRINE HYDROCHLORIDE 100 MCG: 10 INJECTION INTRAVENOUS at 02:08

## 2022-08-26 RX ADMIN — FENTANYL CITRATE 50 MCG: 50 INJECTION, SOLUTION INTRAMUSCULAR; INTRAVENOUS at 01:08

## 2022-08-26 RX ADMIN — INSULIN ASPART 4 UNITS: 100 INJECTION, SOLUTION INTRAVENOUS; SUBCUTANEOUS at 08:08

## 2022-08-26 RX ADMIN — MORPHINE SULFATE 4 MG: 4 INJECTION INTRAVENOUS at 03:08

## 2022-08-26 RX ADMIN — INSULIN ASPART 2 UNITS: 100 INJECTION, SOLUTION INTRAVENOUS; SUBCUTANEOUS at 11:08

## 2022-08-26 RX ADMIN — DEXAMETHASONE SODIUM PHOSPHATE 4 MG: 4 INJECTION, SOLUTION INTRA-ARTICULAR; INTRALESIONAL; INTRAMUSCULAR; INTRAVENOUS; SOFT TISSUE at 01:08

## 2022-08-26 RX ADMIN — FENTANYL CITRATE 50 MCG: 50 INJECTION, SOLUTION INTRAMUSCULAR; INTRAVENOUS at 02:08

## 2022-08-26 RX ADMIN — SODIUM CHLORIDE: 0.9 INJECTION, SOLUTION INTRAVENOUS at 03:08

## 2022-08-26 RX ADMIN — GLYCOPYRROLATE 0.2 MG: 0.2 INJECTION, SOLUTION INTRAMUSCULAR; INTRAVITREAL at 01:08

## 2022-08-26 RX ADMIN — INSULIN HUMAN 5 UNITS: 100 INJECTION, SOLUTION PARENTERAL at 01:08

## 2022-08-26 RX ADMIN — SODIUM CHLORIDE: 0.9 INJECTION, SOLUTION INTRAVENOUS at 01:08

## 2022-08-26 RX ADMIN — SENNOSIDES AND DOCUSATE SODIUM 1 TABLET: 8.6; 5 TABLET ORAL at 08:08

## 2022-08-26 RX ADMIN — ONDANSETRON 4 MG: 2 INJECTION INTRAMUSCULAR; INTRAVENOUS at 01:08

## 2022-08-26 RX ADMIN — ENOXAPARIN SODIUM 40 MG: 100 INJECTION SUBCUTANEOUS at 05:08

## 2022-08-26 RX ADMIN — KETAMINE HYDROCHLORIDE 30 MG: 10 INJECTION, SOLUTION INTRAMUSCULAR; INTRAVENOUS at 01:08

## 2022-08-26 NOTE — HOSPITAL COURSE
Ernst May is a 62 year old male with a past medical history of NIDDM, HTN and osteomyelitis for the cervical spine who presented with acute osteomyelitis of the right fourth and fifth metatarsal with abscess with gangrene of the fifth metatarsal. Podiatry was consulted and performed amputation of the fifth right toe as well as cultures of the area. Surgical wound cultures show GNRs. He initiated on vancomycin and cefepime.  PT/OT has been consulted. Wound care and infectious disease specialty were consulted.  PICC line was placed, will require long term IV abx and wound care. Spoke with CM for LTAC placement.  Wound cultures resulted positive for E.coli and proteus penneri.  Vancomycin was discontinued and flagyl added.  WBCs and inflammatory markers trended.

## 2022-08-26 NOTE — PLAN OF CARE
[facility]  Initial Discharge Assessment       Primary Care Provider: Yeison Nuñez Jr, MD    Admission Diagnosis: Osteomyelitis of right foot [M86.9]  Chest pain [R07.9]  Acute hematogenous osteomyelitis of right foot [M86.071]  Sepsis, due to unspecified organism, unspecified whether acute organ dysfunction present [A41.9]    Admission Date: 8/25/2022  Expected Discharge Date:    Pt confirmed home address and denied HH and DME. [facility] . Pt confirmed PCP as Dr. Nuñez and insurance as Medicaid . Pt stated that he was attending wound care. Pts discharge plan is home with family or possible ltac depending on needs after amputation. CM following.   Discharge Barriers Identified: None    Payor: MEDICAID / Plan: [employer] [facility] ([facility]) / Product Type: Managed Medicaid /     Extended Emergency Contact Information  Primary Emergency Contact: Ernst May Jr  Address: [address] [place] [address] [id number] [place] [address]  Home Phone: [phone]  Mobile Phone: [phone]  Relation: Son    Discharge Plan A: Home with family, Home Health  Discharge Plan B: Long-term acute care facility (LTAC)      [id number] - [place], [place] - [address]  [address] [facility] [address]  Phone: [phone] Fax: [phone]      Initial Assessment (most recent)     Adult Discharge Assessment - 08/26/22 1012        Discharge Assessment    Assessment Type Discharge Planning Assessment     Confirmed/corrected address, phone number and insurance Yes     Communicated MEGHAN with patient/caregiver Yes     Lives With spouse     Facility Arrived From: home     Do you expect to return to your current living situation? Yes     Do you have help at home or someone to help you manage your care at home? Yes     Who are your caregiver(s) and their phone number(s)? Ernst Hwang [phone]     Prior to hospitilization cognitive status: Alert/Oriented     Current cognitive  status: Alert/Oriented     Walking or Climbing Stairs Difficulty none     Dressing/Bathing Difficulty none     Home Layout Able to live on 1st floor     Equipment Currently Used at Home none     Readmission within 30 days? No     Patient currently being followed by outpatient case management? No     Do you currently have service(s) that help you manage your care at home? No     Do you take prescription medications? Yes     Do you have prescription coverage? Yes     Do you have any problems affording any of your prescribed medications? No     Is the patient taking medications as prescribed? yes     Who is going to help you get home at discharge? Ernst Hwang [phone]     How do you get to doctors appointments? family or friend will provide     Are you on dialysis? No     Do you take coumadin? No     Discharge Plan A Home with family;Home Health     Discharge Plan B Long-term acute care facility (LTAC)     DME Needed Upon Discharge  walker, rolling     Discharge Plan discussed with: Patient     Discharge Barriers Identified None

## 2022-08-26 NOTE — PROGRESS NOTES
Pharmacist Renal Dose Adjustment Note    Ernst May is a 62 y.o. male being treated with the medication CEFEPIME    Patient Data:    Vital Signs (Most Recent):  Temp: (!) 102.9 °F (39.4 °C) (08/25/22 2047)  Pulse: 106 (08/25/22 2332)  Resp: 20 (08/25/22 2047)  BP: (!) 181/88 (08/25/22 2332)  SpO2: 96 % (08/25/22 2332)   Vital Signs (72h Range):  Temp:  [102.9 °F (39.4 °C)]   Pulse:  [104-113]   Resp:  [20]   BP: (178-185)/(86-95)   SpO2:  [96 %-98 %]      Recent Labs   Lab 08/25/22 2319   CREATININE 1.4     Serum creatinine: 1.4 mg/dL 08/25/22 2319  Estimated creatinine clearance: 52.9 mL/min    Medication:CEFEPIME dose: 2G frequency Q8H will be changed to medication:CEFEPIME dose:2G frequency:Q12H    Pharmacist's Name: Ahmet Hernandez  Pharmacist's Extension: [phone]

## 2022-08-26 NOTE — PLAN OF CARE
"Assumed care from CLINTON Larose at 1700. Pt sitting up in bed eating dinner. New bag of IVF infusing at this time. Pt requesting side of rice as he does not prefer the potatoes, "they are too salty." Dietary notified.  "

## 2022-08-26 NOTE — PROGRESS NOTES
Patient seen and examined.  Has continued to spike fever overnight.  Podiatry consult pending.  MRI pending.  Arterial ultrasound results pending at this time.    Noted podiatry case scheduled.  Discussed with Pt the necrosis of his right 5th toe and lateral foot.    On exam, wound is unchanged in appearance from admission photos, foul smell noted.  Lungs clear to auscultation.  Heart rate rhythm regular.    Will continue IV Abx, await Podiatry recommendations of surgical plans.  Pt is NPO.

## 2022-08-26 NOTE — H&P
"[facility] Medicine  History & Physical    Patient Name: Ernst May  MRN: [id number]  Patient Class: IP- Inpatient  Admission Date: 8/25/2022  Attending Physician: Dr. Diaz  Primary Care Provider: Yeison Nuñez Jr, MD         Patient information was obtained from patient, past medical records and ER records.     Subjective:     Principal Problem:Osteomyelitis of right foot    Chief Complaint:   Chief Complaint   Patient presents with    Weakness     Pt comes in via ems with c/o weakness dizziness and hyperglycemia. Pt found in car in store parking lot extremely weak with a CBG of 407. Per ems pt is non compliant with medications     Hyperglycemia        HPI: Mr. May is a 62 year old male with a history of NIDDM, HTN, CKDIII, and osteomyelitis of the cervical spine who presents today with complaints of weakness. It is severe. It is associated with hyperglycemia, dizziness, fever 102.9, and rt foot wound. He denies chest pain, SOB, cough, or LOC. He was in his truck and having difficulty reaching to the other side and states some "do-gooders" called EMS to help him out. He has not been compliant with any of his medications in months. He noticed his rt 5th toe turned black about 12 days ago. He has ulcerations and erythema along the right foot adjacent to the 5th toe. He was noted to be febrile in the ED T 102.9, , /95. Glucose 448 and lactate 0.9. Foot Xray: 1. Acute osteomyelitis of the 5th metatarsal and proximal phalanx and the 4th metatarsal head and neck. 2. Large plantar soft tissue ulcer with extensive underlying soft tissue gas      Past Medical History:   Diagnosis Date    Back abscess 09/2021    CKD (chronic kidney disease) 09/2021    Diabetes mellitus with hyperglycemia 09/2021    Diabetic foot ulcer 09/2021    bilateral, severe abrasions    Hypertension     Osteomyelitis of cervical spine 09/2021    under back abscess    Sepsis 10/9/2021 "       Past Surgical History:   Procedure Laterality Date    CYST REMOVAL  2012    INCISION AND DRAINAGE OF ABSCESS Left 9/4/2021    Procedure: INCISION AND DRAINAGE, ABSCESS;  Surgeon: Surjit Chawla MD;  Location: [facility] OR;  Service: General;  Laterality: Left;    INCISION AND DRAINAGE OF ABSCESS Left 9/14/2021    Procedure: INCISION AND DRAINAGE, ABSCESS; DEBRIDEMENT;  Surgeon: Surjit Chawla MD;  Location: [facility] OR;  Service: General;  Laterality: Left;    REPLACEMENT OF WOUND VACUUM-ASSISTED CLOSURE DEVICE Left 9/14/2021    Procedure: REPLACEMENT, WOUND VAC;  Surgeon: Surjit Chawla MD;  Location: [facility] OR;  Service: General;  Laterality: Left;  EXCHANGE.       Review of patient's allergies indicates:   Allergen Reactions    Neosporin [benzalkonium chloride]        No current facility-administered medications on file prior to encounter.     Current Outpatient Medications on File Prior to Encounter   Medication Sig    multivitamin Tab Take 1 tablet by mouth once daily.    acetaminophen (TYLENOL) 325 MG tablet Take 2 tablets (650 mg total) by mouth every 4 (four) hours as needed.    carvediloL (COREG) 25 MG tablet TAKE 1 TABLET BY MOUTH TWICE A DAY    diphenhydrAMINE (BENADRYL) 25 mg capsule Take 1 capsule (25 mg total) by mouth every 6 (six) hours as needed for Itching.    hydroCHLOROthiazide (HYDRODIURIL) 25 MG tablet TAKE 1 TABLET BY MOUTH EVERY DAY    HYDROcodone-acetaminophen (NORCO) 5-325 mg per tablet Take 1 tablet by mouth every 6 (six) hours as needed for Pain.    hydrocortisone 2.5 % cream Apply topically 2 (two) times daily. (Patient taking differently: Apply 1 application topically 2 (two) times daily.)    L.acidophil,parac-S.therm-Bif. (RISAQUAD) Cap capsule Take 1 capsule by mouth once daily.    LIDOcaine (LIDODERM) 5 % Place 1 patch onto the skin once daily. Remove & Discard patch within 12 hours or as directed by MD    metFORMIN (GLUCOPHAGE) 500 MG tablet Take 500 mg by mouth 2  (two) times daily.    [DISCONTINUED] doxycycline (VIBRA-TABS) 100 MG tablet TAKE 1 TABLET BY MOUTH EVERY 12 HOURS     Family History       Problem Relation (Age of Onset)    Arthritis Father    Diabetes Mother, Father, Maternal Grandmother    Heart disease Mother, Father    Hypertension Father          Tobacco Use    Smoking status: Current Some Day Smoker     Types: Cigars    Smokeless tobacco: Never Used    Tobacco comment: occassionally   Substance and Sexual Activity    Alcohol use: No    Drug use: No    Sexual activity: Not on file     Review of Systems   Constitutional:  Positive for fatigue. Negative for chills, diaphoresis and fever.   HENT:  Negative for congestion, ear pain, sore throat and trouble swallowing.    Eyes:  Negative for pain, discharge and visual disturbance.   Respiratory:  Negative for cough, chest tightness, shortness of breath and wheezing.    Cardiovascular:  Negative for chest pain, palpitations and leg swelling.   Gastrointestinal:  Negative for abdominal distention, abdominal pain, blood in stool, constipation, diarrhea, nausea and vomiting.   Endocrine: Negative for polydipsia, polyphagia and polyuria.   Genitourinary:  Negative for dysuria, flank pain, frequency and urgency.   Musculoskeletal:  Negative for back pain, joint swelling, neck pain and neck stiffness.   Skin:  Positive for color change and wound. Negative for rash.   Allergic/Immunologic: Negative for immunocompromised state.   Neurological:  Positive for dizziness and weakness. Negative for syncope, speech difficulty, light-headedness, numbness and headaches.   Hematological:  Negative for adenopathy.   Psychiatric/Behavioral:  Negative for confusion and suicidal ideas. The patient is not nervous/anxious.    All other systems reviewed and are negative.  Objective:     Vital Signs (Most Recent):  Temp: 99.7 °F (37.6 °C) (08/26/22 0227)  Pulse: 93 (08/26/22 0232)  Resp: 17 (08/26/22 0232)  BP: 126/61 (08/26/22  No 0232)  SpO2: 96 % (08/26/22 0232) Vital Signs (24h Range):  Temp:  [99.7 °F (37.6 °C)-102.9 °F (39.4 °C)] 99.7 °F (37.6 °C)  Pulse:  [] 93  Resp:  [17-20] 17  SpO2:  [96 %-98 %] 96 %  BP: (126-185)/(61-96) 126/61     Weight: 77.1 kg (170 lb)  Body mass index is 25.85 kg/m².    Physical Exam  Vitals and nursing note reviewed.   Constitutional:       Appearance: He is well-developed. He is ill-appearing.   HENT:      Head: Normocephalic and atraumatic.   Eyes:      Conjunctiva/sclera: Conjunctivae normal.      Pupils: Pupils are equal, round, and reactive to light.   Cardiovascular:      Rate and Rhythm: Normal rate and regular rhythm.      Comments: Rt pedal pulse faint, doppler +, left pedal pulse 1+   Pulmonary:      Effort: Pulmonary effort is normal.      Breath sounds: Normal breath sounds.   Abdominal:      General: Bowel sounds are normal.      Palpations: Abdomen is soft.   Musculoskeletal:         General: Normal range of motion.      Cervical back: Normal range of motion and neck supple.      Right lower leg: Edema present.   Skin:     General: Skin is warm.      Capillary Refill: Capillary refill takes 2 to 3 seconds.      Findings: Erythema and lesion present.      Comments: Scab to left temple  Rt 5th toe black/necrotic, external lateral rt foot ulcerated with slough and surrounding erythema.    Neurological:      Mental Status: He is alert and oriented to person, place, and time.   Psychiatric:         Behavior: Behavior normal.         Thought Content: Thought content normal.         Judgment: Judgment normal.         CRANIAL NERVES     CN III, IV, VI   Pupils are equal, round, and reactive to light.     Significant Labs: All pertinent labs within the past 24 hours have been reviewed.  CBC:   Recent Labs   Lab 08/25/22  2319   WBC 18.51*   HGB 9.9*   HCT 28.6*        CMP:   Recent Labs   Lab 08/25/22  2319   *   K 4.1   CL 91*   CO2 22*   *   BUN 25*   CREATININE 1.4   CALCIUM  9.0   PROT 7.5   ALBUMIN 2.0*   BILITOT 0.5   ALKPHOS 121   AST 25   ALT 23   ANIONGAP 15     Lactic Acid:   Recent Labs   Lab 08/25/22  2319 08/26/22  0236   LACTATE 0.9 0.9       Significant Imaging: I have reviewed all pertinent imaging results/findings within the past 24 hours.    X-Ray Foot Complete Right    Result Date: 8/26/2022  EXAMINATION: XR FOOT COMPLETE 3 VIEW RIGHT CLINICAL HISTORY: Osteomyelitis;. TECHNIQUE: AP, lateral, and oblique views of the right foot were performed. COMPARISON: None FINDINGS: There is acute osteomyelitis of the 5th metatarsal and proximal phalanx, with associated overlying displaced and angulated fractures.  There is also likely acute osteomyelitis of the lateral aspect of the 4th metatarsal head and neck.  There is a large soft tissue ulcer plantar to the 5th metatarsophalangeal joint with extensive underlying soft tissue gas.     1. Acute osteomyelitis of the 5th metatarsal and proximal phalanx and the 4th metatarsal head and neck. 2. Large plantar soft tissue ulcer with extensive underlying soft tissue gas. This report was flagged in Epic as abnormal. Electronically signed by: Tomasz Keane Date:    08/26/2022 Time:    00:16                       Assessment/Plan:     * Osteomyelitis of right foot  Admit to med/tele   Consult Podiatry in AM  Will hold NPO as this will likely require surgical intervention for source control of sepsis  Attempted to get stat arterial US, but US not available after 11pm, ordered for AM  MRI of right foot ordered  Cefepime/vanc        Cellulitis of right foot  As above      Normocytic anemia  Monitor      Sepsis  This patient does have evidence of infective focus  My overall impression is sepsis. Vital signs were reviewed and noted in progress note.  Antibiotics given-   Antibiotics (From admission, onward)            Start     Stop Route Frequency Ordered    08/26/22 1200  cefepime in dextrose 5 % IVPB 2 g         -- IV Every 12 hours  "(non-standard times) 08/26/22 0121 08/26/22 0220  vancomycin - pharmacy to dose  (vancomycin IVPB)        "And" Linked Group Details    -- IV pharmacy to manage frequency 08/26/22 0121        Cultures were taken-   Microbiology Results (last 7 days)     Procedure Component Value Units Date/Time    Blood culture x two cultures. Draw prior to antibiotics. [[id number]] Collected: 08/25/22 2319    Order Status: Sent Specimen: Blood from Peripheral, Left Hand Updated: 08/25/22 2319    Blood culture x two cultures. Draw prior to antibiotics. [[id number]] Collected: 08/25/22 2318    Order Status: Sent Specimen: Blood from Hand Updated: 08/25/22 2319        Latest lactate reviewed, they are-  Recent Labs   Lab 08/25/22 2319 08/26/22 0236   LACTATE 0.9 0.9         Source- rt foot osteomyelitis     Source control Achieved by- abx for now, amputation per podiatry        Dry gangrene  Podiatry and wound care consults    Diabetes mellitus with hyperglycemia  Check A1c  Patient's FSGs are uncontrolled due to hyperglycemia on current medication regimen.  Last A1c reviewed-   Lab Results   Component Value Date    HGBA1C 8.4 (H) 10/25/2021     Most recent fingerstick glucose reviewed-   Recent Labs   Lab 08/26/22  0130 08/26/22 0226   POCTGLUCOSE 432* 331*     Current correctional scale  Medium  Maintain anti-hyperglycemic dose as follows-   Antihyperglycemics (From admission, onward)            Start     Stop Route Frequency Ordered    08/26/22 0218  insulin aspart U-100 pen 1-10 Units         -- SubQ Before meals & nightly PRN 08/26/22 0121        Hold Oral hypoglycemics while patient is in the hospital.    Essential hypertension, not well controlled  Pt stopped home coreg and HCTZ  Added PRN hydralazine for now        VTE Risk Mitigation (From admission, onward)         Ordered     enoxaparin injection 40 mg  Daily         08/26/22 0121     IP VTE HIGH RISK PATIENT  Once         08/26/22 0121     Place sequential compression " device  Until discontinued         08/26/22 0121                   Gail Gutiérrez NP  Department of Hospital Medicine   [facility]

## 2022-08-26 NOTE — CONSULTS
Wound Care Consult provided for pt who is having Right foot amputation today. Left foot: Skin is dry, intact, no visible signs of breakdown. Toe nails are all intact, no signs of athletes foot or discoloration. There is a 0.5 cm x 0.5 cm area with purple dry intact scab without drainage. Pt denies feeling pain only pressure. Left medial malleolus has a dry purple scab that is in healing stages. Area painted with betadine and covered by heel dressing. No drainage or s/s of infection. Left heel is firm, skin is warm dry intact. No s/s of bogging or discoloration. Foam Heel dressing placed. Remainder of foot and in between toes were painted with betadine and left open to air. Left forehead ha a dry scab in healing stages. No s/s of infection, no drainage. Left open to air.      Left foot 0.5 cm x 0.5 cm    Left foot medial aspect    Left forehead

## 2022-08-26 NOTE — PLAN OF CARE
Report to Thuan.. Patient denies pain, no nausea, dressing to right foot dry intact no drainage, boot to foot, vs stable resting quietly, no one with patient at hospital.

## 2022-08-26 NOTE — ANESTHESIA POSTPROCEDURE EVALUATION
Anesthesia Post Evaluation    Patient: Ernst May    Procedure(s) Performed: Procedure(s) (LRB):  AMPUTATION, FOOT (Right)    Final Anesthesia Type: general      Patient location during evaluation: PACU  Patient participation: Yes- Able to Participate  Level of consciousness: awake and alert and oriented  Post-procedure vital signs: reviewed and stable  Pain management: adequate  Airway patency: patent    PONV status at discharge: No PONV  Anesthetic complications: no      Cardiovascular status: blood pressure returned to baseline  Respiratory status: unassisted, spontaneous ventilation and room air  Hydration status: euvolemic  Follow-up not needed.          Vitals Value Taken Time   /62 08/26/22 1523   Temp 36.3 °C (97.3 °F) 08/26/22 1440   Pulse 76 08/26/22 1524   Resp 14 08/26/22 1524   SpO2 96 % 08/26/22 1524   Vitals shown include unvalidated device data.      No case tracking events are documented in the log.      Pain/Pardeep Score: Pain Rating Prior to Med Admin: 0 (8/26/2022  2:40 PM)  Pain Rating Post Med Admin: 4 (8/26/2022  4:13 AM)  Pardeep Score: 8 (8/26/2022  2:40 PM)

## 2022-08-26 NOTE — NURSING
Nurses Note -- 4 Eyes      8/26/2022   4:36 AM      Skin assessed during: Admit      [x] No Pressure Injuries Present    []Prevention Measures Documented      [x] Yes- Altered Skin Integrity Present or Discovered   [x] LDA Added if Not in Epic (Describe Wound)   [x] New Altered Skin Integrity was Present on Admit and Documented in LDA   [x] Wound Image Taken    Wound Care Consulted? Yes    Attending Nurse:  BANDAR BUSH RN     Second RN/Staff Member:  Sapna Salcedo RN

## 2022-08-26 NOTE — ED PROVIDER NOTES
"Encounter Date: 8/25/2022       History     Chief Complaint   Patient presents with    Weakness     Pt comes in via ems with c/o weakness dizziness and hyperglycemia. Pt found in car in store parking lot extremely weak with a CBG of 407. Per ems pt is non compliant with medications     Hyperglycemia     62-year-old male presents to the emergency room with wound to the right foot.  He comes in by EMS.  He is admittedly noncompliant with his health care and medications.  He states he has noticed a foot wound for 1 week with a bad smell but has not sought medical treatment.  Patient was in a store parking lot and he states some do-gooders" called EMS for him.  Patient denies fever or chills.  No nausea no vomiting no diarrhea no chest pain no abdominal pain no shortness of breath.    The history is provided by the patient.     Review of patient's allergies indicates:   Allergen Reactions    Neosporin [benzalkonium chloride]      Past Medical History:   Diagnosis Date    Back abscess 09/2021    CKD (chronic kidney disease) 09/2021    Diabetes mellitus with hyperglycemia 09/2021    Diabetic foot ulcer 09/2021    bilateral, severe abrasions    Hypertension     Osteomyelitis of cervical spine 09/2021    under back abscess    Sepsis 10/9/2021     Past Surgical History:   Procedure Laterality Date    CYST REMOVAL  2012    INCISION AND DRAINAGE OF ABSCESS Left 9/4/2021    Procedure: INCISION AND DRAINAGE, ABSCESS;  Surgeon: Surjit Chawla MD;  Location: [facility] OR;  Service: General;  Laterality: Left;    INCISION AND DRAINAGE OF ABSCESS Left 9/14/2021    Procedure: INCISION AND DRAINAGE, ABSCESS; DEBRIDEMENT;  Surgeon: Surjit Chawla MD;  Location: [facility] OR;  Service: General;  Laterality: Left;    REPLACEMENT OF WOUND VACUUM-ASSISTED CLOSURE DEVICE Left 9/14/2021    Procedure: REPLACEMENT, WOUND VAC;  Surgeon: Surjit Chawla MD;  Location: [facility] OR;  Service: General;  Laterality: Left;  EXCHANGE.     Family " History   Problem Relation Age of Onset    Diabetes Mother     Heart disease Mother     Arthritis Father     Diabetes Father     Heart disease Father     Hypertension Father     Diabetes Maternal Grandmother      Social History     Tobacco Use    Smoking status: Current Some Day Smoker     Types: Cigars    Smokeless tobacco: Never Used    Tobacco comment: occassionally   Substance Use Topics    Alcohol use: No    Drug use: No     Review of Systems   Constitutional: Negative for fever.   HENT: Negative for sore throat.    Respiratory: Negative for shortness of breath.    Cardiovascular: Negative for chest pain.   Gastrointestinal: Negative for nausea.   Genitourinary: Negative for dysuria.   Musculoskeletal: Negative for back pain.   Skin: Positive for wound. Negative for rash.   Neurological: Positive for weakness.   All other systems reviewed and are negative.      Physical Exam     Initial Vitals [08/25/22 2047]   BP Pulse Resp Temp SpO2   (!) 178/95 (!) 113 20 (!) 102.9 °F (39.4 °C) 98 %      MAP       --         Physical Exam    Nursing note and vitals reviewed.  Constitutional: He appears well-developed and well-nourished. He is not diaphoretic.  Non-toxic appearance. He does not have a sickly appearance. He does not appear ill. No distress.   HENT:   Head: Normocephalic and atraumatic.   Eyes: EOM are normal.   Neck: Neck supple.   Normal range of motion.  Cardiovascular: Normal rate, regular rhythm and normal heart sounds. Exam reveals no gallop and no friction rub.    No murmur heard.  Pulmonary/Chest: Breath sounds normal. No respiratory distress. He has no wheezes. He has no rhonchi. He has no rales.   Abdominal: Abdomen is soft. He exhibits no distension.   Musculoskeletal:         General: Normal range of motion.      Cervical back: Normal range of motion and neck supple. No rigidity. Normal range of motion.     Neurological: He is alert and oriented to person, place, and time.   Skin: Skin  is warm and dry. No rash noted.   Right toe 5th is gangrenous.  Ulcerated wounds to lateral aspect of the right foot with surrounding cellulitis.   Psychiatric: He has a normal mood and affect. His behavior is normal. Judgment and thought content normal.                     ED Course   Critical Care    Date/Time: 8/25/2022 11:48 PM  Performed by: Minh Thrasher MD  Authorized by: Minh Thrasher MD   Direct patient critical care time: 30 minutes  Additional history critical care time: 7 minutes  Ordering / reviewing critical care time: 10 minutes  Documentation critical care time: 8 minutes  Consulting other physicians critical care time: 4 minutes  Total critical care time (exclusive of procedural time) : 59 minutes  Critical care was necessary to treat or prevent imminent or life-threatening deterioration of the following conditions: sepsis.  Critical care was time spent personally by me on the following activities: discussions with consultants, evaluation of patient's response to treatment, examination of patient, obtaining history from patient or surrogate, ordering and performing treatments and interventions, ordering and review of laboratory studies, ordering and review of radiographic studies, pulse oximetry and re-evaluation of patient's condition.        Labs Reviewed   CBC W/ AUTO DIFFERENTIAL - Abnormal; Notable for the following components:       Result Value    WBC 18.51 (*)     RBC 3.51 (*)     Hemoglobin 9.9 (*)     Hematocrit 28.6 (*)     Immature Granulocytes 0.9 (*)     Gran # (ANC) 16.0 (*)     Immature Grans (Abs) 0.16 (*)     Lymph # 0.9 (*)     Mono # 1.4 (*)     Gran % 86.3 (*)     Lymph % 4.9 (*)     All other components within normal limits   COMPREHENSIVE METABOLIC PANEL - Abnormal; Notable for the following components:    Sodium 128 (*)     Chloride 91 (*)     CO2 22 (*)     Glucose 448 (*)     BUN 25 (*)     Albumin 2.0 (*)     eGFR 57 (*)     All other components within normal  limits   POCT GLUCOSE - Abnormal; Notable for the following components:    POCT Glucose 432 (*)     All other components within normal limits   CULTURE, BLOOD   CULTURE, BLOOD   SARS-COV-2 RNA AMPLIFICATION, QUAL   LACTIC ACID, PLASMA   PROCALCITONIN   LACTIC ACID, PLASMA   BASIC METABOLIC PANEL   MAGNESIUM   CBC W/ AUTO DIFFERENTIAL   HEMOGLOBIN A1C   POCT GLUCOSE MONITORING CONTINUOUS          Imaging Results           X-Ray Foot Complete Right (Final result)  Result time 08/26/22 00:16:43   Procedure changed from X-Ray Foot 2 View Right     Final result by Tomasz Keane DO (08/26/22 00:16:43)                 Impression:      1. Acute osteomyelitis of the 5th metatarsal and proximal phalanx and the 4th metatarsal head and neck.  2. Large plantar soft tissue ulcer with extensive underlying soft tissue gas.  This report was flagged in Epic as abnormal.      Electronically signed by: Tomasz Keane  Date:    08/26/2022  Time:    00:16             Narrative:    EXAMINATION:  XR FOOT COMPLETE 3 VIEW RIGHT    CLINICAL HISTORY:  Osteomyelitis;.    TECHNIQUE:  AP, lateral, and oblique views of the right foot were performed.    COMPARISON:  None    FINDINGS:  There is acute osteomyelitis of the 5th metatarsal and proximal phalanx, with associated overlying displaced and angulated fractures.  There is also likely acute osteomyelitis of the lateral aspect of the 4th metatarsal head and neck.  There is a large soft tissue ulcer plantar to the 5th metatarsophalangeal joint with extensive underlying soft tissue gas.                                 Medications   vancomycin 1.5 g in dextrose 5 % 250 mL IVPB (ready to mix) (has no administration in time range)   sodium chloride 0.9% bolus 1,000 mL (1,000 mLs Intravenous New Bag 8/26/22 0133)   sodium chloride 0.9% flush 10 mL (has no administration in time range)   melatonin tablet 6 mg (has no administration in time range)   ondansetron injection 4 mg (has no administration in  time range)   polyethylene glycol packet 17 g (has no administration in time range)   senna-docusate 8.6-50 mg per tablet 1 tablet (has no administration in time range)   acetaminophen tablet 650 mg (has no administration in time range)   aluminum-magnesium hydroxide-simethicone 200-200-20 mg/5 mL suspension 30 mL (has no administration in time range)   naloxone 0.4 mg/mL injection 0.02 mg (has no administration in time range)   potassium bicarbonate disintegrating tablet 50 mEq (has no administration in time range)   potassium bicarbonate disintegrating tablet 35 mEq (has no administration in time range)   potassium bicarbonate disintegrating tablet 60 mEq (has no administration in time range)   magnesium oxide tablet 800 mg (has no administration in time range)   magnesium oxide tablet 800 mg (has no administration in time range)   glucose chewable tablet 16 g (has no administration in time range)   glucose chewable tablet 24 g (has no administration in time range)   dextrose 50% injection 12.5 g (has no administration in time range)   dextrose 50% injection 25 g (has no administration in time range)   glucagon (human recombinant) injection 1 mg (has no administration in time range)   0.9%  NaCl infusion (has no administration in time range)   enoxaparin injection 40 mg (has no administration in time range)   HYDROcodone-acetaminophen 5-325 mg per tablet 1 tablet (has no administration in time range)   morphine injection 4 mg (has no administration in time range)   insulin aspart U-100 pen 1-10 Units (has no administration in time range)   cefepime in dextrose 5 % IVPB 2 g (has no administration in time range)   vancomycin - pharmacy to dose (has no administration in time range)   cefepime in dextrose 5 % IVPB 2 g (0 g Intravenous Stopped 8/26/22 0114)   insulin regular injection 5 Units 0.05 mL (5 Units Intravenous Given 8/26/22 0134)   acetaminophen tablet 1,000 mg (1,000 mg Oral Given 8/26/22 0121)     Medical  Decision Making:   History:   Old Medical Records: I decided to obtain old medical records.  Clinical Tests:   Lab Tests: Ordered and Reviewed  Radiological Study: Reviewed and Ordered             ED Course as of 08/26/22 0210   Thu Aug 25, 2022   2237 BP(!): 178/95 [EF]   2237 Temp(!): 102.9 °F (39.4 °C) [EF]   2237 Temp src: Oral [EF]   2237 Pulse(!): 113 [EF]   2237 Resp: 20 [EF]   2237 SpO2: 98 % [EF]   2249 Spoke with Dr. Palacios of Orthopedics regarding the patient's obviously infected right foot.  He states Podiatry consultation tomorrow for likely amputation. [EF]   2332 WBC(!): 18.51 [EF]   2332 Hemoglobin(!): 9.9 [EF]   2332 Platelets: 408 [EF]   2355 Lactate, Tim: 0.9 [EF]   Fri Aug 26, 2022   0004 Sodium(!): 128 [EF]   0004 Chloride(!): 91 [EF]   0004 CO2(!): 22 [EF]   0004 Glucose(!): 448 [EF]   0004 BUN(!): 25 [EF]   0004 Anion Gap: 15 [EF]   0006 [facility] to admit [EF]   0200 X-Ray Foot Complete Right(!) [EF]      ED Course User Index  [EF] Minh Thrasher MD             Clinical Impression:   Final diagnoses:  [A41.9] Sepsis, due to unspecified organism, unspecified whether acute organ dysfunction present (Primary)  [M86.071] Acute hematogenous osteomyelitis of right foot          ED Disposition Condition    Admit                 62-year-old male presents with a right foot wound.  Tissue was necrotic, right 5th toe is gangrenous.  There is gas on the x-ray.  Labs and vitals are consistent with sepsis.  Patient is not altered and in no distress.  He will be admitted to Hospital Medicine.  Likely will need amputation of his foot soon.     Minh Thrasher MD  08/26/22 0211      Case d/w podiatry dr davidson who will see today       Minh Thrasher MD  08/26/22 6453

## 2022-08-26 NOTE — PROGRESS NOTES
Pharmacokinetic Initial Assessment: IV Vancomycin    Assessment/Plan:    Initiate intravenous vancomycin with loading dose of 1500 mg once with subsequent doses when random concentrations are less than 20 mcg/mL  Desired empiric serum trough concentration is 15 to 20 mcg/mL  Draw vancomycin random level on 8/26 at 1930.  Pharmacy will continue to follow and monitor vancomycin.      Please contact pharmacy at extension [phone] with any questions regarding this assessment.     Thank you for the consult,   Ahmet Hernandez       Patient brief summary:  Ernst May is a 62 y.o. male initiated on antimicrobial therapy with IV Vancomycin for treatment of suspected bone/joint infection    Drug Allergies:   Review of patient's allergies indicates:   Allergen Reactions    Neosporin [benzalkonium chloride]        Actual Body Weight:   77.1 kg    Renal Function:   Estimated Creatinine Clearance: 52.9 mL/min (based on SCr of 1.4 mg/dL).,     Dialysis Method (if applicable):  N/A    CBC (last 72 hours):  Recent Labs   Lab Result Units 08/25/22  2319   WBC K/uL 18.51*   Hemoglobin g/dL 9.9*   Hematocrit % 28.6*   Platelets K/uL 408   Gran % % 86.3*   Lymph % % 4.9*   Mono % % 7.6   Eosinophil % % 0.1   Basophil % % 0.2   Differential Method  Automated       Metabolic Panel (last 72 hours):  Recent Labs   Lab Result Units 08/25/22  2319   Sodium mmol/L 128*   Potassium mmol/L 4.1   Chloride mmol/L 91*   CO2 mmol/L 22*   Glucose mg/dL 448*   BUN mg/dL 25*   Creatinine mg/dL 1.4   Albumin g/dL 2.0*   Total Bilirubin mg/dL 0.5   Alkaline Phosphatase U/L 121   AST U/L 25   ALT U/L 23       Drug levels (last 3 results):  No results for input(s): VANCOMYCINRA, VANCORANDOM, VANCOMYCINPE, VANCOPEAK, VANCOMYCINTR, VANCOTROUGH in the last 72 hours.    Microbiologic Results:  Microbiology Results (last 7 days)       Procedure Component Value Units Date/Time    Blood culture x two cultures. Draw prior to antibiotics. [[id number]] Collected: 08/25/22  2319    Order Status: Sent Specimen: Blood from Peripheral, Left Hand Updated: 08/25/22 2319    Blood culture x two cultures. Draw prior to antibiotics. [[id number]] Collected: 08/25/22 2318    Order Status: Sent Specimen: Blood from Hand Updated: 08/25/22 2319

## 2022-08-26 NOTE — ASSESSMENT & PLAN NOTE
Check A1c  Patient's FSGs are uncontrolled due to hyperglycemia on current medication regimen.  Last A1c reviewed-   Lab Results   Component Value Date    HGBA1C 8.4 (H) 10/25/2021     Most recent fingerstick glucose reviewed-   Recent Labs   Lab 08/26/22  0130 08/26/22  0226   POCTGLUCOSE 432* 331*     Current correctional scale  Medium  Maintain anti-hyperglycemic dose as follows-   Antihyperglycemics (From admission, onward)            Start     Stop Route Frequency Ordered    08/26/22 0218  insulin aspart U-100 pen 1-10 Units         -- SubQ Before meals & nightly PRN 08/26/22 0121        Hold Oral hypoglycemics while patient is in the hospital.

## 2022-08-26 NOTE — ASSESSMENT & PLAN NOTE
Admit to med/tele   Consult Podiatry in AM  Will hold NPO as this will likely require surgical intervention for source control of sepsis  Attempted to get stat arterial US, but US not available after 11pm, ordered for AM  MRI of right foot ordered  Cefepime/vanc

## 2022-08-26 NOTE — OP NOTE
Operative Report     Patient name: Ernst May   MRN: [id number]  Date of surgery: 8/26/2022    Surgeon: Dave Mathur DPM   Assistant: none    Preoperative diagnosis: 1.  Gangrene right 5th toe  2.  Osteomyelitis 5th metatarsal and 4th metatarsal  3.  Full thickness grade 3 ulcer right foot with deep abscess  Postoperative diagnosis: same as above  Procedure: amputation 5th toe, 5th metatarsal and 4th metatarsal right foot  C&S purulent drainage right foot  Anesthesia: general with post op local anesthesia with marcaine .5% plain  Hemostasis: no tourniquet used  Estimated blood loss: 15ml   Specimen: 4th 5th metatarsals and 4th and 5th toes right foot.    C&S purulent drainage  Complications: None  Condition upon discharge: Stable              Procedure in detail: Pt brought to the OR and placed on OR table in supine position.  Timeout called.  General anesthesia obtained and the right foot and leg prepped in the usual aseptic manner.  Attention placed to the right foot and I marked the semieliptical lateral incision encompassing the 5th toe and the ulcers on the lateral aspect of the 5th metatarsal.  I made a full thickness excision of the ulcers and necrotic 5th toe and  5th metatarsal and disarticulated it at the cuboid and sent it out for histopathology.  I did a swab of the pus encountered superficial to the 5th metatarsal for C&S.  I next removed the 4th toe and 4th metatarsal proximal to cuboid.  I controlled bleeding with coag and sutures and surgicell.  I irrigated the area.  No more necrotic tissue or purulent drainage seen.  I closed deep structures with 2.0 vicryl suture and skin with 3.0 prolene sutures.  I injected the foot with 0.5% plain Marcaine and dressed the area with adaptic, 4x4 gauze, 4 inc kerlix roll and ace wrap.  Cam walker boot cast to the foot. Anesthesia reversed and pt left OR with stable vitals.    1. Keep dressings, clean, dry, and intact to surgical extremity.  2. Rest, ice,  and elevate the surgical extremity.  3. CAM walker boot cast to surgical extremity in O.R.  4. Pt is inpatient and will return to his room

## 2022-08-26 NOTE — TRANSFER OF CARE
"Anesthesia Transfer of Care Note    Patient: Ernst May    Procedure(s) Performed: Procedure(s) (LRB):  AMPUTATION, FOOT (Right)    Patient location: PACU    Anesthesia Type: general    Transport from OR: Transported from OR on 6-10 L/min O2 by face mask with adequate spontaneous ventilation    Post pain: adequate analgesia    Post assessment: tolerated procedure well and no apparent anesthetic complications    Post vital signs: stable    Level of consciousness: sedated    Nausea/Vomiting: no nausea/vomiting    Complications: none    Transfer of care protocol was followed      Last vitals:   Visit Vitals  BP (!) 181/87 (BP Location: Right arm, Patient Position: Lying)   Pulse 99   Temp 36.7 °C (98.1 °F) (Skin)   Resp 18   Ht 5' 8" (1.727 m)   Wt 73 kg (161 lb)   SpO2 95%   BMI 24.48 kg/m²     "

## 2022-08-26 NOTE — ASSESSMENT & PLAN NOTE
"This patient does have evidence of infective focus  My overall impression is sepsis. Vital signs were reviewed and noted in progress note.  Antibiotics given-   Antibiotics (From admission, onward)            Start     Stop Route Frequency Ordered    08/26/22 1200  cefepime in dextrose 5 % IVPB 2 g         -- IV Every 12 hours (non-standard times) 08/26/22 0121    08/26/22 0220  vancomycin - pharmacy to dose  (vancomycin IVPB)        "And" Linked Group Details    -- IV pharmacy to manage frequency 08/26/22 0121        Cultures were taken-   Microbiology Results (last 7 days)     Procedure Component Value Units Date/Time    Blood culture x two cultures. Draw prior to antibiotics. [[id number]] Collected: 08/25/22 2319    Order Status: Sent Specimen: Blood from Peripheral, Left Hand Updated: 08/25/22 2319    Blood culture x two cultures. Draw prior to antibiotics. [[id number]] Collected: 08/25/22 2318    Order Status: Sent Specimen: Blood from Hand Updated: 08/25/22 2319        Latest lactate reviewed, they are-  Recent Labs   Lab 08/25/22 2319 08/26/22  0236   LACTATE 0.9 0.9         Source- rt foot osteomyelitis     Source control Achieved by- abx for now, amputation per podiatry      "

## 2022-08-26 NOTE — NURSING
Nursing Transfer Note      8/26/2022     Reason patient is being transferred: Admit    Transfer From: ED    Transfer via stretcher    Transfer with cardiac monitoring, [id number]    Transported by SCHUYLER Jean    Medicines sent: Vancomycin    Any special needs or follow-up needed: none    Chart send with patient: No    Patient reassessed at: 8/26/22 0430     Upon arrival to floor: patient oriented to room, call bell in reach and bed in lowest position

## 2022-08-26 NOTE — CARE UPDATE
08/26/22 1620   PRE-TX-O2   O2 Device (Oxygen Therapy) room air   Oxygen Concentration (%) 211   SpO2 96 %   Pulse Oximetry Type Intermittent   $ Pulse Oximetry - Multiple Charge Pulse Oximetry - Multiple   Probe Placed On (Pulse Ox) finger   Pulse 72   Resp 15   Positioning HOB elevated 30 degrees   Incentive Spirometer   $ Incentive Spirometer Charges done with encouragement   Incentive Spirometer Predicted Level (mL) 2850   Administration (IS) proper technique demonstrated   Number of Repetitions (IS) 4   Level Incentive Spirometer (mL) 500   Patient Tolerance (IS) poor

## 2022-08-26 NOTE — HPI
"Mr. May is a 62 year old male with a history of NIDDM, HTN, CKDIII, and osteomyelitis of the cervical spine who presents today with complaints of weakness. It is severe. It is associated with hyperglycemia, dizziness, fever 102.9, and rt foot wound. He denies chest pain, SOB, cough, or LOC. He was in his truck and having difficulty reaching to the other side and states some "do-gooders" called EMS to help him out. He has not been compliant with any of his medications in months. He noticed his rt 5th toe turned black about 12 days ago. He has ulcerations and erythema along the right foot adjacent to the 5th toe. He was noted to be febrile in the ED T 102.9, , /95. Glucose 448 and lactate 0.9. Foot Xray: 1. Acute osteomyelitis of the 5th metatarsal and proximal phalanx and the 4th metatarsal head and neck. 2. Large plantar soft tissue ulcer with extensive underlying soft tissue gas  "

## 2022-08-26 NOTE — SUBJECTIVE & OBJECTIVE
Past Medical History:   Diagnosis Date    Back abscess 09/2021    CKD (chronic kidney disease) 09/2021    Diabetes mellitus with hyperglycemia 09/2021    Diabetic foot ulcer 09/2021    bilateral, severe abrasions    Hypertension     Osteomyelitis of cervical spine 09/2021    under back abscess    Sepsis 10/9/2021       Past Surgical History:   Procedure Laterality Date    CYST REMOVAL  2012    INCISION AND DRAINAGE OF ABSCESS Left 9/4/2021    Procedure: INCISION AND DRAINAGE, ABSCESS;  Surgeon: Surjit Chawla MD;  Location: [facility] OR;  Service: General;  Laterality: Left;    INCISION AND DRAINAGE OF ABSCESS Left 9/14/2021    Procedure: INCISION AND DRAINAGE, ABSCESS; DEBRIDEMENT;  Surgeon: Surjit Chawla MD;  Location: [facility] OR;  Service: General;  Laterality: Left;    REPLACEMENT OF WOUND VACUUM-ASSISTED CLOSURE DEVICE Left 9/14/2021    Procedure: REPLACEMENT, WOUND VAC;  Surgeon: Surjit Chawla MD;  Location: [facility] OR;  Service: General;  Laterality: Left;  EXCHANGE.       Review of patient's allergies indicates:   Allergen Reactions    Neosporin [benzalkonium chloride]        No current facility-administered medications on file prior to encounter.     Current Outpatient Medications on File Prior to Encounter   Medication Sig    multivitamin Tab Take 1 tablet by mouth once daily.    acetaminophen (TYLENOL) 325 MG tablet Take 2 tablets (650 mg total) by mouth every 4 (four) hours as needed.    carvediloL (COREG) 25 MG tablet TAKE 1 TABLET BY MOUTH TWICE A DAY    diphenhydrAMINE (BENADRYL) 25 mg capsule Take 1 capsule (25 mg total) by mouth every 6 (six) hours as needed for Itching.    hydroCHLOROthiazide (HYDRODIURIL) 25 MG tablet TAKE 1 TABLET BY MOUTH EVERY DAY    HYDROcodone-acetaminophen (NORCO) 5-325 mg per tablet Take 1 tablet by mouth every 6 (six) hours as needed for Pain.    hydrocortisone 2.5 % cream Apply topically 2 (two) times daily. (Patient taking differently: Apply 1 application topically 2 (two)  times daily.)    L.acidophil,parac-S.therm-Bif. (RISAQUAD) Cap capsule Take 1 capsule by mouth once daily.    LIDOcaine (LIDODERM) 5 % Place 1 patch onto the skin once daily. Remove & Discard patch within 12 hours or as directed by MD    metFORMIN (GLUCOPHAGE) 500 MG tablet Take 500 mg by mouth 2 (two) times daily.    [DISCONTINUED] doxycycline (VIBRA-TABS) 100 MG tablet TAKE 1 TABLET BY MOUTH EVERY 12 HOURS     Family History       Problem Relation (Age of Onset)    Arthritis Father    Diabetes Mother, Father, Maternal Grandmother    Heart disease Mother, Father    Hypertension Father          Tobacco Use    Smoking status: Current Some Day Smoker     Types: Cigars    Smokeless tobacco: Never Used    Tobacco comment: occassionally   Substance and Sexual Activity    Alcohol use: No    Drug use: No    Sexual activity: Not on file     Review of Systems   Constitutional:  Positive for fatigue. Negative for chills, diaphoresis and fever.   HENT:  Negative for congestion, ear pain, sore throat and trouble swallowing.    Eyes:  Negative for pain, discharge and visual disturbance.   Respiratory:  Negative for cough, chest tightness, shortness of breath and wheezing.    Cardiovascular:  Negative for chest pain, palpitations and leg swelling.   Gastrointestinal:  Negative for abdominal distention, abdominal pain, blood in stool, constipation, diarrhea, nausea and vomiting.   Endocrine: Negative for polydipsia, polyphagia and polyuria.   Genitourinary:  Negative for dysuria, flank pain, frequency and urgency.   Musculoskeletal:  Negative for back pain, joint swelling, neck pain and neck stiffness.   Skin:  Positive for color change and wound. Negative for rash.   Allergic/Immunologic: Negative for immunocompromised state.   Neurological:  Positive for dizziness and weakness. Negative for syncope, speech difficulty, light-headedness, numbness and headaches.   Hematological:  Negative for adenopathy.   Psychiatric/Behavioral:   Negative for confusion and suicidal ideas. The patient is not nervous/anxious.    All other systems reviewed and are negative.  Objective:     Vital Signs (Most Recent):  Temp: 99.7 °F (37.6 °C) (08/26/22 0227)  Pulse: 93 (08/26/22 0232)  Resp: 17 (08/26/22 0232)  BP: 126/61 (08/26/22 0232)  SpO2: 96 % (08/26/22 0232) Vital Signs (24h Range):  Temp:  [99.7 °F (37.6 °C)-102.9 °F (39.4 °C)] 99.7 °F (37.6 °C)  Pulse:  [] 93  Resp:  [17-20] 17  SpO2:  [96 %-98 %] 96 %  BP: (126-185)/(61-96) 126/61     Weight: 77.1 kg (170 lb)  Body mass index is 25.85 kg/m².    Physical Exam  Vitals and nursing note reviewed.   Constitutional:       Appearance: He is well-developed. He is ill-appearing.   HENT:      Head: Normocephalic and atraumatic.   Eyes:      Conjunctiva/sclera: Conjunctivae normal.      Pupils: Pupils are equal, round, and reactive to light.   Cardiovascular:      Rate and Rhythm: Normal rate and regular rhythm.      Comments: Rt pedal pulse faint, doppler +, left pedal pulse 1+   Pulmonary:      Effort: Pulmonary effort is normal.      Breath sounds: Normal breath sounds.   Abdominal:      General: Bowel sounds are normal.      Palpations: Abdomen is soft.   Musculoskeletal:         General: Normal range of motion.      Cervical back: Normal range of motion and neck supple.      Right lower leg: Edema present.   Skin:     General: Skin is warm.      Capillary Refill: Capillary refill takes 2 to 3 seconds.      Findings: Erythema and lesion present.      Comments: Scab to left temple  Rt 5th toe black/necrotic, external lateral rt foot ulcerated with slough and surrounding erythema.    Neurological:      Mental Status: He is alert and oriented to person, place, and time.   Psychiatric:         Behavior: Behavior normal.         Thought Content: Thought content normal.         Judgment: Judgment normal.         CRANIAL NERVES     CN III, IV, VI   Pupils are equal, round, and reactive to light.     Significant  Labs: All pertinent labs within the past 24 hours have been reviewed.  CBC:   Recent Labs   Lab 08/25/22 2319   WBC 18.51*   HGB 9.9*   HCT 28.6*        CMP:   Recent Labs   Lab 08/25/22 2319   *   K 4.1   CL 91*   CO2 22*   *   BUN 25*   CREATININE 1.4   CALCIUM 9.0   PROT 7.5   ALBUMIN 2.0*   BILITOT 0.5   ALKPHOS 121   AST 25   ALT 23   ANIONGAP 15     Lactic Acid:   Recent Labs   Lab 08/25/22 2319 08/26/22  0236   LACTATE 0.9 0.9       Significant Imaging: I have reviewed all pertinent imaging results/findings within the past 24 hours.    X-Ray Foot Complete Right    Result Date: 8/26/2022  EXAMINATION: XR FOOT COMPLETE 3 VIEW RIGHT CLINICAL HISTORY: Osteomyelitis;. TECHNIQUE: AP, lateral, and oblique views of the right foot were performed. COMPARISON: None FINDINGS: There is acute osteomyelitis of the 5th metatarsal and proximal phalanx, with associated overlying displaced and angulated fractures.  There is also likely acute osteomyelitis of the lateral aspect of the 4th metatarsal head and neck.  There is a large soft tissue ulcer plantar to the 5th metatarsophalangeal joint with extensive underlying soft tissue gas.     1. Acute osteomyelitis of the 5th metatarsal and proximal phalanx and the 4th metatarsal head and neck. 2. Large plantar soft tissue ulcer with extensive underlying soft tissue gas. This report was flagged in Epic as abnormal. Electronically signed by: Tomasz Keane Date:    08/26/2022 Time:    00:16

## 2022-08-26 NOTE — ANESTHESIA PREPROCEDURE EVALUATION
08/26/2022  Ernst May is a 62 y.o., male.      Pre-op Assessment    I have reviewed the Patient Summary Reports.     I have reviewed the Nursing Notes. I have reviewed the NPO Status.   I have reviewed the Medications.     Review of Systems  Anesthesia Hx:  No problems with previous Anesthesia Denies Hx of Anesthetic complications    Social:  Smoker Smoking Status: Current Some Day Smoker  Smokeless Tobacco Status: Never Used  Alcohol use: No  Drug use: No       Cardiovascular:   Hypertension Denies MI.  Denies CAD.    Denies CABG/stent.   Denies Angina.    Pulmonary:   Denies COPD.  Denies Asthma.  Denies Recent URI.    Renal/:   Chronic Renal Disease, CRI    Hepatic/GI:   Denies GERD. Denies Liver Disease.    Musculoskeletal:   Large back abscess, status post I&D, cultures with coagulase-negative staph  Foot ulcer, limited to breakdown of skin, bilateral     Neurological:   Denies TIA. Denies CVA. Denies Seizures.    Endocrine:   Diabetes, poorly controlled, type 2 Denies Hypothyroidism.    Psych:   Denies Psychiatric History.          Physical Exam  General: Well nourished, Cooperative, Alert and Oriented    Airway:  Mallampati: II / II  Mouth Opening: Normal  TM Distance: 4 - 6 cm  Tongue: Normal    Dental:  Intact    Chest/Lungs:  Clear to auscultation, Normal Respiratory Rate    Heart:  Rate: Normal  Rhythm: Regular Rhythm  Sounds: Normal        Anesthesia Plan  Type of Anesthesia, risks & benefits discussed:    Anesthesia Type: Gen Supraglottic Airway  Intra-op Monitoring Plan: Standard ASA Monitors  Induction:  IV  Informed Consent: Informed consent signed with the Patient and all parties understand the risks and agree with anesthesia plan.  All questions answered.   ASA Score: 4 Emergent    Ready For Surgery From Anesthesia Perspective.     .

## 2022-08-26 NOTE — CONSULTS
Wound care nurse to follow up on 8/29/22 for post op dressing change as ordered by Dr. Mathur. Pictures to be taken daily with wound care starting Monday 8/29/22.

## 2022-08-27 PROBLEM — I96 DRY GANGRENE: Status: RESOLVED | Noted: 2022-08-26 | Resolved: 2022-08-27

## 2022-08-27 PROBLEM — L02.91 ABSCESS: Status: RESOLVED | Noted: 2021-09-03 | Resolved: 2022-08-27

## 2022-08-27 PROBLEM — D75.839 THROMBOCYTOSIS: Status: RESOLVED | Noted: 2021-09-11 | Resolved: 2022-08-27

## 2022-08-27 PROBLEM — A41.9 SEPSIS: Status: RESOLVED | Noted: 2022-08-26 | Resolved: 2022-08-27

## 2022-08-27 LAB
ANION GAP SERPL CALC-SCNC: 12 MMOL/L (ref 8–16)
BASOPHILS NFR BLD: 0 % (ref 0–1.9)
BUN SERPL-MCNC: 23 MG/DL (ref 8–23)
CALCIUM SERPL-MCNC: 8.7 MG/DL (ref 8.7–10.5)
CHLORIDE SERPL-SCNC: 100 MMOL/L (ref 95–110)
CO2 SERPL-SCNC: 18 MMOL/L (ref 23–29)
CREAT SERPL-MCNC: 1.1 MG/DL (ref 0.5–1.4)
DIFFERENTIAL METHOD: ABNORMAL
EOSINOPHIL NFR BLD: 0 % (ref 0–8)
ERYTHROCYTE [DISTWIDTH] IN BLOOD BY AUTOMATED COUNT: 12.1 % (ref 11.5–14.5)
EST. GFR  (NO RACE VARIABLE): >60 ML/MIN/1.73 M^2
GLUCOSE SERPL-MCNC: 390 MG/DL (ref 70–110)
GRAM STN SPEC: NORMAL
HCT VFR BLD AUTO: 30.7 % (ref 40–54)
HGB BLD-MCNC: 10 G/DL (ref 14–18)
IMM GRANULOCYTES # BLD AUTO: ABNORMAL K/UL (ref 0–0.04)
IMM GRANULOCYTES NFR BLD AUTO: ABNORMAL % (ref 0–0.5)
LYMPHOCYTES NFR BLD: 10 % (ref 18–48)
MAGNESIUM SERPL-MCNC: 2.2 MG/DL (ref 1.6–2.6)
MCH RBC QN AUTO: 27.6 PG (ref 27–31)
MCHC RBC AUTO-ENTMCNC: 32.6 G/DL (ref 32–36)
MCV RBC AUTO: 85 FL (ref 82–98)
MONOCYTES NFR BLD: 2 % (ref 4–15)
NEUTROPHILS NFR BLD: 88 % (ref 38–73)
NRBC BLD-RTO: 0 /100 WBC
PLATELET # BLD AUTO: 429 K/UL (ref 150–450)
PLATELET BLD QL SMEAR: ABNORMAL
PMV BLD AUTO: 10.3 FL (ref 9.2–12.9)
POCT GLUCOSE: 218 MG/DL (ref 70–110)
POCT GLUCOSE: 314 MG/DL (ref 70–110)
POCT GLUCOSE: 335 MG/DL (ref 70–110)
POCT GLUCOSE: 390 MG/DL (ref 70–110)
POTASSIUM SERPL-SCNC: 4.2 MMOL/L (ref 3.5–5.1)
RBC # BLD AUTO: 3.62 M/UL (ref 4.6–6.2)
SODIUM SERPL-SCNC: 130 MMOL/L (ref 136–145)
VANCOMYCIN SERPL-MCNC: 15.6 UG/ML
WBC # BLD AUTO: 23.67 K/UL (ref 3.9–12.7)

## 2022-08-27 PROCEDURE — 25000003 PHARM REV CODE 250: Performed by: NURSE PRACTITIONER

## 2022-08-27 PROCEDURE — 80048 BASIC METABOLIC PNL TOTAL CA: CPT | Performed by: NURSE PRACTITIONER

## 2022-08-27 PROCEDURE — 94799 UNLISTED PULMONARY SVC/PX: CPT

## 2022-08-27 PROCEDURE — 36415 COLL VENOUS BLD VENIPUNCTURE: CPT | Performed by: HOSPITALIST

## 2022-08-27 PROCEDURE — 94761 N-INVAS EAR/PLS OXIMETRY MLT: CPT

## 2022-08-27 PROCEDURE — 83735 ASSAY OF MAGNESIUM: CPT | Performed by: NURSE PRACTITIONER

## 2022-08-27 PROCEDURE — 63600175 PHARM REV CODE 636 W HCPCS: Performed by: NURSE PRACTITIONER

## 2022-08-27 PROCEDURE — 80202 ASSAY OF VANCOMYCIN: CPT | Performed by: HOSPITALIST

## 2022-08-27 PROCEDURE — 85007 BL SMEAR W/DIFF WBC COUNT: CPT | Performed by: NURSE PRACTITIONER

## 2022-08-27 PROCEDURE — 25000003 PHARM REV CODE 250: Performed by: STUDENT IN AN ORGANIZED HEALTH CARE EDUCATION/TRAINING PROGRAM

## 2022-08-27 PROCEDURE — 12000002 HC ACUTE/MED SURGE SEMI-PRIVATE ROOM

## 2022-08-27 PROCEDURE — 36415 COLL VENOUS BLD VENIPUNCTURE: CPT | Performed by: NURSE PRACTITIONER

## 2022-08-27 PROCEDURE — 85027 COMPLETE CBC AUTOMATED: CPT | Performed by: NURSE PRACTITIONER

## 2022-08-27 RX ORDER — CARVEDILOL 25 MG/1
25 TABLET ORAL 2 TIMES DAILY
Status: DISCONTINUED | OUTPATIENT
Start: 2022-08-27 | End: 2022-08-27

## 2022-08-27 RX ORDER — HYDROCHLOROTHIAZIDE 12.5 MG/1
25 TABLET ORAL DAILY
Status: DISCONTINUED | OUTPATIENT
Start: 2022-08-28 | End: 2022-08-27

## 2022-08-27 RX ORDER — LISINOPRIL 10 MG/1
20 TABLET ORAL DAILY
Status: DISCONTINUED | OUTPATIENT
Start: 2022-08-28 | End: 2022-09-01 | Stop reason: HOSPADM

## 2022-08-27 RX ADMIN — ENOXAPARIN SODIUM 40 MG: 100 INJECTION SUBCUTANEOUS at 05:08

## 2022-08-27 RX ADMIN — VANCOMYCIN HYDROCHLORIDE 1250 MG: 1.25 INJECTION, POWDER, LYOPHILIZED, FOR SOLUTION INTRAVENOUS at 05:08

## 2022-08-27 RX ADMIN — CEFEPIME HYDROCHLORIDE 2 G: 2 INJECTION, SOLUTION INTRAVENOUS at 12:08

## 2022-08-27 RX ADMIN — HYDROCODONE BITARTRATE AND ACETAMINOPHEN 1 TABLET: 5; 325 TABLET ORAL at 01:08

## 2022-08-27 RX ADMIN — INSULIN ASPART 8 UNITS: 100 INJECTION, SOLUTION INTRAVENOUS; SUBCUTANEOUS at 07:08

## 2022-08-27 RX ADMIN — POLYETHYLENE GLYCOL 3350 17 G: 17 POWDER, FOR SOLUTION ORAL at 11:08

## 2022-08-27 RX ADMIN — SENNOSIDES AND DOCUSATE SODIUM 1 TABLET: 8.6; 5 TABLET ORAL at 10:08

## 2022-08-27 RX ADMIN — INSULIN ASPART 2 UNITS: 100 INJECTION, SOLUTION INTRAVENOUS; SUBCUTANEOUS at 09:08

## 2022-08-27 RX ADMIN — HYDROCODONE BITARTRATE AND ACETAMINOPHEN 1 TABLET: 5; 325 TABLET ORAL at 06:08

## 2022-08-27 RX ADMIN — INSULIN ASPART 8 UNITS: 100 INJECTION, SOLUTION INTRAVENOUS; SUBCUTANEOUS at 04:08

## 2022-08-27 RX ADMIN — INSULIN ASPART 10 UNITS: 100 INJECTION, SOLUTION INTRAVENOUS; SUBCUTANEOUS at 11:08

## 2022-08-27 RX ADMIN — INSULIN DETEMIR 30 UNITS: 100 INJECTION, SOLUTION SUBCUTANEOUS at 01:08

## 2022-08-27 RX ADMIN — SODIUM CHLORIDE: 0.9 INJECTION, SOLUTION INTRAVENOUS at 11:08

## 2022-08-27 RX ADMIN — SENNOSIDES AND DOCUSATE SODIUM 1 TABLET: 8.6; 5 TABLET ORAL at 09:08

## 2022-08-27 RX ADMIN — CEFEPIME HYDROCHLORIDE 2 G: 2 INJECTION, SOLUTION INTRAVENOUS at 11:08

## 2022-08-27 NOTE — PROGRESS NOTES
Pharmacokinetic Assessment Follow Up: IV Vancomycin    Vancomycin serum concentration assessment(s):    The random level was drawn incorrectly and cannot be used to guide therapy at this time.    Vancomycin Regimen Plan:    Change regimen to Vancomycin 1500 pulse dose with next level due at 1430 on 8/27  Drug levels (last 3 results):  Recent Labs   Lab Result Units 08/26/22  1945   Vancomycin, Random ug/mL 6.1       Pharmacy will continue to follow and monitor vancomycin.    Please contact pharmacy at extension [phone] for questions regarding this assessment.    Thank you for the consult,   Galo Watters       Patient brief summary:  Ernst May is a 62 y.o. male initiated on antimicrobial therapy with IV Vancomycin for treatment of bone/joint infection        Drug Allergies:   Review of patient's allergies indicates:   Allergen Reactions    Neosporin [benzalkonium chloride]        Actual Body Weight:   73 kg    Renal Function:   Estimated Creatinine Clearance: 67.4 mL/min (based on SCr of 1.1 mg/dL).,     Dialysis Method (if applicable):  N/A    CBC (last 72 hours):  Recent Labs   Lab Result Units 08/25/22 2319 08/26/22  0236 08/26/22  0352   WBC K/uL 18.51*  --  16.96*   Hemoglobin g/dL 9.9*  --  8.5*   Hemoglobin A1C %  --  12.3*  --    Hematocrit % 28.6*  --  24.9*   Platelets K/uL 408  --  327   Gran % % 86.3*  --  78.7*   Lymph % % 4.9*  --  9.2*   Mono % % 7.6  --  10.8   Eosinophil % % 0.1  --  0.2   Basophil % % 0.2  --  0.2   Differential Method  Automated  --  Automated       Metabolic Panel (last 72 hours):  Recent Labs   Lab Result Units 08/25/22 2319 08/26/22  0352   Sodium mmol/L 128* 128*   Potassium mmol/L 4.1 3.6   Chloride mmol/L 91* 96   CO2 mmol/L 22* 20*   Glucose mg/dL 448* 351*   BUN mg/dL 25* 22   Creatinine mg/dL 1.4 1.1   Albumin g/dL 2.0*  --    Total Bilirubin mg/dL 0.5  --    Alkaline Phosphatase U/L 121  --    AST U/L 25  --    ALT U/L 23  --    Magnesium mg/dL  --  1.7       Vancomycin  Administrations:  vancomycin given in the last 96 hours                     vancomycin 1.5 g in dextrose 5 % 250 mL IVPB (ready to mix) (mg) 1,500 mg New Bag 08/26/22 0221                    Microbiologic Results:  Microbiology Results (last 7 days)       Procedure Component Value Units Date/Time    Blood culture x two cultures. Draw prior to antibiotics. [[id number]] Collected: 08/25/22 2319    Order Status: Completed Specimen: Blood from Peripheral, Left Hand Updated: 08/26/22 1715     Blood Culture, Routine No Growth to date    Narrative:      Aerobic and anaerobic    Blood culture x two cultures. Draw prior to antibiotics. [[id number]] Collected: 08/25/22 2318    Order Status: Completed Specimen: Blood from Hand Updated: 08/26/22 1715     Blood Culture, Routine No Growth to date    Narrative:      Aerobic and anaerobic    Gram stain [[id number]] Collected: 08/26/22 1348    Order Status: Sent Specimen: Wound from Foot, Right Updated: 08/26/22 1541    AFB Culture & Smear [[id number]] Collected: 08/26/22 1348    Order Status: Sent Specimen: Wound from Foot, Right Updated: 08/26/22 1540    Fungus culture [[id number]] Collected: 08/26/22 1348    Order Status: Sent Specimen: Wound from Foot, Right Updated: 08/26/22 1540    Aerobic culture [[id number]] Collected: 08/26/22 1348    Order Status: Sent Specimen: Wound from Foot, Right Updated: 08/26/22 1540    Culture, Anaerobe [[id number]] Collected: 08/26/22 1348    Order Status: Sent Specimen: Wound from Foot, Right Updated: 08/26/22 1539

## 2022-08-27 NOTE — PROGRESS NOTES
"[facility] Medicine  Progress Note    Patient Name: Ernst May  MRN: [id number]  Patient Class: IP- Inpatient   Admission Date: 8/25/2022  Length of Stay: 1 days  Attending Physician: Chase Sparrow MD  Primary Care Provider: Yeison Nuñez Jr, MD        Subjective:     Principal Problem:Osteomyelitis of right foot        HPI:  Mr. May is a 62 year old male with a history of NIDDM, HTN, CKDIII, and osteomyelitis of the cervical spine who presents today with complaints of weakness. It is severe. It is associated with hyperglycemia, dizziness, fever 102.9, and rt foot wound. He denies chest pain, SOB, cough, or LOC. He was in his truck and having difficulty reaching to the other side and states some "do-gooders" called EMS to help him out. He has not been compliant with any of his medications in months. He noticed his rt 5th toe turned black about 12 days ago. He has ulcerations and erythema along the right foot adjacent to the 5th toe. He was noted to be febrile in the ED T 102.9, , /95. Glucose 448 and lactate 0.9. Foot Xray: 1. Acute osteomyelitis of the 5th metatarsal and proximal phalanx and the 4th metatarsal head and neck. 2. Large plantar soft tissue ulcer with extensive underlying soft tissue gas      Overview/Hospital Course:  Ernst May is a 62 year old male with a past medical history of NIDDM, HTN and osteomyelitis for the cervical spine who presented with acute osteomyelitis of the right fourth and fifth metatarsal with abscess with gangrene of the fifth metatarsal. Podiatry was consulted and performed amputation of the fifth right toe as well as cultures of the area. He is stable on vancomycin and cefepime. PT/OT has been consulted.      Interval History: see "Hospital Course"    Review of Systems   Constitutional:  Positive for fatigue. Negative for chills, diaphoresis and fever.   HENT:  Negative for congestion, ear pain, sore throat and trouble " swallowing.    Eyes:  Negative for pain, discharge and visual disturbance.   Respiratory:  Negative for cough, chest tightness, shortness of breath and wheezing.    Cardiovascular:  Negative for chest pain, palpitations and leg swelling.   Gastrointestinal:  Negative for abdominal distention, abdominal pain, blood in stool, constipation, diarrhea, nausea and vomiting.   Endocrine: Negative for polydipsia, polyphagia and polyuria.   Genitourinary:  Negative for dysuria, flank pain, frequency and urgency.   Musculoskeletal:  Negative for back pain, joint swelling, neck pain and neck stiffness.   Skin:  Positive for color change and wound. Negative for rash.   Allergic/Immunologic: Negative for immunocompromised state.   Neurological:  Positive for dizziness and weakness. Negative for syncope, speech difficulty, light-headedness, numbness and headaches.   Hematological:  Negative for adenopathy.   Psychiatric/Behavioral:  Negative for confusion and suicidal ideas. The patient is not nervous/anxious.    All other systems reviewed and are negative.  Objective:     Vital Signs (Most Recent):  Temp: 97.7 °F (36.5 °C) (08/27/22 1147)  Pulse: 82 (08/27/22 1147)  Resp: 16 (08/27/22 1330)  BP: 134/63 (08/27/22 1147)  SpO2: 98 % (08/27/22 1147) Vital Signs (24h Range):  Temp:  [96.5 °F (35.8 °C)-98.1 °F (36.7 °C)] 97.7 °F (36.5 °C)  Pulse:  [70-90] 82  Resp:  [13-20] 16  SpO2:  [94 %-100 %] 98 %  BP: (101-179)/(52-96) 134/63     Weight: 73 kg (161 lb)  Body mass index is 24.48 kg/m².    Intake/Output Summary (Last 24 hours) at 8/27/2022 1334  Last data filed at 8/27/2022 0532  Gross per 24 hour   Intake 2797.55 ml   Output 525 ml   Net 2272.55 ml      Physical Exam  Vitals and nursing note reviewed.   Constitutional:       Appearance: He is well-developed. He is ill-appearing.   HENT:      Head: Normocephalic and atraumatic.      Right Ear: External ear normal.      Left Ear: External ear normal.      Nose: Nose normal.       Mouth/Throat:      Pharynx: Oropharynx is clear.   Eyes:      Extraocular Movements: Extraocular movements intact.      Conjunctiva/sclera: Conjunctivae normal.   Cardiovascular:      Rate and Rhythm: Normal rate and regular rhythm.      Pulses: Normal pulses.      Heart sounds: Normal heart sounds.   Pulmonary:      Effort: Pulmonary effort is normal.      Breath sounds: Normal breath sounds.   Abdominal:      General: Bowel sounds are normal.      Palpations: Abdomen is soft.   Musculoskeletal:         General: Normal range of motion.      Cervical back: Normal range of motion and neck supple.      Right lower leg: Edema present.   Skin:     General: Skin is warm.      Findings: Erythema and lesion present.      Comments: Dressings clean/dry/intact.   Neurological:      General: No focal deficit present.      Mental Status: He is alert and oriented to person, place, and time. Mental status is at baseline.   Psychiatric:         Mood and Affect: Mood normal.         Behavior: Behavior normal.         Thought Content: Thought content normal.         Judgment: Judgment normal.       Significant Labs: All pertinent labs within the past 24 hours have been reviewed.    Significant Imaging: I have reviewed all pertinent imaging results/findings within the past 24 hours.      Assessment/Plan:      * Osteomyelitis of right foot  POD 1 right fifth toe amputation per Dr. Mathur.  -Vancomycin and cefepime  -Follow up cultures  -Will likely need PICC line  -PT/OT  -PRN analgesics  -Fall precautions  -Wound Care      Cellulitis of right foot  -Continue vancomycin and cefepime      Normocytic anemia  -Trend Hgb with CBC    Diabetes mellitus with hyperglycemia  -Detemir 30 units daily  -SSI  -Diabetic diet  -AC HS glucose checks  -Hypoglycemic precautions    Open wound of right foot  -Wound Care      Essential hypertension, not well controlled  -Start lisinopril 20  -PRN IV hydralazine        VTE Risk Mitigation (From admission,  onward)         Ordered     enoxaparin injection 40 mg  Daily         08/26/22 0121     IP VTE HIGH RISK PATIENT  Once         08/26/22 0121     Place sequential compression device  Until discontinued         08/26/22 0121                Discharge Planning   MEGHAN:  8/31/2022    Code Status: Full Code   Is the patient medically ready for discharge?:     Reason for patient still in hospital (select all that apply): Patient trending condition, Laboratory test, Treatment, PT / OT recommendations and Pending disposition  Discharge Plan A: Home with family, Home Health                  Chase Sparrow MD  Department of Hospital Medicine   [facility]

## 2022-08-27 NOTE — PLAN OF CARE
POC/Meds reviewed, pt verbalized understanding. Vitals stable.  Afebrile.   Tele In place-4803.  IVF infusing. IV ABX given. Blood glucose monitored S/S given. Repositions self. Hourly/Q2hr rounding performed, safety maintained. Bed in lowest position, wheels locked, SR up x2, call light in easy reach. No  complaints at this time.

## 2022-08-27 NOTE — ASSESSMENT & PLAN NOTE
POD 1 right fifth toe amputation per Dr. Mathur.  -Vancomycin and cefepime  -Follow up cultures  -Will likely need PICC line  -PT/OT  -PRN analgesics  -Fall precautions  -Wound Care

## 2022-08-27 NOTE — SUBJECTIVE & OBJECTIVE
"Interval History: see "Hospital Course"    Review of Systems   Constitutional:  Positive for fatigue. Negative for chills, diaphoresis and fever.   HENT:  Negative for congestion, ear pain, sore throat and trouble swallowing.    Eyes:  Negative for pain, discharge and visual disturbance.   Respiratory:  Negative for cough, chest tightness, shortness of breath and wheezing.    Cardiovascular:  Negative for chest pain, palpitations and leg swelling.   Gastrointestinal:  Negative for abdominal distention, abdominal pain, blood in stool, constipation, diarrhea, nausea and vomiting.   Endocrine: Negative for polydipsia, polyphagia and polyuria.   Genitourinary:  Negative for dysuria, flank pain, frequency and urgency.   Musculoskeletal:  Negative for back pain, joint swelling, neck pain and neck stiffness.   Skin:  Positive for color change and wound. Negative for rash.   Allergic/Immunologic: Negative for immunocompromised state.   Neurological:  Positive for dizziness and weakness. Negative for syncope, speech difficulty, light-headedness, numbness and headaches.   Hematological:  Negative for adenopathy.   Psychiatric/Behavioral:  Negative for confusion and suicidal ideas. The patient is not nervous/anxious.    All other systems reviewed and are negative.  Objective:     Vital Signs (Most Recent):  Temp: 97.7 °F (36.5 °C) (08/27/22 1147)  Pulse: 82 (08/27/22 1147)  Resp: 16 (08/27/22 1330)  BP: 134/63 (08/27/22 1147)  SpO2: 98 % (08/27/22 1147) Vital Signs (24h Range):  Temp:  [96.5 °F (35.8 °C)-98.1 °F (36.7 °C)] 97.7 °F (36.5 °C)  Pulse:  [70-90] 82  Resp:  [13-20] 16  SpO2:  [94 %-100 %] 98 %  BP: (101-179)/(52-96) 134/63     Weight: 73 kg (161 lb)  Body mass index is 24.48 kg/m².    Intake/Output Summary (Last 24 hours) at 8/27/2022 1334  Last data filed at 8/27/2022 0532  Gross per 24 hour   Intake 2797.55 ml   Output 525 ml   Net 2272.55 ml      Physical Exam  Vitals and nursing note reviewed.   Constitutional:  "      Appearance: He is well-developed. He is ill-appearing.   HENT:      Head: Normocephalic and atraumatic.      Right Ear: External ear normal.      Left Ear: External ear normal.      Nose: Nose normal.      Mouth/Throat:      Pharynx: Oropharynx is clear.   Eyes:      Extraocular Movements: Extraocular movements intact.      Conjunctiva/sclera: Conjunctivae normal.   Cardiovascular:      Rate and Rhythm: Normal rate and regular rhythm.      Pulses: Normal pulses.      Heart sounds: Normal heart sounds.   Pulmonary:      Effort: Pulmonary effort is normal.      Breath sounds: Normal breath sounds.   Abdominal:      General: Bowel sounds are normal.      Palpations: Abdomen is soft.   Musculoskeletal:         General: Normal range of motion.      Cervical back: Normal range of motion and neck supple.      Right lower leg: Edema present.   Skin:     General: Skin is warm.      Findings: Erythema and lesion present.      Comments: Dressings clean/dry/intact.   Neurological:      General: No focal deficit present.      Mental Status: He is alert and oriented to person, place, and time. Mental status is at baseline.   Psychiatric:         Mood and Affect: Mood normal.         Behavior: Behavior normal.         Thought Content: Thought content normal.         Judgment: Judgment normal.       Significant Labs: All pertinent labs within the past 24 hours have been reviewed.    Significant Imaging: I have reviewed all pertinent imaging results/findings within the past 24 hours.

## 2022-08-27 NOTE — PROGRESS NOTES
Pharmacokinetic Assessment Follow Up: IV Vancomycin    Vancomycin serum concentration assessment(s):    The random level was drawn correctly and can be used to guide therapy at this time. The measurement is within the desired definitive target range of 15 to 20 mcg/mL.    Vancomycin Regimen Plan:    Change regimen to Vancomycin 1250 mg IV once with next serum trough concentration measured at 60 min prior to next dose on 8/28/22    Drug levels (last 3 results):  Recent Labs   Lab Result Units 08/26/22  1945 08/27/22  1355   Vancomycin, Random ug/mL 6.1 15.6       Pharmacy will continue to follow and monitor vancomycin.    Please contact pharmacy at extension [phone] for questions regarding this assessment.    Thank you for the consult,   Nan Watters       Patient brief summary:  Ernst May is a 62 y.o. male initiated on antimicrobial therapy with IV Vancomycin for treatment of bone/joint infection      Drug Allergies:   Review of patient's allergies indicates:   Allergen Reactions    Neosporin [benzalkonium chloride]        Actual Body Weight:   73 kg    Renal Function:   Estimated Creatinine Clearance: 67.4 mL/min (based on SCr of 1.1 mg/dL).,     Dialysis Method (if applicable):  N/A    CBC (last 72 hours):  Recent Labs   Lab Result Units 08/25/22 2319 08/26/22  0236 08/26/22  0352 08/27/22  0505   WBC K/uL 18.51*  --  16.96* 23.67*   Hemoglobin g/dL 9.9*  --  8.5* 10.0*   Hemoglobin A1C %  --  12.3*  --   --    Hematocrit % 28.6*  --  24.9* 30.7*   Platelets K/uL 408  --  327 429   Gran % % 86.3*  --  78.7* 88.0*   Lymph % % 4.9*  --  9.2* 10.0*   Mono % % 7.6  --  10.8 2.0*   Eosinophil % % 0.1  --  0.2 0.0   Basophil % % 0.2  --  0.2 0.0   Differential Method  Automated  --  Automated Manual       Metabolic Panel (last 72 hours):  Recent Labs   Lab Result Units 08/25/22 2319 08/26/22  0352 08/27/22  0505   Sodium mmol/L 128* 128* 130*   Potassium mmol/L 4.1 3.6 4.2   Chloride mmol/L 91* 96 100   CO2 mmol/L  22* 20* 18*   Glucose mg/dL 448* 351* 390*   BUN mg/dL 25* 22 23   Creatinine mg/dL 1.4 1.1 1.1   Albumin g/dL 2.0*  --   --    Total Bilirubin mg/dL 0.5  --   --    Alkaline Phosphatase U/L 121  --   --    AST U/L 25  --   --    ALT U/L 23  --   --    Magnesium mg/dL  --  1.7 2.2       Vancomycin Administrations:  vancomycin given in the last 96 hours                     vancomycin 1.5 g in dextrose 5 % 250 mL IVPB (ready to mix) (mg) 1,500 mg New Bag 08/26/22 2126    vancomycin 1.5 g in dextrose 5 % 250 mL IVPB (ready to mix) (mg) 1,500 mg New Bag 08/26/22 0221                    Microbiologic Results:  Microbiology Results (last 7 days)       Procedure Component Value Units Date/Time    Blood culture x two cultures. Draw prior to antibiotics. [[id number]] Collected: 08/25/22 2319    Order Status: Completed Specimen: Blood from Peripheral, Left Hand Updated: 08/27/22 1022     Blood Culture, Routine No Growth to date      No Growth to date    Narrative:      Aerobic and anaerobic    Blood culture x two cultures. Draw prior to antibiotics. [[id number]] Collected: 08/25/22 2318    Order Status: Completed Specimen: Blood from Hand Updated: 08/27/22 1022     Blood Culture, Routine No Growth to date      No Growth to date    Narrative:      Aerobic and anaerobic    Gram stain [[id number]] Collected: 08/26/22 1348    Order Status: Completed Specimen: Wound from Foot, Right Updated: 08/27/22 0120     Gram Stain Result Rare WBC's      Many Gram positive cocci      Few Gram negative rods    Culture, Anaerobe [[id number]] Collected: 08/26/22 1348    Order Status: Sent Specimen: Wound from Foot, Right Updated: 08/26/22 2208    Aerobic culture [[id number]] Collected: 08/26/22 1348    Order Status: Sent Specimen: Wound from Foot, Right Updated: 08/26/22 2208    AFB Culture & Smear [[id number]] Collected: 08/26/22 1348    Order Status: Sent Specimen: Wound from Foot, Right Updated: 08/26/22 2208    Fungus culture [[id number]]  Collected: 08/26/22 1348    Order Status: Sent Specimen: Wound from Foot, Right Updated: 08/26/22 8998

## 2022-08-27 NOTE — CARE UPDATE
08/27/22 0807   PRE-TX-O2   O2 Device (Oxygen Therapy) room air   Oxygen Concentration (%) 21   SpO2 96 %   Pulse Oximetry Type Intermittent   $ Pulse Oximetry - Multiple Charge Pulse Oximetry - Multiple   Resp 15   Positioning HOB elevated 30 degrees   Incentive Spirometer   $ Incentive Spirometer Charges done with encouragement   Incentive Spirometer Predicted Level (mL) 2850   Administration (IS) proper technique demonstrated   Number of Repetitions (IS) 10   Level Incentive Spirometer (mL) 1000   Patient Tolerance (IS) fair

## 2022-08-28 PROBLEM — E78.5 HLD (HYPERLIPIDEMIA): Status: ACTIVE | Noted: 2022-08-28

## 2022-08-28 PROBLEM — E87.6 HYPOKALEMIA: Status: ACTIVE | Noted: 2022-08-28

## 2022-08-28 LAB
ALBUMIN SERPL BCP-MCNC: 1.4 G/DL (ref 3.5–5.2)
ALP SERPL-CCNC: 109 U/L (ref 55–135)
ALT SERPL W/O P-5'-P-CCNC: 18 U/L (ref 10–44)
ANION GAP SERPL CALC-SCNC: 7 MMOL/L (ref 8–16)
AST SERPL-CCNC: 16 U/L (ref 10–40)
BASOPHILS # BLD AUTO: 0.04 K/UL (ref 0–0.2)
BASOPHILS NFR BLD: 0.2 % (ref 0–1.9)
BILIRUB SERPL-MCNC: 0.2 MG/DL (ref 0.1–1)
BUN SERPL-MCNC: 22 MG/DL (ref 8–23)
CALCIUM SERPL-MCNC: 8.1 MG/DL (ref 8.7–10.5)
CHLORIDE SERPL-SCNC: 104 MMOL/L (ref 95–110)
CHOLEST SERPL-MCNC: 135 MG/DL (ref 120–199)
CHOLEST/HDLC SERPL: 11.3 {RATIO} (ref 2–5)
CO2 SERPL-SCNC: 21 MMOL/L (ref 23–29)
CREAT SERPL-MCNC: 1.1 MG/DL (ref 0.5–1.4)
CRP SERPL-MCNC: 174 MG/L (ref 0–8.2)
DIFFERENTIAL METHOD: ABNORMAL
EOSINOPHIL # BLD AUTO: 0.1 K/UL (ref 0–0.5)
EOSINOPHIL NFR BLD: 0.8 % (ref 0–8)
ERYTHROCYTE [DISTWIDTH] IN BLOOD BY AUTOMATED COUNT: 12.3 % (ref 11.5–14.5)
EST. GFR  (NO RACE VARIABLE): >60 ML/MIN/1.73 M^2
GLUCOSE SERPL-MCNC: 184 MG/DL (ref 70–110)
HCT VFR BLD AUTO: 24.7 % (ref 40–54)
HDLC SERPL-MCNC: 12 MG/DL (ref 40–75)
HDLC SERPL: 8.9 % (ref 20–50)
HGB BLD-MCNC: 8.2 G/DL (ref 14–18)
IMM GRANULOCYTES # BLD AUTO: 0.16 K/UL (ref 0–0.04)
IMM GRANULOCYTES NFR BLD AUTO: 0.9 % (ref 0–0.5)
LDLC SERPL CALC-MCNC: 79 MG/DL (ref 63–159)
LYMPHOCYTES # BLD AUTO: 1.8 K/UL (ref 1–4.8)
LYMPHOCYTES NFR BLD: 10.3 % (ref 18–48)
MAGNESIUM SERPL-MCNC: 1.7 MG/DL (ref 1.6–2.6)
MCH RBC QN AUTO: 28.2 PG (ref 27–31)
MCHC RBC AUTO-ENTMCNC: 33.2 G/DL (ref 32–36)
MCV RBC AUTO: 85 FL (ref 82–98)
MONOCYTES # BLD AUTO: 0.6 K/UL (ref 0.3–1)
MONOCYTES NFR BLD: 3.4 % (ref 4–15)
NEUTROPHILS # BLD AUTO: 15 K/UL (ref 1.8–7.7)
NEUTROPHILS NFR BLD: 84.4 % (ref 38–73)
NONHDLC SERPL-MCNC: 123 MG/DL
NRBC BLD-RTO: 0 /100 WBC
PHOSPHATE SERPL-MCNC: 2.1 MG/DL (ref 2.7–4.5)
PLATELET # BLD AUTO: 404 K/UL (ref 150–450)
PMV BLD AUTO: 10.1 FL (ref 9.2–12.9)
POCT GLUCOSE: 151 MG/DL (ref 70–110)
POCT GLUCOSE: 155 MG/DL (ref 70–110)
POCT GLUCOSE: 158 MG/DL (ref 70–110)
POCT GLUCOSE: 160 MG/DL (ref 70–110)
POTASSIUM SERPL-SCNC: 3.4 MMOL/L (ref 3.5–5.1)
PROT SERPL-MCNC: 5.8 G/DL (ref 6–8.4)
RBC # BLD AUTO: 2.91 M/UL (ref 4.6–6.2)
SODIUM SERPL-SCNC: 132 MMOL/L (ref 136–145)
TRIGL SERPL-MCNC: 220 MG/DL (ref 30–150)
VANCOMYCIN SERPL-MCNC: 16.2 UG/ML
WBC # BLD AUTO: 17.79 K/UL (ref 3.9–12.7)

## 2022-08-28 PROCEDURE — 97116 GAIT TRAINING THERAPY: CPT

## 2022-08-28 PROCEDURE — 63600175 PHARM REV CODE 636 W HCPCS: Performed by: STUDENT IN AN ORGANIZED HEALTH CARE EDUCATION/TRAINING PROGRAM

## 2022-08-28 PROCEDURE — 94761 N-INVAS EAR/PLS OXIMETRY MLT: CPT

## 2022-08-28 PROCEDURE — 36415 COLL VENOUS BLD VENIPUNCTURE: CPT | Performed by: STUDENT IN AN ORGANIZED HEALTH CARE EDUCATION/TRAINING PROGRAM

## 2022-08-28 PROCEDURE — 85025 COMPLETE CBC W/AUTO DIFF WBC: CPT | Performed by: STUDENT IN AN ORGANIZED HEALTH CARE EDUCATION/TRAINING PROGRAM

## 2022-08-28 PROCEDURE — 84100 ASSAY OF PHOSPHORUS: CPT | Performed by: STUDENT IN AN ORGANIZED HEALTH CARE EDUCATION/TRAINING PROGRAM

## 2022-08-28 PROCEDURE — 25000003 PHARM REV CODE 250: Performed by: STUDENT IN AN ORGANIZED HEALTH CARE EDUCATION/TRAINING PROGRAM

## 2022-08-28 PROCEDURE — 83735 ASSAY OF MAGNESIUM: CPT | Performed by: STUDENT IN AN ORGANIZED HEALTH CARE EDUCATION/TRAINING PROGRAM

## 2022-08-28 PROCEDURE — 12000002 HC ACUTE/MED SURGE SEMI-PRIVATE ROOM

## 2022-08-28 PROCEDURE — 25000003 PHARM REV CODE 250: Performed by: NURSE PRACTITIONER

## 2022-08-28 PROCEDURE — 97161 PT EVAL LOW COMPLEX 20 MIN: CPT

## 2022-08-28 PROCEDURE — 80202 ASSAY OF VANCOMYCIN: CPT | Performed by: STUDENT IN AN ORGANIZED HEALTH CARE EDUCATION/TRAINING PROGRAM

## 2022-08-28 PROCEDURE — 99900035 HC TECH TIME PER 15 MIN (STAT)

## 2022-08-28 PROCEDURE — 94799 UNLISTED PULMONARY SVC/PX: CPT

## 2022-08-28 PROCEDURE — 80061 LIPID PANEL: CPT | Performed by: STUDENT IN AN ORGANIZED HEALTH CARE EDUCATION/TRAINING PROGRAM

## 2022-08-28 PROCEDURE — 80053 COMPREHEN METABOLIC PANEL: CPT | Performed by: STUDENT IN AN ORGANIZED HEALTH CARE EDUCATION/TRAINING PROGRAM

## 2022-08-28 PROCEDURE — 86140 C-REACTIVE PROTEIN: CPT | Performed by: STUDENT IN AN ORGANIZED HEALTH CARE EDUCATION/TRAINING PROGRAM

## 2022-08-28 PROCEDURE — 63600175 PHARM REV CODE 636 W HCPCS: Performed by: NURSE PRACTITIONER

## 2022-08-28 RX ORDER — POTASSIUM CHLORIDE 20 MEQ/1
40 TABLET, EXTENDED RELEASE ORAL ONCE
Status: COMPLETED | OUTPATIENT
Start: 2022-08-28 | End: 2022-08-28

## 2022-08-28 RX ORDER — IBUPROFEN 400 MG/1
400 TABLET ORAL EVERY 6 HOURS PRN
Status: DISCONTINUED | OUTPATIENT
Start: 2022-08-28 | End: 2022-09-01 | Stop reason: HOSPADM

## 2022-08-28 RX ORDER — ATORVASTATIN CALCIUM 40 MG/1
40 TABLET, FILM COATED ORAL DAILY
Status: DISCONTINUED | OUTPATIENT
Start: 2022-08-28 | End: 2022-08-28

## 2022-08-28 RX ORDER — SODIUM,POTASSIUM PHOSPHATES 280-250MG
1 POWDER IN PACKET (EA) ORAL
Status: DISCONTINUED | OUTPATIENT
Start: 2022-08-28 | End: 2022-09-01 | Stop reason: HOSPADM

## 2022-08-28 RX ORDER — LACTULOSE 10 G/15ML
20 SOLUTION ORAL EVERY 6 HOURS PRN
Status: DISCONTINUED | OUTPATIENT
Start: 2022-08-28 | End: 2022-09-01 | Stop reason: HOSPADM

## 2022-08-28 RX ORDER — NAPROXEN SODIUM 220 MG/1
81 TABLET, FILM COATED ORAL DAILY
Status: DISCONTINUED | OUTPATIENT
Start: 2022-08-28 | End: 2022-09-01 | Stop reason: HOSPADM

## 2022-08-28 RX ADMIN — CEFEPIME HYDROCHLORIDE 2 G: 2 INJECTION, SOLUTION INTRAVENOUS at 12:08

## 2022-08-28 RX ADMIN — HYDROCODONE BITARTRATE AND ACETAMINOPHEN 1 TABLET: 5; 325 TABLET ORAL at 08:08

## 2022-08-28 RX ADMIN — SENNOSIDES AND DOCUSATE SODIUM 1 TABLET: 8.6; 5 TABLET ORAL at 09:08

## 2022-08-28 RX ADMIN — INSULIN ASPART 2 UNITS: 100 INJECTION, SOLUTION INTRAVENOUS; SUBCUTANEOUS at 04:08

## 2022-08-28 RX ADMIN — SODIUM CHLORIDE: 0.9 INJECTION, SOLUTION INTRAVENOUS at 07:08

## 2022-08-28 RX ADMIN — ASPIRIN 81 MG CHEWABLE TABLET 81 MG: 81 TABLET CHEWABLE at 11:08

## 2022-08-28 RX ADMIN — LACTULOSE 20 G: 20 SOLUTION ORAL at 12:08

## 2022-08-28 RX ADMIN — IBUPROFEN 400 MG: 400 TABLET, FILM COATED ORAL at 09:08

## 2022-08-28 RX ADMIN — POLYETHYLENE GLYCOL 3350 17 G: 17 POWDER, FOR SOLUTION ORAL at 11:08

## 2022-08-28 RX ADMIN — Medication 1 TABLET: at 09:08

## 2022-08-28 RX ADMIN — HYDROCODONE BITARTRATE AND ACETAMINOPHEN 1 TABLET: 5; 325 TABLET ORAL at 07:08

## 2022-08-28 RX ADMIN — ENOXAPARIN SODIUM 40 MG: 100 INJECTION SUBCUTANEOUS at 04:08

## 2022-08-28 RX ADMIN — POTASSIUM & SODIUM PHOSPHATES POWDER PACK 280-160-250 MG 1 PACKET: 280-160-250 PACK at 04:08

## 2022-08-28 RX ADMIN — INSULIN ASPART 2 UNITS: 100 INJECTION, SOLUTION INTRAVENOUS; SUBCUTANEOUS at 12:08

## 2022-08-28 RX ADMIN — INSULIN ASPART 1 UNITS: 100 INJECTION, SOLUTION INTRAVENOUS; SUBCUTANEOUS at 09:08

## 2022-08-28 RX ADMIN — POTASSIUM & SODIUM PHOSPHATES POWDER PACK 280-160-250 MG 1 PACKET: 280-160-250 PACK at 09:08

## 2022-08-28 RX ADMIN — POTASSIUM & SODIUM PHOSPHATES POWDER PACK 280-160-250 MG 1 PACKET: 280-160-250 PACK at 11:08

## 2022-08-28 RX ADMIN — POTASSIUM CHLORIDE 40 MEQ: 1500 TABLET, EXTENDED RELEASE ORAL at 11:08

## 2022-08-28 RX ADMIN — HYDROCODONE BITARTRATE AND ACETAMINOPHEN 1 TABLET: 5; 325 TABLET ORAL at 12:08

## 2022-08-28 RX ADMIN — INSULIN DETEMIR 30 UNITS: 100 INJECTION, SOLUTION SUBCUTANEOUS at 09:08

## 2022-08-28 RX ADMIN — LISINOPRIL 20 MG: 10 TABLET ORAL at 09:08

## 2022-08-28 RX ADMIN — VANCOMYCIN HYDROCHLORIDE 1500 MG: 1.5 INJECTION, POWDER, LYOPHILIZED, FOR SOLUTION INTRAVENOUS at 11:08

## 2022-08-28 NOTE — PLAN OF CARE
The patient is alert and oriented X4. Dressing to the right foot is clean, dry and intact. The patient's blood glucose level at HS was 218. No signs or symptoms of hyper/hypoglycemia.

## 2022-08-28 NOTE — PROGRESS NOTES
Pharmacokinetic Assessment Follow Up: IV Vancomycin    Vancomycin serum concentration assessment(s):    The random level was drawn correctly and can be used to guide therapy at this time. The measurement is within the desired definitive target range of 15 to 20 mcg/mL.    Vancomycin Regimen Plan:    Change regimen to Vancomycin 1500 mg IV every 18 hours with next serum trough concentration measured at 0430 prior to next dose on 8/29/22.    Drug levels (last 3 results):  Recent Labs   Lab Result Units 08/26/22  1945 08/27/22  1355 08/28/22  0912   Vancomycin, Random ug/mL 6.1 15.6 16.2       Pharmacy will continue to follow and monitor vancomycin.    Please contact pharmacy at extension [phone] for questions regarding this assessment.    Thank you for the consult,   Anatoliy Fairbanks, PharmD  [phone]         Patient brief summary:  Ernst May is a 62 y.o. male initiated on antimicrobial therapy with IV Vancomycin for treatment of bone/joint infection    The patient's current regimen is vancomycin pulse dosing.    Drug Allergies:   Review of patient's allergies indicates:   Allergen Reactions    Neosporin [benzalkonium chloride]        Actual Body Weight:   73 kg (161 lb)    Renal Function:   Estimated Creatinine Clearance: 67.4 mL/min (based on SCr of 1.1 mg/dL).,     Dialysis Method (if applicable):  N/A    CBC (last 72 hours):  Recent Labs   Lab Result Units 08/25/22  2319 08/26/22  0236 08/26/22  0352 08/27/22  0505 08/28/22  0353   WBC K/uL 18.51*  --  16.96* 23.67* 17.79*   Hemoglobin g/dL 9.9*  --  8.5* 10.0* 8.2*   Hemoglobin A1C %  --  12.3*  --   --   --    Hematocrit % 28.6*  --  24.9* 30.7* 24.7*   Platelets K/uL 408  --  327 429 404   Gran % % 86.3*  --  78.7* 88.0* 84.4*   Lymph % % 4.9*  --  9.2* 10.0* 10.3*   Mono % % 7.6  --  10.8 2.0* 3.4*   Eosinophil % % 0.1  --  0.2 0.0 0.8   Basophil % % 0.2  --  0.2 0.0 0.2   Differential Method  Automated  --  Automated Manual Automated       Metabolic Panel  (last 72 hours):  Recent Labs   Lab Result Units 08/25/22  2319 08/26/22  0352 08/27/22  0505 08/28/22  0353   Sodium mmol/L 128* 128* 130* 132*   Potassium mmol/L 4.1 3.6 4.2 3.4*   Chloride mmol/L 91* 96 100 104   CO2 mmol/L 22* 20* 18* 21*   Glucose mg/dL 448* 351* 390* 184*   BUN mg/dL 25* 22 23 22   Creatinine mg/dL 1.4 1.1 1.1 1.1   Albumin g/dL 2.0*  --   --  1.4*   Total Bilirubin mg/dL 0.5  --   --  0.2   Alkaline Phosphatase U/L 121  --   --  109   AST U/L 25  --   --  16   ALT U/L 23  --   --  18   Magnesium mg/dL  --  1.7 2.2 1.7   Phosphorus mg/dL  --   --   --  2.1*       Vancomycin Administrations:  vancomycin given in the last 96 hours                     vancomycin 1.25 g in dextrose 5% 250 mL IVPB (ready to mix) ()  Restarted 08/27/22 1825     1,250 mg New Bag  1729    vancomycin 1.5 g in dextrose 5 % 250 mL IVPB (ready to mix) (mg) 1,500 mg New Bag 08/26/22 2126    vancomycin 1.5 g in dextrose 5 % 250 mL IVPB (ready to mix) (mg) 1,500 mg New Bag 08/26/22 0221                    Microbiologic Results:  Microbiology Results (last 7 days)       Procedure Component Value Units Date/Time    AFB Culture & Smear [[id number]] Collected: 08/26/22 1348    Order Status: Completed Specimen: Wound from Foot, Right Updated: 08/28/22 0927     AFB Culture & Smear Culture in progress    Aerobic culture [[id number]]  (Abnormal) Collected: 08/26/22 1348    Order Status: Completed Specimen: Wound from Foot, Right Updated: 08/28/22 0802     Aerobic Bacterial Culture GRAM NEGATIVE SEYMOUR  Moderate  Identification and susceptibility pending  No other significant isolate      Blood culture x two cultures. Draw prior to antibiotics. [[id number]] Collected: 08/25/22 2319    Order Status: Completed Specimen: Blood from Peripheral, Left Hand Updated: 08/27/22 1022     Blood Culture, Routine No Growth to date      No Growth to date    Narrative:      Aerobic and anaerobic    Blood culture x two cultures. Draw prior to antibiotics.  [[id number]] Collected: 08/25/22 2318    Order Status: Completed Specimen: Blood from Hand Updated: 08/27/22 1022     Blood Culture, Routine No Growth to date      No Growth to date    Narrative:      Aerobic and anaerobic    Gram stain [[id number]] Collected: 08/26/22 1348    Order Status: Completed Specimen: Wound from Foot, Right Updated: 08/27/22 0120     Gram Stain Result Rare WBC's      Many Gram positive cocci      Few Gram negative rods    Culture, Anaerobe [[id number]] Collected: 08/26/22 1348    Order Status: Sent Specimen: Wound from Foot, Right Updated: 08/26/22 2208    Fungus culture [[id number]] Collected: 08/26/22 1348    Order Status: Sent Specimen: Wound from Foot, Right Updated: 08/26/22 2208

## 2022-08-28 NOTE — ASSESSMENT & PLAN NOTE
POD 2 right fifth toe amputation per Dr. Mathur.  -Vancomycin and cefepime  -Follow up cultures; currently with GNRs  -Will likely need PICC line  -PT/OT  -PRN analgesics  -Fall precautions  -Wound Care

## 2022-08-28 NOTE — PROGRESS NOTES
"[facility] Medicine  Progress Note    Patient Name: Ernst May  MRN: [id number]  Patient Class: IP- Inpatient   Admission Date: 8/25/2022  Length of Stay: 2 days  Attending Physician: Chase Sparrow MD  Primary Care Provider: Yeison Nuñez Jr, MD        Subjective:     Principal Problem:Osteomyelitis of right foot        HPI:  Mr. May is a 62 year old male with a history of NIDDM, HTN, CKDIII, and osteomyelitis of the cervical spine who presents today with complaints of weakness. It is severe. It is associated with hyperglycemia, dizziness, fever 102.9, and rt foot wound. He denies chest pain, SOB, cough, or LOC. He was in his truck and having difficulty reaching to the other side and states some "do-gooders" called EMS to help him out. He has not been compliant with any of his medications in months. He noticed his rt 5th toe turned black about 12 days ago. He has ulcerations and erythema along the right foot adjacent to the 5th toe. He was noted to be febrile in the ED T 102.9, , /95. Glucose 448 and lactate 0.9. Foot Xray: 1. Acute osteomyelitis of the 5th metatarsal and proximal phalanx and the 4th metatarsal head and neck. 2. Large plantar soft tissue ulcer with extensive underlying soft tissue gas      Overview/Hospital Course:  Ernst May is a 62 year old male with a past medical history of NIDDM, HTN and osteomyelitis for the cervical spine who presented with acute osteomyelitis of the right fourth and fifth metatarsal with abscess with gangrene of the fifth metatarsal. Podiatry was consulted and performed amputation of the fifth right toe as well as cultures of the area. Surgical wound cultures show GNRs. He is stable on vancomycin and cefepime.  PT/OT has been consulted.      Interval History: see "Hospital Course"    Review of Systems   Constitutional:  Negative for chills, diaphoresis, fatigue and fever.   HENT:  Negative for congestion, ear pain, " sore throat and trouble swallowing.    Eyes:  Negative for pain, discharge and visual disturbance.   Respiratory:  Negative for cough, chest tightness, shortness of breath and wheezing.    Cardiovascular:  Negative for chest pain, palpitations and leg swelling.   Gastrointestinal:  Negative for abdominal distention, abdominal pain, blood in stool, constipation, diarrhea, nausea and vomiting.   Endocrine: Negative for polydipsia, polyphagia and polyuria.   Genitourinary:  Negative for dysuria, flank pain, frequency and urgency.   Musculoskeletal:  Negative for back pain, joint swelling, neck pain and neck stiffness.   Skin:  Positive for color change and wound. Negative for rash.   Allergic/Immunologic: Positive for immunocompromised state.   Neurological:  Negative for dizziness, syncope, speech difficulty, weakness, light-headedness, numbness and headaches.   Hematological:  Negative for adenopathy.   Psychiatric/Behavioral:  Negative for confusion and suicidal ideas. The patient is not nervous/anxious.    All other systems reviewed and are negative.  Objective:     Vital Signs (Most Recent):  Temp: 98.1 °F (36.7 °C) (08/28/22 0805)  Pulse: 91 (08/28/22 0805)  Resp: 18 (08/28/22 0848)  BP: (!) 160/75 (08/28/22 0805)  SpO2: 97 % (08/28/22 0805)   Vital Signs (24h Range):  Temp:  [97.5 °F (36.4 °C)-99.9 °F (37.7 °C)] 98.1 °F (36.7 °C)  Pulse:  [79-94] 91  Resp:  [15-20] 18  SpO2:  [96 %-98 %] 97 %  BP: (128-160)/(63-76) 160/75     Weight: 73 kg (161 lb)  Body mass index is 24.48 kg/m².    Intake/Output Summary (Last 24 hours) at 8/28/2022 0961  Last data filed at 8/28/2022 0515  Gross per 24 hour   Intake 3322.22 ml   Output 1150 ml   Net 2172.22 ml      Physical Exam  Vitals and nursing note reviewed.   Constitutional:       Appearance: He is well-developed. He is ill-appearing.   HENT:      Head: Normocephalic and atraumatic.      Right Ear: External ear normal.      Left Ear: External ear normal.      Nose: Nose  normal.      Mouth/Throat:      Pharynx: Oropharynx is clear.   Eyes:      Extraocular Movements: Extraocular movements intact.      Conjunctiva/sclera: Conjunctivae normal.   Cardiovascular:      Rate and Rhythm: Normal rate and regular rhythm.      Pulses: Normal pulses.      Heart sounds: Normal heart sounds.   Pulmonary:      Effort: Pulmonary effort is normal.      Breath sounds: Normal breath sounds.   Abdominal:      General: Bowel sounds are normal.      Palpations: Abdomen is soft.   Musculoskeletal:         General: Normal range of motion.      Cervical back: Normal range of motion and neck supple.      Right lower leg: Edema present.   Skin:     General: Skin is warm.      Findings: Erythema and lesion present.      Comments: Dressings clean/dry/intact.   Neurological:      General: No focal deficit present.      Mental Status: He is alert and oriented to person, place, and time. Mental status is at baseline.   Psychiatric:         Mood and Affect: Mood normal.         Behavior: Behavior normal.         Thought Content: Thought content normal.         Judgment: Judgment normal.       Significant Labs: All pertinent labs within the past 24 hours have been reviewed.    Significant Imaging: I have reviewed all pertinent imaging results/findings within the past 24 hours.      Assessment/Plan:      * Osteomyelitis of right foot  POD 2 right fifth toe amputation per Dr. Mathur.  -Vancomycin and cefepime  -Follow up cultures; currently with GNRs  -Will likely need PICC line  -PT/OT  -PRN analgesics  -Fall precautions  -Wound Care      HLD (hyperlipidemia)  -Start ASA and statin      Hypokalemia  -Replete PRN  -Trend K      Cellulitis of right foot  -Continue vancomycin and cefepime      Normocytic anemia  -Trend Hgb with CBC    Diabetes mellitus with hyperglycemia  -Detemir 30 units daily  -SSI  -Diabetic diet  -AC HS glucose checks  -Hypoglycemic precautions    Open wound of right foot  -Wound  Care      Essential hypertension, not well controlled  -Start lisinopril 20  -PRN IV hydralazine      Hyponatremia  -IV NS fluids  -Trend Na        VTE Risk Mitigation (From admission, onward)         Ordered     enoxaparin injection 40 mg  Daily         08/26/22 0121     IP VTE HIGH RISK PATIENT  Once         08/26/22 0121     Place sequential compression device  Until discontinued         08/26/22 0121                Discharge Planning   MEGHAN: 8/31/2022      Code Status: Full Code   Is the patient medically ready for discharge?:     Reason for patient still in hospital (select all that apply): Patient trending condition, Laboratory test, Treatment, PT / OT recommendations and Pending disposition  Discharge Plan A: Home with family, Home Health                  Chase Sparrow MD  Department of Hospital Medicine   [facility]

## 2022-08-28 NOTE — PLAN OF CARE
Problem: Physical Therapy  Goal: Physical Therapy Goal  Description: Goals to be met by: 9/15/2022     Patient will increase functional independence with mobility by performin). Supine to sit with Modified [place]  2). Sit to supine with Modified [place]  3). Sit to stand transfer with Modified [place]  4). Gait  x > 25 feet with Modified [place] using Rolling Walker.     Outcome: Ongoing, Progressing

## 2022-08-28 NOTE — PT/OT/SLP EVAL
Physical Therapy Evaluation    Patient Name:  Ernst May   MRN:  [id number]    Recommendations:     Discharge Recommendations:   (TBD)   Discharge Equipment Recommendations: walker, rolling   Barriers to discharge:  lives in RV (3 steps to enter)    Assessment:     Ernst May is a 62 y.o. male admitted with a medical diagnosis of Osteomyelitis of right foot.  He presents with the following impairments/functional limitations:  weakness, impaired balance, gait instability, impaired functional mobility, decreased lower extremity function, orthopedic precautions, impaired endurance, impaired skin  .    Rehab Prognosis: Good and Fair; patient would benefit from acute skilled PT services to address these deficits and reach maximum level of function.    Recent Surgery: Procedure(s) (LRB):  AMPUTATION, FOOT (Right) 2 Days Post-Op    Plan:     During this hospitalization, patient to be seen daily to address the identified rehab impairments via gait training, therapeutic activities, therapeutic exercises and progress toward the following goals:    Plan of Care Expires:  09/15/22    Subjective     Patient/Family Comments/goals: agrees to work with PT to walk in almanzar    Living Environment:  RV (3 steps to enter)  Prior to admission, patients level of function was independent without AD.  Equipment used at home: none.  DME owned (not currently used): none.  Upon discharge, patient will have assistance from ?.    Objective:     Communicated with nurse (Blanche) prior to session.  Patient found supine with peripheral IV, telemetry  upon PT entry to room.    General Precautions: Standard, fall   Orthopedic Precautions:RLE non weight bearing   Braces:  (Cam walker boot in place throughout session)  Respiratory Status: Room air    Functional Mobility training:  Bed Mobility:     Rolling Right: stand by assistance  Supine to Sit: stand by assistance  Transfers:     Sit to Stand:  contact guard assistance with rolling walker and x 2  reps  Gait: 25' x 2 with RW with CGA and cues emphasizing NWB RLE at all times    Therapeutic Activities and Exercises:   Mobility training as above with cues for technique  Instructed NWB RLE prior to and during gait training.    AM-PAC 6 CLICK MOBILITY  Total Score:16     Patient left  seated in chair with feet elevated  with all lines intact, call button in reach, chair alarm on, and nurse (Blanche) present.    GOALS:   Multidisciplinary Problems       Physical Therapy Goals          Problem: Physical Therapy    Goal Priority Disciplines Outcome Goal Variances Interventions   Physical Therapy Goal     PT, PT/OT Ongoing, Progressing     Description: Goals to be met by: 9/15/2022     Patient will increase functional independence with mobility by performin). Supine to sit with Modified [place]  2). Sit to supine with Modified [place]  3). Sit to stand transfer with Modified [place]  4). Gait  x > 25 feet with Modified [place] using Rolling Walker.                          History:     Past Medical History:   Diagnosis Date    Back abscess 2021    CKD (chronic kidney disease) 2021    Diabetes mellitus with hyperglycemia 2021    Diabetic foot ulcer 2021    bilateral, severe abrasions    Hypertension     Osteomyelitis of cervical spine 2021    under back abscess    Sepsis 10/9/2021       Past Surgical History:   Procedure Laterality Date    CYST REMOVAL      INCISION AND DRAINAGE OF ABSCESS Left 2021    Procedure: INCISION AND DRAINAGE, ABSCESS;  Surgeon: Surjit Chawla MD;  Location: [facility] OR;  Service: General;  Laterality: Left;    INCISION AND DRAINAGE OF ABSCESS Left 2021    Procedure: INCISION AND DRAINAGE, ABSCESS; DEBRIDEMENT;  Surgeon: Surjit Chawla MD;  Location: [facility] OR;  Service: General;  Laterality: Left;    REPLACEMENT OF WOUND VACUUM-ASSISTED CLOSURE DEVICE Left 2021    Procedure: REPLACEMENT, WOUND VAC;  Surgeon: Surjit Chawla MD;   Location: [facility];  Service: General;  Laterality: Left;  EXCHANGE.       Time Tracking:     PT Received On: 08/28/22  PT Start Time: 1036     PT Stop Time: 1105  PT Total Time (min): 29 min     Billable Minutes: Evaluation 15 and Gait Training 14      08/28/2022

## 2022-08-28 NOTE — CONSULTS
A chart review has been completed and the patient does meet criteria for [facility] service at this time. We will assume care on 08/29/2022 at 7AM.    Thank you for your consult to [facility].

## 2022-08-28 NOTE — SUBJECTIVE & OBJECTIVE
"Interval History: see "Hospital Course"    Review of Systems   Constitutional:  Negative for chills, diaphoresis, fatigue and fever.   HENT:  Negative for congestion, ear pain, sore throat and trouble swallowing.    Eyes:  Negative for pain, discharge and visual disturbance.   Respiratory:  Negative for cough, chest tightness, shortness of breath and wheezing.    Cardiovascular:  Negative for chest pain, palpitations and leg swelling.   Gastrointestinal:  Negative for abdominal distention, abdominal pain, blood in stool, constipation, diarrhea, nausea and vomiting.   Endocrine: Negative for polydipsia, polyphagia and polyuria.   Genitourinary:  Negative for dysuria, flank pain, frequency and urgency.   Musculoskeletal:  Negative for back pain, joint swelling, neck pain and neck stiffness.   Skin:  Positive for color change and wound. Negative for rash.   Allergic/Immunologic: Positive for immunocompromised state.   Neurological:  Negative for dizziness, syncope, speech difficulty, weakness, light-headedness, numbness and headaches.   Hematological:  Negative for adenopathy.   Psychiatric/Behavioral:  Negative for confusion and suicidal ideas. The patient is not nervous/anxious.    All other systems reviewed and are negative.  Objective:     Vital Signs (Most Recent):  Temp: 98.1 °F (36.7 °C) (08/28/22 0805)  Pulse: 91 (08/28/22 0805)  Resp: 18 (08/28/22 0848)  BP: (!) 160/75 (08/28/22 0805)  SpO2: 97 % (08/28/22 0805)   Vital Signs (24h Range):  Temp:  [97.5 °F (36.4 °C)-99.9 °F (37.7 °C)] 98.1 °F (36.7 °C)  Pulse:  [79-94] 91  Resp:  [15-20] 18  SpO2:  [96 %-98 %] 97 %  BP: (128-160)/(63-76) 160/75     Weight: 73 kg (161 lb)  Body mass index is 24.48 kg/m².    Intake/Output Summary (Last 24 hours) at 8/28/2022 0920  Last data filed at 8/28/2022 0515  Gross per 24 hour   Intake 3322.22 ml   Output 1150 ml   Net 2172.22 ml      Physical Exam  Vitals and nursing note reviewed.   Constitutional:       Appearance: He is " well-developed. He is ill-appearing.   HENT:      Head: Normocephalic and atraumatic.      Right Ear: External ear normal.      Left Ear: External ear normal.      Nose: Nose normal.      Mouth/Throat:      Pharynx: Oropharynx is clear.   Eyes:      Extraocular Movements: Extraocular movements intact.      Conjunctiva/sclera: Conjunctivae normal.   Cardiovascular:      Rate and Rhythm: Normal rate and regular rhythm.      Pulses: Normal pulses.      Heart sounds: Normal heart sounds.   Pulmonary:      Effort: Pulmonary effort is normal.      Breath sounds: Normal breath sounds.   Abdominal:      General: Bowel sounds are normal.      Palpations: Abdomen is soft.   Musculoskeletal:         General: Normal range of motion.      Cervical back: Normal range of motion and neck supple.      Right lower leg: Edema present.   Skin:     General: Skin is warm.      Findings: Erythema and lesion present.      Comments: Dressings clean/dry/intact.   Neurological:      General: No focal deficit present.      Mental Status: He is alert and oriented to person, place, and time. Mental status is at baseline.   Psychiatric:         Mood and Affect: Mood normal.         Behavior: Behavior normal.         Thought Content: Thought content normal.         Judgment: Judgment normal.       Significant Labs: All pertinent labs within the past 24 hours have been reviewed.    Significant Imaging: I have reviewed all pertinent imaging results/findings within the past 24 hours.

## 2022-08-28 NOTE — PLAN OF CARE
Problem: Adult Inpatient Plan of Care  Goal: Plan of Care Review  Outcome: Ongoing, Progressing   Supine with HOB up 35. POC and medications reviewed with patient and verbalized complete understanding. Tele [id number] in use. NS infusing at 125cc/hr into Left ac without difficulty. DDI. No redness or swelling . SR up x 2. Call light in reach. Bed in lowest position with brakes locked. Will continue to monitor.   Problem: Adult Inpatient Plan of Care  Goal: Optimal Comfort and Wellbeing  Outcome: Ongoing, Progressing   Pain monitored through out shift . Q 2 hourly rounds made through out shift and IV site, pain and position monitored .   Problem: Diabetes Comorbidity  Goal: Blood Glucose Level Within Targeted Range  Outcome: Ongoing, Progressing   CBG monitored through out shift and  sliding scale insulin coverage given per MD order  Problem: Impaired Wound Healing  Goal: Optimal Wound Healing  Outcome: Ongoing, Progressing   Dressing to Right foot intact and without drainage and boot in use.

## 2022-08-29 LAB
ALBUMIN SERPL BCP-MCNC: 1.6 G/DL (ref 3.5–5.2)
ALP SERPL-CCNC: 183 U/L (ref 55–135)
ALT SERPL W/O P-5'-P-CCNC: 26 U/L (ref 10–44)
ANION GAP SERPL CALC-SCNC: 10 MMOL/L (ref 8–16)
AST SERPL-CCNC: 46 U/L (ref 10–40)
BASOPHILS # BLD AUTO: 0.06 K/UL (ref 0–0.2)
BASOPHILS NFR BLD: 0.4 % (ref 0–1.9)
BILIRUB SERPL-MCNC: 0.5 MG/DL (ref 0.1–1)
BILIRUB UR QL STRIP: NEGATIVE
BUN SERPL-MCNC: 15 MG/DL (ref 8–23)
CALCIUM SERPL-MCNC: 8.7 MG/DL (ref 8.7–10.5)
CHLORIDE SERPL-SCNC: 107 MMOL/L (ref 95–110)
CLARITY UR: CLEAR
CO2 SERPL-SCNC: 20 MMOL/L (ref 23–29)
COLOR UR: YELLOW
CREAT SERPL-MCNC: 1 MG/DL (ref 0.5–1.4)
DIFFERENTIAL METHOD: ABNORMAL
EOSINOPHIL # BLD AUTO: 0.3 K/UL (ref 0–0.5)
EOSINOPHIL NFR BLD: 1.8 % (ref 0–8)
ERYTHROCYTE [DISTWIDTH] IN BLOOD BY AUTOMATED COUNT: 12.6 % (ref 11.5–14.5)
EST. GFR  (NO RACE VARIABLE): >60 ML/MIN/1.73 M^2
GLUCOSE SERPL-MCNC: 79 MG/DL (ref 70–110)
GLUCOSE UR QL STRIP: NEGATIVE
HCT VFR BLD AUTO: 25.4 % (ref 40–54)
HGB BLD-MCNC: 8.4 G/DL (ref 14–18)
HGB UR QL STRIP: ABNORMAL
IMM GRANULOCYTES # BLD AUTO: 0.18 K/UL (ref 0–0.04)
IMM GRANULOCYTES NFR BLD AUTO: 1.3 % (ref 0–0.5)
KETONES UR QL STRIP: NEGATIVE
LEUKOCYTE ESTERASE UR QL STRIP: NEGATIVE
LYMPHOCYTES # BLD AUTO: 1.9 K/UL (ref 1–4.8)
LYMPHOCYTES NFR BLD: 13.4 % (ref 18–48)
MAGNESIUM SERPL-MCNC: 1.8 MG/DL (ref 1.6–2.6)
MCH RBC QN AUTO: 28.4 PG (ref 27–31)
MCHC RBC AUTO-ENTMCNC: 33.1 G/DL (ref 32–36)
MCV RBC AUTO: 86 FL (ref 82–98)
MONOCYTES # BLD AUTO: 1 K/UL (ref 0.3–1)
MONOCYTES NFR BLD: 6.9 % (ref 4–15)
NEUTROPHILS # BLD AUTO: 10.8 K/UL (ref 1.8–7.7)
NEUTROPHILS NFR BLD: 76.2 % (ref 38–73)
NITRITE UR QL STRIP: NEGATIVE
NRBC BLD-RTO: 0 /100 WBC
PH UR STRIP: 6 [PH] (ref 5–8)
PHOSPHATE SERPL-MCNC: 2.9 MG/DL (ref 2.7–4.5)
PLATELET # BLD AUTO: 409 K/UL (ref 150–450)
PMV BLD AUTO: 10.5 FL (ref 9.2–12.9)
POCT GLUCOSE: 190 MG/DL (ref 70–110)
POCT GLUCOSE: 92 MG/DL (ref 70–110)
POCT GLUCOSE: 98 MG/DL (ref 70–110)
POTASSIUM SERPL-SCNC: 3.7 MMOL/L (ref 3.5–5.1)
PROT SERPL-MCNC: 6.5 G/DL (ref 6–8.4)
PROT UR QL STRIP: ABNORMAL
RBC # BLD AUTO: 2.96 M/UL (ref 4.6–6.2)
SODIUM SERPL-SCNC: 137 MMOL/L (ref 136–145)
SP GR UR STRIP: 1.01 (ref 1–1.03)
URN SPEC COLLECT METH UR: ABNORMAL
UROBILINOGEN UR STRIP-ACNC: NEGATIVE EU/DL
VANCOMYCIN SERPL-MCNC: 18.2 UG/ML
WBC # BLD AUTO: 14.16 K/UL (ref 3.9–12.7)

## 2022-08-29 PROCEDURE — 94761 N-INVAS EAR/PLS OXIMETRY MLT: CPT

## 2022-08-29 PROCEDURE — 97165 OT EVAL LOW COMPLEX 30 MIN: CPT

## 2022-08-29 PROCEDURE — 25000003 PHARM REV CODE 250: Performed by: INTERNAL MEDICINE

## 2022-08-29 PROCEDURE — C1751 CATH, INF, PER/CENT/MIDLINE: HCPCS

## 2022-08-29 PROCEDURE — 85025 COMPLETE CBC W/AUTO DIFF WBC: CPT | Performed by: STUDENT IN AN ORGANIZED HEALTH CARE EDUCATION/TRAINING PROGRAM

## 2022-08-29 PROCEDURE — 25000003 PHARM REV CODE 250: Performed by: NURSE PRACTITIONER

## 2022-08-29 PROCEDURE — 97116 GAIT TRAINING THERAPY: CPT

## 2022-08-29 PROCEDURE — 81003 URINALYSIS AUTO W/O SCOPE: CPT | Performed by: NURSE PRACTITIONER

## 2022-08-29 PROCEDURE — 63600175 PHARM REV CODE 636 W HCPCS: Performed by: NURSE PRACTITIONER

## 2022-08-29 PROCEDURE — 80053 COMPREHEN METABOLIC PANEL: CPT | Performed by: STUDENT IN AN ORGANIZED HEALTH CARE EDUCATION/TRAINING PROGRAM

## 2022-08-29 PROCEDURE — 83735 ASSAY OF MAGNESIUM: CPT | Performed by: STUDENT IN AN ORGANIZED HEALTH CARE EDUCATION/TRAINING PROGRAM

## 2022-08-29 PROCEDURE — A4216 STERILE WATER/SALINE, 10 ML: HCPCS | Performed by: INTERNAL MEDICINE

## 2022-08-29 PROCEDURE — 80202 ASSAY OF VANCOMYCIN: CPT | Performed by: STUDENT IN AN ORGANIZED HEALTH CARE EDUCATION/TRAINING PROGRAM

## 2022-08-29 PROCEDURE — 97535 SELF CARE MNGMENT TRAINING: CPT

## 2022-08-29 PROCEDURE — 25000003 PHARM REV CODE 250: Performed by: STUDENT IN AN ORGANIZED HEALTH CARE EDUCATION/TRAINING PROGRAM

## 2022-08-29 PROCEDURE — 84100 ASSAY OF PHOSPHORUS: CPT | Performed by: STUDENT IN AN ORGANIZED HEALTH CARE EDUCATION/TRAINING PROGRAM

## 2022-08-29 PROCEDURE — 25000003 PHARM REV CODE 250: Performed by: PODIATRIST

## 2022-08-29 PROCEDURE — 36573 INSJ PICC RS&I 5 YR+: CPT

## 2022-08-29 PROCEDURE — 12000002 HC ACUTE/MED SURGE SEMI-PRIVATE ROOM

## 2022-08-29 PROCEDURE — 94799 UNLISTED PULMONARY SVC/PX: CPT

## 2022-08-29 RX ORDER — SODIUM CHLORIDE 0.9 % (FLUSH) 0.9 %
10 SYRINGE (ML) INJECTION
Status: DISCONTINUED | OUTPATIENT
Start: 2022-08-29 | End: 2022-09-01 | Stop reason: HOSPADM

## 2022-08-29 RX ORDER — MUPIROCIN 20 MG/G
OINTMENT TOPICAL 2 TIMES DAILY
Status: DISCONTINUED | OUTPATIENT
Start: 2022-08-29 | End: 2022-09-01 | Stop reason: HOSPADM

## 2022-08-29 RX ORDER — SODIUM CHLORIDE 0.9 % (FLUSH) 0.9 %
10 SYRINGE (ML) INJECTION EVERY 6 HOURS
Status: DISCONTINUED | OUTPATIENT
Start: 2022-08-29 | End: 2022-09-01 | Stop reason: HOSPADM

## 2022-08-29 RX ADMIN — INSULIN ASPART 4 UNITS: 100 INJECTION, SOLUTION INTRAVENOUS; SUBCUTANEOUS at 04:08

## 2022-08-29 RX ADMIN — HYDRALAZINE HYDROCHLORIDE 10 MG: 20 INJECTION INTRAMUSCULAR; INTRAVENOUS at 08:08

## 2022-08-29 RX ADMIN — ASPIRIN 81 MG CHEWABLE TABLET 81 MG: 81 TABLET CHEWABLE at 08:08

## 2022-08-29 RX ADMIN — SENNOSIDES AND DOCUSATE SODIUM 1 TABLET: 8.6; 5 TABLET ORAL at 08:08

## 2022-08-29 RX ADMIN — SODIUM CHLORIDE: 0.9 INJECTION, SOLUTION INTRAVENOUS at 09:08

## 2022-08-29 RX ADMIN — HYDROCODONE BITARTRATE AND ACETAMINOPHEN 1 TABLET: 5; 325 TABLET ORAL at 01:08

## 2022-08-29 RX ADMIN — POTASSIUM & SODIUM PHOSPHATES POWDER PACK 280-160-250 MG 1 PACKET: 280-160-250 PACK at 08:08

## 2022-08-29 RX ADMIN — HYDROCODONE BITARTRATE AND ACETAMINOPHEN 1 TABLET: 5; 325 TABLET ORAL at 10:08

## 2022-08-29 RX ADMIN — INSULIN ASPART 2 UNITS: 100 INJECTION, SOLUTION INTRAVENOUS; SUBCUTANEOUS at 12:08

## 2022-08-29 RX ADMIN — INSULIN DETEMIR 30 UNITS: 100 INJECTION, SOLUTION SUBCUTANEOUS at 08:08

## 2022-08-29 RX ADMIN — CEFEPIME HYDROCHLORIDE 2 G: 2 INJECTION, SOLUTION INTRAVENOUS at 12:08

## 2022-08-29 RX ADMIN — POLYETHYLENE GLYCOL 3350 17 G: 17 POWDER, FOR SOLUTION ORAL at 10:08

## 2022-08-29 RX ADMIN — HYOSCYAMINE SULFATE: 16 SOLUTION at 04:08

## 2022-08-29 RX ADMIN — LACTULOSE 20 G: 20 SOLUTION ORAL at 08:08

## 2022-08-29 RX ADMIN — MUPIROCIN: 20 OINTMENT TOPICAL at 08:08

## 2022-08-29 RX ADMIN — CEFEPIME HYDROCHLORIDE 2 G: 2 INJECTION, SOLUTION INTRAVENOUS at 03:08

## 2022-08-29 RX ADMIN — Medication 1 TABLET: at 08:08

## 2022-08-29 RX ADMIN — SODIUM CHLORIDE: 0.9 INJECTION, SOLUTION INTRAVENOUS at 03:08

## 2022-08-29 RX ADMIN — POTASSIUM & SODIUM PHOSPHATES POWDER PACK 280-160-250 MG 1 PACKET: 280-160-250 PACK at 05:08

## 2022-08-29 RX ADMIN — VANCOMYCIN HYDROCHLORIDE 1250 MG: 1.25 INJECTION, POWDER, LYOPHILIZED, FOR SOLUTION INTRAVENOUS at 07:08

## 2022-08-29 RX ADMIN — POTASSIUM & SODIUM PHOSPHATES POWDER PACK 280-160-250 MG 1 PACKET: 280-160-250 PACK at 11:08

## 2022-08-29 RX ADMIN — SODIUM CHLORIDE, PRESERVATIVE FREE 10 ML: 5 INJECTION INTRAVENOUS at 06:08

## 2022-08-29 RX ADMIN — ENOXAPARIN SODIUM 40 MG: 100 INJECTION SUBCUTANEOUS at 05:08

## 2022-08-29 RX ADMIN — LISINOPRIL 20 MG: 10 TABLET ORAL at 08:08

## 2022-08-29 NOTE — PROGRESS NOTES
Wound care follow up post op debridement today. Wound to right lateral foot is very foul smelling with moderate amount of drainage. The skin is macerated at the lower incision area. Purulent mixed with bloody drainage noted from 1/3 of the way down the incision and at the lower area of the incision. Notified Dr. Mathur regarding wound findings at this assessment. New orders to irrigate with Dakin's solution obtained from Dr. Mathur and he will follow up with this patient tomorrow. Wound care will continue to follow this patient.        08/29/22 1452        Incision/Site 08/26/22 1343 Right Foot   Date First Assessed/Time First Assessed: 08/26/22 1343   Side: Right  Location: Foot   Wound Image       Incision WDL ex   Dressing Appearance Moist drainage   Drainage Amount Moderate   Drainage Characteristics/Odor Purulent;Sanguineous   Appearance Red;Wet;Eschar   Black (%), Wound Tissue Color 10 %   Red (%), Wound Tissue Color 90 %   Yellow (%), Wound Tissue Color 0 %   Periwound Area Macerated;Excoriated;Moist;Pale white;Redness   Wound Edges Jagged   Wound Length (cm) 13.5 cm   Wound Width (cm) 0.5 cm   Wound Surface Area (cm^2) 6.75 cm^2   Tunneling (depth (cm)/location) 0   Undermining (depth (cm)/location) 0   Care Cleansed with:;Wound cleanser;Other (see comments)  (Dakin's)   Dressing Applied;Gauze, wet to dry   Off Loading Off loading shoe   Dressing Change Due 08/30/22        Altered Skin Integrity 08/26/22 0438 Right anterior Toe, fifth Diabetic Ulcer   Date First Assessed/Time First Assessed: 08/26/22 0438   Altered Skin Integrity Present on Admission: yes  Side: Right  Orientation: anterior  Location: Toe, fifth  Primary Wound Type: Diabetic Ulcer   Wound Image    Description of Altered Skin Integrity Partial thickness tissue loss. Shallow open ulcer with a red or pink wound bed, without slough. Intact or Open/Ruptured Serum-filled blister.   Dressing Appearance Dried drainage   Drainage  Characteristics/Odor Serosanguineous   Appearance Red   Tissue loss description Partial thickness   Black (%), Wound Tissue Color 0 %   Red (%), Wound Tissue Color 100 %   Yellow (%), Wound Tissue Color 0 %   Periwound Area Dry   Wound Edges Callused   Wound Length (cm) 3.5 cm   Wound Width (cm) 0.5 cm   Wound Depth (cm) 0.2 cm   Wound Volume (cm^3) 0.35 cm^3   Wound Surface Area (cm^2) 1.75 cm^2   Tunneling (depth (cm)/location) 0   Undermining (depth (cm)/location) 0   Care Cleansed with:;Wound cleanser   Dressing Applied;Gauze, wet to dry;Gauze   Off Loading Off loading shoe   Dressing Change Due 08/30/22

## 2022-08-29 NOTE — PLAN OF CARE
Problem: Physical Therapy  Goal: Physical Therapy Goal  Description: Goals to be met by: 9/15/2022     Patient will increase functional independence with mobility by performin). Supine to sit with Modified [place]  2). Sit to supine with Modified [place]  3). Sit to stand transfer with Modified [place]  4). Gait  x > 25 feet with Modified [place] using Rolling Walker.     Outcome: Ongoing, Progressing   Gait 30ft with RW with camwalker boot R foot. Pt non compliant with NWB. OOB chair

## 2022-08-29 NOTE — PLAN OF CARE
Problem: Adult Inpatient Plan of Care  Goal: Plan of Care Review  Outcome: Ongoing, Progressing   Supine with HOB up 35. POC and medications reviewed with patient. Verbalized understanding. NS infusing at 125cc/hr into Left fa with out diffculty. DDI. No redness or swelling at site. Boot in use to Right lower leg. Dressing to right foot dry and intact without drainage. Will continue to monitor. SR up x 2. Call light in reach. Bed in lowest position with brakes locked.   Problem: Adult Inpatient Plan of Care  Goal: Optimal Comfort and Wellbeing  Outcome: Ongoing, Progressing   Pain monitored through out shift and q 2 hourly rounds made and IV site, pain and position monitored.   Problem: Diabetes Comorbidity  Goal: Blood Glucose Level Within Targeted Range  Outcome: Ongoing, Progressing   CBGs monitored through out shift and insulin sliding coverage given per MD order.   Problem: Impaired Wound Healing  Goal: Optimal Wound Healing  Outcome: Ongoing, Progressing   Wound care to right foot completed today per Blanche RN wound care nurse

## 2022-08-29 NOTE — PROGRESS NOTES
"      [facility] Hospital Medicine  Telemedicine Progress Note    Patient Name: Ernst May  MRN: [id number]  Patient Class: IP- Inpatient   Admission Date: 8/25/2022  Length of Stay: 3 days  Attending Physician: Elzbieta Diaz MD  Primary Care Provider: Yeison Nuñez Jr, MD          Subjective:     Principal Problem:Osteomyelitis of right foot        HPI:  Mr. May is a 62 year old male with a history of NIDDM, HTN, CKDIII, and osteomyelitis of the cervical spine who presents today with complaints of weakness. It is severe. It is associated with hyperglycemia, dizziness, fever 102.9, and rt foot wound. He denies chest pain, SOB, cough, or LOC. He was in his truck and having difficulty reaching to the other side and states some "do-gooders" called EMS to help him out. He has not been compliant with any of his medications in months. He noticed his rt 5th toe turned black about 12 days ago. He has ulcerations and erythema along the right foot adjacent to the 5th toe. He was noted to be febrile in the ED T 102.9, , /95. Glucose 448 and lactate 0.9. Foot Xray: 1. Acute osteomyelitis of the 5th metatarsal and proximal phalanx and the 4th metatarsal head and neck. 2. Large plantar soft tissue ulcer with extensive underlying soft tissue gas      Overview/Hospital Course:  Ernst May is a 62 year old male with a past medical history of NIDDM, HTN and osteomyelitis for the cervical spine who presented with acute osteomyelitis of the right fourth and fifth metatarsal with abscess with gangrene of the fifth metatarsal. Podiatry was consulted and performed amputation of the fifth right toe as well as cultures of the area. Surgical wound cultures show GNRs. He is stable on vancomycin and cefepime.  PT/OT has been consulted. 8/29-wound still with moderate drainage, wound care following, PICC placement today, will require long term IV abx and wound care. Spoke with CM for LTAC " placement.      Interval History: patient resting in bed, wound with mod drainage, PICC line placed today, LTAC placement initiated, patient agrees with POC.    Review of Systems   Constitutional:  Negative for fatigue, fever and unexpected weight change.   HENT:  Negative for congestion.    Respiratory:  Negative for cough and shortness of breath.    Cardiovascular:  Negative for chest pain and palpitations.   Endocrine: Negative for polydipsia and polyuria.   Genitourinary:  Negative for difficulty urinating.   Musculoskeletal:  Positive for gait problem.   Skin:  Positive for wound.   Allergic/Immunologic: Negative for food allergies.   Neurological:  Positive for weakness.   Psychiatric/Behavioral:  Negative for agitation.    Objective:     Vital Signs (Most Recent):  Temp: 98.3 °F (36.8 °C) (08/29/22 1519)  Pulse: (!) 118 (08/29/22 1519)  Resp: 18 (08/29/22 1519)  BP: 137/72 (08/29/22 0719)  SpO2: 95 % (08/29/22 1519)   Vital Signs (24h Range):  Temp:  [97.7 °F (36.5 °C)-101.1 °F (38.4 °C)] 98.3 °F (36.8 °C)  Pulse:  [] 118  Resp:  [15-18] 18  SpO2:  [92 %-99 %] 95 %  BP: (127-168)/(60-75) 137/72     Weight: 73 kg (161 lb)  Body mass index is 24.48 kg/m².    Intake/Output Summary (Last 24 hours) at 8/29/2022 1827  Last data filed at 8/29/2022 1806  Gross per 24 hour   Intake 3514.83 ml   Output 2450 ml   Net 1064.83 ml      Physical Exam  HENT:      Head: Atraumatic.   Cardiovascular:      Rate and Rhythm: Normal rate.   Pulmonary:      Effort: Pulmonary effort is normal.   Skin:     Comments: Wound to right lateral foot   Neurological:      General: No focal deficit present.      Mental Status: He is alert and oriented to person, place, and time. Mental status is at baseline.   Psychiatric:         Mood and Affect: Mood normal.       Significant Labs: All pertinent labs within the past 24 hours have been reviewed.    Significant Imaging: I have reviewed all pertinent imaging results/findings within the  past 24 hours.      Assessment/Plan:      * Osteomyelitis of right foot  POD 2 right fifth toe amputation per Dr. Mathur.  -Vancomycin and cefepime  -Follow up cultures; currently with GNRs  -PICC placement 8/29  -PT/OT  -PRN analgesics  -Fall precautions  -Wound Care      HLD (hyperlipidemia)  -Start ASA and statin      Hypokalemia  -Replete PRN  -Trend K      Cellulitis of right foot  -Continue vancomycin and cefepime      Normocytic anemia  -Trend Hgb with CBC    Diabetes mellitus with hyperglycemia  -Detemir 30 units daily  -SSI  -Diabetic diet  -AC HS glucose checks  -Hypoglycemic precautions    Open wound of right foot  -Wound Care      Essential hypertension, not well controlled  -Start lisinopril 20  -PRN IV hydralazine      Hyponatremia  -IV NS fluids  -Trend Na        VTE Risk Mitigation (From admission, onward)         Ordered     enoxaparin injection 40 mg  Daily         08/26/22 0121     IP VTE HIGH RISK PATIENT  Once         08/26/22 0121     Place sequential compression device  Until discontinued         08/26/22 0121                      I have assessed these finding virtually using telemed platform and with assistance of bedside nurse                 The attending portion of this evaluation, treatment, and documentation was performed per Milagros Morgan NP via Telemedicine AudioVisual using the secure [employer] software platform with 2 way audio/video. The provider was located off-site and the patient is located in the hospital. The aforementioned video software was utilized to document the relevant history and physical exam    Milagros Morgan NP  Department of Hospital Medicine   [facility]

## 2022-08-29 NOTE — PLAN OF CARE
Jonathan communicated with Milagros ROMAN NP, for medical rounding. Possible LTAC for long term IVAB or Rehab.  Pending ID consult and Podiatry recommendations. Pt had fever last night, pt not medically cleared.

## 2022-08-29 NOTE — PT/OT/SLP PROGRESS
Physical Therapy Treatment    Patient Name:  Ernst May   MRN:  [id number]    Recommendations:     Discharge Recommendations:  LTACH (long-term acute care hospital)   Discharge Equipment Recommendations: walker, rolling   Barriers to discharge: Decreased caregiver support    Assessment:     Ernst May is a 62 y.o. male admitted with a medical diagnosis of Osteomyelitis of right foot.  He presents with the following impairments/functional limitations:  weakness, impaired endurance, impaired sensation, gait instability, decreased lower extremity function, impaired skin .    Pt seen up in chair with camwalker boot R foot. Pt requiring min to CGA for mobility- ambulated 30ft with RW- has difficulty maintaining NWB R. Back to chair with legs elevated.  Pt being evaluated for LTAC.    Rehab Prognosis: Fair; patient would benefit from acute skilled PT services to address these deficits and reach maximum level of function.    Recent Surgery: Procedure(s) (LRB):  AMPUTATION, FOOT (Right) 3 Days Post-Op    Plan:     During this hospitalization, patient to be seen daily to address the identified rehab impairments via gait training, therapeutic activities, therapeutic exercises and progress toward the following goals:    Plan of Care Expires:  09/15/22    Subjective   Stated he does not have problem with moving around  Pt educated on doctors orders for NWB  Chief Complaint: none  Patient/Family Comments/goals: get well  Pain/Comfort:  Pain Rating 1: 0/10      Objective:     Communicated with nurse Mancia prior to session.  Patient found up in chair with peripheral IV upon PT entry to room.     General Precautions: Standard, fall   Orthopedic Precautions:RLE non weight bearing   Braces:    Respiratory Status: Room air     Functional Mobility:  Bed Mobility:     Scooting: contact guard assistance  Transfers:     Sit to Stand:  minimum assistance with rolling walker  Gait: 30ft with RW with camwalker boot R. Pt not following  NWB. Pt encouraged heel weight bearing      AM-PAC 6 CLICK MOBILITY          Therapeutic Activities and Exercises:   Patient was educated on the importance of OOB activity and functional mobility to negate negative effects of prolonged bed rest during hospitalization, safe transfers and ambulation, and D/C planning   Back to chair post PT with legs elevated    Patient left up in chair with all lines intact, call button in reach, chair alarm on, and nurse Blanche present..    GOALS:   Multidisciplinary Problems       Physical Therapy Goals          Problem: Physical Therapy    Goal Priority Disciplines Outcome Goal Variances Interventions   Physical Therapy Goal     PT, PT/OT Ongoing, Progressing     Description: Goals to be met by: 9/15/2022     Patient will increase functional independence with mobility by performin). Supine to sit with Modified [place]  2). Sit to supine with Modified [place]  3). Sit to stand transfer with Modified [place]  4). Gait  x > 25 feet with Modified [place] using Rolling Walker.                          Time Tracking:     PT Received On: 22  PT Start Time: 1055     PT Stop Time: 1112  PT Total Time (min): 17 min     Billable Minutes: Gait Training 17    Treatment Type: Treatment  PT/PTA: PT     PTA Visit Number: 0     2022

## 2022-08-29 NOTE — PLAN OF CARE
Problem: Occupational Therapy  Goal: Occupational Therapy Goal  Description: Goals to be met by: 9/12/2022     Patient will increase functional independence with ADLs by performing:    UE Dressing with Modified [place].  LE Dressing with Modified [place].  Grooming with Modified [place].  Toileting from toilet with Modified [place] for hygiene and clothing management.   Toilet transfer to toilet with Modified [place].    Outcome: Ongoing, Progressing     OT evaluation completed. POC established.

## 2022-08-29 NOTE — PT/OT/SLP EVAL
Occupational Therapy   Evaluation    Name: Ernst May  MRN: [id number]  Admitting Diagnosis:  Osteomyelitis of right foot  Recent Surgery: Procedure(s) (LRB):  AMPUTATION, FOOT (Right) 3 Days Post-Op    Recommendations:     Discharge Recommendations: LTACH  Discharge Equipment Recommendations:  TBD  Barriers to discharge:  Decreased caregiver support    Assessment:     Ernst May is a 62 y.o. male with a medical diagnosis of Osteomyelitis of right foot.  He presents with performance deficits affecting function: weakness, impaired endurance, impaired self care skills, impaired functional mobility, gait instability, decreased lower extremity function and orthopedic precautions.     Rehab Prognosis: Fair; patient would benefit from acute skilled OT services to address these deficits and reach maximum level of function.       Plan:     Patient to be seen 5 x/week to address the above listed problems via self-care/home management, therapeutic activities, therapeutic exercises  Plan of Care Expires: 09/12/22  Plan of Care Reviewed with: patient    Subjective     Chief Complaint: Right foot pain  Patient/Family Comments/goals: Pt was agreeable to participate in OT.     Occupational Profile:  Living Environment: Pt lives alone in a RV.  Previous level of function: Independent   Equipment Used at Home: None  Assistance upon Discharge: TBD    Pain/Comfort:  Pain Rating 1: 1/10  Location - Side 1: Right  Location 1: foot    Patients cultural, spiritual, Sabianism conflicts given the current situation: yes    Objective:     Communicated with: nurse prior to session.  Patient found up in chair with peripheral IV upon OT entry to room.    General Precautions: Standard, fall   Orthopedic Precautions:RLE non weight bearing   Braces: Right boot  Respiratory Status: Room air    Occupational Performance:    Functional Mobility/Transfers:  Patient completed Sit <> Stand Transfer with minimum assistance with rolling walker    Patient completed Toilet Transfer Stand Pivot technique with minimum assistance with rolling walker    Activities of Daily Living:  Feeding:  independence  Grooming: Pt brushed his teeth with set up A/stand by assistance.  Upper Body Dressing: stand by assistance  Lower Body Dressing: maximal assistance  Toileting: moderate assistance for clothing management    Cognitive/Visual Perceptual:  Cognitive/Psychosocial Skills:  -       Oriented to: Person, Place, Time, and Situation   -       Follows Commands/attention: Follows multistep commands  -       Communication: clear/fluent    Physical Exam:  Upper Extremity Range of Motion:  -       Right Upper Extremity: WFL  -       Left Upper Extremity: WFL  Upper Extremity Strength: -       Right Upper Extremity: WFL  -       Left Upper Extremity: WFL    AMPAC 6 Click ADL:  AMPAC Total Score: 16    Treatment & Education:  Pt was given education on role of OT, POC, calling for assistance for OOB mobility and R LE NWB. Pt verbalized understanding, however would benefit from further instruction on R LE NWB.   Grooming: set up A/SBA  Functional transfer training  Sit <> stand transfer with RW and CGA  Toilet transfer with RW, grab bar and min A    Patient left up in chair with call button in reach and nurse notified.     GOALS:   Multidisciplinary Problems       Occupational Therapy Goals          Problem: Occupational Therapy    Goal Priority Disciplines Outcome Interventions   Occupational Therapy Goal     OT, PT/OT Ongoing, Progressing    Description: Goals to be met by: 9/12/2022     Patient will increase functional independence with ADLs by performing:    UE Dressing with Modified [place].  LE Dressing with Modified [place].  Grooming with Modified [place].  Toileting from toilet with Modified [place] for hygiene and clothing management.   Toilet transfer to toilet with Modified [place].                         History:     Past Medical History:    Diagnosis Date    Back abscess 09/2021    CKD (chronic kidney disease) 09/2021    Diabetes mellitus with hyperglycemia 09/2021    Diabetic foot ulcer 09/2021    bilateral, severe abrasions    Hypertension     Osteomyelitis of cervical spine 09/2021    under back abscess    Sepsis 10/9/2021         Past Surgical History:   Procedure Laterality Date    CYST REMOVAL  2012    FOOT AMPUTATION Right 8/26/2022    Procedure: AMPUTATION, FOOT;  Surgeon: Dave Mathur DPM;  Location: [facility] OR;  Service: Podiatry;  Laterality: Right;  4th and 5th metatarsal     INCISION AND DRAINAGE OF ABSCESS Left 9/4/2021    Procedure: INCISION AND DRAINAGE, ABSCESS;  Surgeon: Surjit Chawla MD;  Location: [facility] OR;  Service: General;  Laterality: Left;    INCISION AND DRAINAGE OF ABSCESS Left 9/14/2021    Procedure: INCISION AND DRAINAGE, ABSCESS; DEBRIDEMENT;  Surgeon: Surjit Chawla MD;  Location: [facility];  Service: General;  Laterality: Left;    REPLACEMENT OF WOUND VACUUM-ASSISTED CLOSURE DEVICE Left 9/14/2021    Procedure: REPLACEMENT, WOUND VAC;  Surgeon: Surjit Chawla MD;  Location: [facility];  Service: General;  Laterality: Left;  EXCHANGE.       Time Tracking:     OT Date of Treatment: 08/29/22  OT Start Time: 0935  OT Stop Time: 1035  OT Total Time (min): 60 min    Billable Minutes:Evaluation 15  Self Care/Home Management 45    8/29/2022

## 2022-08-29 NOTE — PROGRESS NOTES
Pharmacokinetic Assessment Follow Up: IV Vancomycin    Vancomycin serum concentration assessment(s):    The random level was drawn correctly and can be used to guide therapy at this time. The measurement is within the desired definitive target range of 15 to 20 mcg/mL.    Vancomycin Regimen Plan:    Change regimen to Vancomycin 1250 mg IV every 18 hours with next serum trough concentration measured at 1830 prior to 3rd dose on 8/30    Drug levels (last 3 results):  Recent Labs   Lab Result Units 08/27/22  1355 08/28/22  0912 08/29/22  0502   Vancomycin, Random ug/mL 15.6 16.2 18.2       Pharmacy will continue to follow and monitor vancomycin.    Please contact pharmacy at extension [phone] for questions regarding this assessment.    Thank you for the consult,   Ahmet Hernandez       Patient brief summary:  Ernst May is a 62 y.o. male initiated on antimicrobial therapy with IV Vancomycin for treatment of bone/joint infection        Drug Allergies:   Review of patient's allergies indicates:   Allergen Reactions    Neosporin [benzalkonium chloride]        Actual Body Weight:   73 kg    Renal Function:   Estimated Creatinine Clearance: 67.4 mL/min (based on SCr of 1.1 mg/dL).,     Dialysis Method (if applicable):  N/A    CBC (last 72 hours):  Recent Labs   Lab Result Units 08/27/22  0505 08/28/22  0353 08/29/22  0502   WBC K/uL 23.67* 17.79* 14.16*   Hemoglobin g/dL 10.0* 8.2* 8.4*   Hematocrit % 30.7* 24.7* 25.4*   Platelets K/uL 429 404 409   Gran % % 88.0* 84.4* 76.2*   Lymph % % 10.0* 10.3* 13.4*   Mono % % 2.0* 3.4* 6.9   Eosinophil % % 0.0 0.8 1.8   Basophil % % 0.0 0.2 0.4   Differential Method  Manual Automated Automated       Metabolic Panel (last 72 hours):  Recent Labs   Lab Result Units 08/27/22  0505 08/28/22  0353 08/29/22  0133   Sodium mmol/L 130* 132*  --    Potassium mmol/L 4.2 3.4*  --    Chloride mmol/L 100 104  --    CO2 mmol/L 18* 21*  --    Glucose mg/dL 390* 184*  --    Glucose, UA   --   --  Negative    BUN mg/dL 23 22  --    Creatinine mg/dL 1.1 1.1  --    Albumin g/dL  --  1.4*  --    Total Bilirubin mg/dL  --  0.2  --    Alkaline Phosphatase U/L  --  109  --    AST U/L  --  16  --    ALT U/L  --  18  --    Magnesium mg/dL 2.2 1.7  --    Phosphorus mg/dL  --  2.1*  --        Vancomycin Administrations:  vancomycin given in the last 96 hours                     vancomycin 1.5 g in dextrose 5 % 250 mL IVPB (ready to mix) ()  Restarted 08/28/22 1222     1,500 mg New Bag  1109    vancomycin 1.25 g in dextrose 5% 250 mL IVPB (ready to mix) ()  Restarted 08/27/22 1825     1,250 mg New Bag  1729    vancomycin 1.5 g in dextrose 5 % 250 mL IVPB (ready to mix) (mg) 1,500 mg New Bag 08/26/22 2126    vancomycin 1.5 g in dextrose 5 % 250 mL IVPB (ready to mix) (mg) 1,500 mg New Bag 08/26/22 0221                    Microbiologic Results:  Microbiology Results (last 7 days)       Procedure Component Value Units Date/Time    Culture, Anaerobe [[id number]] Collected: 08/26/22 1348    Order Status: Completed Specimen: Wound from Foot, Right Updated: 08/28/22 1154     Anaerobic Culture Culture in progress    Blood culture x two cultures. Draw prior to antibiotics. [[id number]] Collected: 08/25/22 2319    Order Status: Completed Specimen: Blood from Peripheral, Left Hand Updated: 08/28/22 1022     Blood Culture, Routine No Growth to date      No Growth to date      No Growth to date    Narrative:      Aerobic and anaerobic    Blood culture x two cultures. Draw prior to antibiotics. [[id number]] Collected: 08/25/22 2318    Order Status: Completed Specimen: Blood from Hand Updated: 08/28/22 1022     Blood Culture, Routine No Growth to date      No Growth to date      No Growth to date    Narrative:      Aerobic and anaerobic    AFB Culture & Smear [[id number]] Collected: 08/26/22 1348    Order Status: Completed Specimen: Wound from Foot, Right Updated: 08/28/22 0927     AFB Culture & Smear Culture in progress    Aerobic culture  [[id number]]  (Abnormal) Collected: 08/26/22 1348    Order Status: Completed Specimen: Wound from Foot, Right Updated: 08/28/22 0802     Aerobic Bacterial Culture GRAM NEGATIVE SE[place]  Moderate  Identification and susceptibility pending  No other significant isolate      Gram stain [[id number]] Collected: 08/26/22 1348    Order Status: Completed Specimen: Wound from Foot, Right Updated: 08/27/22 0120     Gram Stain Result Rare WBC's      Many Gram positive cocci      Few Gram negative rods    Fungus culture [[id number]] Collected: 08/26/22 1348    Order Status: Sent Specimen: Wound from Foot, Right Updated: 08/26/22 2200

## 2022-08-29 NOTE — PLAN OF CARE
"The patient complained of his urine smelling like "eggs X 4 days". He wanted something done. Notified the NP and she ordered a UA. His UA was negative. His temperature early in the shift was 101.1 and a re-take was 101.2. Ibuprofen was ordered. He took the Ibuprofen and his temperature went down to 98.7 orally. He remained afebrile for the rest of the shift.   "

## 2022-08-29 NOTE — SUBJECTIVE & OBJECTIVE
Interval History: patient resting in bed, wound with mod drainage, PICC line placed today, LTAC placement initiated, patient agrees with POC.    Review of Systems   Constitutional:  Negative for fatigue, fever and unexpected weight change.   HENT:  Negative for congestion.    Respiratory:  Negative for cough and shortness of breath.    Cardiovascular:  Negative for chest pain and palpitations.   Endocrine: Negative for polydipsia and polyuria.   Genitourinary:  Negative for difficulty urinating.   Musculoskeletal:  Positive for gait problem.   Skin:  Positive for wound.   Allergic/Immunologic: Negative for food allergies.   Neurological:  Positive for weakness.   Psychiatric/Behavioral:  Negative for agitation.    Objective:     Vital Signs (Most Recent):  Temp: 98.3 °F (36.8 °C) (08/29/22 1519)  Pulse: (!) 118 (08/29/22 1519)  Resp: 18 (08/29/22 1519)  BP: 137/72 (08/29/22 0719)  SpO2: 95 % (08/29/22 1519)   Vital Signs (24h Range):  Temp:  [97.7 °F (36.5 °C)-101.1 °F (38.4 °C)] 98.3 °F (36.8 °C)  Pulse:  [] 118  Resp:  [15-18] 18  SpO2:  [92 %-99 %] 95 %  BP: (127-168)/(60-75) 137/72     Weight: 73 kg (161 lb)  Body mass index is 24.48 kg/m².    Intake/Output Summary (Last 24 hours) at 8/29/2022 1827  Last data filed at 8/29/2022 1806  Gross per 24 hour   Intake 3514.83 ml   Output 2450 ml   Net 1064.83 ml      Physical Exam  HENT:      Head: Atraumatic.   Cardiovascular:      Rate and Rhythm: Normal rate.   Pulmonary:      Effort: Pulmonary effort is normal.   Skin:     Comments: Wound to right lateral foot   Neurological:      General: No focal deficit present.      Mental Status: He is alert and oriented to person, place, and time. Mental status is at baseline.   Psychiatric:         Mood and Affect: Mood normal.       Significant Labs: All pertinent labs within the past 24 hours have been reviewed.    Significant Imaging: I have reviewed all pertinent imaging results/findings within the past 24 hours.

## 2022-08-29 NOTE — PROCEDURES
"Ernst May is a 62 y.o. male patient.    Temp: 98.3 °F (36.8 °C) (08/29/22 1519)  Pulse: (!) 118 (08/29/22 1519)  Resp: 18 (08/29/22 1519)  BP: 137/72 (08/29/22 0719)  SpO2: 95 % (08/29/22 1519)  Weight: 73 kg (161 lb) (08/26/22 1244)  Height: 5' 8" (172.7 cm) (08/26/22 1244)    PICC  Date/Time: 8/29/2022 4:02 PM  Location procedure was performed: [facility] MEDICAL SURGICAL UNIT 3RD  Performed by: Nida Mack RN  Consent Done: Yes  Time out: Immediately prior to procedure a time out was called to verify the correct patient, procedure, equipment, support staff and site/side marked as required  Indications: med administration  Anesthesia: local infiltration  Local anesthetic: lidocaine 1% without epinephrine  Anesthetic Total (mL): 3  Preparation: skin prepped with ChloraPrep  Skin prep agent dried: skin prep agent completely dried prior to procedure  Sterile barriers: all five maximum sterile barriers used - cap, mask, sterile gown, sterile gloves, and large sterile sheet  Hand hygiene: hand hygiene performed prior to central venous catheter insertion  Location details: right basilic  Catheter type: double lumen  Catheter size: 5 Fr  Catheter Length: 38cm    Ultrasound guidance: yes  Vessel Caliber: medium and patent, compressibility normal  Vascular Doppler: not done  Needle advanced into vessel with real time Ultrasound guidance.  Guidewire confirmed in vessel.  Image recorded and saved.  Sterile sheath used.  no esophageal manometryNumber of attempts: 1  Post-procedure: blood return through all ports, chlorhexidine patch and sterile dressing applied  Technical procedures used: DEONNA & SHERLOCK 3CG  Estimated blood loss (mL): 0    Assessment: placement verified by x-ray, tip termination, successful placement and no pneumothorax on x-ray  Complications: none  Comments: PICC in good position        Name OC  8/29/2022    "

## 2022-08-29 NOTE — PLAN OF CARE
"CM spoke with pt about LTAC/Rhab. Pt agreed to go to LT[facility]. Cm sent referral via careport to [facility] and communicated with Mehnaz ALCALA and Didi MOREIRA via secure messaging. Cm will continue following.      12:08pm  Cm received a message from Mehnaz ALCALA, [facility]. Per Mehnaz, "she will accept pt. She is submitting for authorization." Cm will continue following.     08/29/22 1205   Post-Acute Status   Post-Acute Authorization Placement   Post-Acute Placement Status Referrals Sent     "

## 2022-08-30 ENCOUNTER — OUTSIDE PLACE OF SERVICE (OUTPATIENT)
Dept: INFECTIOUS DISEASES | Facility: CLINIC | Age: 62
End: 2022-08-30
Payer: MEDICAID

## 2022-08-30 DIAGNOSIS — L03.115 CELLULITIS OF RIGHT LOWER EXTREMITY: Primary | ICD-10-CM

## 2022-08-30 LAB
ALBUMIN SERPL BCP-MCNC: 1.5 G/DL (ref 3.5–5.2)
ALP SERPL-CCNC: 152 U/L (ref 55–135)
ALT SERPL W/O P-5'-P-CCNC: 19 U/L (ref 10–44)
ANION GAP SERPL CALC-SCNC: 8 MMOL/L (ref 8–16)
AST SERPL-CCNC: 21 U/L (ref 10–40)
BACTERIA SPEC AEROBE CULT: ABNORMAL
BACTERIA SPEC AEROBE CULT: ABNORMAL
BASOPHILS # BLD AUTO: 0.07 K/UL (ref 0–0.2)
BASOPHILS NFR BLD: 0.3 % (ref 0–1.9)
BILIRUB SERPL-MCNC: 0.4 MG/DL (ref 0.1–1)
BUN SERPL-MCNC: 11 MG/DL (ref 8–23)
CALCIUM SERPL-MCNC: 8.3 MG/DL (ref 8.7–10.5)
CHLORIDE SERPL-SCNC: 100 MMOL/L (ref 95–110)
CO2 SERPL-SCNC: 26 MMOL/L (ref 23–29)
CREAT SERPL-MCNC: 0.9 MG/DL (ref 0.5–1.4)
CRP SERPL-MCNC: 230.7 MG/L (ref 0–8.2)
DIFFERENTIAL METHOD: ABNORMAL
EOSINOPHIL # BLD AUTO: 0.1 K/UL (ref 0–0.5)
EOSINOPHIL NFR BLD: 0.7 % (ref 0–8)
ERYTHROCYTE [DISTWIDTH] IN BLOOD BY AUTOMATED COUNT: 12.6 % (ref 11.5–14.5)
EST. GFR  (NO RACE VARIABLE): >60 ML/MIN/1.73 M^2
GLUCOSE SERPL-MCNC: 101 MG/DL (ref 70–110)
HCT VFR BLD AUTO: 24.3 % (ref 40–54)
HGB BLD-MCNC: 8 G/DL (ref 14–18)
IMM GRANULOCYTES # BLD AUTO: 0.3 K/UL (ref 0–0.04)
IMM GRANULOCYTES NFR BLD AUTO: 1.5 % (ref 0–0.5)
LYMPHOCYTES # BLD AUTO: 1.5 K/UL (ref 1–4.8)
LYMPHOCYTES NFR BLD: 7.5 % (ref 18–48)
MAGNESIUM SERPL-MCNC: 1.5 MG/DL (ref 1.6–2.6)
MCH RBC QN AUTO: 27.8 PG (ref 27–31)
MCHC RBC AUTO-ENTMCNC: 32.9 G/DL (ref 32–36)
MCV RBC AUTO: 84 FL (ref 82–98)
MONOCYTES # BLD AUTO: 1.7 K/UL (ref 0.3–1)
MONOCYTES NFR BLD: 8.2 % (ref 4–15)
NEUTROPHILS # BLD AUTO: 16.5 K/UL (ref 1.8–7.7)
NEUTROPHILS NFR BLD: 81.8 % (ref 38–73)
NRBC BLD-RTO: 0 /100 WBC
PHOSPHATE SERPL-MCNC: 3.4 MG/DL (ref 2.7–4.5)
PLATELET # BLD AUTO: 463 K/UL (ref 150–450)
PMV BLD AUTO: 9.8 FL (ref 9.2–12.9)
POCT GLUCOSE: 177 MG/DL (ref 70–110)
POCT GLUCOSE: 181 MG/DL (ref 70–110)
POTASSIUM SERPL-SCNC: 3.4 MMOL/L (ref 3.5–5.1)
PROT SERPL-MCNC: 6.2 G/DL (ref 6–8.4)
RBC # BLD AUTO: 2.88 M/UL (ref 4.6–6.2)
SODIUM SERPL-SCNC: 134 MMOL/L (ref 136–145)
VANCOMYCIN TROUGH SERPL-MCNC: 16.3 UG/ML (ref 10–22)
WBC # BLD AUTO: 20.22 K/UL (ref 3.9–12.7)

## 2022-08-30 PROCEDURE — A4216 STERILE WATER/SALINE, 10 ML: HCPCS | Performed by: INTERNAL MEDICINE

## 2022-08-30 PROCEDURE — 80053 COMPREHEN METABOLIC PANEL: CPT | Performed by: STUDENT IN AN ORGANIZED HEALTH CARE EDUCATION/TRAINING PROGRAM

## 2022-08-30 PROCEDURE — 25000003 PHARM REV CODE 250: Performed by: NURSE PRACTITIONER

## 2022-08-30 PROCEDURE — 25000003 PHARM REV CODE 250: Performed by: INTERNAL MEDICINE

## 2022-08-30 PROCEDURE — 80202 ASSAY OF VANCOMYCIN: CPT | Performed by: STUDENT IN AN ORGANIZED HEALTH CARE EDUCATION/TRAINING PROGRAM

## 2022-08-30 PROCEDURE — 63600175 PHARM REV CODE 636 W HCPCS: Performed by: NURSE PRACTITIONER

## 2022-08-30 PROCEDURE — 94799 UNLISTED PULMONARY SVC/PX: CPT

## 2022-08-30 PROCEDURE — 83735 ASSAY OF MAGNESIUM: CPT | Performed by: STUDENT IN AN ORGANIZED HEALTH CARE EDUCATION/TRAINING PROGRAM

## 2022-08-30 PROCEDURE — 99223 PR INITIAL HOSPITAL CARE,LEVL III: ICD-10-PCS | Mod: S$GLB,,, | Performed by: INTERNAL MEDICINE

## 2022-08-30 PROCEDURE — 11042 PR DEBRIDEMENT, SKIN, SUB-Q TISSUE,=<20 SQ CM: ICD-10-PCS | Mod: 58,,, | Performed by: PODIATRIST

## 2022-08-30 PROCEDURE — 94761 N-INVAS EAR/PLS OXIMETRY MLT: CPT

## 2022-08-30 PROCEDURE — 25000003 PHARM REV CODE 250: Performed by: STUDENT IN AN ORGANIZED HEALTH CARE EDUCATION/TRAINING PROGRAM

## 2022-08-30 PROCEDURE — 36415 COLL VENOUS BLD VENIPUNCTURE: CPT | Performed by: STUDENT IN AN ORGANIZED HEALTH CARE EDUCATION/TRAINING PROGRAM

## 2022-08-30 PROCEDURE — 86140 C-REACTIVE PROTEIN: CPT | Performed by: STUDENT IN AN ORGANIZED HEALTH CARE EDUCATION/TRAINING PROGRAM

## 2022-08-30 PROCEDURE — 99900035 HC TECH TIME PER 15 MIN (STAT)

## 2022-08-30 PROCEDURE — 25000003 PHARM REV CODE 250: Performed by: PODIATRIST

## 2022-08-30 PROCEDURE — 85025 COMPLETE CBC W/AUTO DIFF WBC: CPT | Performed by: STUDENT IN AN ORGANIZED HEALTH CARE EDUCATION/TRAINING PROGRAM

## 2022-08-30 PROCEDURE — 97535 SELF CARE MNGMENT TRAINING: CPT

## 2022-08-30 PROCEDURE — 97605 NEG PRS WND THER DME<=50SQCM: CPT

## 2022-08-30 PROCEDURE — 84100 ASSAY OF PHOSPHORUS: CPT | Performed by: STUDENT IN AN ORGANIZED HEALTH CARE EDUCATION/TRAINING PROGRAM

## 2022-08-30 PROCEDURE — 99223 1ST HOSP IP/OBS HIGH 75: CPT | Mod: S$GLB,,, | Performed by: INTERNAL MEDICINE

## 2022-08-30 PROCEDURE — 12000002 HC ACUTE/MED SURGE SEMI-PRIVATE ROOM

## 2022-08-30 PROCEDURE — 11042 DBRDMT SUBQ TIS 1ST 20SQCM/<: CPT | Mod: 58,,, | Performed by: PODIATRIST

## 2022-08-30 RX ORDER — POTASSIUM CHLORIDE 20 MEQ/1
40 TABLET, EXTENDED RELEASE ORAL ONCE
Status: COMPLETED | OUTPATIENT
Start: 2022-08-30 | End: 2022-08-30

## 2022-08-30 RX ORDER — METRONIDAZOLE 500 MG/100ML
500 INJECTION, SOLUTION INTRAVENOUS
Status: DISCONTINUED | OUTPATIENT
Start: 2022-08-31 | End: 2022-09-01 | Stop reason: HOSPADM

## 2022-08-30 RX ORDER — MAGNESIUM SULFATE HEPTAHYDRATE 40 MG/ML
2 INJECTION, SOLUTION INTRAVENOUS ONCE
Status: COMPLETED | OUTPATIENT
Start: 2022-08-30 | End: 2022-08-30

## 2022-08-30 RX ADMIN — POTASSIUM CHLORIDE 40 MEQ: 1500 TABLET, EXTENDED RELEASE ORAL at 12:08

## 2022-08-30 RX ADMIN — CEFEPIME HYDROCHLORIDE 2 G: 2 INJECTION, SOLUTION INTRAVENOUS at 12:08

## 2022-08-30 RX ADMIN — INSULIN ASPART 4 UNITS: 100 INJECTION, SOLUTION INTRAVENOUS; SUBCUTANEOUS at 05:08

## 2022-08-30 RX ADMIN — INSULIN DETEMIR 30 UNITS: 100 INJECTION, SOLUTION SUBCUTANEOUS at 09:08

## 2022-08-30 RX ADMIN — MUPIROCIN: 20 OINTMENT TOPICAL at 08:08

## 2022-08-30 RX ADMIN — ENOXAPARIN SODIUM 40 MG: 100 INJECTION SUBCUTANEOUS at 05:08

## 2022-08-30 RX ADMIN — Medication 1 TABLET: at 09:08

## 2022-08-30 RX ADMIN — LISINOPRIL 20 MG: 10 TABLET ORAL at 09:08

## 2022-08-30 RX ADMIN — INSULIN ASPART 2 UNITS: 100 INJECTION, SOLUTION INTRAVENOUS; SUBCUTANEOUS at 12:08

## 2022-08-30 RX ADMIN — SODIUM CHLORIDE: 0.9 INJECTION, SOLUTION INTRAVENOUS at 10:08

## 2022-08-30 RX ADMIN — VANCOMYCIN HYDROCHLORIDE 1250 MG: 1.25 INJECTION, POWDER, LYOPHILIZED, FOR SOLUTION INTRAVENOUS at 08:08

## 2022-08-30 RX ADMIN — INSULIN ASPART 1 UNITS: 100 INJECTION, SOLUTION INTRAVENOUS; SUBCUTANEOUS at 08:08

## 2022-08-30 RX ADMIN — MUPIROCIN: 20 OINTMENT TOPICAL at 09:08

## 2022-08-30 RX ADMIN — POTASSIUM & SODIUM PHOSPHATES POWDER PACK 280-160-250 MG 1 PACKET: 280-160-250 PACK at 09:08

## 2022-08-30 RX ADMIN — SODIUM CHLORIDE, PRESERVATIVE FREE 10 ML: 5 INJECTION INTRAVENOUS at 06:08

## 2022-08-30 RX ADMIN — POTASSIUM & SODIUM PHOSPHATES POWDER PACK 280-160-250 MG 1 PACKET: 280-160-250 PACK at 08:08

## 2022-08-30 RX ADMIN — HYDROCODONE BITARTRATE AND ACETAMINOPHEN 1 TABLET: 5; 325 TABLET ORAL at 06:08

## 2022-08-30 RX ADMIN — MAGNESIUM SULFATE 2 G: 2 INJECTION INTRAVENOUS at 12:08

## 2022-08-30 RX ADMIN — POTASSIUM & SODIUM PHOSPHATES POWDER PACK 280-160-250 MG 1 PACKET: 280-160-250 PACK at 05:08

## 2022-08-30 RX ADMIN — HYOSCYAMINE SULFATE: 16 SOLUTION at 03:08

## 2022-08-30 RX ADMIN — ASPIRIN 81 MG CHEWABLE TABLET 81 MG: 81 TABLET CHEWABLE at 09:08

## 2022-08-30 RX ADMIN — SENNOSIDES AND DOCUSATE SODIUM 1 TABLET: 8.6; 5 TABLET ORAL at 09:08

## 2022-08-30 RX ADMIN — POTASSIUM & SODIUM PHOSPHATES POWDER PACK 280-160-250 MG 1 PACKET: 280-160-250 PACK at 12:08

## 2022-08-30 RX ADMIN — VANCOMYCIN HYDROCHLORIDE 1250 MG: 1.25 INJECTION, POWDER, LYOPHILIZED, FOR SOLUTION INTRAVENOUS at 01:08

## 2022-08-30 NOTE — PLAN OF CARE
POC/Meds reviewed, pt verbalized understanding. Vitals stable.  Afebrile. Up with x1 assist. Blood glucose monitored. Repositions self. Hourly/Q2hr rounding performed, safety maintained. Bed in lowest position, wheels locked, SR up x2, call light in easy reach. No  complaints at this time. Will continue to monitor.

## 2022-08-30 NOTE — NURSING
Patient complained about a wound to the back of his head on the left side of his neck and hairline area. There is an area of redness that is very painful and dried drainage noted on the patients hair that was cleaned off but the actual wound area opening is not visualized. If it would have been draining from a particular area I would have requested a culture to be done but no defined opening noted.

## 2022-08-30 NOTE — PROGRESS NOTES
Identifying data:  62-year-old male    Chief complaint:  Infected right foot with bone infection and gangrene     History of present illness:  Patient is 72 hours amputation 4th and 5th metatarsal 4th and 5th toes right foot    Objective:  Evaluated the incision has some dehiscence soupy in appearance with some minimal drainage.  There is necrotic tissue that is full-thickness down to subcutaneous tissue present.  Minimal redness no lymphangitis seen.          Assessment:  72 hours status post partial amputation right foot with continued infection and ulceration lateral right foot.  Grade 3 ulcer right foot with full-thickness necrotic tissue skin and subcutaneous tissue.      Plan:  With the patient's permission, At the bedside I removed sutures on lateral aspect of the proximal half of the incision I did a full-thickness excisional debridement of necrotic skin and subcutaneous tissue down to cuboid bone.  There was minimal amount of purulent drainage that was expressed.  I left the wound open I will write orders for wound care to apply wound VAC and monitor for continued infection.  There is a possibility the patient may need further surgery and possibility of further amputation.  I will monitor the patient along with wound care.  I explained to the patient he would do better to go to the LTAC allow them to do IV antibiotics wound care monitor the wound VAC.  If he goes to the LTAC my associate Dr. Lara can follow him there.

## 2022-08-30 NOTE — NURSING
Pt's blood sugars were as follows:  95 this am and no coverage needed.  181 for lunch and coverage provided.  244 for supper and coverage provided.  Will continue to monitor.

## 2022-08-30 NOTE — PT/OT/SLP PROGRESS
Physical Therapy      Patient Name:  Ernst May   MRN:  [id number]    PT attempted- wound vac placement at bedside.

## 2022-08-30 NOTE — PLAN OF CARE
"Cm received message in careport, "pt is accepted at [facility] and they have authorization. Cm will continue following.    12:50pmCm received communications form Blanche VU, RN-Wound care Nurse,'pt will be getting wound vac, as ordered by Dr Mathur." Cm updated JESSICA Ernandez via secure chat.  Per Oma, still pending wound culture results and ID consult."  Cm will continue following.    "

## 2022-08-30 NOTE — NURSING
Pt's lab this am showed his magnesium and potassium were low.  Notified MD Wilson.   Wrote new orders as scheduled for magnesium 2g IV and potassium 40meq PO to be given.  Will continue to monitor.

## 2022-08-30 NOTE — PLAN OF CARE
Problem: Adult Inpatient Plan of Care  Goal: Plan of Care Review  Outcome: Ongoing, Progressing  Goal: Optimal Comfort and Wellbeing  Outcome: Ongoing, Progressing     Problem: Diabetes Comorbidity  Goal: Blood Glucose Level Within Targeted Range  Outcome: Ongoing, Progressing     Problem: Fluid and Electrolyte Imbalance (Acute Kidney Injury/Impairment)  Goal: Fluid and Electrolyte Balance  Outcome: Ongoing, Progressing     Problem: Oral Intake Inadequate (Acute Kidney Injury/Impairment)  Goal: Optimal Nutrition Intake  Outcome: Ongoing, Progressing     Problem: Renal Function Impairment (Acute Kidney Injury/Impairment)  Goal: Effective Renal Function  Outcome: Ongoing, Progressing     Problem: Impaired Wound Healing  Goal: Optimal Wound Healing  Outcome: Ongoing, Progressing     Problem: Adjustment to Illness (Sepsis/Septic Shock)  Goal: Optimal Coping  Outcome: Ongoing, Progressing     Problem: Glycemic Control Impaired (Sepsis/Septic Shock)  Goal: Blood Glucose Level Within Desired Range  Outcome: Ongoing, Progressing     Problem: Infection Progression (Sepsis/Septic Shock)  Goal: Absence of Infection Signs and Symptoms  Outcome: Ongoing, Progressing     Problem: Nutrition Impaired (Sepsis/Septic Shock)  Goal: Optimal Nutrition Intake  Outcome: Ongoing, Progressing     Problem: Skin Injury Risk Increased  Goal: Skin Health and Integrity  Outcome: Ongoing, Progressing     Problem: Fall Injury Risk  Goal: Absence of Fall and Fall-Related Injury  Outcome: Ongoing, Progressing     Problem: Infection  Goal: Absence of Infection Signs and Symptoms  Outcome: Ongoing, Progressing     Pt's POC discussed today.  Pt received wound vac to foot today.  Blood sugars have not been in range today.  Pt's pain level has been under control today.  Pt's WBC count is still elevated.  Pt has infection still.  Pt has been eating 75% of all meals.  Will continue to monitor.

## 2022-08-30 NOTE — NURSING
Pt AAOx4. Lying in bed.  No c/o pain voiced.  Pt contiues on IV fluids.  No other needs at this time.  Will continue to monitor.

## 2022-08-30 NOTE — PT/OT/SLP PROGRESS
Occupational Therapy   Treatment    Name: Ernst May  MRN: [id number]  Admitting Diagnosis:  Osteomyelitis of right foot  4 Days Post-Op    Recommendations:     Discharge Recommendations:  LTACH  Discharge Equipment Recommendations:  TBD  Barriers to discharge:  Decreased caregiver support    Assessment:     Ernst May is a 62 y.o. male with a medical diagnosis of Osteomyelitis of right foot.  He presents with performance deficits affecting function are weakness, impaired endurance, impaired self care skills, impaired functional mobility, gait instability, decreased lower extremity function and orthopedic precautions. Pt was agreeable to participate in OT. Pt washed his face and brushed his teeth with set up A seated in chair. Pt was given instruction and demonstration of UE ROM exercises to improve his strength and independence with ADLs. Pt performed 3x10 bilateral shoulder flexion and elbow extension/flexion AROM. Pt was given re instruction of FERNANDA LEE. Pt verbalized understanding.     Rehab Prognosis:  Fair; patient would benefit from acute skilled OT services to address these deficits and reach maximum level of function.       Plan:     Patient to be seen 5 x/week to address the above listed problems via self-care/home management, therapeutic activities, therapeutic exercises  Plan of Care Expires: 09/12/22  Plan of Care Reviewed with: patient    Subjective     Pain/Comfort:  Pain Rating 1: 0/10    Objective:     Communicated with: nurse prior to session.  Patient found up in chair with peripheral IV upon OT entry to room.    General Precautions: Standard, fall   Orthopedic Precautions:RLE non weight bearing   Braces: right boot  Respiratory Status: Room air     Occupational Performance:     Activities of Daily Living:  Grooming: Pt washed his hands and brushed his teeth with set up A/stand by assistance seated in chair.     Treatment & Education:  Grooming: set up A  UE ROM exercises    Patient left up  in chair with all lines intact, call button in reach and chair alarm on.     GOALS:   Multidisciplinary Problems       Occupational Therapy Goals          Problem: Occupational Therapy    Goal Priority Disciplines Outcome Interventions   Occupational Therapy Goal     OT, PT/OT Ongoing, Progressing    Description: Goals to be met by: 9/12/2022     Patient will increase functional independence with ADLs by performing:    UE Dressing with Modified [place].  LE Dressing with Modified [place].  Grooming with Modified [place].  Toileting from toilet with Modified [place] for hygiene and clothing management.   Toilet transfer to toilet with Modified [place].                         Time Tracking:     OT Date of Treatment: 08/30/22  OT Start Time: 1030  OT Stop Time: 1105  OT Total Time (min): 35 min    Billable Minutes:Self Care/Home Management 35               8/30/2022

## 2022-08-31 LAB
ALBUMIN SERPL BCP-MCNC: 1.5 G/DL (ref 3.5–5.2)
ALP SERPL-CCNC: 130 U/L (ref 55–135)
ALT SERPL W/O P-5'-P-CCNC: 16 U/L (ref 10–44)
ANION GAP SERPL CALC-SCNC: 7 MMOL/L (ref 8–16)
AST SERPL-CCNC: 17 U/L (ref 10–40)
BACTERIA BLD CULT: NORMAL
BACTERIA BLD CULT: NORMAL
BACTERIA SPEC ANAEROBE CULT: ABNORMAL
BASOPHILS # BLD AUTO: 0.06 K/UL (ref 0–0.2)
BASOPHILS NFR BLD: 0.3 % (ref 0–1.9)
BILIRUB SERPL-MCNC: 0.3 MG/DL (ref 0.1–1)
BUN SERPL-MCNC: 14 MG/DL (ref 8–23)
CALCIUM SERPL-MCNC: 8.2 MG/DL (ref 8.7–10.5)
CHLORIDE SERPL-SCNC: 100 MMOL/L (ref 95–110)
CO2 SERPL-SCNC: 24 MMOL/L (ref 23–29)
CREAT SERPL-MCNC: 0.9 MG/DL (ref 0.5–1.4)
DIFFERENTIAL METHOD: ABNORMAL
EOSINOPHIL # BLD AUTO: 0.4 K/UL (ref 0–0.5)
EOSINOPHIL NFR BLD: 2 % (ref 0–8)
ERYTHROCYTE [DISTWIDTH] IN BLOOD BY AUTOMATED COUNT: 13.1 % (ref 11.5–14.5)
EST. GFR  (NO RACE VARIABLE): >60 ML/MIN/1.73 M^2
GLUCOSE SERPL-MCNC: 83 MG/DL (ref 70–110)
HCT VFR BLD AUTO: 22.9 % (ref 40–54)
HGB BLD-MCNC: 7.6 G/DL (ref 14–18)
IMM GRANULOCYTES # BLD AUTO: 0.23 K/UL (ref 0–0.04)
IMM GRANULOCYTES NFR BLD AUTO: 1.3 % (ref 0–0.5)
LYMPHOCYTES # BLD AUTO: 1.9 K/UL (ref 1–4.8)
LYMPHOCYTES NFR BLD: 10.6 % (ref 18–48)
MAGNESIUM SERPL-MCNC: 1.8 MG/DL (ref 1.6–2.6)
MCH RBC QN AUTO: 28.4 PG (ref 27–31)
MCHC RBC AUTO-ENTMCNC: 33.2 G/DL (ref 32–36)
MCV RBC AUTO: 85 FL (ref 82–98)
MONOCYTES # BLD AUTO: 1.7 K/UL (ref 0.3–1)
MONOCYTES NFR BLD: 9.7 % (ref 4–15)
NEUTROPHILS # BLD AUTO: 13.5 K/UL (ref 1.8–7.7)
NEUTROPHILS NFR BLD: 76.1 % (ref 38–73)
NRBC BLD-RTO: 0 /100 WBC
PHOSPHATE SERPL-MCNC: 3 MG/DL (ref 2.7–4.5)
PLATELET # BLD AUTO: 435 K/UL (ref 150–450)
PMV BLD AUTO: 9.7 FL (ref 9.2–12.9)
POCT GLUCOSE: 186 MG/DL (ref 70–110)
POCT GLUCOSE: 268 MG/DL (ref 70–110)
POCT GLUCOSE: 346 MG/DL (ref 70–110)
POTASSIUM SERPL-SCNC: 3.9 MMOL/L (ref 3.5–5.1)
PROT SERPL-MCNC: 6.1 G/DL (ref 6–8.4)
RBC # BLD AUTO: 2.68 M/UL (ref 4.6–6.2)
SODIUM SERPL-SCNC: 131 MMOL/L (ref 136–145)
WBC # BLD AUTO: 17.8 K/UL (ref 3.9–12.7)

## 2022-08-31 PROCEDURE — 85025 COMPLETE CBC W/AUTO DIFF WBC: CPT | Performed by: STUDENT IN AN ORGANIZED HEALTH CARE EDUCATION/TRAINING PROGRAM

## 2022-08-31 PROCEDURE — 94761 N-INVAS EAR/PLS OXIMETRY MLT: CPT

## 2022-08-31 PROCEDURE — 97110 THERAPEUTIC EXERCISES: CPT

## 2022-08-31 PROCEDURE — 80053 COMPREHEN METABOLIC PANEL: CPT | Performed by: STUDENT IN AN ORGANIZED HEALTH CARE EDUCATION/TRAINING PROGRAM

## 2022-08-31 PROCEDURE — 97535 SELF CARE MNGMENT TRAINING: CPT

## 2022-08-31 PROCEDURE — 12000002 HC ACUTE/MED SURGE SEMI-PRIVATE ROOM

## 2022-08-31 PROCEDURE — 25000003 PHARM REV CODE 250: Performed by: NURSE PRACTITIONER

## 2022-08-31 PROCEDURE — S0030 INJECTION, METRONIDAZOLE: HCPCS | Performed by: INTERNAL MEDICINE

## 2022-08-31 PROCEDURE — 84100 ASSAY OF PHOSPHORUS: CPT | Performed by: STUDENT IN AN ORGANIZED HEALTH CARE EDUCATION/TRAINING PROGRAM

## 2022-08-31 PROCEDURE — 83735 ASSAY OF MAGNESIUM: CPT | Performed by: STUDENT IN AN ORGANIZED HEALTH CARE EDUCATION/TRAINING PROGRAM

## 2022-08-31 PROCEDURE — 25500020 PHARM REV CODE 255

## 2022-08-31 PROCEDURE — 25000003 PHARM REV CODE 250: Performed by: STUDENT IN AN ORGANIZED HEALTH CARE EDUCATION/TRAINING PROGRAM

## 2022-08-31 PROCEDURE — 94799 UNLISTED PULMONARY SVC/PX: CPT

## 2022-08-31 PROCEDURE — 25000003 PHARM REV CODE 250: Performed by: INTERNAL MEDICINE

## 2022-08-31 PROCEDURE — 99233 PR SUBSEQUENT HOSPITAL CARE,LEVL III: ICD-10-PCS | Mod: S$GLB,,, | Performed by: INTERNAL MEDICINE

## 2022-08-31 PROCEDURE — 99233 SBSQ HOSP IP/OBS HIGH 50: CPT | Mod: S$GLB,,, | Performed by: INTERNAL MEDICINE

## 2022-08-31 PROCEDURE — 63600175 PHARM REV CODE 636 W HCPCS: Performed by: NURSE PRACTITIONER

## 2022-08-31 PROCEDURE — 36415 COLL VENOUS BLD VENIPUNCTURE: CPT | Performed by: STUDENT IN AN ORGANIZED HEALTH CARE EDUCATION/TRAINING PROGRAM

## 2022-08-31 RX ADMIN — POTASSIUM & SODIUM PHOSPHATES POWDER PACK 280-160-250 MG 1 PACKET: 280-160-250 PACK at 08:08

## 2022-08-31 RX ADMIN — MUPIROCIN: 20 OINTMENT TOPICAL at 08:08

## 2022-08-31 RX ADMIN — ENOXAPARIN SODIUM 40 MG: 100 INJECTION SUBCUTANEOUS at 04:08

## 2022-08-31 RX ADMIN — CEFEPIME HYDROCHLORIDE 2 G: 2 INJECTION, SOLUTION INTRAVENOUS at 11:08

## 2022-08-31 RX ADMIN — ASPIRIN 81 MG CHEWABLE TABLET 81 MG: 81 TABLET CHEWABLE at 08:08

## 2022-08-31 RX ADMIN — METRONIDAZOLE 500 MG: 5 INJECTION, SOLUTION INTRAVENOUS at 01:08

## 2022-08-31 RX ADMIN — INSULIN DETEMIR 30 UNITS: 100 INJECTION, SOLUTION SUBCUTANEOUS at 08:08

## 2022-08-31 RX ADMIN — CEFEPIME HYDROCHLORIDE 2 G: 2 INJECTION, SOLUTION INTRAVENOUS at 01:08

## 2022-08-31 RX ADMIN — METRONIDAZOLE 500 MG: 5 INJECTION, SOLUTION INTRAVENOUS at 03:08

## 2022-08-31 RX ADMIN — INSULIN ASPART 2 UNITS: 100 INJECTION, SOLUTION INTRAVENOUS; SUBCUTANEOUS at 08:08

## 2022-08-31 RX ADMIN — HYDROCODONE BITARTRATE AND ACETAMINOPHEN 1 TABLET: 5; 325 TABLET ORAL at 08:08

## 2022-08-31 RX ADMIN — METRONIDAZOLE 500 MG: 5 INJECTION, SOLUTION INTRAVENOUS at 08:08

## 2022-08-31 RX ADMIN — LISINOPRIL 20 MG: 10 TABLET ORAL at 08:08

## 2022-08-31 RX ADMIN — HYDROCODONE BITARTRATE AND ACETAMINOPHEN 1 TABLET: 5; 325 TABLET ORAL at 03:08

## 2022-08-31 RX ADMIN — POTASSIUM & SODIUM PHOSPHATES POWDER PACK 280-160-250 MG 1 PACKET: 280-160-250 PACK at 04:08

## 2022-08-31 RX ADMIN — HYDRALAZINE HYDROCHLORIDE 10 MG: 20 INJECTION INTRAMUSCULAR; INTRAVENOUS at 08:08

## 2022-08-31 RX ADMIN — POTASSIUM & SODIUM PHOSPHATES POWDER PACK 280-160-250 MG 1 PACKET: 280-160-250 PACK at 12:08

## 2022-08-31 RX ADMIN — SENNOSIDES AND DOCUSATE SODIUM 1 TABLET: 8.6; 5 TABLET ORAL at 08:08

## 2022-08-31 RX ADMIN — Medication 1 TABLET: at 08:08

## 2022-08-31 RX ADMIN — CEFEPIME HYDROCHLORIDE 2 G: 2 INJECTION, SOLUTION INTRAVENOUS at 12:08

## 2022-08-31 RX ADMIN — HYDROCODONE BITARTRATE AND ACETAMINOPHEN 1 TABLET: 5; 325 TABLET ORAL at 09:08

## 2022-08-31 RX ADMIN — INSULIN ASPART 4 UNITS: 100 INJECTION, SOLUTION INTRAVENOUS; SUBCUTANEOUS at 08:08

## 2022-08-31 RX ADMIN — SODIUM CHLORIDE: 0.9 INJECTION, SOLUTION INTRAVENOUS at 07:08

## 2022-08-31 RX ADMIN — IOHEXOL 125 ML: 350 INJECTION, SOLUTION INTRAVENOUS at 12:08

## 2022-08-31 RX ADMIN — INSULIN ASPART 4 UNITS: 100 INJECTION, SOLUTION INTRAVENOUS; SUBCUTANEOUS at 04:08

## 2022-08-31 RX ADMIN — METRONIDAZOLE 500 MG: 5 INJECTION, SOLUTION INTRAVENOUS at 11:08

## 2022-08-31 NOTE — PLAN OF CARE
Problem: Physical Therapy  Goal: Physical Therapy Goal  Description: Goals to be met by: 9/15/2022     Patient will increase functional independence with mobility by performin). Supine to sit with Modified [place]  2). Sit to supine with Modified [place]  3). Sit to stand transfer with Modified [place]  4). Gait  x > 25 feet with Modified [place] using Rolling Walker.     Outcome: Ongoing, Progressing   Pt up in chair with wound vac and cam boot on. Pt seen for thera ex in chair. Pt with complex R foot wound and strict NWB. LTAC

## 2022-08-31 NOTE — PROGRESS NOTES
"Progress  Note  Infectious Disease    Reason for Consult:  Osteomyelitis    HPI: Ernst May is a 62 y.o. male with pMHx of uncontrolled NIDDM,  large back abscess with bacteremia (enterococcus and enterobacter),  C7 spinous osteomyelitis,  anemia, CKD, HTN<, Diabetic foot ulcers, noncompliant, and poor historian.     Patient presented to ED with fever,102.9 and foot  pain   He was diagnosed with acute osteomyelitis of the right fourth and fifth metatarsal with abscess with gangrene of the fifth metatarsal. Podiatry performed amputation of the fifth right toe as well as cultures of the area.     On 8/30= podiatrist : "removed sutures on lateral aspect of the proximal half of the incision I did a full-thickness excisional debridement of necrotic skin and subcutaneous tissue down to cuboid bone.  There was minimal amount of purulent drainage that was expressed. "And placed wound VAC.  Cultures are growing E coli, Proteus, Bacteroides.  Patient has received 6 days of vancomycin and cefepime  Tamx improved, fever and chills resolved.  I came and saw patient.  He is a poor historian as the explanations are extremely vague.    Bottom line he is very noncompliant due to his understanding and financial issues.  His diabetes is out of control.  Duration of wound I would call it uncertain.      08/31/2022  No new complains. No fever. + Same foot pain. He does not like to be disturbed today. He has been accepted at [facility]  CTA of lower extremities is markedly abnormal as below    Antibiotics (From admission, onward)      Start     Stop Route Frequency Ordered    08/31/22 0015  metronidazole IVPB 500 mg         -- IV Every 8 hours (non-standard times) 08/30/22 2302    08/29/22 2100  mupirocin 2 % ointment         09/03 2059 Nasl 2 times daily 08/29/22 1453    08/26/22 1200  cefepime in dextrose 5 % IVPB 2 g         -- IV Every 12 hours (non-standard times) 08/26/22 0121           Antifungals (From admission, onward)      None "          Antivirals (From admission, onward)      None              Review of patient's allergies indicates:   Allergen Reactions    Neosporin [benzalkonium chloride]      Past Medical History:   Diagnosis Date    Back abscess 09/2021    CKD (chronic kidney disease) 09/2021    Diabetes mellitus with hyperglycemia 09/2021    Diabetic foot ulcer 09/2021    bilateral, severe abrasions    Hypertension     Osteomyelitis of cervical spine 09/2021    under back abscess    Sepsis 10/9/2021     Past Surgical History:   Procedure Laterality Date    CYST REMOVAL  2012    FOOT AMPUTATION Right 8/26/2022    Procedure: AMPUTATION, FOOT;  Surgeon: Dave Mathur DPM;  Location: [facility] OR;  Service: Podiatry;  Laterality: Right;  4th and 5th metatarsal     INCISION AND DRAINAGE OF ABSCESS Left 9/4/2021    Procedure: INCISION AND DRAINAGE, ABSCESS;  Surgeon: Surjit Chawla MD;  Location: [facility] OR;  Service: General;  Laterality: Left;    INCISION AND DRAINAGE OF ABSCESS Left 9/14/2021    Procedure: INCISION AND DRAINAGE, ABSCESS; DEBRIDEMENT;  Surgeon: Surjit Chawla MD;  Location: [facility] OR;  Service: General;  Laterality: Left;    REPLACEMENT OF WOUND VACUUM-ASSISTED CLOSURE DEVICE Left 9/14/2021    Procedure: REPLACEMENT, WOUND VAC;  Surgeon: Surjit Chawla MD;  Location: [facility] OR;  Service: General;  Laterality: Left;  EXCHANGE.       Review of Systems:  He had fevers and chills when he came in now resolved.  No change in vision, loss of vision or diplopia  No sinus congestion, purulent nasal discharge, post nasal drip or facial pain  No pain in mouth or throat. No problems with teeth, gums.  No chest pain, palpitations, syncope  No cough, sputum production, shortness of breath, dyspnea on exertion, pleurisy, hemoptysis  No nausea, vomiting, diarrhea, constipation, blood in stool, or focal abd pain,  No dysphagia, odynophagia  No dysuria, hematuria, strangury, retention, incontinence, nocturia, prostatism,   No  vaginal/penile discharge, genital ulcers, risk for STD  Other than foot changes, No swelling of joints, redness of joints, injuries, or new focal pain  No unusual headaches, dizziness, vertigo, numbness, paresthesias, neuropathy, falls  No anxiety, depression, substance abuse, sleep disturbance  No diabetes, thyroid, hypogonadal conditions  No bleeding, lymphadenopathy, anemia, malignancy, unusual bruising  No new rashes, lesions, or wounds  No TB exposure  No recent/prior steroids  Outdoor activities:  Travel:  No  Implants:  No  Antibiotic History:  None for a year as per patient    EXAM & DIAGNOSTICS REVIEWED:   Vitals:     Temp:  [98.6 °F (37 °C)-100.5 °F (38.1 °C)]   Temp: 99 °F (37.2 °C) (08/31/22 1100)  Pulse: 100 (08/31/22 1100)  Resp: 17 (08/31/22 1100)  BP: (!) 170/79 (08/31/22 1100)  SpO2: 97 % (08/31/22 1100)    Intake/Output Summary (Last 24 hours) at 8/31/2022 1342  Last data filed at 8/31/2022 0600  Gross per 24 hour   Intake 360 ml   Output 400 ml   Net -40 ml       General:  In NAD. Alert and attentive, cooperative, comfortable  Eyes:  Anicteric, EOMI  ENT:  No ulcers, exudates, thrush, nares patent, dentition is  Neck:  supple,   Lungs: Clear, no consolidation, rales, wheezes, rub  Heart:  RRR, no gallop/murmur/rub noted  Abd:  Soft, NT, ND, normal BS, no masses or organomegaly appreciated.  :  Voids/Diallo, urine clear, no flank tenderness  Musc:  Joints without effusion, swelling, erythema, synovitis, muscle wasting.   Skin:  Other than legs and neck as below in pictures, No rashes. No palmar or plantar lesions. No subungual petechiae  Neuro:             Alert, attentive, speech fluent, face symmetric, moves all extremities, no focal weakness. Ambulatory  Psych:  Calm, cooperative  Lymphatic:     No cervical, supraclavicular, axillary, or inguinal nodes  Extrem: Other than pics; No edema, erythema, phlebitis, cellulitis, warm and well perfused  VAD:  08/      Isolation:    Wound:   08/31/2022  Wound vac is not disturbed    08/30/2022 0829/2022                             Lines/Tubes/Drains:    General Labs reviewed:  Recent Labs   Lab 08/29/22  0502 08/30/22  0414 08/31/22  0354   WBC 14.16* 20.22* 17.80*   HGB 8.4* 8.0* 7.6*   HCT 25.4* 24.3* 22.9*    463* 435       Recent Labs   Lab 08/29/22  0502 08/30/22  0414 08/31/22  0354    134* 131*   K 3.7 3.4* 3.9    100 100   CO2 20* 26 24   BUN 15 11 14   CREATININE 1.0 0.9 0.9   CALCIUM 8.7 8.3* 8.2*   PROT 6.5 6.2 6.1   BILITOT 0.5 0.4 0.3   ALKPHOS 183* 152* 130   ALT 26 19 16   AST 46* 21 17     Recent Labs   Lab 08/28/22  0353 08/30/22  0414   .0* 230.7*         Micro:  Microbiology Results (last 7 days)       Procedure Component Value Units Date/Time    Fungus culture [[id number]] Collected: 08/26/22 1348    Order Status: Completed Specimen: Wound from Foot, Right Updated: 08/31/22 1055     Fungus (Mycology) Culture Culture in progress    Blood culture x two cultures. Draw prior to antibiotics. [[id number]] Collected: 08/25/22 2319    Order Status: Completed Specimen: Blood from Peripheral, Left Hand Updated: 08/31/22 1022     Blood Culture, Routine No growth after 5 days.    Narrative:      Aerobic and anaerobic    Blood culture x two cultures. Draw prior to antibiotics. [[id number]] Collected: 08/25/22 2318    Order Status: Completed Specimen: Blood from Hand Updated: 08/31/22 1022     Blood Culture, Routine No growth after 5 days.    Narrative:      Aerobic and anaerobic    Culture, Anaerobe [[id number]]  (Abnormal) Collected: 08/26/22 1348    Order Status: Completed Specimen: Wound from Foot, Right Updated: 08/31/22 0733     Anaerobic Culture BACTEROIDES OVATUS  Many      Aerobic culture [[id number]]  (Abnormal)  (Susceptibility) Collected: 08/26/22 1348    Order Status: Completed Specimen: Wound from Foot, Right Updated: 08/30/22 1022     Aerobic Bacterial Culture ESCHERICHIA COLI  Moderate        PROTEUS  PENNERI  Few  No other significant isolate  No other significant isolate      AFB Culture & Smear [[id number]] Collected: 08/26/22 1348    Order Status: Completed Specimen: Wound from Foot, Right Updated: 08/29/22 1521     AFB Culture & Smear Culture in progress     AFB CULTURE STAIN No acid fast bacilli seen.    Gram stain [[id number]] Collected: 08/26/22 1348    Order Status: Completed Specimen: Wound from Foot, Right Updated: 08/27/22 0120     Gram Stain Result Rare WBC's      Many Gram positive cocci      Few Gram negative rods           Escherichia coli Proteus penneri      CULTURE, AEROBIC  (SPECIFY SOURCE) CULTURE, AEROBIC  (SPECIFY SOURCE)    Amox/K Clav'ate <=8/4 mcg/mL Sensitive <=8/4 mcg/mL Sensitive    Amp/Sulbactam <=8/4 mcg/mL Sensitive <=8/4 mcg/mL Sensitive    Ampicillin <=8 mcg/mL Sensitive      Cefazolin <=2 mcg/mL Sensitive      Cefepime <=2 mcg/mL Sensitive <=2 mcg/mL Sensitive    Ceftriaxone <=1 mcg/mL Sensitive <=1 mcg/mL Sensitive    Ciprofloxacin <=1 mcg/mL Sensitive <=1 mcg/mL Sensitive    Ertapenem <=0.5 mcg/mL Sensitive <=0.5 mcg/mL Sensitive    Gentamicin <=4 mcg/mL Sensitive <=4 mcg/mL Sensitive    Levofloxacin <=2 mcg/mL Sensitive <=2 mcg/mL Sensitive    Meropenem <=1 mcg/mL Sensitive <=1 mcg/mL Sensitive    Piperacillin/Tazo <=16 mcg/mL Sensitive <=16 mcg/mL Sensitive    Tetracycline <=4 mcg/mL Sensitive      Tobramycin <=4 mcg/mL Sensitive <=4 mcg/mL Sensitive    Trimeth/Sulfa <=2/38 mcg/mL Sensitive <=2/38 mcg/mL Sensitive        Imaging Reviewed:   CXR---PICC in good position.  Hypoventilation noted.    US arterial---- There are findings suggesting inflow disease with biphasic waveforms seen proximally within the right lower extremity arterial system.  No focally elevated regions of velocity diff is suggest focal stenosis are seen.  There is evidence of further stenosis involving the right posterior tibial artery.    CTA 1 . Right lower extremity: Multiple areas of up to 50% stenosis  including right deep femoral artery, popliteal artery. High-grade stenosis at the origin of the right anterior tibial artery (51-99%).  Flow is present in all trifurcation vessels at the origin but cannot be traced beyond the origin, for reasons detailed above.  Correlate with prior ultrasound  2. Left lower extremity: Multiple areas of up to 50% stenosis including mid segment deep femoral artery, distal popliteal artery.  Flow is present in all trifurcation vessels at the origin but cannot be traced beyond the origin, for reasons detailed above.  Correlate with prior ultrasound.  3. Open skin wound along the right foot with subcutaneous emphysema in the foot anterior ankle and anterior shin musculature.  Gas could be medially from the open wound or from gas-forming infection.  Evidence of osteomyelitis involving the cuboid.  4. Diffuse subcutaneous soft tissue edema of both lower legs especially on the right.  This could relate to cellulitis noninfectious inflammation or venous/lymphatic occlusion.  5. Cholelith.  6. Left nephrolith.    Cardiology:    IMPRESSION & PLAN     -- Rt foot infection, osteomyelitis of 4th and 5th metatarsal, abscess, status post IND and amputation of 5th great toe  -- Leukocytosis  --markedly increased CRP  -- PAD  -- DM terribly uncontrolled  --noncompliance   -- Left scalp dry, red patch, to follow with dermatologist as OP , aware    Recommendations:  Continue cefepime, which is covering E coli and Proteus  Continue flagyl, for anaerobic coverage given Bacteroides and the way the foot looked, suspicious for anaerobes  Elevate leg  Eat healthy, increase protein intake  Control DM   Abnormal Arterial US and  CTA -- will ask for vascular evaluation  LTAC eventually, Patient is not able to attend to himself at home    Medical Decision Making during this encounter was  [_] Low Complexity  [_] Moderate Complexity  [ xx ] High Complexity

## 2022-08-31 NOTE — PLAN OF CARE
8:52am NICOLÁS sent secure message to NP and MD regarding status of possible discharge today to [facility].    Per ID, [facility]       08/31/22 0852   Post-Acute Status   Post-Acute Authorization Placement   Post-Acute Placement Status Pending medical clearance/testing

## 2022-08-31 NOTE — PROGRESS NOTES
Pharmacokinetic Assessment Follow Up: IV Vancomycin    Vancomycin serum concentration assessment(s):    The trough level was drawn correctly and can be used to guide therapy at this time. The measurement is within the desired definitive target range of 15 to 20 mcg/mL.    Vancomycin Regimen Plan:    Continue regimen to Vancomycin 1250 mg IV every 18 hours with next serum trough concentration measured at 0630 prior to 3rd  dose on 9/1/22.     Drug levels (last 3 results):  Recent Labs   Lab Result Units 08/28/22  0912 08/29/22  0502 08/30/22  1750   Vancomycin, Random ug/mL 16.2 18.2  --    Vancomycin-Trough ug/mL  --   --  16.3       Pharmacy will continue to follow and monitor vancomycin.    Please contact pharmacy at extension [phone] for questions regarding this assessment.    Thank you for the consult,   Stephany Huffman, Pharm. D        Patient brief summary:  Ernst May is a 62 y.o. male initiated on antimicrobial therapy with IV Vancomycin for treatment of bone/joint infection      Drug Allergies:   Review of patient's allergies indicates:   Allergen Reactions    Neosporin [benzalkonium chloride]        Actual Body Weight:   73 kg     Renal Function:   Estimated Creatinine Clearance: 82.3 mL/min (based on SCr of 0.9 mg/dL).,     Dialysis Method (if applicable):  N/A    CBC (last 72 hours):  Recent Labs   Lab Result Units 08/28/22  0353 08/29/22  0502 08/30/22  0414   WBC K/uL 17.79* 14.16* 20.22*   Hemoglobin g/dL 8.2* 8.4* 8.0*   Hematocrit % 24.7* 25.4* 24.3*   Platelets K/uL 404 409 463*   Gran % % 84.4* 76.2* 81.8*   Lymph % % 10.3* 13.4* 7.5*   Mono % % 3.4* 6.9 8.2   Eosinophil % % 0.8 1.8 0.7   Basophil % % 0.2 0.4 0.3   Differential Method  Automated Automated Automated       Metabolic Panel (last 72 hours):  Recent Labs   Lab Result Units 08/28/22  0353 08/29/22  0133 08/29/22  0502 08/30/22  0414   Sodium mmol/L 132*  --  137 134*   Potassium mmol/L 3.4*  --  3.7 3.4*   Chloride mmol/L 104  --  107  100   CO2 mmol/L 21*  --  20* 26   Glucose mg/dL 184*  --  79 101   Glucose, UA   --  Negative  --   --    BUN mg/dL 22  --  15 11   Creatinine mg/dL 1.1  --  1.0 0.9   Albumin g/dL 1.4*  --  1.6* 1.5*   Total Bilirubin mg/dL 0.2  --  0.5 0.4   Alkaline Phosphatase U/L 109  --  183* 152*   AST U/L 16  --  46* 21   ALT U/L 18  --  26 19   Magnesium mg/dL 1.7  --  1.8 1.5*   Phosphorus mg/dL 2.1*  --  2.9 3.4       Vancomycin Administrations:  vancomycin given in the last 96 hours                     vancomycin 1.25 g in dextrose 5% 250 mL IVPB (ready to mix) (mg) 1,250 mg New Bag 08/30/22 2014     1,250 mg New Bag  0108     1,250 mg New Bag 08/29/22 0730    vancomycin 1.5 g in dextrose 5 % 250 mL IVPB (ready to mix) ()  Restarted 08/28/22 1222     1,500 mg New Bag  1109    vancomycin 1.25 g in dextrose 5% 250 mL IVPB (ready to mix) ()  Restarted 08/27/22 1825     1,250 mg New Bag  1729    vancomycin 1.5 g in dextrose 5 % 250 mL IVPB (ready to mix) (mg) 1,500 mg New Bag 08/26/22 2126                    Microbiologic Results:  Microbiology Results (last 7 days)       Procedure Component Value Units Date/Time    Culture, Anaerobe [[id number]]  (Abnormal) Collected: 08/26/22 1348    Order Status: Completed Specimen: Wound from Foot, Right Updated: 08/30/22 1446     Anaerobic Culture BACTEROIDES OVATUS  Many      Blood culture x two cultures. Draw prior to antibiotics. [[id number]] Collected: 08/25/22 2319    Order Status: Completed Specimen: Blood from Peripheral, Left Hand Updated: 08/30/22 1022     Blood Culture, Routine No Growth to date      No Growth to date      No Growth to date      No Growth to date      No Growth to date    Narrative:      Aerobic and anaerobic    Blood culture x two cultures. Draw prior to antibiotics. [[id number]] Collected: 08/25/22 2318    Order Status: Completed Specimen: Blood from Hand Updated: 08/30/22 1022     Blood Culture, Routine No Growth to date      No Growth to date      No Growth  to date      No Growth to date      No Growth to date    Narrative:      Aerobic and anaerobic    Aerobic culture [[id number]]  (Abnormal)  (Susceptibility) Collected: 08/26/22 1348    Order Status: Completed Specimen: Wound from Foot, Right Updated: 08/30/22 1022     Aerobic Bacterial Culture ESCHERICHIA COLI  Moderate        PROTEUS PENNERI  Few  No other significant isolate  No other significant isolate      AFB Culture & Smear [[id number]] Collected: 08/26/22 1348    Order Status: Completed Specimen: Wound from Foot, Right Updated: 08/29/22 1521     AFB Culture & Smear Culture in progress     AFB CULTURE STAIN No acid fast bacilli seen.    Gram stain [[id number]] Collected: 08/26/22 1348    Order Status: Completed Specimen: Wound from Foot, Right Updated: 08/27/22 0120     Gram Stain Result Rare WBC's      Many Gram positive cocci      Few Gram negative rods    Fungus culture [[id number]] Collected: 08/26/22 1348    Order Status: Sent Specimen: Wound from Foot, Right Updated: 08/26/22 220

## 2022-08-31 NOTE — PROGRESS NOTES
"      [facility] Hospital Medicine  Telemedicine Progress Note    Patient Name: Ernst May  MRN: [id number]  Patient Class: IP- Inpatient   Admission Date: 8/25/2022  Length of Stay: 4 days  Attending Physician: Lexi Wilson MD  Primary Care Provider: Yeison Nuñez Jr, MD          Subjective:     Principal Problem:Osteomyelitis of right foot        HPI:  Mr. May is a 62 year old male with a history of NIDDM, HTN, CKDIII, and osteomyelitis of the cervical spine who presents today with complaints of weakness. It is severe. It is associated with hyperglycemia, dizziness, fever 102.9, and rt foot wound. He denies chest pain, SOB, cough, or LOC. He was in his truck and having difficulty reaching to the other side and states some "do-gooders" called EMS to help him out. He has not been compliant with any of his medications in months. He noticed his rt 5th toe turned black about 12 days ago. He has ulcerations and erythema along the right foot adjacent to the 5th toe. He was noted to be febrile in the ED T 102.9, , /95. Glucose 448 and lactate 0.9. Foot Xray: 1. Acute osteomyelitis of the 5th metatarsal and proximal phalanx and the 4th metatarsal head and neck. 2. Large plantar soft tissue ulcer with extensive underlying soft tissue gas      Overview/Hospital Course:  Ernst May is a 62 year old male with a past medical history of NIDDM, HTN and osteomyelitis for the cervical spine who presented with acute osteomyelitis of the right fourth and fifth metatarsal with abscess with gangrene of the fifth metatarsal. Podiatry was consulted and performed amputation of the fifth right toe as well as cultures of the area. Surgical wound cultures show GNRs. He is stable on vancomycin and cefepime.  PT/OT has been consulted. 8/29-wound still with moderate drainage, wound care following, PICC placement today, will require long term IV abx and wound care. Spoke with CM for LTAC " placement.      Interval History: no acute events overnight.  Bedside debridement per podiatry this morning.  Consult ID for long term abx recs.  Cultures pending.    Review of Systems   Constitutional:  Negative for fatigue, fever and unexpected weight change.   HENT:  Negative for congestion.    Respiratory:  Negative for cough and shortness of breath.    Cardiovascular:  Negative for chest pain and palpitations.   Endocrine: Negative for polydipsia and polyuria.   Genitourinary:  Negative for difficulty urinating.   Musculoskeletal:  Positive for gait problem.   Skin:  Positive for wound.   Allergic/Immunologic: Negative for food allergies.   Neurological:  Positive for weakness.   Psychiatric/Behavioral:  Negative for agitation.    Objective:     Vital Signs (Most Recent):  Temp: 100.1 °F (37.8 °C) (08/30/22 1949)  Pulse: 98 (08/30/22 1949)  Resp: 16 (08/30/22 1949)  BP: (!) 142/68 (08/30/22 1949)  SpO2: 96 % (08/30/22 1949)   Vital Signs (24h Range):  Temp:  [98.4 °F (36.9 °C)-100.2 °F (37.9 °C)] 100.1 °F (37.8 °C)  Pulse:  [] 98  Resp:  [14-20] 16  SpO2:  [95 %-98 %] 96 %  BP: (136-184)/(67-96) 142/68     Weight: 73 kg (161 lb)  Body mass index is 24.48 kg/m².    Intake/Output Summary (Last 24 hours) at 8/30/2022 2045  Last data filed at 8/30/2022 1800  Gross per 24 hour   Intake 960 ml   Output 1000 ml   Net -40 ml      Physical Exam  HENT:      Head: Atraumatic.   Cardiovascular:      Rate and Rhythm: Normal rate.   Pulmonary:      Effort: Pulmonary effort is normal.   Skin:     Comments: Wound to right lateral foot   Neurological:      General: No focal deficit present.      Mental Status: He is alert and oriented to person, place, and time. Mental status is at baseline.   Psychiatric:         Mood and Affect: Mood normal.       Significant Labs: All pertinent labs within the past 24 hours have been reviewed.  CBC:   Recent Labs   Lab 08/29/22  0502 08/30/22  0414   WBC 14.16* 20.22*   HGB 8.4* 8.0*    HCT 25.4* 24.3*    463*     CMP:   Recent Labs   Lab 08/29/22  0502 08/30/22  0414    134*   K 3.7 3.4*    100   CO2 20* 26   GLU 79 101   BUN 15 11   CREATININE 1.0 0.9   CALCIUM 8.7 8.3*   PROT 6.5 6.2   ALBUMIN 1.6* 1.5*   BILITOT 0.5 0.4   ALKPHOS 183* 152*   AST 46* 21   ALT 26 19   ANIONGAP 10 8       Significant Imaging: I have reviewed all pertinent imaging results/findings within the past 24 hours.      Assessment/Plan:      * Osteomyelitis of right foot  POD 2 right fifth toe amputation per Dr. Mathur.  -Vancomycin and cefepime  -Follow up cultures; currently with GNRs  -Will likely need PICC line  -PT/OT  -PRN analgesics  -Fall precautions  -Wound Care  - consult ID      HLD (hyperlipidemia)  -Start ASA and statin      Hypokalemia  -Replete PRN  -Trend K      Cellulitis of right foot  -Continue vancomycin and cefepime      Normocytic anemia  -Trend Hgb with CBC    Diabetes mellitus with hyperglycemia  -Detemir 30 units daily  -SSI  -Diabetic diet  -AC HS glucose checks  -Hypoglycemic precautions    Open wound of right foot  -Wound Care  8/30 bedside debridement per podiatry      Essential hypertension, not well controlled  -Start lisinopril 20  -PRN IV hydralazine      Hyponatremia  -IV NS fluids  -Trend Na        VTE Risk Mitigation (From admission, onward)         Ordered     enoxaparin injection 40 mg  Daily         08/26/22 0121     IP VTE HIGH RISK PATIENT  Once         08/26/22 0121     Place sequential compression device  Until discontinued         08/26/22 0121                      I have assessed these finding virtually using telemed platform and with assistance of bedside nurse                 The attending portion of this evaluation, treatment, and documentation was performed per IMTIAZ Silver via Telemedicine AudioVisual using the secure [employer] software platform with 2 way audio/video. The provider was located off-site and the patient is located in the hospital.  The aforementioned video software was utilized to document the relevant history and physical exam    IMTIAZ Silver  Department of Hospital Medicine   [facility]

## 2022-08-31 NOTE — ASSESSMENT & PLAN NOTE
POD 2 right fifth toe amputation per Dr. Mathur.  -Vancomycin and cefepime  -Follow up cultures; currently with GNRs  -Will likely need PICC line  -PT/OT  -PRN analgesics  -Fall precautions  -Wound Care  - consult ID

## 2022-08-31 NOTE — PROGRESS NOTES
Ernst May [id number] is a 62 y.o. male who has been consulted for vancomycin dosing.    Pharmacy consult for vancomycin dosing in no longer required.  Vancomycin was discontinued.    Thank you for allowing us to participate in this patient's care.     Ahmet Hernandez  Pharm.D    [facility]    [phone]

## 2022-08-31 NOTE — PLAN OF CARE
POC/Meds reviewed, pt verbalized understanding. Vitals stable.  Afebrile. Up with x1 assist. wound vac in place. Blood glucose monitored. Repositions self. Hourly/Q2hr rounding performed, safety maintained. Bed in lowest position, wheels locked, SR up x2, call light in easy reach. No  complaints at this time. Will continue to monitor.

## 2022-08-31 NOTE — SUBJECTIVE & OBJECTIVE
Interval History: no acute events overnight.  Bedside debridement per podiatry this morning.  Consult ID for long term abx recs.  Cultures pending.    Review of Systems   Constitutional:  Negative for fatigue, fever and unexpected weight change.   HENT:  Negative for congestion.    Respiratory:  Negative for cough and shortness of breath.    Cardiovascular:  Negative for chest pain and palpitations.   Endocrine: Negative for polydipsia and polyuria.   Genitourinary:  Negative for difficulty urinating.   Musculoskeletal:  Positive for gait problem.   Skin:  Positive for wound.   Allergic/Immunologic: Negative for food allergies.   Neurological:  Positive for weakness.   Psychiatric/Behavioral:  Negative for agitation.    Objective:     Vital Signs (Most Recent):  Temp: 100.1 °F (37.8 °C) (08/30/22 1949)  Pulse: 98 (08/30/22 1949)  Resp: 16 (08/30/22 1949)  BP: (!) 142/68 (08/30/22 1949)  SpO2: 96 % (08/30/22 1949)   Vital Signs (24h Range):  Temp:  [98.4 °F (36.9 °C)-100.2 °F (37.9 °C)] 100.1 °F (37.8 °C)  Pulse:  [] 98  Resp:  [14-20] 16  SpO2:  [95 %-98 %] 96 %  BP: (136-184)/(67-96) 142/68     Weight: 73 kg (161 lb)  Body mass index is 24.48 kg/m².    Intake/Output Summary (Last 24 hours) at 8/30/2022 2045  Last data filed at 8/30/2022 1800  Gross per 24 hour   Intake 960 ml   Output 1000 ml   Net -40 ml      Physical Exam  HENT:      Head: Atraumatic.   Cardiovascular:      Rate and Rhythm: Normal rate.   Pulmonary:      Effort: Pulmonary effort is normal.   Skin:     Comments: Wound to right lateral foot   Neurological:      General: No focal deficit present.      Mental Status: He is alert and oriented to person, place, and time. Mental status is at baseline.   Psychiatric:         Mood and Affect: Mood normal.       Significant Labs: All pertinent labs within the past 24 hours have been reviewed.  CBC:   Recent Labs   Lab 08/29/22  0502 08/30/22  0414   WBC 14.16* 20.22*   HGB 8.4* 8.0*   HCT 25.4* 24.3*     463*     CMP:   Recent Labs   Lab 08/29/22  0502 08/30/22  0414    134*   K 3.7 3.4*    100   CO2 20* 26   GLU 79 101   BUN 15 11   CREATININE 1.0 0.9   CALCIUM 8.7 8.3*   PROT 6.5 6.2   ALBUMIN 1.6* 1.5*   BILITOT 0.5 0.4   ALKPHOS 183* 152*   AST 46* 21   ALT 26 19   ANIONGAP 10 8       Significant Imaging: I have reviewed all pertinent imaging results/findings within the past 24 hours.

## 2022-08-31 NOTE — PT/OT/SLP PROGRESS
Occupational Therapy   Treatment    Name: Ernst May  MRN: [id number]  Admitting Diagnosis:  Osteomyelitis of right foot  5 Days Post-Op    Recommendations:     Discharge Recommendations: LTACH  Discharge Equipment Recommendations:  TBD  Barriers to discharge:  Decreased caregiver support    Assessment:     Ernst May is a 62 y.o. male with a medical diagnosis of Osteomyelitis of right foot.  He presents with performance deficits affecting function are weakness, impaired endurance, impaired self care skills, impaired functional mobility, gait instability, decreased lower extremity function and orthopedic precautions. Pt was agreeable to participate in OT. Pt washed his face and brushed his teeth with set up A seated in chair. Pt performed 3x10 bilateral shoulder flexion, shoulder abduction and elbow extension/flexion AROM. Pt was given re instruction of FERNANDA LEE. Pt verbalized understanding.     Rehab Prognosis:  Fair; patient would benefit from acute skilled OT services to address these deficits and reach maximum level of function.       Plan:     Patient to be seen 5 x/week to address the above listed problems via self-care/home management, therapeutic activities, therapeutic exercises  Plan of Care Expires: 09/12/22  Plan of Care Reviewed with: patient    Subjective     Pain/Comfort:  Pain Rating 1: 10/10  Location - Side 1: Right  Location 1: foot  Pain Addressed 1: Nurse notified    Objective:     Communicated with: nurse prior to session.  Patient found up in chair with peripheral IV upon OT entry to room.    General Precautions: Standard, fall   Orthopedic Precautions:RLE non weight bearing   Braces: boot  Respiratory Status: Room air     Occupational Performance:     Activities of Daily Living:  Grooming: Pt washed his hands and brushed his teeth with set up A/stand by assistance seated in chair.      Treatment & Education:  Grooming: set up A  UE ROM exercises    Patient left up in chair with all  lines intact, call button in reach and chair alarm on.    GOALS:   Multidisciplinary Problems       Occupational Therapy Goals          Problem: Occupational Therapy    Goal Priority Disciplines Outcome Interventions   Occupational Therapy Goal     OT, PT/OT Ongoing, Progressing    Description: Goals to be met by: 9/12/2022     Patient will increase functional independence with ADLs by performing:    UE Dressing with Modified Dale.  LE Dressing with Modified Dale.  Grooming with Modified Dale.  Toileting from toilet with Modified Dale for hygiene and clothing management.   Toilet transfer to toilet with Modified Dale.                         Time Tracking:     OT Date of Treatment: 08/31/22  OT Start Time: 1117  OT Stop Time: 1145  OT Total Time (min): 28 min    Billable Minutes:Self Care/Home Management 15  Therapeutic Exercise 13               8/31/2022

## 2022-08-31 NOTE — PT/OT/SLP PROGRESS
"Physical Therapy Treatment    Patient Name:  Ernst May   MRN:  [id number]    Recommendations:     Discharge Recommendations:  LTACH (long-term acute care hospital)   Discharge Equipment Recommendations: walker, rolling   Barriers to discharge: Decreased caregiver support    Assessment:     Ernst May is a 62 y.o. male admitted with a medical diagnosis of Osteomyelitis of right foot.  He presents with the following impairments/functional limitations:  weakness, impaired endurance, impaired sensation, gait instability, decreased lower extremity function, impaired skin .    Pt seen OOB in reclining chair with wound vac and cam walker boot. Pt seen for thera ex to LE's. Pt with complex R foot wound and strictly NWB/and to limit ambulation from bed to bathroom only..    Rehab Prognosis: Fair; patient would benefit from acute skilled PT services to address these deficits and reach maximum level of function.    Recent Surgery: Procedure(s) (LRB):  AMPUTATION, FOOT (Right) 5 Days Post-Op    Plan:     During this hospitalization, patient to be seen daily to address the identified rehab impairments via therapeutic activities, therapeutic exercises and progress toward the following goals:    Plan of Care Expires:  09/15/22    Subjective   Stated "I'm not supposed to put weight on my R foot"  Chief Complaint: none  Patient/Family Comments/goals: get to LTAC  Pain/Comfort:  Pain Rating 1: 0/10      Objective:     Communicated with nurse Neal prior to session.  Patient found up in chair with wound vac, peripheral IV, chair check upon PT entry to room.     General Precautions: Standard, fall   Orthopedic Precautions:RLE non weight bearing   Braces:    Respiratory Status: Room air     Functional Mobility:  Scooting: contact guard assistance      AM-PAC 6 CLICK MOBILITY          Therapeutic Activities and Exercises:   Patient was educated on the importance of OOB activity and functional mobility to negate negative effects " of prolonged bed rest during hospitalization, safe transfers and ambulation, and D/C planning   Thera ex with L AP, SLR,abd/add, HS  Bilateral QS/GS  Will continued bed to chair transfers    Patient left up in chair with all lines intact and chair alarm on..    GOALS:   Multidisciplinary Problems       Physical Therapy Goals          Problem: Physical Therapy    Goal Priority Disciplines Outcome Goal Variances Interventions   Physical Therapy Goal     PT, PT/OT Ongoing, Progressing     Description: Goals to be met by: 9/15/2022     Patient will increase functional independence with mobility by performin). Supine to sit with Modified Jack  2). Sit to supine with Modified Jack  3). Sit to stand transfer with Modified Jack  4). Gait  x > 25 feet with Modified Jack using Rolling Walker.                          Time Tracking:     PT Received On: 22  PT Start Time: 1414     PT Stop Time: 1430  PT Total Time (min): 16 min     Billable Minutes: Therapeutic Exercise 16    Treatment Type: Treatment  PT/PTA: PT     PTA Visit Number: 0     2022

## 2022-08-31 NOTE — CONSULTS
"Consult Note  Infectious Disease    Reason for Consult:  Osteomyelitis    HPI: Ernst May is a 62 y.o. male with pMHx of uncontrolled NIDDM,  large back abscess with bacteremia (enterococcus and enterobacter),  C7 spinous osteomyelitis,  anemia, CKD, HTN<, Diabetic foot ulcers, noncompliant, and poor historian.     Patient presented to ED with fever,102.9 and foot  pain   He was diagnosed with acute osteomyelitis of the right fourth and fifth metatarsal with abscess with gangrene of the fifth metatarsal. Podiatry performed amputation of the fifth right toe as well as cultures of the area.     On 8/30= podiatrist : "removed sutures on lateral aspect of the proximal half of the incision I did a full-thickness excisional debridement of necrotic skin and subcutaneous tissue down to cuboid bone.  There was minimal amount of purulent drainage that was expressed. "And placed wound VAC.  Cultures are growing E coli, Proteus, Bacteroides.  Patient has received 6 days of vancomycin and cefepime  Tamx improved, fever and chills resolved.  I came and saw patient.  He is a poor historian as the explanations are extremely vague.    Bottom line he is very noncompliant due to his understanding and financial issues.  His diabetes is out of control.  Duration of wound I would call it uncertain.        Antibiotics (From admission, onward)      Start     Stop Route Frequency Ordered    08/29/22 2100  mupirocin 2 % ointment         09/03 2059 Nasl 2 times daily 08/29/22 1453    08/29/22 0730  vancomycin 1.25 g in dextrose 5% 250 mL IVPB (ready to mix)         -- IV Every 18 hours 08/29/22 0554    08/26/22 1200  cefepime in dextrose 5 % IVPB 2 g         -- IV Every 12 hours (non-standard times) 08/26/22 0121    08/26/22 0220  vancomycin - pharmacy to dose  (vancomycin IVPB)        See [facility] for full Linked Orders Report.    -- IV pharmacy to manage frequency 08/26/22 0121           Antifungals (From admission, onward)      None "          Antivirals (From admission, onward)      None              Review of patient's allergies indicates:   Allergen Reactions    Neosporin [benzalkonium chloride]      Past Medical History:   Diagnosis Date    Back abscess 09/2021    CKD (chronic kidney disease) 09/2021    Diabetes mellitus with hyperglycemia 09/2021    Diabetic foot ulcer 09/2021    bilateral, severe abrasions    Hypertension     Osteomyelitis of cervical spine 09/2021    under back abscess    Sepsis 10/9/2021     Past Surgical History:   Procedure Laterality Date    CYST REMOVAL  2012    FOOT AMPUTATION Right 8/26/2022    Procedure: AMPUTATION, FOOT;  Surgeon: Dave Mathur DPM;  Location: [facility] OR;  Service: Podiatry;  Laterality: Right;  4th and 5th metatarsal     INCISION AND DRAINAGE OF ABSCESS Left 9/4/2021    Procedure: INCISION AND DRAINAGE, ABSCESS;  Surgeon: Surjit Chawla MD;  Location: [facility] OR;  Service: General;  Laterality: Left;    INCISION AND DRAINAGE OF ABSCESS Left 9/14/2021    Procedure: INCISION AND DRAINAGE, ABSCESS; DEBRIDEMENT;  Surgeon: Surjit Chawla MD;  Location: [facility] OR;  Service: General;  Laterality: Left;    REPLACEMENT OF WOUND VACUUM-ASSISTED CLOSURE DEVICE Left 9/14/2021    Procedure: REPLACEMENT, WOUND VAC;  Surgeon: Surjit Chawla MD;  Location: [facility] OR;  Service: General;  Laterality: Left;  EXCHANGE.     Social History     Socioeconomic History    Marital status: Single   Tobacco Use    Smoking status: Some Days     Types: Cigars    Smokeless tobacco: Never    Tobacco comments:     occassionally   Substance and Sexual Activity    Alcohol use: No    Drug use: No     Family History   Problem Relation Age of Onset    Diabetes Mother     Heart disease Mother     Arthritis Father     Diabetes Father     Heart disease Father     Hypertension Father     Diabetes Maternal Grandmother          Review of Systems:  He had fevers and chills when he came in now resolved.  No change in vision, loss of vision  or diplopia  No sinus congestion, purulent nasal discharge, post nasal drip or facial pain  No pain in mouth or throat. No problems with teeth, gums.  No chest pain, palpitations, syncope  No cough, sputum production, shortness of breath, dyspnea on exertion, pleurisy, hemoptysis  No nausea, vomiting, diarrhea, constipation, blood in stool, or focal abd pain,  No dysphagia, odynophagia  No dysuria, hematuria, strangury, retention, incontinence, nocturia, prostatism,   No vaginal/penile discharge, genital ulcers, risk for STD  Other than foot changes, No swelling of joints, redness of joints, injuries, or new focal pain  No unusual headaches, dizziness, vertigo, numbness, paresthesias, neuropathy, falls  No anxiety, depression, substance abuse, sleep disturbance  No diabetes, thyroid, hypogonadal conditions  No bleeding, lymphadenopathy, anemia, malignancy, unusual bruising  No new rashes, lesions, or wounds  No TB exposure  No recent/prior steroids  Outdoor activities:  Travel:  No  Implants:  No  Antibiotic History:  None for a year as per patient    EXAM & DIAGNOSTICS REVIEWED:   Vitals:     Temp:  [98.4 °F (36.9 °C)-100.2 °F (37.9 °C)]   Temp: 100.1 °F (37.8 °C) (08/30/22 1949)  Pulse: 98 (08/30/22 1949)  Resp: 16 (08/30/22 1949)  BP: (!) 142/68 (08/30/22 1949)  SpO2: 96 % (08/30/22 1949)    Intake/Output Summary (Last 24 hours) at 8/30/2022 2517  Last data filed at 8/30/2022 1800  Gross per 24 hour   Intake 960 ml   Output 1000 ml   Net -40 ml       General:  In NAD. Alert and attentive, cooperative, comfortable  Eyes:  Anicteric, PERRL, EOMI  ENT:  No ulcers, exudates, thrush, nares patent, dentition is  Neck:  supple, no masses or adenopathy appreciated  Lungs: Clear, no consolidation, rales, wheezes, rub  Heart:  RRR, no gallop/murmur/rub noted  Abd:  Soft, NT, ND, normal BS, no masses or organomegaly appreciated.  :  Voids/Diallo, urine clear, no flank tenderness  Musc:  Joints without effusion, swelling,  erythema, synovitis, muscle wasting.   Skin:  Other than legs and neck as below in pictures, No rashes. No palmar or plantar lesions. No subungual petechiae  Neuro:             Alert, attentive, speech fluent, face symmetric, moves all extremities, no focal weakness. Ambulatory  Psych:  Calm, cooperative  Lymphatic:     No cervical, supraclavicular, axillary, or inguinal nodes  Extrem: No edema, erythema, phlebitis, cellulitis, warm and well perfused  VAD:       Isolation:    Wound:   08/30/2022 0829/2022                             Lines/Tubes/Drains:    General Labs reviewed:  Recent Labs   Lab 08/28/22  0353 08/29/22  0502 08/30/22  0414   WBC 17.79* 14.16* 20.22*   HGB 8.2* 8.4* 8.0*   HCT 24.7* 25.4* 24.3*    409 463*       Recent Labs   Lab 08/28/22  0353 08/29/22  0502 08/30/22  0414   * 137 134*   K 3.4* 3.7 3.4*    107 100   CO2 21* 20* 26   BUN 22 15 11   CREATININE 1.1 1.0 0.9   CALCIUM 8.1* 8.7 8.3*   PROT 5.8* 6.5 6.2   BILITOT 0.2 0.5 0.4   ALKPHOS 109 183* 152*   ALT 18 26 19   AST 16 46* 21     Recent Labs   Lab 08/28/22  0353 08/30/22  0414   .0* 230.7*         Micro:  Microbiology Results (last 7 days)       Procedure Component Value Units Date/Time    Culture, Anaerobe [[id number]]  (Abnormal) Collected: 08/26/22 1348    Order Status: Completed Specimen: Wound from Foot, Right Updated: 08/30/22 1446     Anaerobic Culture BACTEROIDES OVATUS  Many      Blood culture x two cultures. Draw prior to antibiotics. [[id number]] Collected: 08/25/22 2319    Order Status: Completed Specimen: Blood from Peripheral, Left Hand Updated: 08/30/22 1022     Blood Culture, Routine No Growth to date      No Growth to date      No Growth to date      No Growth to date      No Growth to date    Narrative:      Aerobic and anaerobic    Blood culture x two cultures. Draw prior to antibiotics. [[id number]] Collected: 08/25/22 2318    Order Status: Completed Specimen: Blood from Hand  Updated: 08/30/22 1022     Blood Culture, Routine No Growth to date      No Growth to date      No Growth to date      No Growth to date      No Growth to date    Narrative:      Aerobic and anaerobic    Aerobic culture [[id number]]  (Abnormal)  (Susceptibility) Collected: 08/26/22 1348    Order Status: Completed Specimen: Wound from Foot, Right Updated: 08/30/22 1022     Aerobic Bacterial Culture ESCHERICHIA COLI  Moderate        PROTEUS PENNERI  Few  No other significant isolate  No other significant isolate      AFB Culture & Smear [[id number]] Collected: 08/26/22 1348    Order Status: Completed Specimen: Wound from Foot, Right Updated: 08/29/22 1521     AFB Culture & Smear Culture in progress     AFB CULTURE STAIN No acid fast bacilli seen.    Gram stain [[id number]] Collected: 08/26/22 1348    Order Status: Completed Specimen: Wound from Foot, Right Updated: 08/27/22 0120     Gram Stain Result Rare WBC's      Many Gram positive cocci      Few Gram negative rods    Fungus culture [[id number]] Collected: 08/26/22 1348    Order Status: Sent Specimen: Wound from Foot, Right Updated: 08/26/22 2208          Escherichia coli Proteus penneri       CULTURE, AEROBIC  (SPECIFY SOURCE) CULTURE, AEROBIC  (SPECIFY SOURCE)    Amox/K Clav'ate <=8/4 mcg/mL Sensitive <=8/4 mcg/mL Sensitive    Amp/Sulbactam <=8/4 mcg/mL Sensitive <=8/4 mcg/mL Sensitive    Ampicillin <=8 mcg/mL Sensitive      Cefazolin <=2 mcg/mL Sensitive      Cefepime <=2 mcg/mL Sensitive <=2 mcg/mL Sensitive    Ceftriaxone <=1 mcg/mL Sensitive <=1 mcg/mL Sensitive    Ciprofloxacin <=1 mcg/mL Sensitive <=1 mcg/mL Sensitive    Ertapenem <=0.5 mcg/mL Sensitive <=0.5 mcg/mL Sensitive    Gentamicin <=4 mcg/mL Sensitive <=4 mcg/mL Sensitive    Levofloxacin <=2 mcg/mL Sensitive <=2 mcg/mL Sensitive    Meropenem <=1 mcg/mL Sensitive <=1 mcg/mL Sensitive    Piperacillin/Tazo <=16 mcg/mL Sensitive <=16 mcg/mL Sensitive    Tetracycline <=4 mcg/mL Sensitive      Tobramycin <=4  mcg/mL Sensitive <=4 mcg/mL Sensitive    Trimeth/Sulfa <=2/38 mcg/mL Sensitive <=2/38 mcg/mL Sensitive        Imaging Reviewed:   CXR---PICC in good position.  Hypoventilation noted.    US arterial---- There are findings suggesting inflow disease with biphasic waveforms seen proximally within the right lower extremity arterial system.  No focally elevated regions of velocity diff is suggest focal stenosis are seen.  There is evidence of further stenosis involving the right posterior tibial artery.    Cardiology:    IMPRESSION & PLAN     -- Rt foot infection, osteomyelitis of 4th and 5th metatarsal, abscess, status post IND and amputation of 5th great toe  -- Leukocytosis  --markedly increased CRP  -- PAD  -- DM terribly uncontrolled  --noncompliance     Recommendations:  NOt ready  for dc (he had purulence today)  Continue cefepime, which is covering E coli and Proteus  Dc vancomycin  Add flagyl, for anaerobic coverage given Bacteroides and the way the foot looked, suspicious for a narrow  Follow cx final and change abx accordingly  Elevate leg  Eat healthy, increase protein intake  Control DM   Us arterial noted--- check CTA and consider vascular evaluation and opinion  LTAC eventually, Patient is not able to attend to himself at home    Medical Decision Making during this encounter was  [_] Low Complexity  [_] Moderate Complexity  [ xx ] High Complexity

## 2022-09-01 ENCOUNTER — HOSPITAL ENCOUNTER (INPATIENT)
Facility: HOSPITAL | Age: 62
LOS: 6 days | Discharge: NURSING, REHAB OR LONG-TERM CARE | DRG: 853 | End: 2022-09-07
Attending: INTERNAL MEDICINE | Admitting: INTERNAL MEDICINE
Payer: MEDICAID

## 2022-09-01 VITALS
HEIGHT: 68 IN | SYSTOLIC BLOOD PRESSURE: 125 MMHG | WEIGHT: 161 LBS | HEART RATE: 89 BPM | DIASTOLIC BLOOD PRESSURE: 61 MMHG | BODY MASS INDEX: 24.4 KG/M2 | OXYGEN SATURATION: 97 % | TEMPERATURE: 99 F | RESPIRATION RATE: 17 BRPM

## 2022-09-01 DIAGNOSIS — M86.9 OSTEOMYELITIS: ICD-10-CM

## 2022-09-01 DIAGNOSIS — I95.9 HYPOTENSION: ICD-10-CM

## 2022-09-01 DIAGNOSIS — I73.9 PERIPHERAL ARTERY DISEASE: Primary | ICD-10-CM

## 2022-09-01 DIAGNOSIS — I95.9 LOW BLOOD PRESSURE: ICD-10-CM

## 2022-09-01 LAB
ALBUMIN SERPL BCP-MCNC: 1.4 G/DL (ref 3.5–5.2)
ALP SERPL-CCNC: 136 U/L (ref 55–135)
ALT SERPL W/O P-5'-P-CCNC: 15 U/L (ref 10–44)
ANION GAP SERPL CALC-SCNC: 9 MMOL/L (ref 8–16)
AST SERPL-CCNC: 13 U/L (ref 10–40)
BASOPHILS # BLD AUTO: 0.06 K/UL (ref 0–0.2)
BASOPHILS NFR BLD: 0.4 % (ref 0–1.9)
BILIRUB SERPL-MCNC: 0.3 MG/DL (ref 0.1–1)
BUN SERPL-MCNC: 14 MG/DL (ref 8–23)
CALCIUM SERPL-MCNC: 8.2 MG/DL (ref 8.7–10.5)
CHLORIDE SERPL-SCNC: 100 MMOL/L (ref 95–110)
CO2 SERPL-SCNC: 21 MMOL/L (ref 23–29)
CREAT SERPL-MCNC: 1 MG/DL (ref 0.5–1.4)
CRP SERPL-MCNC: 216.2 MG/L (ref 0–8.2)
DIFFERENTIAL METHOD: ABNORMAL
EOSINOPHIL # BLD AUTO: 0.3 K/UL (ref 0–0.5)
EOSINOPHIL NFR BLD: 1.7 % (ref 0–8)
ERYTHROCYTE [DISTWIDTH] IN BLOOD BY AUTOMATED COUNT: 13.1 % (ref 11.5–14.5)
EST. GFR  (NO RACE VARIABLE): >60 ML/MIN/1.73 M^2
FINAL PATHOLOGIC DIAGNOSIS: NORMAL
GLUCOSE SERPL-MCNC: 213 MG/DL (ref 70–110)
GROSS: NORMAL
HCT VFR BLD AUTO: 21.7 % (ref 40–54)
HGB BLD-MCNC: 7.1 G/DL (ref 14–18)
IMM GRANULOCYTES # BLD AUTO: 0.25 K/UL (ref 0–0.04)
IMM GRANULOCYTES NFR BLD AUTO: 1.6 % (ref 0–0.5)
LYMPHOCYTES # BLD AUTO: 1.1 K/UL (ref 1–4.8)
LYMPHOCYTES NFR BLD: 7.3 % (ref 18–48)
Lab: NORMAL
MAGNESIUM SERPL-MCNC: 1.8 MG/DL (ref 1.6–2.6)
MCH RBC QN AUTO: 27.8 PG (ref 27–31)
MCHC RBC AUTO-ENTMCNC: 32.7 G/DL (ref 32–36)
MCV RBC AUTO: 85 FL (ref 82–98)
MONOCYTES # BLD AUTO: 1.6 K/UL (ref 0.3–1)
MONOCYTES NFR BLD: 9.9 % (ref 4–15)
NEUTROPHILS # BLD AUTO: 12.5 K/UL (ref 1.8–7.7)
NEUTROPHILS NFR BLD: 79.1 % (ref 38–73)
NRBC BLD-RTO: 0 /100 WBC
PHOSPHATE SERPL-MCNC: 2.9 MG/DL (ref 2.7–4.5)
PLATELET # BLD AUTO: 486 K/UL (ref 150–450)
PMV BLD AUTO: 9.7 FL (ref 9.2–12.9)
POCT GLUCOSE: 159 MG/DL (ref 70–110)
POCT GLUCOSE: 178 MG/DL (ref 70–110)
POCT GLUCOSE: 231 MG/DL (ref 70–110)
POCT GLUCOSE: 232 MG/DL (ref 70–110)
POCT GLUCOSE: 244 MG/DL (ref 70–110)
POCT GLUCOSE: 325 MG/DL (ref 70–110)
POCT GLUCOSE: 95 MG/DL (ref 70–110)
POTASSIUM SERPL-SCNC: 4.1 MMOL/L (ref 3.5–5.1)
PROT SERPL-MCNC: 6.3 G/DL (ref 6–8.4)
RBC # BLD AUTO: 2.55 M/UL (ref 4.6–6.2)
SODIUM SERPL-SCNC: 130 MMOL/L (ref 136–145)
WBC # BLD AUTO: 15.71 K/UL (ref 3.9–12.7)

## 2022-09-01 PROCEDURE — 36415 COLL VENOUS BLD VENIPUNCTURE: CPT | Performed by: STUDENT IN AN ORGANIZED HEALTH CARE EDUCATION/TRAINING PROGRAM

## 2022-09-01 PROCEDURE — S0030 INJECTION, METRONIDAZOLE: HCPCS | Performed by: INTERNAL MEDICINE

## 2022-09-01 PROCEDURE — 25000003 PHARM REV CODE 250: Performed by: NURSE PRACTITIONER

## 2022-09-01 PROCEDURE — 12000002 HC ACUTE/MED SURGE SEMI-PRIVATE ROOM

## 2022-09-01 PROCEDURE — 83735 ASSAY OF MAGNESIUM: CPT | Performed by: STUDENT IN AN ORGANIZED HEALTH CARE EDUCATION/TRAINING PROGRAM

## 2022-09-01 PROCEDURE — 25000003 PHARM REV CODE 250: Performed by: INTERNAL MEDICINE

## 2022-09-01 PROCEDURE — 85025 COMPLETE CBC W/AUTO DIFF WBC: CPT | Performed by: STUDENT IN AN ORGANIZED HEALTH CARE EDUCATION/TRAINING PROGRAM

## 2022-09-01 PROCEDURE — 97110 THERAPEUTIC EXERCISES: CPT

## 2022-09-01 PROCEDURE — 25000003 PHARM REV CODE 250: Performed by: STUDENT IN AN ORGANIZED HEALTH CARE EDUCATION/TRAINING PROGRAM

## 2022-09-01 PROCEDURE — 63600175 PHARM REV CODE 636 W HCPCS: Performed by: NURSE PRACTITIONER

## 2022-09-01 PROCEDURE — 84100 ASSAY OF PHOSPHORUS: CPT | Performed by: STUDENT IN AN ORGANIZED HEALTH CARE EDUCATION/TRAINING PROGRAM

## 2022-09-01 PROCEDURE — A4216 STERILE WATER/SALINE, 10 ML: HCPCS | Performed by: INTERNAL MEDICINE

## 2022-09-01 PROCEDURE — 94761 N-INVAS EAR/PLS OXIMETRY MLT: CPT

## 2022-09-01 PROCEDURE — 94799 UNLISTED PULMONARY SVC/PX: CPT

## 2022-09-01 PROCEDURE — 86140 C-REACTIVE PROTEIN: CPT | Performed by: STUDENT IN AN ORGANIZED HEALTH CARE EDUCATION/TRAINING PROGRAM

## 2022-09-01 PROCEDURE — 80053 COMPREHEN METABOLIC PANEL: CPT | Performed by: STUDENT IN AN ORGANIZED HEALTH CARE EDUCATION/TRAINING PROGRAM

## 2022-09-01 PROCEDURE — 97535 SELF CARE MNGMENT TRAINING: CPT

## 2022-09-01 RX ORDER — CEFEPIME HYDROCHLORIDE 1 G/50ML
1 INJECTION, SOLUTION INTRAVENOUS
Status: DISCONTINUED | OUTPATIENT
Start: 2022-09-02 | End: 2022-09-02

## 2022-09-01 RX ORDER — SODIUM,POTASSIUM PHOSPHATES 280-250MG
2 POWDER IN PACKET (EA) ORAL
Status: DISCONTINUED | OUTPATIENT
Start: 2022-09-02 | End: 2022-09-07 | Stop reason: HOSPADM

## 2022-09-01 RX ORDER — LISINOPRIL 20 MG/1
20 TABLET ORAL DAILY
Status: DISCONTINUED | OUTPATIENT
Start: 2022-09-02 | End: 2022-09-07 | Stop reason: HOSPADM

## 2022-09-01 RX ORDER — SODIUM CHLORIDE 0.9 % (FLUSH) 0.9 %
2 SYRINGE (ML) INJECTION EVERY 6 HOURS PRN
Status: DISCONTINUED | OUTPATIENT
Start: 2022-09-02 | End: 2022-09-07 | Stop reason: HOSPADM

## 2022-09-01 RX ORDER — CEFEPIME HYDROCHLORIDE 1 G/50ML
1 INJECTION, SOLUTION INTRAVENOUS
Status: DISCONTINUED | OUTPATIENT
Start: 2022-09-02 | End: 2022-09-01

## 2022-09-01 RX ORDER — CARVEDILOL 12.5 MG/1
25 TABLET ORAL 2 TIMES DAILY
Status: DISCONTINUED | OUTPATIENT
Start: 2022-09-02 | End: 2022-09-07 | Stop reason: HOSPADM

## 2022-09-01 RX ORDER — FAMOTIDINE 10 MG/ML
20 INJECTION INTRAVENOUS EVERY 12 HOURS
Status: DISCONTINUED | OUTPATIENT
Start: 2022-09-02 | End: 2022-09-04

## 2022-09-01 RX ORDER — LANOLIN ALCOHOL/MO/W.PET/CERES
800 CREAM (GRAM) TOPICAL
Status: DISCONTINUED | OUTPATIENT
Start: 2022-09-02 | End: 2022-09-07 | Stop reason: HOSPADM

## 2022-09-01 RX ORDER — ACETAMINOPHEN 325 MG/1
650 TABLET ORAL EVERY 4 HOURS PRN
Status: DISCONTINUED | OUTPATIENT
Start: 2022-09-02 | End: 2022-09-07 | Stop reason: HOSPADM

## 2022-09-01 RX ORDER — INSULIN ASPART 100 [IU]/ML
1-6 INJECTION, SOLUTION INTRAVENOUS; SUBCUTANEOUS
Status: DISCONTINUED | OUTPATIENT
Start: 2022-09-01 | End: 2022-09-07 | Stop reason: HOSPADM

## 2022-09-01 RX ORDER — HYDROCHLOROTHIAZIDE 25 MG/1
25 TABLET ORAL DAILY
Status: DISCONTINUED | OUTPATIENT
Start: 2022-09-02 | End: 2022-09-07 | Stop reason: HOSPADM

## 2022-09-01 RX ORDER — METRONIDAZOLE 500 MG/100ML
500 INJECTION, SOLUTION INTRAVENOUS
Status: DISCONTINUED | OUTPATIENT
Start: 2022-09-02 | End: 2022-09-02

## 2022-09-01 RX ORDER — ATORVASTATIN CALCIUM 40 MG/1
40 TABLET, FILM COATED ORAL NIGHTLY
Status: DISCONTINUED | OUTPATIENT
Start: 2022-09-01 | End: 2022-09-07 | Stop reason: HOSPADM

## 2022-09-01 RX ORDER — NAPROXEN SODIUM 220 MG/1
81 TABLET, FILM COATED ORAL DAILY
Status: DISCONTINUED | OUTPATIENT
Start: 2022-09-02 | End: 2022-09-07 | Stop reason: HOSPADM

## 2022-09-01 RX ADMIN — POTASSIUM & SODIUM PHOSPHATES POWDER PACK 280-160-250 MG 1 PACKET: 280-160-250 PACK at 07:09

## 2022-09-01 RX ADMIN — SODIUM CHLORIDE: 0.9 INJECTION, SOLUTION INTRAVENOUS at 03:09

## 2022-09-01 RX ADMIN — SODIUM CHLORIDE, PRESERVATIVE FREE 10 ML: 5 INJECTION INTRAVENOUS at 06:09

## 2022-09-01 RX ADMIN — SODIUM CHLORIDE, PRESERVATIVE FREE 10 ML: 5 INJECTION INTRAVENOUS at 12:09

## 2022-09-01 RX ADMIN — POTASSIUM & SODIUM PHOSPHATES POWDER PACK 280-160-250 MG 1 PACKET: 280-160-250 PACK at 08:09

## 2022-09-01 RX ADMIN — INSULIN ASPART 8 UNITS: 100 INJECTION, SOLUTION INTRAVENOUS; SUBCUTANEOUS at 11:09

## 2022-09-01 RX ADMIN — ENOXAPARIN SODIUM 40 MG: 100 INJECTION SUBCUTANEOUS at 04:09

## 2022-09-01 RX ADMIN — MUPIROCIN: 20 OINTMENT TOPICAL at 08:09

## 2022-09-01 RX ADMIN — HYDROCODONE BITARTRATE AND ACETAMINOPHEN 1 TABLET: 5; 325 TABLET ORAL at 05:09

## 2022-09-01 RX ADMIN — POTASSIUM & SODIUM PHOSPHATES POWDER PACK 280-160-250 MG 1 PACKET: 280-160-250 PACK at 04:09

## 2022-09-01 RX ADMIN — Medication 1 TABLET: at 08:09

## 2022-09-01 RX ADMIN — INSULIN ASPART 4 UNITS: 100 INJECTION, SOLUTION INTRAVENOUS; SUBCUTANEOUS at 04:09

## 2022-09-01 RX ADMIN — METRONIDAZOLE 500 MG: 5 INJECTION, SOLUTION INTRAVENOUS at 04:09

## 2022-09-01 RX ADMIN — POTASSIUM & SODIUM PHOSPHATES POWDER PACK 280-160-250 MG 1 PACKET: 280-160-250 PACK at 11:09

## 2022-09-01 RX ADMIN — INSULIN DETEMIR 30 UNITS: 100 INJECTION, SOLUTION SUBCUTANEOUS at 08:09

## 2022-09-01 RX ADMIN — LISINOPRIL 20 MG: 10 TABLET ORAL at 08:09

## 2022-09-01 RX ADMIN — ASPIRIN 81 MG CHEWABLE TABLET 81 MG: 81 TABLET CHEWABLE at 08:09

## 2022-09-01 RX ADMIN — SENNOSIDES AND DOCUSATE SODIUM 1 TABLET: 8.6; 5 TABLET ORAL at 08:09

## 2022-09-01 RX ADMIN — CEFEPIME HYDROCHLORIDE 2 G: 2 INJECTION, SOLUTION INTRAVENOUS at 12:09

## 2022-09-01 RX ADMIN — HYOSCYAMINE SULFATE: 16 SOLUTION at 02:09

## 2022-09-01 RX ADMIN — HYDRALAZINE HYDROCHLORIDE 10 MG: 20 INJECTION INTRAMUSCULAR; INTRAVENOUS at 04:09

## 2022-09-01 RX ADMIN — METRONIDAZOLE 500 MG: 5 INJECTION, SOLUTION INTRAVENOUS at 08:09

## 2022-09-01 RX ADMIN — ACETAMINOPHEN 650 MG: 325 TABLET ORAL at 07:09

## 2022-09-01 RX ADMIN — HYDROCODONE BITARTRATE AND ACETAMINOPHEN 1 TABLET: 5; 325 TABLET ORAL at 07:09

## 2022-09-01 RX ADMIN — INSULIN ASPART 4 UNITS: 100 INJECTION, SOLUTION INTRAVENOUS; SUBCUTANEOUS at 08:09

## 2022-09-01 NOTE — PLAN OF CARE
Patient progressing towards discharge.    Patient had no acute events noted. Wound vac dressing remains intact to right foot.  IV antibiotics and IV fluids continued per MD order.  Pain controlled with hydrocodone overnight.  Discussed POC with patient and family with verbalization of understanding.    Will continue to monitor.

## 2022-09-01 NOTE — PROVIDER TRANSFER
Outside Transfer Acceptance Note / Regional Referral Center    Referring facility: [facility]   Referring provider: SUKHDEV MONAHAN  Accepting facility: [facility]  Accepting provider: JESÚS JOLLEY  Admitting Provider: PITA BALL  Reason for transfer:  Need Vascular Surgery  Transfer diagnosis: Right foot wound with osteomyelitis  Transfer specialty requested: Hospital Medicine  Transfer specialty notified: yes  Transfer level: NUMBER 1-5: 2  Bed type requested: med-tele  Isolation status: No active isolations   Admission class or status: IP- Inpatient      Narrative     62-year-old male with a history of diabetes, hypertension, chronic kidney disease, and previous osteomyelitis of the cervical spine admitted to [facility] on August 26 with fever, weakness, and right foot wound.  He was admitted with osteomyelitis of the right foot with cellulitis, sepsis, gangrene of the right foot, and anemia.  Podiatry was consulted and he underwent amputation of the right 5th toe.  He was treated with antibiotics and wound care.  PICC line was placed.  He had further debridement of his foot on August 30.  He was seen by Infectious Diseases, and antibiotics were adjusted.  Recommendation was for transfer to a facility with Vascular Surgery availability to determine if revascularization would improve wound healing.  Requesting transfer to Hospital Medicine at [facility] for further treatment.    September 1: Sodium 130, potassium 4.1, chloride 100, CO2 21, BUN 14, creatinine 1, glucose 213, AST 13, ALT 15, magnesium 1.8, phosphorus 2.9, .2, white blood cells 15.71, hemoglobin 7.1, hematocrit 21.7, platelets 486    August 31: CTA of the bilateral lower extremities:  1. Right lower extremity: Multiple areas of up to 50% stenosis including right deep femoral artery, popliteal artery. High-grade stenosis at the origin of the right anterior tibial artery  (51-99%).  Flow is present in all trifurcation vessels at the origin but cannot be traced beyond the origin, for reasons detailed above.  Correlate with prior ultrasound  2. Left lower extremity: Multiple areas of up to 50% stenosis including mid segment deep femoral artery, distal popliteal artery.  Flow is present in all trifurcation vessels at the origin but cannot be traced beyond the origin, for reasons detailed above.  Correlate with prior ultrasound.  3. Open skin wound along the right foot with subcutaneous emphysema in the foot anterior ankle and anterior shin musculature.  Gas could be medially from the open wound or from gas-forming infection.  Evidence of osteomyelitis involving the cuboid.  4. Diffuse subcutaneous soft tissue edema of both lower legs especially on the right.  This could relate to cellulitis noninfectious inflammation or venous/lymphatic occlusion.  5. Cholelith.  6. Left nephrolith.    August 29: Chest x-ray noted PICC in good position.  Hypoventilation noted.    August 26:  Right foot wound anaerobic culture growing Bacteroides ovatus  Aerobic right foot my and Proteus penneri  -EKG with normal sinus rhythm and right bundle-branch block    August 25:  COVID negative  Blood cultures with no growth at 5 days    Objective     Vitals: Temp: 97.6 °F (36.4 °C) (09/01/22 1147)  Pulse: 101 (09/01/22 1147)  Resp: 18 (09/01/22 1147)  BP: (!) 198/92 (09/01/22 1147)  SpO2: 97 % (09/01/22 1147)  Recent Labs: CBC:   Recent Labs   Lab 08/31/22  0354 09/01/22  0509   WBC 17.80* 15.71*   HGB 7.6* 7.1*   HCT 22.9* 21.7*    486*     CMP:   Recent Labs   Lab 08/31/22  0354 09/01/22  0509   * 130*   K 3.9 4.1    100   CO2 24 21*   GLU 83 213*   BUN 14 14   CREATININE 0.9 1.0   CALCIUM 8.2* 8.2*   PROT 6.1 6.3   ALBUMIN 1.5* 1.4*   BILITOT 0.3 0.3   ALKPHOS 130 136*   AST 17 13   ALT 16 15   ANIONGAP 7* 9     Allergies:   Review of patient's allergies indicates:   Allergen Reactions     Neosporin [benzalkonium chloride]       NPO: No    Anticoagulation:   Anticoagulants       Ordered     Route Frequency Start Stop    08/26/22 0121  enoxaparin  (VTE Pharmacological Prophylaxis Orders - High Risk)         SubQ Daily 08/26/22 1700 --             Instructions    Admit to Hospital Medicine  Consult Vascular Surgery  Consult Podiatry      Upon patient arrival to floor, please send SecureChat to [facility] or call extension [phone] (if no answer, do NOT leave a callback number after the beep, rather please send a SecureChat to [facility]), for Hospital Medicine admit team assignment and for additional admit orders for the patient.  Do not page the attending physician associated with the patient on arrival (this physician may not be on duty at the time of arrival).  Rather, always send a SecureChat to [facility] or call [phone] to reach the triage physician for orders and team assignment.    BRANDON Castro MD  Hospital Medicine Staff  Cell: [phone]

## 2022-09-01 NOTE — ASSESSMENT & PLAN NOTE
Patient's anemia is currently stable. S/p 0 units of PRBCs.  Current CBC reviewed-   Lab Results   Component Value Date    HGB 7.6 (L) 08/31/2022    HCT 22.9 (L) 08/31/2022    MCV 85 08/31/2022     08/31/2022     Monitor serial CBC and transfuse if patient becomes hemodynamically unstable, symptomatic or H/H drops below 7/21.

## 2022-09-01 NOTE — PROGRESS NOTES
"      [facility] Hospital Medicine  Telemedicine Progress Note     Patient Name: Ernst May  MRN: [id number]  Patient Class: IP- Inpatient     Admission Date: 8/25/2022    The attending portion of this evaluation, treatment, and documentation was performed via Telemedicine AudioVisual using the secure [employer] software platform with 2 way audio/video. The provider was located off-site and the patient is located in the hospital. The aforementioned video software was utilized to document the relevant history and physical exam    SUBJECTIVE:     Follow-up For:  Osteomyelitis of right foot     Interval history/ROS:   9/1: CTA with some critical stenosis on right.     HPI:  Mr. May is a 62 year old male with a history of NIDDM, HTN, CKDIII, and osteomyelitis of the cervical spine who presents today with complaints of weakness. It is severe. It is associated with hyperglycemia, dizziness, fever 102.9, and rt foot wound. He denies chest pain, SOB, cough, or LOC. He was in his truck and having difficulty reaching to the other side and states some "do-gooders" called EMS to help him out. He has not been compliant with any of his medications in months. He noticed his rt 5th toe turned black about 12 days ago. He has ulcerations and erythema along the right foot adjacent to the 5th toe. He was noted to be febrile in the ED T 102.9, , /95. Glucose 448 and lactate 0.9. Foot Xray: 1. Acute osteomyelitis of the 5th metatarsal and proximal phalanx and the 4th metatarsal head and neck. 2. Large plantar soft tissue ulcer with extensive underlying soft tissue gas        Overview/Hospital Course:  Ernst May is a 62 year old male with a past medical history of NIDDM, HTN and osteomyelitis for the cervical spine who presented with acute osteomyelitis of the right fourth and fifth metatarsal with abscess with gangrene of the fifth metatarsal. Podiatry was consulted and performed amputation of the " fifth right toe as well as cultures of the area. Surgical wound cultures show GNRs. He initiated on vancomycin and cefepime.  PT/OT has been consulted. Wound care and infectious disease specialty were consulted.  PICC line was placed, will require long term IV abx and wound care. Spoke with CM for LTAC placement.  Wound cultures resulted positive for E.coli and proteus penneri.  Vancomycin was discontinued and flagyl added.  WBCs and inflammatory markers trended.          OBJECTIVE:     Body mass index is 24.48 kg/m².    Vital Signs Range (Last 24H):  Temp:  [97.6 °F (36.4 °C)-100.7 °F (38.2 °C)]   Pulse:  []   Resp:  [16-20]   BP: (149-198)/(69-92)   SpO2:  [96 %-97 %]     I & O (Last 24H):    Intake/Output Summary (Last 24 hours) at 9/1/2022 1510  Last data filed at 9/1/2022 1000  Gross per 24 hour   Intake 3616.85 ml   Output 1525 ml   Net 2091.85 ml        Physical Exam:  HENT:      Head: Atraumatic.   Cardiovascular:      Rate and Rhythm: Normal rate.   Pulmonary:      Effort: Pulmonary effort is normal.   Skin:     Comments: Wound to right lateral foot   Neurological:      General: No focal deficit present.      Mental Status: He is alert and oriented to person, place, and time. Mental status is at baseline.   Psychiatric:         Mood and Affect: Mood normal.     Recent Labs   Lab 08/30/22 0414 08/31/22  0354 09/01/22  0509   * 131* 130*   K 3.4* 3.9 4.1    100 100   CO2 26 24 21*   BUN 11 14 14   CREATININE 0.9 0.9 1.0    83 213*   CALCIUM 8.3* 8.2* 8.2*   MG 1.5* 1.8 1.8   PHOS 3.4 3.0 2.9     Recent Labs   Lab 08/30/22 0414 08/31/22  0354 09/01/22  0509   ALKPHOS 152* 130 136*   ALT 19 16 15   AST 21 17 13   ALBUMIN 1.5* 1.5* 1.4*   PROT 6.2 6.1 6.3   BILITOT 0.4 0.3 0.3       Recent Labs   Lab 08/30/22  0414 08/31/22  0354 09/01/22  0509   WBC 20.22* 17.80* 15.71*   HGB 8.0* 7.6* 7.1*   HCT 24.3* 22.9* 21.7*   * 435 486*   GRAN 81.8*  16.5* 76.1*  13.5* 79.1*  12.5*    LYMPH 7.5*  1.5 10.6*  1.9 7.3*  1.1   MONO 8.2  1.7* 9.7  1.7* 9.9  1.6*       Recent Labs   Lab 08/30/22 2017 08/31/22  1156 08/31/22  1627 08/31/22  2038 09/01/22  0744 09/01/22  1142   POCTGLUCOSE 177* 186* 268* 346* 231* 325*       No results for input(s): TROPONINI in the last 168 hours.    Diagnostic Results:  Labs: Reviewed    aspirin, 81 mg, Oral, Daily  ceFEPime (MAXIPIME) IVPB, 2 g, Intravenous, Q12H  enoxaparin, 40 mg, Subcutaneous, Daily  insulin detemir U-100, 30 Units, Subcutaneous, Daily  lisinopriL, 20 mg, Oral, Daily  metronidazole, 500 mg, Intravenous, Q8H  multivitamin, 1 tablet, Oral, Daily  mupirocin, , Nasal, BID  potassium, sodium phosphates, 1 packet, Oral, QID (WM & HS)  senna-docusate 8.6-50 mg, 1 tablet, Oral, BID  sodium chloride 0.9%, 10 mL, Intravenous, Q6H  sodium hypochlorite, , Irrigation, Daily       sodium chloride 0.9% 125 mL/hr at 09/01/22 0500     As Needed acetaminophen, dextrose 10%, dextrose 10%, glucagon (human recombinant), glucose, glucose, hydrALAZINE, HYDROcodone-acetaminophen, ibuprofen, insulin aspart U-100, lactulose, melatonin, naloxone, ondansetron, polyethylene glycol, sodium chloride 0.9%, Flushing PICC Protocol **AND** sodium chloride 0.9% **AND** sodium chloride 0.9%    ASSESSMENT/PLAN:     Assessment: Ernst May is a 62 y.o. male here with:     Active Hospital Problems    Diagnosis  POA    *Osteomyelitis of right foot [M86.9]  Yes    Hypokalemia [E87.6]  No    HLD (hyperlipidemia) [E78.5]  No    Normocytic anemia [D64.9]  Yes    Cellulitis of right foot [L03.115]  Yes    Open wound of right foot [S91.301A]  Yes    Essential hypertension, not well controlled [I10]  Yes     Chronic    Hyponatremia [E87.1]  Yes    Diabetes mellitus with hyperglycemia [E11.65]  Yes      Resolved Hospital Problems    Diagnosis Date Resolved POA    Dry gangrene [I96] 08/27/2022 Yes    Sepsis [A41.9] 08/27/2022 Yes        Plan:       Osteomyelitis of right foot  POD 2  right fifth toe amputation per Dr. Mathur.  -Vancomycin and cefepime initially-vanc dc'd, flagyl added  -Follow up cultures; currently with GNRs-ID below  -PICC in place  -PT/OT  -PRN analgesics  -Fall precautions  -Wound Care  - consult ID           Microbiology Results (last 7 days)      Procedure Component Value Units Date/Time     Fungus culture [[id number]] Collected: 08/26/22 1348     Order Status: Completed Specimen: Wound from Foot, Right Updated: 08/31/22 1055       Fungus (Mycology) Culture Culture in progress     Blood culture x two cultures. Draw prior to antibiotics. [[id number]] Collected: 08/25/22 2319     Order Status: Completed Specimen: Blood from Peripheral, Left Hand Updated: 08/31/22 1022       Blood Culture, Routine No growth after 5 days.     Narrative:       Aerobic and anaerobic     Blood culture x two cultures. Draw prior to antibiotics. [[id number]] Collected: 08/25/22 2318     Order Status: Completed Specimen: Blood from Hand Updated: 08/31/22 1022       Blood Culture, Routine No growth after 5 days.     Narrative:       Aerobic and anaerobic     Culture, Anaerobe [[id number]]  (Abnormal) Collected: 08/26/22 1348     Order Status: Completed Specimen: Wound from Foot, Right Updated: 08/31/22 0733       Anaerobic Culture BACTEROIDES OVATUS  Many       Aerobic culture [[id number]]  (Abnormal)  (Susceptibility) Collected: 08/26/22 1348     Order Status: Completed Specimen: Wound from Foot, Right Updated: 08/30/22 1022       Aerobic Bacterial Culture ESCHERICHIA COLI  Moderate           PROTEUS PENNERI  Few  No other significant isolate  No other significant isolate       AFB Culture & Smear [[id number]] Collected: 08/26/22 1348     Order Status: Completed Specimen: Wound from Foot, Right Updated: 08/29/22 1521       AFB Culture & Smear Culture in progress       AFB CULTURE STAIN No acid fast bacilli seen.     Gram stain [[id number]] Collected: 08/26/22 1348     Order Status: Completed Specimen:  Wound from Foot, Right Updated: 08/27/22 0120       Gram Stain Result Rare WBC's         Many Gram positive cocci         Few Gram negative rods                HLD (hyperlipidemia)  -Start ASA and statin        Hypokalemia  -Replete PRN  -Trend K        Cellulitis of right foot  -cefepime and flagyl  See plan above     Normocytic anemia  Patient's anemia is currently stable. S/p 0 units of PRBCs.  Current CBC reviewed-         Lab Results   Component Value Date     HGB 7.6 (L) 08/31/2022     HCT 22.9 (L) 08/31/2022     MCV 85 08/31/2022      08/31/2022      Monitor serial CBC and transfuse if patient becomes hemodynamically unstable, symptomatic or H/H drops below 7/21.         Diabetes mellitus with hyperglycemia  Patient's FSGs are uncontrolled due to hyperglycemia on current medication regimen.  Last A1c reviewed-         Lab Results   Component Value Date     HGBA1C 12.3 (H) 08/26/2022      Most recent fingerstick glucose reviewed-         Recent Labs   Lab 08/30/22  2017 08/31/22  1156 08/31/22  1627   POCTGLUCOSE 177* 186* 268*      Current correctional scale  Medium  Maintain anti-hyperglycemic dose as follows-              Antihyperglycemics (From admission, onward)     Start     Stop Route Frequency Ordered     08/27/22 1330   insulin detemir U-100 pen 30 Units         -- SubQ Daily 08/27/22 1329     08/26/22 0218   insulin aspart U-100 pen 1-10 Units         -- SubQ Before meals & nightly PRN 08/26/22 0121          Hold Oral hypoglycemics while patient is in the hospital.        Open wound of right foot  -Wound Care  8/30 bedside debridement per podiatry        Essential hypertension, not well controlled  Chronic, stable.  Latest blood pressure and vitals reviewed-   Temp:  [99 °F (37.2 °C)-100.5 °F (38.1 °C)]   Pulse:  []   Resp:  [16-20]   BP: (142-188)/(68-98)   SpO2:  [96 %-98 %] .   Home meds for hypertension were reviewed and noted below.         Hypertension Medications                  carvediloL (COREG) 25 MG tablet TAKE 1 TABLET BY MOUTH TWICE A DAY     hydroCHLOROthiazide (HYDRODIURIL) 25 MG tablet TAKE 1 TABLET BY MOUTH EVERY DAY             While in the hospital, will manage blood pressure as follows; Continue home antihypertensive regimen   Lisinopril 20mg daily added during this hospitalization.     Will utilize p.r.n. blood pressure medication only if patient's blood pressure greater than  180/110 and he develops symptoms such as worsening chest pain or shortness of breath.           Hyponatremia  -IV NS fluids  -Trend Na              HIGH RISK CONDITION(S):  Patient has a condition that poses threat to life and bodily function: osteomyelitis     The attending portion of this evaluation, treatment, and documentation was performed per [employer] via Telemedicine AudioVisual using the secure [employer] software platform with 2 way audio/video. The provider was located off-site and the patient is located in the hospital. The aforementioned video software was utilized to document the relevant history and physical exam         Gail Shirley MD

## 2022-09-01 NOTE — PROGRESS NOTES
"      [facility] Medicine  Telemedicine Progress Note    Patient Name: Ernst May  MRN: [id number]  Patient Class: IP- Inpatient   Admission Date: 8/25/2022  Length of Stay: 5 days  Attending Physician: Lexi Wilson MD  Primary Care Provider: Chemo Hinkle MD          Subjective:     Principal Problem:Osteomyelitis of right foot        HPI:  Mr. May is a 62 year old male with a history of NIDDM, HTN, CKDIII, and osteomyelitis of the cervical spine who presents today with complaints of weakness. It is severe. It is associated with hyperglycemia, dizziness, fever 102.9, and rt foot wound. He denies chest pain, SOB, cough, or LOC. He was in his truck and having difficulty reaching to the other side and states some "do-gooders" called EMS to help him out. He has not been compliant with any of his medications in months. He noticed his rt 5th toe turned black about 12 days ago. He has ulcerations and erythema along the right foot adjacent to the 5th toe. He was noted to be febrile in the ED T 102.9, , /95. Glucose 448 and lactate 0.9. Foot Xray: 1. Acute osteomyelitis of the 5th metatarsal and proximal phalanx and the 4th metatarsal head and neck. 2. Large plantar soft tissue ulcer with extensive underlying soft tissue gas      Overview/Hospital Course:  Ernst May is a 62 year old male with a past medical history of NIDDM, HTN and osteomyelitis for the cervical spine who presented with acute osteomyelitis of the right fourth and fifth metatarsal with abscess with gangrene of the fifth metatarsal. Podiatry was consulted and performed amputation of the fifth right toe as well as cultures of the area. Surgical wound cultures show GNRs. He initiated on vancomycin and cefepime.  PT/OT has been consulted. Wound care and infectious disease specialty were consulted.  PICC line was placed, will require long term IV abx and wound care. Spoke with [facility] for LTAC placement.  " "Wound cultures resulted positive for E.coli and proteus penneri.  Vancomycin was discontinued and flagyl added.  WBCs and inflammatory markers trended.      Interval History: See "hospital course"      Review of Systems   Constitutional:  Negative for fatigue, fever and unexpected weight change.   HENT:  Negative for congestion.    Respiratory:  Negative for cough and shortness of breath.    Cardiovascular:  Negative for chest pain and palpitations.   Endocrine: Negative for polydipsia and polyuria.   Genitourinary:  Negative for difficulty urinating.   Musculoskeletal:  Positive for gait problem.   Skin:  Positive for wound.   Allergic/Immunologic: Negative for food allergies.   Neurological:  Positive for weakness.   Psychiatric/Behavioral:  Negative for agitation.    Objective:     Vital Signs (Most Recent):  Temp: 99 °F (37.2 °C) (08/31/22 1100)  Pulse: 100 (08/31/22 1100)  Resp: 16 (08/31/22 1528)  BP: (!) 170/79 (08/31/22 1100)  SpO2: 97 % (08/31/22 1100)   Vital Signs (24h Range):  Temp:  [99 °F (37.2 °C)-100.5 °F (38.1 °C)] 99 °F (37.2 °C)  Pulse:  [] 100  Resp:  [16-20] 16  SpO2:  [96 %-98 %] 97 %  BP: (142-188)/(68-98) 170/79     Weight: 73 kg (161 lb)  Body mass index is 24.48 kg/m².    Intake/Output Summary (Last 24 hours) at 8/31/2022 1929  Last data filed at 8/31/2022 1745  Gross per 24 hour   Intake 2030 ml   Output 975 ml   Net 1055 ml      Physical Exam  HENT:      Head: Atraumatic.   Cardiovascular:      Rate and Rhythm: Normal rate.   Pulmonary:      Effort: Pulmonary effort is normal.   Skin:     Comments: Wound to right lateral foot   Neurological:      General: No focal deficit present.      Mental Status: He is alert and oriented to person, place, and time. Mental status is at baseline.   Psychiatric:         Mood and Affect: Mood normal.       Significant Labs: All pertinent labs within the past 24 hours have been reviewed.  CBC:   Recent Labs   Lab 08/30/22  0414 08/31/22  0354   WBC " 20.22* 17.80*   HGB 8.0* 7.6*   HCT 24.3* 22.9*   * 435     CMP:   Recent Labs   Lab 08/30/22  0414 08/31/22  0354   * 131*   K 3.4* 3.9    100   CO2 26 24    83   BUN 11 14   CREATININE 0.9 0.9   CALCIUM 8.3* 8.2*   PROT 6.2 6.1   ALBUMIN 1.5* 1.5*   BILITOT 0.4 0.3   ALKPHOS 152* 130   AST 21 17   ALT 19 16   ANIONGAP 8 7*       Significant Imaging: I have reviewed all pertinent imaging results/findings within the past 24 hours.      Assessment/Plan:      * Osteomyelitis of right foot  POD 2 right fifth toe amputation per Dr. Mathur.  -Vancomycin and cefepime initially-vanc dc'd, flagyl added  -Follow up cultures; currently with GNRs-ID below  -PICC in place  -PT/OT  -PRN analgesics  -Fall precautions  -Wound Care  - consult ID  Microbiology Results (last 7 days)     Procedure Component Value Units Date/Time    Fungus culture [[id number]] Collected: 08/26/22 1348    Order Status: Completed Specimen: Wound from Foot, Right Updated: 08/31/22 1055     Fungus (Mycology) Culture Culture in progress    Blood culture x two cultures. Draw prior to antibiotics. [[id number]] Collected: 08/25/22 2319    Order Status: Completed Specimen: Blood from Peripheral, Left Hand Updated: 08/31/22 1022     Blood Culture, Routine No growth after 5 days.    Narrative:      Aerobic and anaerobic    Blood culture x two cultures. Draw prior to antibiotics. [[id number]] Collected: 08/25/22 2318    Order Status: Completed Specimen: Blood from Hand Updated: 08/31/22 1022     Blood Culture, Routine No growth after 5 days.    Narrative:      Aerobic and anaerobic    Culture, Anaerobe [[id number]]  (Abnormal) Collected: 08/26/22 1348    Order Status: Completed Specimen: Wound from Foot, Right Updated: 08/31/22 0733     Anaerobic Culture BACTEROIDES OVATUS  Many      Aerobic culture [[id number]]  (Abnormal)  (Susceptibility) Collected: 08/26/22 1348    Order Status: Completed Specimen: Wound from Foot, Right Updated: 08/30/22  1022     Aerobic Bacterial Culture ESCHERICHIA COLI  Moderate        PROTEUS PENNERI  Few  No other significant isolate  No other significant isolate      AFB Culture & Smear [[id number]] Collected: 08/26/22 1348    Order Status: Completed Specimen: Wound from Foot, Right Updated: 08/29/22 1521     AFB Culture & Smear Culture in progress     AFB CULTURE STAIN No acid fast bacilli seen.    Gram stain [[id number]] Collected: 08/26/22 1348    Order Status: Completed Specimen: Wound from Foot, Right Updated: 08/27/22 0120     Gram Stain Result Rare WBC's      Many Gram positive cocci      Few Gram negative rods            HLD (hyperlipidemia)  -Start ASA and statin      Hypokalemia  -Replete PRN  -Trend K      Cellulitis of right foot  -cefepime and flagyl  See plan above    Normocytic anemia  Patient's anemia is currently stable. S/p 0 units of PRBCs.  Current CBC reviewed-   Lab Results   Component Value Date    HGB 7.6 (L) 08/31/2022    HCT 22.9 (L) 08/31/2022    MCV 85 08/31/2022     08/31/2022     Monitor serial CBC and transfuse if patient becomes hemodynamically unstable, symptomatic or H/H drops below 7/21.       Diabetes mellitus with hyperglycemia  Patient's FSGs are uncontrolled due to hyperglycemia on current medication regimen.  Last A1c reviewed-   Lab Results   Component Value Date    HGBA1C 12.3 (H) 08/26/2022     Most recent fingerstick glucose reviewed-   Recent Labs   Lab 08/30/22  2017 08/31/22  1156 08/31/22  1627   POCTGLUCOSE 177* 186* 268*     Current correctional scale  Medium  Maintain anti-hyperglycemic dose as follows-   Antihyperglycemics (From admission, onward)    Start     Stop Route Frequency Ordered    08/27/22 1330  insulin detemir U-100 pen 30 Units         -- SubQ Daily 08/27/22 1329    08/26/22 0218  insulin aspart U-100 pen 1-10 Units         -- SubQ Before meals & nightly PRN 08/26/22 0121        Hold Oral hypoglycemics while patient is in the hospital.      Open wound of  right foot  -Wound Care  8/30 bedside debridement per podiatry      Essential hypertension, not well controlled  Chronic, stable.  Latest blood pressure and vitals reviewed-   Temp:  [99 °F (37.2 °C)-100.5 °F (38.1 °C)]   Pulse:  []   Resp:  [16-20]   BP: (142-188)/(68-98)   SpO2:  [96 %-98 %] .   Home meds for hypertension were reviewed and noted below.   Hypertension Medications             carvediloL (COREG) 25 MG tablet TAKE 1 TABLET BY MOUTH TWICE A DAY    hydroCHLOROthiazide (HYDRODIURIL) 25 MG tablet TAKE 1 TABLET BY MOUTH EVERY DAY          While in the hospital, will manage blood pressure as follows; Continue home antihypertensive regimen   Lisinopril 20mg daily added during this hospitalization.    Will utilize p.r.n. blood pressure medication only if patient's blood pressure greater than  180/110 and he develops symptoms such as worsening chest pain or shortness of breath.        Hyponatremia  -IV NS fluids  -Trend Na        VTE Risk Mitigation (From admission, onward)         Ordered     enoxaparin injection 40 mg  Daily         08/26/22 0121     IP VTE HIGH RISK PATIENT  Once         08/26/22 0121     Place sequential compression device  Until discontinued         08/26/22 0121                      I have assessed these finding virtually using telemed platform and with assistance of bedside nurse                 The attending portion of this evaluation, treatment, and documentation was performed per IMTIAZ Silver via Telemedicine AudioVisual using the secure [facility] software platform with 2 way audio/video. The provider was located off-site and the patient is located in the hospital. The aforementioned video software was utilized to document the relevant history and physical exam    IMTIAZ Silver  Department of Hospital Medicine   [facility]
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
## 2022-09-01 NOTE — SUBJECTIVE & OBJECTIVE
"Interval History: See "hospital course"      Review of Systems   Constitutional:  Negative for fatigue, fever and unexpected weight change.   HENT:  Negative for congestion.    Respiratory:  Negative for cough and shortness of breath.    Cardiovascular:  Negative for chest pain and palpitations.   Endocrine: Negative for polydipsia and polyuria.   Genitourinary:  Negative for difficulty urinating.   Musculoskeletal:  Positive for gait problem.   Skin:  Positive for wound.   Allergic/Immunologic: Negative for food allergies.   Neurological:  Positive for weakness.   Psychiatric/Behavioral:  Negative for agitation.    Objective:     Vital Signs (Most Recent):  Temp: 99 °F (37.2 °C) (08/31/22 1100)  Pulse: 100 (08/31/22 1100)  Resp: 16 (08/31/22 1528)  BP: (!) 170/79 (08/31/22 1100)  SpO2: 97 % (08/31/22 1100)   Vital Signs (24h Range):  Temp:  [99 °F (37.2 °C)-100.5 °F (38.1 °C)] 99 °F (37.2 °C)  Pulse:  [] 100  Resp:  [16-20] 16  SpO2:  [96 %-98 %] 97 %  BP: (142-188)/(68-98) 170/79     Weight: 73 kg (161 lb)  Body mass index is 24.48 kg/m².    Intake/Output Summary (Last 24 hours) at 8/31/2022 1929  Last data filed at 8/31/2022 1745  Gross per 24 hour   Intake 2030 ml   Output 975 ml   Net 1055 ml      Physical Exam  HENT:      Head: Atraumatic.   Cardiovascular:      Rate and Rhythm: Normal rate.   Pulmonary:      Effort: Pulmonary effort is normal.   Skin:     Comments: Wound to right lateral foot   Neurological:      General: No focal deficit present.      Mental Status: He is alert and oriented to person, place, and time. Mental status is at baseline.   Psychiatric:         Mood and Affect: Mood normal.       Significant Labs: All pertinent labs within the past 24 hours have been reviewed.  CBC:   Recent Labs   Lab 08/30/22 0414 08/31/22  0354   WBC 20.22* 17.80*   HGB 8.0* 7.6*   HCT 24.3* 22.9*   * 435     CMP:   Recent Labs   Lab 08/30/22 0414 08/31/22  0354   * 131*   K 3.4* 3.9    " 100   CO2 26 24    83   BUN 11 14   CREATININE 0.9 0.9   CALCIUM 8.3* 8.2*   PROT 6.2 6.1   ALBUMIN 1.5* 1.5*   BILITOT 0.4 0.3   ALKPHOS 152* 130   AST 21 17   ALT 19 16   ANIONGAP 8 7*       Significant Imaging: I have reviewed all pertinent imaging results/findings within the past 24 hours.

## 2022-09-01 NOTE — PT/OT/SLP PROGRESS
Occupational Therapy   Treatment    Name: Ernst May  MRN: [id number]  Admitting Diagnosis:  Osteomyelitis of right foot  6 Days Post-Op    Recommendations:     Discharge Recommendations: LTACH  Discharge Equipment Recommendations:  TBD  Barriers to discharge:  Decreased caregiver support    Assessment:     Ernst May is a 62 y.o. male with a medical diagnosis of Osteomyelitis of right foot.  He presents with performance deficits affecting function are weakness, impaired endurance, impaired self care skills, impaired functional mobility, gait instability, decreased lower extremity function and orthopedic precautions. Pt was agreeable to participate in ADLs and UE ROM exercises. Pt washed his face and brushed his teeth with set up A seated in chair. Pt performed 3x10 bilateral shoulder flexion, shoulder abduction and elbow extension/flexion AROM. Pt was encouraged to participate in chair push ups while maintaining R LE NWB to increase his independence with functional transfers. Pt was unwilling to participate in chair push ups.    Rehab Prognosis:  Fair; patient would benefit from acute skilled OT services to address these deficits and reach maximum level of function.       Plan:     Patient to be seen 5 x/week to address the above listed problems via self-care/home management, therapeutic activities, therapeutic exercises  Plan of Care Expires: 09/12/22  Plan of Care Reviewed with: patient    Subjective     Pain/Comfort:  Pain Rating 1: 0/10    Objective:     Communicated with: nurse prior to session.  Patient found up in chair with peripheral IV upon OT entry to room.    General Precautions: Standard, fall   Orthopedic Precautions:RLE non weight bearing   Braces: boot  Respiratory Status: Room air     Occupational Performance:     Activities of Daily Living:  Grooming: stand by assistance  Lower Body Dressing: total assistance      Treatment & Education:  Pt required total A to don his L sock.   Grooming: set  up A  UE ROM exercises    Patient left up in chair with all lines intact and call button in reach.     GOALS:   Multidisciplinary Problems       Occupational Therapy Goals          Problem: Occupational Therapy    Goal Priority Disciplines Outcome Interventions   Occupational Therapy Goal     OT, PT/OT Ongoing, Progressing    Description: Goals to be met by: 9/12/2022     Patient will increase functional independence with ADLs by performing:    UE Dressing with Modified [place].  LE Dressing with Modified [place].  Grooming with Modified [place].  Toileting from toilet with Modified [place] for hygiene and clothing management.   Toilet transfer to toilet with Modified [place].                         Time Tracking:     OT Date of Treatment: 09/01/22  OT Start Time: 1020  OT Stop Time: 1055  OT Total Time (min): 35 min    Billable Minutes:Self Care/Home Management 15  Therapeutic Exercise 20               9/1/2022

## 2022-09-01 NOTE — ASSESSMENT & PLAN NOTE
Patient's FSGs are uncontrolled due to hyperglycemia on current medication regimen.  Last A1c reviewed-   Lab Results   Component Value Date    HGBA1C 12.3 (H) 08/26/2022     Most recent fingerstick glucose reviewed-   Recent Labs   Lab 08/30/22 2017 08/31/22  1156 08/31/22  1627   POCTGLUCOSE 177* 186* 268*     Current correctional scale  Medium  Maintain anti-hyperglycemic dose as follows-   Antihyperglycemics (From admission, onward)    Start     Stop Route Frequency Ordered    08/27/22 1330  insulin detemir U-100 pen 30 Units         -- SubQ Daily 08/27/22 1329    08/26/22 0218  insulin aspart U-100 pen 1-10 Units         -- SubQ Before meals & nightly PRN 08/26/22 0121        Hold Oral hypoglycemics while patient is in the hospital.

## 2022-09-01 NOTE — ASSESSMENT & PLAN NOTE
Chronic, stable.  Latest blood pressure and vitals reviewed-   Temp:  [99 °F (37.2 °C)-100.5 °F (38.1 °C)]   Pulse:  []   Resp:  [16-20]   BP: (142-188)/(68-98)   SpO2:  [96 %-98 %] .   Home meds for hypertension were reviewed and noted below.   Hypertension Medications             carvediloL (COREG) 25 MG tablet TAKE 1 TABLET BY MOUTH TWICE A DAY    hydroCHLOROthiazide (HYDRODIURIL) 25 MG tablet TAKE 1 TABLET BY MOUTH EVERY DAY          While in the hospital, will manage blood pressure as follows; Continue home antihypertensive regimen   Lisinopril 20mg daily added during this hospitalization.    Will utilize p.r.n. blood pressure medication only if patient's blood pressure greater than  180/110 and he develops symptoms such as worsening chest pain or shortness of breath.

## 2022-09-01 NOTE — PT/OT/SLP PROGRESS
"Physical Therapy Treatment    Patient Name:  Ernst May   MRN:  [id number]    Recommendations:     Discharge Recommendations:  LTACH (long-term acute care hospital)   Discharge Equipment Recommendations: walker, rolling   Barriers to discharge: Decreased caregiver support    Assessment:     Ernst May is a 62 y.o. male admitted with a medical diagnosis of Osteomyelitis of right foot.  He presents with the following impairments/functional limitations:  weakness, impaired endurance, impaired sensation, gait instability, decreased lower extremity function, impaired skin .    Pt seen up in chair with legs elevated. Wound vac and cam boot intact. Pt seen for thera ex to LE's. Ambulation limited for bed to chair or bathroom only with strict NWB RLE. Pt with possible transfer to [facility] for vascular consult..    Rehab Prognosis: Fair; patient would benefit from acute skilled PT services to address these deficits and reach maximum level of function.    Recent Surgery: Procedure(s) (LRB):  AMPUTATION, FOOT (Right) 6 Days Post-Op    Plan:     During this hospitalization, patient to be seen daily to address the identified rehab impairments via therapeutic activities, therapeutic exercises and progress toward the following goals:    Plan of Care Expires:  09/15/22    Subjective   Stated of being cold  Stated of "need for another doctor for my circulation"  Chief Complaint: none  Patient/Family Comments/goals: get well and to get R foot healed  Pain/Comfort:  Pain Rating 1: 0/10      Objective:     Communicated with nurse Mancia prior to session.  Patient found up in chair with wound vac, peripheral IV, chair check upon PT entry to room.     General Precautions: Standard, fall   Orthopedic Precautions:RLE non weight bearing   Braces:    Respiratory Status: Room air     Functional Mobility:  Transfers:     Sit to Stand:  minimum assistance with rolling walker NWB RLE.        AM-PAC 6 CLICK MOBILITY          Therapeutic Activities " and Exercises:   Patient was educated on the importance of OOB activity and functional mobility to negate negative effects of prolonged bed rest during hospitalization, safe transfers and ambulation, and D/C planning   Thera ex with AP,QS/GS,HS and SLR 10-20 reps    Patient left up in chair with all lines intact, call button in reach, and chair alarm on..    GOALS:   Multidisciplinary Problems       Physical Therapy Goals          Problem: Physical Therapy    Goal Priority Disciplines Outcome Goal Variances Interventions   Physical Therapy Goal     PT, PT/OT Ongoing, Progressing     Description: Goals to be met by: 9/15/2022     Patient will increase functional independence with mobility by performin). Supine to sit with Modified [place]  2). Sit to supine with Modified [place]  3). Sit to stand transfer with Modified [place]  4). Gait  x > 25 feet with Modified [place] using Rolling Walker.                          Time Tracking:     PT Received On: 22  PT Start Time: 1405     PT Stop Time: 1417  PT Total Time (min): 12 min     Billable Minutes: Therapeutic Exercise 12    Treatment Type: Treatment  PT/PTA: PT     PTA Visit Number: 0     2022

## 2022-09-01 NOTE — ASSESSMENT & PLAN NOTE
POD 2 right fifth toe amputation per Dr. Mathur.  -Vancomycin and cefepime initially-vanc dc'd, flagyl added  -Follow up cultures; currently with GNRs-ID below  -PICC in place  -PT/OT  -PRN analgesics  -Fall precautions  -Wound Care  - consult ID  Microbiology Results (last 7 days)     Procedure Component Value Units Date/Time    Fungus culture [[id number]] Collected: 08/26/22 1348    Order Status: Completed Specimen: Wound from Foot, Right Updated: 08/31/22 1055     Fungus (Mycology) Culture Culture in progress    Blood culture x two cultures. Draw prior to antibiotics. [[id number]] Collected: 08/25/22 2319    Order Status: Completed Specimen: Blood from Peripheral, Left Hand Updated: 08/31/22 1022     Blood Culture, Routine No growth after 5 days.    Narrative:      Aerobic and anaerobic    Blood culture x two cultures. Draw prior to antibiotics. [[id number]] Collected: 08/25/22 2318    Order Status: Completed Specimen: Blood from Hand Updated: 08/31/22 1022     Blood Culture, Routine No growth after 5 days.    Narrative:      Aerobic and anaerobic    Culture, Anaerobe [[id number]]  (Abnormal) Collected: 08/26/22 1348    Order Status: Completed Specimen: Wound from Foot, Right Updated: 08/31/22 0733     Anaerobic Culture BACTEROIDES OVATUS  Many      Aerobic culture [[id number]]  (Abnormal)  (Susceptibility) Collected: 08/26/22 1348    Order Status: Completed Specimen: Wound from Foot, Right Updated: 08/30/22 1022     Aerobic Bacterial Culture ESCHERICHIA COLI  Moderate        PROTEUS PENNERI  Few  No other significant isolate  No other significant isolate      AFB Culture & Smear [[id number]] Collected: 08/26/22 1348    Order Status: Completed Specimen: Wound from Foot, Right Updated: 08/29/22 1521     AFB Culture & Smear Culture in progress     AFB CULTURE STAIN No acid fast bacilli seen.    Gram stain [[id number]] Collected: 08/26/22 1348    Order Status: Completed Specimen: Wound from Foot, Right Updated: 08/27/22  0120     Gram Stain Result Rare WBC's      Many Gram positive cocci      Few Gram negative rods

## 2022-09-01 NOTE — PLAN OF CARE
Problem: Physical Therapy  Goal: Physical Therapy Goal  Description: Goals to be met by: 9/15/2022     Patient will increase functional independence with mobility by performin). Supine to sit with Modified [place]  2). Sit to supine with Modified [place]  3). Sit to stand transfer with Modified [place]  4). Gait  x > 25 feet with Modified [place] using Rolling Walker.     Outcome: Ongoing, Progressing   Pt seen for thera ex while in reclining chair. Wound vac R foot with cam boot. Limit gait for bed to chair and to bathroom only, strict NWB RLE

## 2022-09-02 ENCOUNTER — CLINICAL SUPPORT (OUTPATIENT)
Dept: CARDIOLOGY | Facility: HOSPITAL | Age: 62
DRG: 853 | End: 2022-09-02
Attending: INTERNAL MEDICINE
Payer: MEDICAID

## 2022-09-02 ENCOUNTER — ANESTHESIA (OUTPATIENT)
Dept: INTENSIVE CARE | Facility: HOSPITAL | Age: 62
End: 2022-09-02

## 2022-09-02 ENCOUNTER — ANESTHESIA EVENT (OUTPATIENT)
Dept: INTENSIVE CARE | Facility: HOSPITAL | Age: 62
End: 2022-09-02

## 2022-09-02 ENCOUNTER — ANESTHESIA EVENT (OUTPATIENT)
Dept: INTENSIVE CARE | Facility: HOSPITAL | Age: 62
DRG: 853 | End: 2022-09-02
Payer: MEDICAID

## 2022-09-02 ENCOUNTER — ANESTHESIA (OUTPATIENT)
Dept: INTENSIVE CARE | Facility: HOSPITAL | Age: 62
DRG: 853 | End: 2022-09-02
Payer: MEDICAID

## 2022-09-02 VITALS — WEIGHT: 180 LBS | BODY MASS INDEX: 27.28 KG/M2 | HEIGHT: 68 IN

## 2022-09-02 PROBLEM — I95.9 HYPOTENSION: Status: ACTIVE | Noted: 2022-09-02

## 2022-09-02 LAB
ABO + RH BLD: NORMAL
ALBUMIN SERPL BCP-MCNC: 1.6 G/DL (ref 3.5–5.2)
ALP SERPL-CCNC: 106 U/L (ref 55–135)
ALT SERPL W/O P-5'-P-CCNC: 14 U/L (ref 10–44)
ANION GAP SERPL CALC-SCNC: 7 MMOL/L (ref 8–16)
ANION GAP SERPL CALC-SCNC: 8 MMOL/L (ref 8–16)
AST SERPL-CCNC: 20 U/L (ref 10–40)
BACTERIA #/AREA URNS HPF: NEGATIVE /HPF
BACTERIA #/AREA URNS HPF: NEGATIVE /HPF
BASOPHILS # BLD AUTO: 0.03 K/UL (ref 0–0.2)
BASOPHILS # BLD AUTO: 0.05 K/UL (ref 0–0.2)
BASOPHILS NFR BLD: 0.2 % (ref 0–1.9)
BASOPHILS NFR BLD: 0.3 % (ref 0–1.9)
BILIRUB SERPL-MCNC: 0.6 MG/DL (ref 0.1–1)
BILIRUB UR QL STRIP: NEGATIVE
BILIRUB UR QL STRIP: NEGATIVE
BLD GP AB SCN CELLS X3 SERPL QL: NORMAL
BLD PROD TYP BPU: NORMAL
BLD PROD TYP BPU: NORMAL
BLOOD UNIT EXPIRATION DATE: NORMAL
BLOOD UNIT EXPIRATION DATE: NORMAL
BLOOD UNIT TYPE CODE: 5100
BLOOD UNIT TYPE CODE: 5100
BLOOD UNIT TYPE: NORMAL
BLOOD UNIT TYPE: NORMAL
BNP SERPL-MCNC: 183 PG/ML (ref 0–99)
BUN SERPL-MCNC: 14 MG/DL (ref 8–23)
BUN SERPL-MCNC: 16 MG/DL (ref 8–23)
CALCIUM SERPL-MCNC: 7.8 MG/DL (ref 8.7–10.5)
CALCIUM SERPL-MCNC: 8.1 MG/DL (ref 8.7–10.5)
CHLORIDE SERPL-SCNC: 96 MMOL/L (ref 95–110)
CHLORIDE SERPL-SCNC: 99 MMOL/L (ref 95–110)
CLARITY UR: ABNORMAL
CLARITY UR: ABNORMAL
CO2 SERPL-SCNC: 25 MMOL/L (ref 23–29)
CO2 SERPL-SCNC: 25 MMOL/L (ref 23–29)
CODING SYSTEM: NORMAL
CODING SYSTEM: NORMAL
COLOR UR: YELLOW
COLOR UR: YELLOW
CREAT SERPL-MCNC: 1 MG/DL (ref 0.5–1.4)
CREAT SERPL-MCNC: 1.1 MG/DL (ref 0.5–1.4)
DIFFERENTIAL METHOD: ABNORMAL
DIFFERENTIAL METHOD: ABNORMAL
DISPENSE STATUS: NORMAL
DISPENSE STATUS: NORMAL
EOSINOPHIL # BLD AUTO: 0.1 K/UL (ref 0–0.5)
EOSINOPHIL # BLD AUTO: 0.1 K/UL (ref 0–0.5)
EOSINOPHIL NFR BLD: 0.5 % (ref 0–8)
EOSINOPHIL NFR BLD: 0.6 % (ref 0–8)
ERYTHROCYTE [DISTWIDTH] IN BLOOD BY AUTOMATED COUNT: 13.2 % (ref 11.5–14.5)
ERYTHROCYTE [DISTWIDTH] IN BLOOD BY AUTOMATED COUNT: 13.3 % (ref 11.5–14.5)
EST. GFR  (NO RACE VARIABLE): >60 ML/MIN/1.73 M^2
EST. GFR  (NO RACE VARIABLE): >60 ML/MIN/1.73 M^2
GLUCOSE SERPL-MCNC: 114 MG/DL (ref 70–110)
GLUCOSE SERPL-MCNC: 135 MG/DL (ref 70–110)
GLUCOSE SERPL-MCNC: 142 MG/DL (ref 70–110)
GLUCOSE SERPL-MCNC: 146 MG/DL (ref 70–110)
GLUCOSE SERPL-MCNC: 163 MG/DL (ref 70–110)
GLUCOSE UR QL STRIP: ABNORMAL
GLUCOSE UR QL STRIP: NEGATIVE
GRAN CASTS #/AREA URNS LPF: 2 /LPF
HCT VFR BLD AUTO: 20.4 % (ref 40–54)
HCT VFR BLD AUTO: 22.5 % (ref 40–54)
HGB BLD-MCNC: 6.5 G/DL (ref 14–18)
HGB BLD-MCNC: 7.4 G/DL (ref 14–18)
HGB UR QL STRIP: ABNORMAL
HGB UR QL STRIP: ABNORMAL
HYALINE CASTS #/AREA URNS LPF: 28 /LPF
HYALINE CASTS #/AREA URNS LPF: 40 /LPF
IMM GRANULOCYTES # BLD AUTO: 0.24 K/UL (ref 0–0.04)
IMM GRANULOCYTES # BLD AUTO: 0.25 K/UL (ref 0–0.04)
IMM GRANULOCYTES NFR BLD AUTO: 1.3 % (ref 0–0.5)
IMM GRANULOCYTES NFR BLD AUTO: 1.3 % (ref 0–0.5)
KETONES UR QL STRIP: NEGATIVE
KETONES UR QL STRIP: NEGATIVE
LACTATE SERPL-SCNC: 0.8 MMOL/L (ref 0.5–1.9)
LEUKOCYTE ESTERASE UR QL STRIP: NEGATIVE
LEUKOCYTE ESTERASE UR QL STRIP: NEGATIVE
LYMPHOCYTES # BLD AUTO: 0.8 K/UL (ref 1–4.8)
LYMPHOCYTES # BLD AUTO: 1.2 K/UL (ref 1–4.8)
LYMPHOCYTES NFR BLD: 4.1 % (ref 18–48)
LYMPHOCYTES NFR BLD: 6.3 % (ref 18–48)
MAGNESIUM SERPL-MCNC: 1.7 MG/DL (ref 1.6–2.6)
MAGNESIUM SERPL-MCNC: 1.7 MG/DL (ref 1.6–2.6)
MCH RBC QN AUTO: 27.3 PG (ref 27–31)
MCH RBC QN AUTO: 27.8 PG (ref 27–31)
MCHC RBC AUTO-ENTMCNC: 31.9 G/DL (ref 32–36)
MCHC RBC AUTO-ENTMCNC: 32.9 G/DL (ref 32–36)
MCV RBC AUTO: 85 FL (ref 82–98)
MCV RBC AUTO: 86 FL (ref 82–98)
MICROSCOPIC COMMENT: ABNORMAL
MICROSCOPIC COMMENT: ABNORMAL
MONOCYTES # BLD AUTO: 1.3 K/UL (ref 0.3–1)
MONOCYTES # BLD AUTO: 1.9 K/UL (ref 0.3–1)
MONOCYTES NFR BLD: 7.1 % (ref 4–15)
MONOCYTES NFR BLD: 9.7 % (ref 4–15)
NEUTROPHILS # BLD AUTO: 15.8 K/UL (ref 1.8–7.7)
NEUTROPHILS # BLD AUTO: 16.3 K/UL (ref 1.8–7.7)
NEUTROPHILS NFR BLD: 81.9 % (ref 38–73)
NEUTROPHILS NFR BLD: 86.7 % (ref 38–73)
NITRITE UR QL STRIP: NEGATIVE
NITRITE UR QL STRIP: NEGATIVE
NRBC BLD-RTO: 0 /100 WBC
NRBC BLD-RTO: 0 /100 WBC
NUM UNITS TRANS PACKED RBC: NORMAL
NUM UNITS TRANS PACKED RBC: NORMAL
PH UR STRIP: 6 [PH] (ref 5–8)
PH UR STRIP: 6 [PH] (ref 5–8)
PHOSPHATE SERPL-MCNC: 3.3 MG/DL (ref 2.7–4.5)
PLATELET # BLD AUTO: 403 K/UL (ref 150–450)
PLATELET # BLD AUTO: 545 K/UL (ref 150–450)
PMV BLD AUTO: 9.6 FL (ref 9.2–12.9)
PMV BLD AUTO: 9.6 FL (ref 9.2–12.9)
POTASSIUM SERPL-SCNC: 3.6 MMOL/L (ref 3.5–5.1)
POTASSIUM SERPL-SCNC: 3.9 MMOL/L (ref 3.5–5.1)
PROT SERPL-MCNC: 5.8 G/DL (ref 6–8.4)
PROT UR QL STRIP: ABNORMAL
PROT UR QL STRIP: ABNORMAL
RBC # BLD AUTO: 2.38 M/UL (ref 4.6–6.2)
RBC # BLD AUTO: 2.66 M/UL (ref 4.6–6.2)
RBC #/AREA URNS HPF: 5 /HPF (ref 0–4)
RBC #/AREA URNS HPF: 8 /HPF (ref 0–4)
SODIUM SERPL-SCNC: 129 MMOL/L (ref 136–145)
SODIUM SERPL-SCNC: 131 MMOL/L (ref 136–145)
SP GR UR STRIP: 1.01 (ref 1–1.03)
SP GR UR STRIP: 1.02 (ref 1–1.03)
SQUAMOUS #/AREA URNS HPF: 4 /HPF
SQUAMOUS #/AREA URNS HPF: 6 /HPF
TROPONIN I SERPL DL<=0.01 NG/ML-MCNC: 0.05 NG/ML
URN SPEC COLLECT METH UR: ABNORMAL
URN SPEC COLLECT METH UR: ABNORMAL
UROBILINOGEN UR STRIP-ACNC: NEGATIVE EU/DL
UROBILINOGEN UR STRIP-ACNC: NEGATIVE EU/DL
WBC # BLD AUTO: 18.82 K/UL (ref 3.9–12.7)
WBC # BLD AUTO: 19.3 K/UL (ref 3.9–12.7)
WBC #/AREA URNS HPF: 5 /HPF (ref 0–5)
WBC #/AREA URNS HPF: 6 /HPF (ref 0–5)

## 2022-09-02 PROCEDURE — 63600175 PHARM REV CODE 636 W HCPCS: Performed by: INTERNAL MEDICINE

## 2022-09-02 PROCEDURE — 83605 ASSAY OF LACTIC ACID: CPT | Performed by: INTERNAL MEDICINE

## 2022-09-02 PROCEDURE — 93010 ELECTROCARDIOGRAM REPORT: CPT | Mod: ,,, | Performed by: INTERNAL MEDICINE

## 2022-09-02 PROCEDURE — 84100 ASSAY OF PHOSPHORUS: CPT | Performed by: INTERNAL MEDICINE

## 2022-09-02 PROCEDURE — 80053 COMPREHEN METABOLIC PANEL: CPT | Performed by: INTERNAL MEDICINE

## 2022-09-02 PROCEDURE — 97530 THERAPEUTIC ACTIVITIES: CPT

## 2022-09-02 PROCEDURE — 20000000 HC ICU ROOM

## 2022-09-02 PROCEDURE — 27000221 HC OXYGEN, UP TO 24 HOURS

## 2022-09-02 PROCEDURE — 84484 ASSAY OF TROPONIN QUANT: CPT | Performed by: INTERNAL MEDICINE

## 2022-09-02 PROCEDURE — S0030 INJECTION, METRONIDAZOLE: HCPCS | Performed by: INTERNAL MEDICINE

## 2022-09-02 PROCEDURE — 86920 COMPATIBILITY TEST SPIN: CPT | Performed by: INTERNAL MEDICINE

## 2022-09-02 PROCEDURE — 94799 UNLISTED PULMONARY SVC/PX: CPT

## 2022-09-02 PROCEDURE — 85025 COMPLETE CBC W/AUTO DIFF WBC: CPT | Performed by: INTERNAL MEDICINE

## 2022-09-02 PROCEDURE — P9016 RBC LEUKOCYTES REDUCED: HCPCS | Performed by: INTERNAL MEDICINE

## 2022-09-02 PROCEDURE — 99900035 HC TECH TIME PER 15 MIN (STAT)

## 2022-09-02 PROCEDURE — 94761 N-INVAS EAR/PLS OXIMETRY MLT: CPT

## 2022-09-02 PROCEDURE — 86901 BLOOD TYPING SEROLOGIC RH(D): CPT | Performed by: INTERNAL MEDICINE

## 2022-09-02 PROCEDURE — 83735 ASSAY OF MAGNESIUM: CPT | Mod: 91 | Performed by: INTERNAL MEDICINE

## 2022-09-02 PROCEDURE — 93010 EKG 12-LEAD: ICD-10-PCS | Mod: ,,, | Performed by: INTERNAL MEDICINE

## 2022-09-02 PROCEDURE — 93306 ECHO (CUPID ONLY): ICD-10-PCS | Mod: 26,,, | Performed by: INTERNAL MEDICINE

## 2022-09-02 PROCEDURE — 83735 ASSAY OF MAGNESIUM: CPT | Performed by: INTERNAL MEDICINE

## 2022-09-02 PROCEDURE — 36430 TRANSFUSION BLD/BLD COMPNT: CPT

## 2022-09-02 PROCEDURE — 99900031 HC PATIENT EDUCATION (STAT)

## 2022-09-02 PROCEDURE — 93005 ELECTROCARDIOGRAM TRACING: CPT | Performed by: INTERNAL MEDICINE

## 2022-09-02 PROCEDURE — 85025 COMPLETE CBC W/AUTO DIFF WBC: CPT | Mod: 91 | Performed by: INTERNAL MEDICINE

## 2022-09-02 PROCEDURE — C9113 INJ PANTOPRAZOLE SODIUM, VIA: HCPCS | Performed by: INTERNAL MEDICINE

## 2022-09-02 PROCEDURE — 80048 BASIC METABOLIC PNL TOTAL CA: CPT | Performed by: INTERNAL MEDICINE

## 2022-09-02 PROCEDURE — 25000003 PHARM REV CODE 250: Performed by: INTERNAL MEDICINE

## 2022-09-02 PROCEDURE — 93306 TTE W/DOPPLER COMPLETE: CPT

## 2022-09-02 PROCEDURE — 83880 ASSAY OF NATRIURETIC PEPTIDE: CPT | Performed by: INTERNAL MEDICINE

## 2022-09-02 PROCEDURE — 81001 URINALYSIS AUTO W/SCOPE: CPT | Mod: 91 | Performed by: INTERNAL MEDICINE

## 2022-09-02 PROCEDURE — 87086 URINE CULTURE/COLONY COUNT: CPT | Performed by: INTERNAL MEDICINE

## 2022-09-02 PROCEDURE — 93306 TTE W/DOPPLER COMPLETE: CPT | Mod: 26,,, | Performed by: INTERNAL MEDICINE

## 2022-09-02 PROCEDURE — 97166 OT EVAL MOD COMPLEX 45 MIN: CPT

## 2022-09-02 PROCEDURE — 81001 URINALYSIS AUTO W/SCOPE: CPT | Performed by: INTERNAL MEDICINE

## 2022-09-02 RX ORDER — MEROPENEM AND SODIUM CHLORIDE 1 G/50ML
1 INJECTION, SOLUTION INTRAVENOUS
Status: DISCONTINUED | OUTPATIENT
Start: 2022-09-02 | End: 2022-09-07 | Stop reason: HOSPADM

## 2022-09-02 RX ORDER — NOREPINEPHRINE BITARTRATE/D5W 4MG/250ML
0-3 PLASTIC BAG, INJECTION (ML) INTRAVENOUS CONTINUOUS
Status: DISCONTINUED | OUTPATIENT
Start: 2022-09-02 | End: 2022-09-03

## 2022-09-02 RX ORDER — PANTOPRAZOLE SODIUM 40 MG/10ML
80 INJECTION, POWDER, LYOPHILIZED, FOR SOLUTION INTRAVENOUS ONCE
Status: COMPLETED | OUTPATIENT
Start: 2022-09-02 | End: 2022-09-02

## 2022-09-02 RX ORDER — HYDROCODONE BITARTRATE AND ACETAMINOPHEN 500; 5 MG/1; MG/1
TABLET ORAL
Status: DISCONTINUED | OUTPATIENT
Start: 2022-09-02 | End: 2022-09-07 | Stop reason: HOSPADM

## 2022-09-02 RX ORDER — HYDRALAZINE HYDROCHLORIDE 20 MG/ML
10 INJECTION INTRAMUSCULAR; INTRAVENOUS EVERY 6 HOURS PRN
Status: DISCONTINUED | OUTPATIENT
Start: 2022-09-02 | End: 2022-09-07 | Stop reason: HOSPADM

## 2022-09-02 RX ORDER — SODIUM CHLORIDE 9 MG/ML
INJECTION, SOLUTION INTRAVENOUS CONTINUOUS
Status: DISCONTINUED | OUTPATIENT
Start: 2022-09-02 | End: 2022-09-03

## 2022-09-02 RX ORDER — FENTANYL CITRATE 50 UG/ML
25 INJECTION, SOLUTION INTRAMUSCULAR; INTRAVENOUS EVERY 4 HOURS PRN
Status: DISCONTINUED | OUTPATIENT
Start: 2022-09-02 | End: 2022-09-03

## 2022-09-02 RX ADMIN — ASPIRIN 81 MG CHEWABLE TABLET 81 MG: 81 TABLET CHEWABLE at 10:09

## 2022-09-02 RX ADMIN — ACETAMINOPHEN 650 MG: 325 TABLET ORAL at 04:09

## 2022-09-02 RX ADMIN — HYDROCHLOROTHIAZIDE 25 MG: 25 TABLET ORAL at 10:09

## 2022-09-02 RX ADMIN — MEROPENEM AND SODIUM CHLORIDE 1 G: 1 INJECTION, SOLUTION INTRAVENOUS at 03:09

## 2022-09-02 RX ADMIN — METRONIDAZOLE 500 MG: 5 INJECTION, SOLUTION INTRAVENOUS at 12:09

## 2022-09-02 RX ADMIN — LISINOPRIL 20 MG: 20 TABLET ORAL at 10:09

## 2022-09-02 RX ADMIN — ATORVASTATIN CALCIUM 40 MG: 40 TABLET, FILM COATED ORAL at 12:09

## 2022-09-02 RX ADMIN — FAMOTIDINE 20 MG: 10 INJECTION, SOLUTION INTRAVENOUS at 11:09

## 2022-09-02 RX ADMIN — METRONIDAZOLE 500 MG: 5 INJECTION, SOLUTION INTRAVENOUS at 10:09

## 2022-09-02 RX ADMIN — PANTOPRAZOLE SODIUM 80 MG: 40 INJECTION, POWDER, FOR SOLUTION INTRAVENOUS at 03:09

## 2022-09-02 RX ADMIN — MEROPENEM AND SODIUM CHLORIDE 1 G: 1 INJECTION, SOLUTION INTRAVENOUS at 11:09

## 2022-09-02 RX ADMIN — VANCOMYCIN HYDROCHLORIDE 1250 MG: 1.25 INJECTION, POWDER, LYOPHILIZED, FOR SOLUTION INTRAVENOUS at 03:09

## 2022-09-02 RX ADMIN — FENTANYL CITRATE 25 MCG: 50 INJECTION, SOLUTION INTRAMUSCULAR; INTRAVENOUS at 05:09

## 2022-09-02 RX ADMIN — CEFEPIME HYDROCHLORIDE 1 G: 1 INJECTION, SOLUTION INTRAVENOUS at 12:09

## 2022-09-02 RX ADMIN — PANTOPRAZOLE SODIUM 8 MG/HR: 40 INJECTION, POWDER, FOR SOLUTION INTRAVENOUS at 03:09

## 2022-09-02 RX ADMIN — CARVEDILOL 25 MG: 25 TABLET, FILM COATED ORAL at 10:09

## 2022-09-02 RX ADMIN — ACETAMINOPHEN 650 MG: 325 TABLET ORAL at 10:09

## 2022-09-02 RX ADMIN — FAMOTIDINE 20 MG: 10 INJECTION, SOLUTION INTRAVENOUS at 10:09

## 2022-09-02 RX ADMIN — ACETAMINOPHEN 650 MG: 325 TABLET ORAL at 12:09

## 2022-09-02 RX ADMIN — ATORVASTATIN CALCIUM 40 MG: 40 TABLET, FILM COATED ORAL at 11:09

## 2022-09-02 RX ADMIN — CARVEDILOL 25 MG: 25 TABLET, FILM COATED ORAL at 11:09

## 2022-09-02 RX ADMIN — SODIUM CHLORIDE: 0.9 INJECTION, SOLUTION INTRAVENOUS at 03:09

## 2022-09-02 RX ADMIN — NOREPINEPHRINE BITARTRATE 0.02 MCG/KG/MIN: 4 INJECTION, SOLUTION INTRAVENOUS at 12:09

## 2022-09-02 RX ADMIN — HYDRALAZINE HYDROCHLORIDE 10 MG: 20 INJECTION INTRAMUSCULAR; INTRAVENOUS at 06:09

## 2022-09-02 RX ADMIN — CEFEPIME HYDROCHLORIDE 1 G: 1 INJECTION, SOLUTION INTRAVENOUS at 10:09

## 2022-09-02 NOTE — PROGRESS NOTES
Pharmacokinetic Initial Assessment: IV Vancomycin    Assessment/Plan:    Initiate intravenous vancomycin with loading dose of 1250 mg once followed by a maintenance dose of vancomycin 1250mg IV every 18 hours  Desired empiric serum trough concentration is 15 to 20 mcg/mL  Draw vancomycin trough level 60 min prior to third dose on 09/04/2022 at approximately 0200  Pharmacy will continue to follow and monitor vancomycin.      Please contact pharmacy at extension [phone] with any questions regarding this assessment.     Thank you for the consult,   Lisa Maria       Patient brief summary:  Ernst May is a 62 y.o. male initiated on antimicrobial therapy with IV Vancomycin for treatment of suspected bacteremia    Drug Allergies:   Review of patient's allergies indicates:   Allergen Reactions    Neosporin [benzalkonium chloride]        Actual Body Weight:   81.6kg    Renal Function:   Estimated Creatinine Clearance: 67.4 mL/min (based on SCr of 1.1 mg/dL).,     Dialysis Method (if applicable):  N/A    CBC (last 72 hours):  Recent Labs   Lab Result Units 08/31/22  0354 09/01/22  0509 09/02/22 0441   WBC K/uL 17.80* 15.71* 19.30*   Hemoglobin g/dL 7.6* 7.1* 7.4*   Hematocrit % 22.9* 21.7* 22.5*   Platelets K/uL 435 486* 545*   Gran % % 76.1* 79.1* 81.9*   Lymph % % 10.6* 7.3* 6.3*   Mono % % 9.7 9.9 9.7   Eosinophil % % 2.0 1.7 0.6   Basophil % % 0.3 0.4 0.2   Differential Method  Automated Automated Automated       Metabolic Panel (last 72 hours):  Recent Labs   Lab Result Units 08/31/22  0354 09/01/22  0509 09/02/22  0338 09/02/22  0441   Sodium mmol/L 131* 130*  --  129*   Potassium mmol/L 3.9 4.1  --  3.6   Chloride mmol/L 100 100  --  96   CO2 mmol/L 24 21*  --  25   Glucose mg/dL 83 213*  --  135*   Glucose, UA   --   --  1+*  --    BUN mg/dL 14 14  --  16   Creatinine mg/dL 0.9 1.0  --  1.1   Albumin g/dL 1.5* 1.4*  --   --    Total Bilirubin mg/dL 0.3 0.3  --   --    Alkaline Phosphatase U/L 130 136*  --   --     AST U/L 17 13  --   --    ALT U/L 16 15  --   --    Magnesium mg/dL 1.8 1.8  --  1.7   Phosphorus mg/dL 3.0 2.9  --  3.3       Drug levels (last 3 results):  Recent Labs   Lab Result Units 08/30/22  1750   Vancomycin-Trough ug/mL 16.3       Microbiologic Results:  Microbiology Results (last 7 days)       Procedure Component Value Units Date/Time    Urine Culture High Risk [[id number]] Collected: 09/02/22 1309    Order Status: Sent Specimen: Urine, Catheterized Updated: 09/02/22 1309    Blood culture [[id number]]     Order Status: No result Specimen: Blood     Blood culture [[id number]]     Order Status: No result Specimen: Blood

## 2022-09-02 NOTE — PROGRESS NOTES
"[facility] Medicine  Progress Note    Patient Name: Ernst May  MRN: [id number]  Patient Class: IP- Inpatient   Admission Date: 9/1/2022  Length of Stay: 1 days  Attending Physician: Harvey Clifford MD  Primary Care Provider: Chemo Hinkle MD        Subjective:     Principal Problem:Osteomyelitis        HPI:  62 years old male past medical history of diabetes hypertension CKD previous history of osteomyelitis of C-spine  "admitted to [facility] on August 26 with fever, weakness, and right foot wound.  He was admitted with osteomyelitis of the right foot with cellulitis, sepsis, gangrene of the right foot, and anemia.  Podiatry was consulted and he underwent amputation of the right 5th toe.  He was treated with antibiotics and wound care.  PICC line was placed.  He had further debridement of his foot on August 30.  He was seen by Infectious Diseases, and antibiotics were adjusted.  Recommendation was for transfer to a facility with Vascular Surgery availability to determine if revascularization would improve wound healing.  Requesting transfer to Hospital Medicine at [facility] for further treatment.     September 1: Sodium 130, potassium 4.1, chloride 100, CO2 21, BUN 14, creatinine 1, glucose 213, AST 13, ALT 15, magnesium 1.8, phosphorus 2.9, .2, white blood cells 15.71, hemoglobin 7.1, hematocrit 21.7, platelets 486     August 31: CTA of the bilateral lower extremities:  1. Right lower extremity: Multiple areas of up to 50% stenosis including right deep femoral artery, popliteal artery. High-grade stenosis at the origin of the right anterior tibial artery (51-99%).  Flow is present in all trifurcation vessels at the origin but cannot be traced beyond the origin, for reasons detailed above.  Correlate with prior ultrasound  2. Left lower extremity: Multiple areas of up to 50% stenosis including mid segment deep femoral artery, distal popliteal artery.  " "Flow is present in all trifurcation vessels at the origin but cannot be traced beyond the origin, for reasons detailed above.  Correlate with prior ultrasound.  3. Open skin wound along the right foot with subcutaneous emphysema in the foot anterior ankle and anterior shin musculature.  Gas could be medially from the open wound or from gas-forming infection.  Evidence of osteomyelitis involving the cuboid.  4. Diffuse subcutaneous soft tissue edema of both lower legs especially on the right.  This could relate to cellulitis noninfectious inflammation or venous/lymphatic occlusion.  5. Cholelith.  6. Left nephrolith.     August 29: Chest x-ray noted PICC in good position.  Hypoventilation noted.     August 26:  Right foot wound anaerobic culture growing Bacteroides ovatus  Aerobic right foot my and Proteus penneri  -EKG with normal sinus rhythm and right bundle-branch block     August 25:  COVID negative  Blood cultures with no growth at 5 days"      Overview/Hospital Course:  9/2/2022  Mr May had no complaints this AM but became hypotensive in his room before noon. His MAP was below 65 fluid resuscitation was initiated with 2 liters via PICC,  EKG NSR no stemi  Labs ordered including blood and urine cultures  A line and central line ordered      Interval History: Pt with hypotension responding to fluid resuscitation and levophed to a MAP > 65    Review of Systems   Constitutional:  Positive for activity change, diaphoresis and fatigue. Negative for chills and fever.   HENT: Negative.     Eyes: Negative.    Respiratory: Negative.     Cardiovascular: Negative.    Gastrointestinal: Negative.    Endocrine: Positive for heat intolerance.   Genitourinary:  Positive for decreased urine volume.   Musculoskeletal:  Positive for arthralgias and myalgias.   Skin:  Positive for wound.   Allergic/Immunologic: Positive for immunocompromised state.   Neurological:  Positive for dizziness and weakness.   Hematological: " Negative.    Psychiatric/Behavioral:  The patient is nervous/anxious.    Objective:     Vital Signs (Most Recent):  Temp: 98.6 °F (37 °C) (09/02/22 1134)  Pulse: 73 (09/02/22 1134)  Resp: 18 (09/02/22 1134)  BP: (!) 81/48 (09/02/22 1214)  SpO2: (!) 91 % (09/02/22 1214)   Vital Signs (24h Range):  Temp:  [98.6 °F (37 °C)-100.7 °F (38.2 °C)] 98.6 °F (37 °C)  Pulse:  [] 73  Resp:  [16-19] 18  SpO2:  [91 %-98 %] 91 %  BP: ()/() 81/48     Weight: 81.6 kg (180 lb)  Body mass index is 27.37 kg/m².    Intake/Output Summary (Last 24 hours) at 9/2/2022 1259  Last data filed at 9/2/2022 0500  Gross per 24 hour   Intake 120 ml   Output 400 ml   Net -280 ml      Physical Exam  Vitals and nursing note reviewed.   Constitutional:       General: He is not in acute distress.     Appearance: He is ill-appearing.   HENT:      Head: Normocephalic and atraumatic.      Nose: Nose normal.      Mouth/Throat:      Mouth: Mucous membranes are moist.   Eyes:      Extraocular Movements: Extraocular movements intact.      Pupils: Pupils are equal, round, and reactive to light.   Cardiovascular:      Rate and Rhythm: Normal rate and regular rhythm.   Pulmonary:      Effort: Pulmonary effort is normal.      Breath sounds: Normal breath sounds.   Abdominal:      General: There is distension.      Tenderness: There is no abdominal tenderness. There is no guarding or rebound.   Musculoskeletal:         General: Normal range of motion.      Cervical back: Normal range of motion and neck supple.   Skin:     General: Skin is warm.   Neurological:      Mental Status: He is alert and oriented to person, place, and time.   Psychiatric:      Comments: Dulled affect       Significant Labs: All pertinent labs within the past 24 hours have been reviewed.  Recent Lab Results  (Last 5 results in the past 24 hours)        09/02/22  1225   09/02/22  1137   09/02/22  0727   09/02/22  0441   09/02/22  0338        Anion Gap       8          Appearance, UA         Hazy       Bacteria, UA         Negative       Baso #       0.03         Basophil %       0.2         Bilirubin (UA)         Negative       BUN       16         Calcium       8.1         Chloride       96         CO2       25         Color, UA         Yellow       Creatinine       1.1         Differential Method       Automated         eGFR       >60.0         Eos #       0.1         Eosinophil %       0.6         Glucose       135         Glucose, UA         1+       Gran # (ANC)       15.8         Gran %       81.9         Granular Casts, UA         2       Hematocrit       22.5         Hemoglobin       7.4         Hyaline Casts, UA         28       Immature Grans (Abs)       0.25  Comment: Mild elevation in immature granulocytes is non specific and   can be seen in a variety of conditions including stress response,   acute inflammation, trauma and pregnancy. Correlation with other   laboratory and clinical findings is essential.           Immature Granulocytes       1.3         Ketones, UA         Negative       Leukocytes, UA         Negative       Lymph #       1.2         Lymph %       6.3         Magnesium       1.7         MCH       27.8         MCHC       32.9         MCV       85         Microscopic Comment         SEE COMMENT  Comment: Other formed elements not mentioned in the report are not   present in the microscopic examination.          Mono #       1.9         Mono %       9.7         MPV       9.6         NITRITE UA         Negative       nRBC       0         Occult Blood UA         1+       pH, UA         6.0       Phosphorus       3.3         Platelets       545         POC Glucose 163   142   114           Potassium       3.6         Protein, UA         2+  Comment: Recommend a 24 hour urine protein or a urine   protein/creatinine ratio if globulin induced proteinuria is  clinically suspected.         RBC       2.66         RBC, UA         8       RDW       13.2          Sodium       129         Specific [place], UA         1.015       Specimen UA         Urine, Clean Catch       Squam Epithel, UA         4       UROBILINOGEN UA         Negative       WBC, UA         5       WBC       19.30                                Significant Imaging: I have reviewed all pertinent imaging results/findings within the past 24 hours.      Assessment/Plan:      * Osteomyelitis    Of right foot status post amputation 5th digit stay and PICC line placement and I and D on August 30th  Continue with cefepime and Flagyl.  Cultures growing E coli Proteus and Bacteroides.  ID, Dr Ibrahim, on the case.      Hypotension  Responding to IV fluids and Levophed  Blood cultures X 2 Urine culture  Vancomycin and Meropenem      Peripheral artery disease  Dr. Bonner's we consulted on the case follow-up recommendations  Continue with aspirin and atorvastatin    Diabetes mellitus with hyperglycemia  Sliding scale    Essential hypertension, not well controlled    Patient on Coreg 25 b.i.d. HCTZ 25 daily lisinopril 20 mg a day      VTE Risk Mitigation (From admission, onward)         Ordered     IP VTE HIGH RISK PATIENT  Once         09/01/22 2303     Place sequential compression device  Until discontinued         09/01/22 2303                Discharge Planning   MEGHAN:      Code Status: Full Code   Is the patient medically ready for discharge?:     Reason for patient still in hospital (select all that apply): Patient unstable, Patient new problem, Treatment and Consult recommendations  Discharge Plan A: Home                  Harvey Clifford MD  Department of Hospital Medicine   [facility]

## 2022-09-02 NOTE — H&P
"[facility] Medicine  History & Physical    Patient Name: Ernst May  MRN: [id number]  Patient Class: IP- Inpatient  Admission Date: 9/1/2022  Attending Physician: Jayjay Curry MD   Primary Care Provider: Chemo Hinkle MD         Patient information was obtained from patient, past medical records and ER records.     Subjective:     Principal Problem:<principal problem not specified>    Chief Complaint: No chief complaint on file.       HPI: 62 years old male past medical history of diabetes hypertension CKD previous history of osteomyelitis of C-spine  "admitted to [facility] on August 26 with fever, weakness, and right foot wound.  He was admitted with osteomyelitis of the right foot with cellulitis, sepsis, gangrene of the right foot, and anemia.  Podiatry was consulted and he underwent amputation of the right 5th toe.  He was treated with antibiotics and wound care.  PICC line was placed.  He had further debridement of his foot on August 30.  He was seen by Infectious Diseases, and antibiotics were adjusted.  Recommendation was for transfer to a facility with Vascular Surgery availability to determine if revascularization would improve wound healing.  [facility].     September 1: Sodium 130, potassium 4.1, chloride 100, CO2 21, BUN 14, creatinine 1, glucose 213, AST 13, ALT 15, magnesium 1.8, phosphorus 2.9, .2, white blood cells 15.71, hemoglobin 7.1, hematocrit 21.7, platelets 486     August 31: CTA of the bilateral lower extremities:  1. Right lower extremity: Multiple areas of up to 50% stenosis including right deep femoral artery, popliteal artery. High-grade stenosis at the origin of the right anterior tibial artery (51-99%).  Flow is present in all trifurcation vessels at the origin but cannot be traced beyond the origin, for reasons detailed above.  Correlate with prior " "ultrasound  2. Left lower extremity: Multiple areas of up to 50% stenosis including mid segment deep femoral artery, distal popliteal artery.  Flow is present in all trifurcation vessels at the origin but cannot be traced beyond the origin, for reasons detailed above.  Correlate with prior ultrasound.  3. Open skin wound along the right foot with subcutaneous emphysema in the foot anterior ankle and anterior shin musculature.  Gas could be medially from the open wound or from gas-forming infection.  Evidence of osteomyelitis involving the cuboid.  4. Diffuse subcutaneous soft tissue edema of both lower legs especially on the right.  This could relate to cellulitis noninfectious inflammation or venous/lymphatic occlusion.  5. Cholelith.  6. Left nephrolith.     August 29: Chest x-ray noted PICC in good position.  Hypoventilation noted.     August 26:  Right foot wound anaerobic culture growing Bacteroides ovatus  Aerobic right foot my and Proteus penneri  -EKG with normal sinus rhythm and right bundle-branch block     August 25:  COVID negative  Blood cultures with no growth at 5 days"      Past Medical History:   Diagnosis Date    Back abscess 09/2021    CKD (chronic kidney disease) 09/2021    Diabetes mellitus with hyperglycemia 09/2021    Diabetic foot ulcer 09/2021    bilateral, severe abrasions    Hypertension     Osteomyelitis of cervical spine 09/2021    under back abscess    Sepsis 10/9/2021       Past Surgical History:   Procedure Laterality Date    CYST REMOVAL  2012    FOOT AMPUTATION Right 8/26/2022    Procedure: AMPUTATION, FOOT;  Surgeon: Dave Mathur DPM;  Location: [facility] OR;  Service: Podiatry;  Laterality: Right;  4th and 5th metatarsal     INCISION AND DRAINAGE OF ABSCESS Left 9/4/2021    Procedure: INCISION AND DRAINAGE, ABSCESS;  Surgeon: Surjit Chawla MD;  Location: [facility] OR;  Service: General;  Laterality: Left;    INCISION AND DRAINAGE OF ABSCESS Left 9/14/2021    Procedure: " INCISION AND DRAINAGE, ABSCESS; DEBRIDEMENT;  Surgeon: Surjit Chawla MD;  Location: [facility] OR;  Service: General;  Laterality: Left;    REPLACEMENT OF WOUND VACUUM-ASSISTED CLOSURE DEVICE Left 9/14/2021    Procedure: REPLACEMENT, WOUND VAC;  Surgeon: Surjit Chawla MD;  Location: [facility] OR;  Service: General;  Laterality: Left;  EXCHANGE.       Review of patient's allergies indicates:   Allergen Reactions    Neosporin [benzalkonium chloride]        Current Facility-Administered Medications on File Prior to Encounter   Medication    [DISCONTINUED] 0.9%  NaCl infusion    [DISCONTINUED] acetaminophen tablet 650 mg    [DISCONTINUED] aspirin chewable tablet 81 mg    [DISCONTINUED] cefepime in dextrose 5 % IVPB 2 g    [DISCONTINUED] dextrose 10% bolus 125 mL    [DISCONTINUED] dextrose 10% bolus 250 mL    [DISCONTINUED] enoxaparin injection 40 mg    [DISCONTINUED] glucagon (human recombinant) injection 1 mg    [DISCONTINUED] glucose chewable tablet 16 g    [DISCONTINUED] glucose chewable tablet 24 g    [DISCONTINUED] hydrALAZINE injection 10 mg    [DISCONTINUED] HYDROcodone-acetaminophen 5-325 mg per tablet 1 tablet    [DISCONTINUED] ibuprofen tablet 400 mg    [DISCONTINUED] insulin aspart U-100 pen 1-10 Units    [DISCONTINUED] insulin detemir U-100 pen 30 Units    [DISCONTINUED] lactulose 20 gram/30 mL solution Soln 20 g    [DISCONTINUED] lisinopriL tablet 20 mg    [DISCONTINUED] melatonin tablet 6 mg    [DISCONTINUED] metronidazole IVPB 500 mg    [DISCONTINUED] multivitamin tablet    [DISCONTINUED] mupirocin 2 % ointment    [DISCONTINUED] naloxone 0.4 mg/mL injection 0.02 mg    [DISCONTINUED] ondansetron injection 4 mg    [DISCONTINUED] polyethylene glycol packet 17 g    [DISCONTINUED] potassium, sodium phosphates 280-160-250 mg packet 1 packet    [DISCONTINUED] senna-docusate 8.6-50 mg per tablet 1 tablet    [DISCONTINUED] sodium chloride 0.9% flush 10 mL    [DISCONTINUED] sodium chloride  0.9% flush 10 mL    [DISCONTINUED] sodium chloride 0.9% flush 10 mL    [DISCONTINUED] sodium hypochlorite (DAKIN'S) external solution 0.25%     Current Outpatient Medications on File Prior to Encounter   Medication Sig    acetaminophen (TYLENOL) 325 MG tablet Take 2 tablets (650 mg total) by mouth every 4 (four) hours as needed.    carvediloL (COREG) 25 MG tablet TAKE 1 TABLET BY MOUTH TWICE A DAY    diphenhydrAMINE (BENADRYL) 25 mg capsule Take 1 capsule (25 mg total) by mouth every 6 (six) hours as needed for Itching.    hydroCHLOROthiazide (HYDRODIURIL) 25 MG tablet TAKE 1 TABLET BY MOUTH EVERY DAY    HYDROcodone-acetaminophen (NORCO) 5-325 mg per tablet Take 1 tablet by mouth every 6 (six) hours as needed for Pain.    hydrocortisone 2.5 % cream Apply topically 2 (two) times daily. (Patient taking differently: Apply 1 application topically 2 (two) times daily.)    L.acidophil,parac-S.therm-Bif. (RISAQUAD) Cap capsule Take 1 capsule by mouth once daily.    LIDOcaine (LIDODERM) 5 % Place 1 patch onto the skin once daily. Remove & Discard patch within 12 hours or as directed by MD    metFORMIN (GLUCOPHAGE) 500 MG tablet Take 500 mg by mouth 2 (two) times daily.    multivitamin Tab Take 1 tablet by mouth once daily.     Family History       Problem Relation (Age of Onset)    Arthritis Father    Diabetes Mother, Father, Maternal Grandmother    Heart disease Mother, Father    Hypertension Father          Tobacco Use    Smoking status: Some Days     Types: Cigars    Smokeless tobacco: Never    Tobacco comments:     occassionally   Substance and Sexual Activity    Alcohol use: No    Drug use: No    Sexual activity: Not on file     Review of Systems   Reason unable to perform ROS: Ten point system review pertinent positives and negatives are present HPI.   Objective:     Vital Signs (Most Recent):  Temp: 99.5 °F (37.5 °C) (09/01/22 2230)  Pulse: 94 (09/01/22 2230)  Resp: 18 (09/01/22 2230)  BP: (!)  "156/83 (09/01/22 2230)  SpO2: 95 % (09/01/22 2230)   Vital Signs (24h Range):  Temp:  [97.6 °F (36.4 °C)-100.7 °F (38.2 °C)] 99.5 °F (37.5 °C)  Pulse:  [] 94  Resp:  [16-20] 18  SpO2:  [95 %-98 %] 95 %  BP: (112-198)/() 156/83        There is no height or weight on file to calculate BMI.    Physical Exam  Constitutional:       General: He is not in acute distress.     Appearance: Normal appearance. He is not toxic-appearing or diaphoretic.   HENT:      Head: Normocephalic and atraumatic.      Nose: Nose normal.   Eyes:      General: No scleral icterus.        Right eye: No discharge.         Left eye: No discharge.      Conjunctiva/sclera: Conjunctivae normal.   Cardiovascular:      Rate and Rhythm: Normal rate and regular rhythm.      Heart sounds: No murmur heard.    No friction rub. No gallop.   Pulmonary:      Effort: Pulmonary effort is normal. No respiratory distress.      Breath sounds: Normal breath sounds. No stridor. No wheezing, rhonchi or rales.   Abdominal:      General: Bowel sounds are normal. There is no distension.      Palpations: Abdomen is soft.      Tenderness: There is no abdominal tenderness. There is no guarding or rebound.   Musculoskeletal:         General: No swelling or deformity.      Cervical back: Neck supple.      Comments: Right lower extremity with swelling erythema status post toe amputation and I and D   Skin:     Comments: See "MSK"   Neurological:      Mental Status: He is alert.      Comments: Decreased sensitivity on RLE.  Awake alert oriented follows commands           Significant Labs: All pertinent labs within the past 24 hours have been reviewed.  A1C:   Recent Labs   Lab 08/26/22  0236   HGBA1C 12.3*     BMP:   Recent Labs   Lab 09/01/22  0509   *   *   K 4.1      CO2 21*   BUN 14   CREATININE 1.0   CALCIUM 8.2*   MG 1.8     CBC:   Recent Labs   Lab 08/31/22  0354 09/01/22  0509   WBC 17.80* 15.71*   HGB 7.6* 7.1*   HCT 22.9* 21.7*    " 486*     Lactic Acid: No results for input(s): LACTATE in the last 48 hours.  Lipid Panel: No results for input(s): CHOL, HDL, LDLCALC, TRIG, CHOLHDL in the last 48 hours.  Magnesium:   Recent Labs   Lab 08/31/22  0354 09/01/22  0509   MG 1.8 1.8     Troponin: No results for input(s): TROPONINI in the last 48 hours.  TSH: No results for input(s): TSH in the last 4320 hours.  Urine Culture: No results for input(s): LABURIN in the last 48 hours.  Urine Studies: No results for input(s): COLORU, APPEARANCEUA, PHUR, SPECGRAV, PROTEINUA, GLUCUA, KETONESU, BILIRUBINUA, OCCULTUA, NITRITE, UROBILINOGEN, LEUKOCYTESUR, RBCUA, WBCUA, BACTERIA, SQUAMEPITHEL, HYALINECASTS in the last 48 hours.    Invalid input(s): WRIGHTSUR    Significant Imaging: I have reviewed all pertinent imaging results/findings within the past 24 hours.    Assessment/Plan:     Peripheral artery disease  Dr. Bonner's we consulted on the case follow-up recommendations  Continue with aspirin and atorvastatin    Osteomyelitis    Of right foot status post amputation 5th digit stay and PICC line placement and I and D on August 30th  Continue with cefepime and Flagyl.  Cultures growing E coli Proteus and Bacteroides.  ID, Dr Ibrahim, on the case.      Diabetes mellitus with hyperglycemia  Sliding scale    Essential hypertension, not well controlled    Patient on Coreg 25 b.i.d. HCTZ 25 daily lisinopril 20 mg a day      VTE Risk Mitigation (From admission, onward)         Ordered     IP VTE HIGH RISK PATIENT  Once         09/01/22 2303     Place sequential compression device  Until discontinued         09/01/22 2303                   Jayjay Curry MD  Department of Hospital Medicine   [facility]

## 2022-09-02 NOTE — SUBJECTIVE & OBJECTIVE
Interval History: Pt with hypotension responding to fluid resuscitation and levophed to a MAP > 65    Review of Systems   Constitutional:  Positive for activity change, diaphoresis and fatigue. Negative for chills and fever.   HENT: Negative.     Eyes: Negative.    Respiratory: Negative.     Cardiovascular: Negative.    Gastrointestinal: Negative.    Endocrine: Positive for heat intolerance.   Genitourinary:  Positive for decreased urine volume.   Musculoskeletal:  Positive for arthralgias and myalgias.   Skin:  Positive for wound.   Allergic/Immunologic: Positive for immunocompromised state.   Neurological:  Positive for dizziness and weakness.   Hematological: Negative.    Psychiatric/Behavioral:  The patient is nervous/anxious.    Objective:     Vital Signs (Most Recent):  Temp: 98.6 °F (37 °C) (09/02/22 1134)  Pulse: 73 (09/02/22 1134)  Resp: 18 (09/02/22 1134)  BP: (!) 81/48 (09/02/22 1214)  SpO2: (!) 91 % (09/02/22 1214)   Vital Signs (24h Range):  Temp:  [98.6 °F (37 °C)-100.7 °F (38.2 °C)] 98.6 °F (37 °C)  Pulse:  [] 73  Resp:  [16-19] 18  SpO2:  [91 %-98 %] 91 %  BP: ()/() 81/48     Weight: 81.6 kg (180 lb)  Body mass index is 27.37 kg/m².    Intake/Output Summary (Last 24 hours) at 9/2/2022 1259  Last data filed at 9/2/2022 0500  Gross per 24 hour   Intake 120 ml   Output 400 ml   Net -280 ml      Physical Exam  Vitals and nursing note reviewed.   Constitutional:       General: He is not in acute distress.     Appearance: He is ill-appearing.   HENT:      Head: Normocephalic and atraumatic.      Nose: Nose normal.      Mouth/Throat:      Mouth: Mucous membranes are moist.   Eyes:      Extraocular Movements: Extraocular movements intact.      Pupils: Pupils are equal, round, and reactive to light.   Cardiovascular:      Rate and Rhythm: Normal rate and regular rhythm.   Pulmonary:      Effort: Pulmonary effort is normal.      Breath sounds: Normal breath sounds.   Abdominal:      General:  There is distension.      Tenderness: There is no abdominal tenderness. There is no guarding or rebound.   Musculoskeletal:         General: Normal range of motion.      Cervical back: Normal range of motion and neck supple.   Skin:     General: Skin is warm.   Neurological:      Mental Status: He is alert and oriented to person, place, and time.   Psychiatric:      Comments: Dulled affect       Significant Labs: All pertinent labs within the past 24 hours have been reviewed.  Recent Lab Results  (Last 5 results in the past 24 hours)        09/02/22  1225   09/02/22  1137   09/02/22  0727   09/02/22  0441   09/02/22  0338        Anion Gap       8         Appearance, UA         Hazy       Bacteria, UA         Negative       Baso #       0.03         Basophil %       0.2         Bilirubin (UA)         Negative       BUN       16         Calcium       8.1         Chloride       96         CO2       25         Color, UA         Yellow       Creatinine       1.1         Differential Method       Automated         eGFR       >60.0         Eos #       0.1         Eosinophil %       0.6         Glucose       135         Glucose, UA         1+       Gran # (ANC)       15.8         Gran %       81.9         Granular Casts, UA         2       Hematocrit       22.5         Hemoglobin       7.4         Hyaline Casts, UA         28       Immature Grans (Abs)       0.25  Comment: Mild elevation in immature granulocytes is non specific and   can be seen in a variety of conditions including stress response,   acute inflammation, trauma and pregnancy. Correlation with other   laboratory and clinical findings is essential.           Immature Granulocytes       1.3         Ketones, UA         Negative       Leukocytes, UA         Negative       Lymph #       1.2         Lymph %       6.3         Magnesium       1.7         MCH       27.8         MCHC       32.9         MCV       85         Microscopic Comment         SEE  COMMENT  Comment: Other formed elements not mentioned in the report are not   present in the microscopic examination.          Mono #       1.9         Mono %       9.7         MPV       9.6         NITRITE UA         Negative       nRBC       0         Occult Blood UA         1+       pH, UA         6.0       Phosphorus       3.3         Platelets       545         POC Glucose 163   142   114           Potassium       3.6         Protein, UA         2+  Comment: Recommend a 24 hour urine protein or a urine   protein/creatinine ratio if globulin induced proteinuria is  clinically suspected.         RBC       2.66         RBC, UA         8       RDW       13.2         Sodium       129         Specific [place], UA         1.015       Specimen UA         Urine, Clean Catch       Squam Epithel, UA         4       UROBILINOGEN UA         Negative       WBC, UA         5       WBC       19.30                                Significant Imaging: I have reviewed all pertinent imaging results/findings within the past 24 hours.

## 2022-09-02 NOTE — PROGRESS NOTES
[facility]  Podiatry  Progress Note    Patient Name: Ernst May  MRN: [id number]  Admission Date: 9/1/2022  Hospital Length of Stay: 1 days  Attending Physician: Harvey Clifford MD  Primary Care Provider: Chemo Hinkle MD     Subjective:     Interval History:  Patient seen at bedside in the ICU.  States he is having some pain, though controlled, to his right foot.    Follow-up For: Procedure(s) (LRB):  Angiogram Extremity Unilateral (Right)    Post-Operative Day:      Scheduled Meds:   aspirin  81 mg Oral Daily    atorvastatin  40 mg Oral QHS    carvediloL  25 mg Oral BID    famotidine (PF)  20 mg Intravenous Q12H    hydroCHLOROthiazide  25 mg Oral Daily    lisinopriL  20 mg Oral Daily    meropenem (MERREM) IVPB  1 g Intravenous Q8H    vancomycin (VANCOCIN) IVPB  1,250 mg Intravenous Q18H     Continuous Infusions:   sodium chloride 0.9% 200 mL/hr at 09/02/22 1513    NORepinephrine bitartrate-D5W 0.02 mcg/kg/min (09/02/22 1240)    pantoprazole (PROTONIX) IV infusion 8 mg/hr (09/02/22 1530)     PRN Meds:sodium chloride, acetaminophen, dextrose 10%, dextrose 10%, hydrALAZINE, insulin aspart U-100, magnesium oxide, magnesium oxide, potassium bicarbonate, potassium bicarbonate, potassium bicarbonate, potassium, sodium phosphates, potassium, sodium phosphates, potassium, sodium phosphates, sodium chloride 0.9%, Pharmacy to dose Vancomycin consult **AND** vancomycin - pharmacy to dose    Review of Systems   Respiratory: Negative.     Gastrointestinal: Negative.    Musculoskeletal: Negative.         Right foot ulcer   Skin: Negative.    Neurological: Negative.    Objective:     Vital Signs (Most Recent):  Temp: 98.2 °F (36.8 °C) (09/02/22 1515)  Pulse: 74 (09/02/22 1501)  Resp: 12 (09/02/22 1501)  BP: (!) 119/56 (09/02/22 1501)  SpO2: 100 % (09/02/22 1501)   Vital Signs (24h Range):  Temp:  [97.8 °F (36.6 °C)-100.7 °F (38.2 °C)] 98.2 °F (36.8 °C)  Pulse:  [] 74  Resp:  [12-20] 12  SpO2:  [91 %-100  %] 100 %  BP: ()/() 119/56     Weight: 81.6 kg (180 lb)  Body mass index is 27.37 kg/m².    Foot Exam    Laboratory:  A1C:   Recent Labs   Lab 08/26/22  0236   HGBA1C 12.3*     CBC:   Recent Labs   Lab 09/02/22  1340   WBC 18.82*   RBC 2.38*   HGB 6.5*   HCT 20.4*      MCV 86   MCH 27.3   MCHC 31.9*     CMP:   Recent Labs   Lab 09/02/22  1340   *   CALCIUM 7.8*   ALBUMIN 1.6*   PROT 5.8*   *   K 3.9   CO2 25   CL 99   BUN 14   CREATININE 1.0   ALKPHOS 106   ALT 14   AST 20   BILITOT 0.6     CRP:   Recent Labs   Lab 09/01/22  0509   .2*     ESR: No results for input(s): SEDRATE in the last 168 hours.  Wound Cultures:   Recent Labs   Lab 08/26/22  1348   LABAERO ESCHERICHIA COLI  Moderate  *  PROTEUS PENNERI  Few  No other significant isolate  No other significant isolate  *       Diagnostic Results:  I have reviewed all pertinent imaging results/findings within the past 24 hours.    CTA Lower Extremity Bilateral  Narrative: EXAMINATION:  CTA LOWER EXTREMITY BILATERAL    CLINICAL HISTORY:  PAd;  Unspecified open wound, right foot, subsequent encounter    TECHNIQUE:  Transaxial CT angiogram images from mid abdomen through both lower extremities.  Patient was administered 125 cc of Omnipaque 350 IV contrast.  Multiplanar reformats.    COMPARISON:  08/26/2022    FINDINGS:  There is a gallstone near the neck in a mildly distended gallbladder.  There is a 4 mm left renal stone.  Remaining solid abdominal organs unremarkable where included.    There is no enteric contrast which limits bowel assessment.  Query a small diverticulum along the 2nd duodenal segment near the ampulla.  There is also a diverticulum along the transverse duodenal segment.  No dilated bowel loops.  Moderate degree of stool throughout the colon which could indicate constipation.    Distended urinary bladder.  Normal size prostate.  No adenopathy.  Body wall edema.  Enlarged right iliac chain lymph node 11 mm  short axis series 2, image 379.  Diffuse subcutaneous soft tissue edema along both lower legs right greater than left.  There is a skin wound along the right lateral midfoot with adjacent subcutaneous emphysema tracking into the forefoot.  Subcutaneous emphysema also tracks proximally within the musculature of the anterior lower leg compartment with gas as high as the mid shin level.    Partial ankylosis of both SI joints.  Mild degenerative change of both hip joints.  Tricompartmental degenerative change of both knees with lateral patellar tilt.  Small left knee joint effusion.  There is a cortical defect along the right cuboid plantar lateral margin with gas extending into the intramedullary space.  The deep margin of the skin wound described above also abuts the cortex of the 3rd metatarsal base.  Partial foot amputation with resection of the 4th and 5th rays at the level of the TMT joint.    Mild plaque of the abdominal aorta.  All abdominal branch vessels are patent where included.  Note that the celiac artery is not included in the field of view.  There is plaque along the common iliac arteries bilaterally with less than 50% stenosis.  There is plaque at the origin and proximal aspect of both internal iliac arteries resulting in areas of up to 50% stenosis.    Right external iliac artery is patent.  Right femoral artery is patent.  Right deep femoral artery exhibits several areas of 50% stenosis.  Right SFA exhibits diffuse plaque with less than 50% stenosis.  There is plaque in the distal popliteal artery resulting in focal 50% stenosis just before the trifurcation.  There is venous contamination in the right calf which limits diagnostic quality.  There is a high-grade stenosis at the origin of the right anterior tibial artery series 2, image 1225.  There is flow at the origin of the right PTA and peroneal arteries (proximal calf).  However extensive plaque slow flow and venous contamination severely limits  any further characterization of the distal trifurcation vessels.    Left external iliac artery is patent as is the femoral artery.  Left deep femoral artery exhibits several areas of plaque and 1 area of 50% stenosis distally in the mid thigh.  Left SFA demonstrates diffuse plaque with focal area of stenosis of 50% in the mid SFA.  There is focal plaque and 50% stenosis in the distal popliteal artery at several sites.  There is venous contamination and extensive plaque in the trifurcation vessels which severely limits characterization.  There is only trickle flow identified in the trifurcation vessels beyond the origin (proximal calf).  Impression: 1. Right lower extremity: Multiple areas of up to 50% stenosis including right deep femoral artery, popliteal artery. High-grade stenosis at the origin of the right anterior tibial artery (51-99%).  Flow is present in all trifurcation vessels at the origin but cannot be traced beyond the origin, for reasons detailed above.  Correlate with prior ultrasound  2. Left lower extremity: Multiple areas of up to 50% stenosis including mid segment deep femoral artery, distal popliteal artery.  Flow is present in all trifurcation vessels at the origin but cannot be traced beyond the origin, for reasons detailed above.  Correlate with prior ultrasound.  3. Open skin wound along the right foot with subcutaneous emphysema in the foot anterior ankle and anterior shin musculature.  Gas could be medially from the open wound or from gas-forming infection.  Evidence of osteomyelitis involving the cuboid.  4. Diffuse subcutaneous soft tissue edema of both lower legs especially on the right.  This could relate to cellulitis noninfectious inflammation or venous/lymphatic occlusion.  5. Cholelith.  6. Left nephrolith.    Electronically signed by: Edgar Gomez  Date:    08/31/2022  Time:    18:04      Clinical Findings:                      There are gangrenous/infectious changes to the  majority of the lateral right foot.  Bone is fully exposed and palpable at the base of the right foot wound.  Moderate surrounding edema and erythema.  Malodorous.    Assessment/Plan:     Active Diagnoses:    Diagnosis Date Noted POA    PRINCIPAL PROBLEM:  Osteomyelitis [M86.9] 08/26/2022 Yes    Hypotension [I95.9] 09/02/2022 Yes    Peripheral artery disease [I73.9] 09/01/2022 Yes    Essential hypertension, not well controlled [I10] 09/10/2021 Yes     Chronic    Diabetes mellitus with hyperglycemia [E11.65] 09/2021 Yes      Problems Resolved During this Admission:       I had an at length discussion at the bedside with the patient regarding the status and prognosis of his foot.  I explained that given his worsening infection and other comorbidities as well as the previous amputations already performed on the foot, his right foot is not salvageable.  I explained the importance of proper source control of the infection.  We discussed different potential treatment options.  My recommendation is for below-knee amputation in order to provide a more functional extremity as well as definitive source control.  Patient is in agreement with this.  I am going to place a consult for Orthopedics for further evaluation and surgical intervention.    This was also discussed with hospitalist, Infectious Disease, and vascular surgery.    Maxx Lara DPM  Podiatry  [facility]

## 2022-09-02 NOTE — ASSESSMENT & PLAN NOTE
Responding to IV fluids and Levophed  Blood cultures X 2 Urine culture  Vancomycin and Meropenem

## 2022-09-02 NOTE — PLAN OF CARE
09/02/22 0641   Final Note   Assessment Type Final Discharge Note   Anticipated Discharge Disposition Short Term

## 2022-09-02 NOTE — PROGRESS NOTES
Pharmacist Renal Dose Adjustment Note    Ernst May is a 62 y.o. male being treated with the medication Cefepime    Patient Data:    Vital Signs (Most Recent):  Temp: 99.5 °F (37.5 °C) (09/01/22 2230)  Pulse: 94 (09/01/22 2230)  Resp: 18 (09/01/22 2230)  BP: (!) 156/83 (09/01/22 2230)  SpO2: 95 % (09/01/22 2230)   Vital Signs (72h Range):  Temp:  [97.6 °F (36.4 °C)-100.7 °F (38.2 °C)]   Pulse:  []   Resp:  [14-20]   BP: (112-198)/()   SpO2:  [95 %-98 %]      Recent Labs   Lab 08/30/22  0414 08/31/22  0354 09/01/22  0509   CREATININE 0.9 0.9 1.0     Serum creatinine: 1 mg/dL 09/01/22 0509  Estimated creatinine clearance: 74.1 mL/min    Medication:Cefepime dose: 1 mg frequency Q12H will be changed to medication:Cefepime dose:1 g frequency:Q8H    Pharmacist's Name: Ramakrishna Iyer  Pharmacist's Extension: [phone]

## 2022-09-02 NOTE — SUBJECTIVE & OBJECTIVE
Past Medical History:   Diagnosis Date    Back abscess 09/2021    CKD (chronic kidney disease) 09/2021    Diabetes mellitus with hyperglycemia 09/2021    Diabetic foot ulcer 09/2021    bilateral, severe abrasions    Hypertension     Osteomyelitis of cervical spine 09/2021    under back abscess    Sepsis 10/9/2021       Past Surgical History:   Procedure Laterality Date    CYST REMOVAL  2012    FOOT AMPUTATION Right 8/26/2022    Procedure: AMPUTATION, FOOT;  Surgeon: Dave Mathur DPM;  Location: [facility] OR;  Service: Podiatry;  Laterality: Right;  4th and 5th metatarsal     INCISION AND DRAINAGE OF ABSCESS Left 9/4/2021    Procedure: INCISION AND DRAINAGE, ABSCESS;  Surgeon: Surjit Chawla MD;  Location: [facility] OR;  Service: General;  Laterality: Left;    INCISION AND DRAINAGE OF ABSCESS Left 9/14/2021    Procedure: INCISION AND DRAINAGE, ABSCESS; DEBRIDEMENT;  Surgeon: Surjit Chawla MD;  Location: [facility] OR;  Service: General;  Laterality: Left;    REPLACEMENT OF WOUND VACUUM-ASSISTED CLOSURE DEVICE Left 9/14/2021    Procedure: REPLACEMENT, WOUND VAC;  Surgeon: Surjit Chawla MD;  Location: [facility] OR;  Service: General;  Laterality: Left;  EXCHANGE.       Review of patient's allergies indicates:   Allergen Reactions    Neosporin [benzalkonium chloride]        Current Facility-Administered Medications on File Prior to Encounter   Medication    [DISCONTINUED] 0.9%  NaCl infusion    [DISCONTINUED] acetaminophen tablet 650 mg    [DISCONTINUED] aspirin chewable tablet 81 mg    [DISCONTINUED] cefepime in dextrose 5 % IVPB 2 g    [DISCONTINUED] dextrose 10% bolus 125 mL    [DISCONTINUED] dextrose 10% bolus 250 mL    [DISCONTINUED] enoxaparin injection 40 mg    [DISCONTINUED] glucagon (human recombinant) injection 1 mg    [DISCONTINUED] glucose chewable tablet 16 g    [DISCONTINUED] glucose chewable tablet 24 g    [DISCONTINUED] hydrALAZINE injection 10 mg    [DISCONTINUED] HYDROcodone-acetaminophen 5-325 mg per tablet  1 tablet    [DISCONTINUED] ibuprofen tablet 400 mg    [DISCONTINUED] insulin aspart U-100 pen 1-10 Units    [DISCONTINUED] insulin detemir U-100 pen 30 Units    [DISCONTINUED] lactulose 20 gram/30 mL solution Soln 20 g    [DISCONTINUED] lisinopriL tablet 20 mg    [DISCONTINUED] melatonin tablet 6 mg    [DISCONTINUED] metronidazole IVPB 500 mg    [DISCONTINUED] multivitamin tablet    [DISCONTINUED] mupirocin 2 % ointment    [DISCONTINUED] naloxone 0.4 mg/mL injection 0.02 mg    [DISCONTINUED] ondansetron injection 4 mg    [DISCONTINUED] polyethylene glycol packet 17 g    [DISCONTINUED] potassium, sodium phosphates 280-160-250 mg packet 1 packet    [DISCONTINUED] senna-docusate 8.6-50 mg per tablet 1 tablet    [DISCONTINUED] sodium chloride 0.9% flush 10 mL    [DISCONTINUED] sodium chloride 0.9% flush 10 mL    [DISCONTINUED] sodium chloride 0.9% flush 10 mL    [DISCONTINUED] sodium hypochlorite (DAKIN'S) external solution 0.25%     Current Outpatient Medications on File Prior to Encounter   Medication Sig    acetaminophen (TYLENOL) 325 MG tablet Take 2 tablets (650 mg total) by mouth every 4 (four) hours as needed.    carvediloL (COREG) 25 MG tablet TAKE 1 TABLET BY MOUTH TWICE A DAY    diphenhydrAMINE (BENADRYL) 25 mg capsule Take 1 capsule (25 mg total) by mouth every 6 (six) hours as needed for Itching.    hydroCHLOROthiazide (HYDRODIURIL) 25 MG tablet TAKE 1 TABLET BY MOUTH EVERY DAY    HYDROcodone-acetaminophen (NORCO) 5-325 mg per tablet Take 1 tablet by mouth every 6 (six) hours as needed for Pain.    hydrocortisone 2.5 % cream Apply topically 2 (two) times daily. (Patient taking differently: Apply 1 application topically 2 (two) times daily.)    L.acidophil,parac-S.therm-Bif. (RISAQUAD) Cap capsule Take 1 capsule by mouth once daily.    LIDOcaine (LIDODERM) 5 % Place 1 patch onto the skin once daily. Remove & Discard patch within 12 hours or as directed by MD    metFORMIN (GLUCOPHAGE) 500 MG tablet Take 500 mg  by mouth 2 (two) times daily.    multivitamin Tab Take 1 tablet by mouth once daily.     Family History       Problem Relation (Age of Onset)    Arthritis Father    Diabetes Mother, Father, Maternal Grandmother    Heart disease Mother, Father    Hypertension Father          Tobacco Use    Smoking status: Some Days     Types: Cigars    Smokeless tobacco: Never    Tobacco comments:     occassionally   Substance and Sexual Activity    Alcohol use: No    Drug use: No    Sexual activity: Not on file     Review of Systems   Reason unable to perform ROS: Ten point system review pertinent positives and negatives are present HPI.   Objective:     Vital Signs (Most Recent):  Temp: 99.5 °F (37.5 °C) (09/01/22 2230)  Pulse: 94 (09/01/22 2230)  Resp: 18 (09/01/22 2230)  BP: (!) 156/83 (09/01/22 2230)  SpO2: 95 % (09/01/22 2230)   Vital Signs (24h Range):  Temp:  [97.6 °F (36.4 °C)-100.7 °F (38.2 °C)] 99.5 °F (37.5 °C)  Pulse:  [] 94  Resp:  [16-20] 18  SpO2:  [95 %-98 %] 95 %  BP: (112-198)/() 156/83        There is no height or weight on file to calculate BMI.    Physical Exam  Constitutional:       General: He is not in acute distress.     Appearance: Normal appearance. He is not toxic-appearing or diaphoretic.   HENT:      Head: Normocephalic and atraumatic.      Nose: Nose normal.   Eyes:      General: No scleral icterus.        Right eye: No discharge.         Left eye: No discharge.      Conjunctiva/sclera: Conjunctivae normal.   Cardiovascular:      Rate and Rhythm: Normal rate and regular rhythm.      Heart sounds: No murmur heard.    No friction rub. No gallop.   Pulmonary:      Effort: Pulmonary effort is normal. No respiratory distress.      Breath sounds: Normal breath sounds. No stridor. No wheezing, rhonchi or rales.   Abdominal:      General: Bowel sounds are normal. There is no distension.      Palpations: Abdomen is soft.      Tenderness: There is no abdominal tenderness. There is no guarding or  "rebound.   Musculoskeletal:         General: No swelling or deformity.      Cervical back: Neck supple.      Comments: Right lower extremity with swelling erythema status post toe amputation and I and D   Skin:     Comments: See "MSK"   Neurological:      Mental Status: He is alert.      Comments: Decreased sensitivity on RLE.  Awake alert oriented follows commands           Significant Labs: All pertinent labs within the past 24 hours have been reviewed.  A1C:   Recent Labs   Lab 08/26/22  0236   HGBA1C 12.3*     BMP:   Recent Labs   Lab 09/01/22  0509   *   *   K 4.1      CO2 21*   BUN 14   CREATININE 1.0   CALCIUM 8.2*   MG 1.8     CBC:   Recent Labs   Lab 08/31/22  0354 09/01/22  0509   WBC 17.80* 15.71*   HGB 7.6* 7.1*   HCT 22.9* 21.7*    486*     Lactic Acid: No results for input(s): LACTATE in the last 48 hours.  Lipid Panel: No results for input(s): CHOL, HDL, LDLCALC, TRIG, CHOLHDL in the last 48 hours.  Magnesium:   Recent Labs   Lab 08/31/22  0354 09/01/22  0509   MG 1.8 1.8     Troponin: No results for input(s): TROPONINI in the last 48 hours.  TSH: No results for input(s): TSH in the last 4320 hours.  Urine Culture: No results for input(s): LABURIN in the last 48 hours.  Urine Studies: No results for input(s): COLORU, APPEARANCEUA, PHUR, SPECGRAV, PROTEINUA, GLUCUA, KETONESU, BILIRUBINUA, OCCULTUA, NITRITE, UROBILINOGEN, LEUKOCYTESUR, RBCUA, WBCUA, BACTERIA, SQUAMEPITHEL, HYALINECASTS in the last 48 hours.    Invalid input(s): WRIGHTSUR    Significant Imaging: I have reviewed all pertinent imaging results/findings within the past 24 hours.  "

## 2022-09-02 NOTE — PT/OT/SLP PROGRESS
Physical Therapy      Patient Name:  Ernst aMy   MRN:  [id number]    Patient not seen today secondary to  (On hold for angiogram.). Will follow-up 9/3/2022.

## 2022-09-02 NOTE — ASSESSMENT & PLAN NOTE
Of right foot status post amputation 5th digit stay and PICC line placement and I and D on August 30th  Continue with cefepime and Flagyl.  Cultures growing E coli Proteus and Bacteroides.  ID, Dr Ibrahim, on the case.

## 2022-09-02 NOTE — CONSULTS
"[facility]  Vascular Surgery  Consult Note    Inpatient consult to Vascular Surgery  Consult performed by: Jose Bonner MD  Consult ordered by: Jayjay Curry MD      Subjective:     Chief Complaint/Reason for Admission:  Nonhealing right foot wound    History of Present Illness: 62 years old male past medical history of diabetes hypertension CKD previous history of osteomyelitis of C-spine  "admitted to [facility] on August 26 with fever, weakness, and right foot wound.  He was admitted with osteomyelitis of the right foot with cellulitis, sepsis, gangrene of the right foot, and anemia.  Podiatry was consulted and he underwent amputation of the right 5th toe.  He was treated with antibiotics and wound care.  PICC line was placed.  He had further debridement of his foot on August 30.  He was seen by Infectious Diseases, and antibiotics were adjusted.  Recommendation was for transfer to a facility with Vascular Surgery availability to determine if revascularization would improve wound healing.     Medications Prior to Admission   Medication Sig Dispense Refill Last Dose    acetaminophen (TYLENOL) 325 MG tablet Take 2 tablets (650 mg total) by mouth every 4 (four) hours as needed.  0     carvediloL (COREG) 25 MG tablet TAKE 1 TABLET BY MOUTH TWICE A DAY 60 tablet 1     diphenhydrAMINE (BENADRYL) 25 mg capsule Take 1 capsule (25 mg total) by mouth every 6 (six) hours as needed for Itching. 30 capsule 0     hydroCHLOROthiazide (HYDRODIURIL) 25 MG tablet TAKE 1 TABLET BY MOUTH EVERY DAY 30 tablet 1     HYDROcodone-acetaminophen (NORCO) 5-325 mg per tablet Take 1 tablet by mouth every 6 (six) hours as needed for Pain. 20 tablet 0     hydrocortisone 2.5 % cream Apply topically 2 (two) times daily. (Patient taking differently: Apply 1 application topically 2 (two) times daily.) 20 g 1     L.acidophil,parac-S.therm-Bif. (RISAQUAD) Cap capsule Take 1 capsule by mouth once daily. 30 capsule 1     " LIDOcaine (LIDODERM) 5 % Place 1 patch onto the skin once daily. Remove & Discard patch within 12 hours or as directed by MD 30 patch 1     metFORMIN (GLUCOPHAGE) 500 MG tablet Take 500 mg by mouth 2 (two) times daily.       multivitamin Tab Take 1 tablet by mouth once daily.          Review of patient's allergies indicates:   Allergen Reactions    Neosporin [benzalkonium chloride]        Past Medical History:   Diagnosis Date    Back abscess 09/2021    CKD (chronic kidney disease) 09/2021    Diabetes mellitus with hyperglycemia 09/2021    Diabetic foot ulcer 09/2021    bilateral, severe abrasions    Hypertension     Osteomyelitis of cervical spine 09/2021    under back abscess    Sepsis 10/9/2021     Past Surgical History:   Procedure Laterality Date    CYST REMOVAL  2012    FOOT AMPUTATION Right 8/26/2022    Procedure: AMPUTATION, FOOT;  Surgeon: Dave Mathur DPM;  Location: [facility] OR;  Service: Podiatry;  Laterality: Right;  4th and 5th metatarsal     INCISION AND DRAINAGE OF ABSCESS Left 9/4/2021    Procedure: INCISION AND DRAINAGE, ABSCESS;  Surgeon: Surjit Chawla MD;  Location: [facility] OR;  Service: General;  Laterality: Left;    INCISION AND DRAINAGE OF ABSCESS Left 9/14/2021    Procedure: INCISION AND DRAINAGE, ABSCESS; DEBRIDEMENT;  Surgeon: Surjit Chawla MD;  Location: [facility] OR;  Service: General;  Laterality: Left;    REPLACEMENT OF WOUND VACUUM-ASSISTED CLOSURE DEVICE Left 9/14/2021    Procedure: REPLACEMENT, WOUND VAC;  Surgeon: Surjit Chawla MD;  Location: [facility] OR;  Service: General;  Laterality: Left;  EXCHANGE.     Family History       Problem Relation (Age of Onset)    Arthritis Father    Diabetes Mother, Father, Maternal Grandmother    Heart disease Mother, Father    Hypertension Father          Tobacco Use    Smoking status: Some Days     Types: Cigars    Smokeless tobacco: Never    Tobacco comments:     occassionally   Substance and Sexual Activity    Alcohol use: No    Drug use: No     Sexual activity: Not on file     Review of Systems   Respiratory: Negative.     Gastrointestinal: Negative.    Musculoskeletal: Negative.         Right foot ulcer   Skin: Negative.    Neurological: Negative.    Objective:     Vital Signs (Most Recent):  Temp: 98.7 °F (37.1 °C) (09/02/22 0727)  Pulse: 101 (09/02/22 0727)  Resp: 18 (09/02/22 0727)  BP: (!) 156/83 (09/02/22 0727)  SpO2: 96 % (09/02/22 0727)   Vital Signs (24h Range):  Temp:  [97.6 °F (36.4 °C)-100.7 °F (38.2 °C)] 98.7 °F (37.1 °C)  Pulse:  [] 101  Resp:  [16-19] 18  SpO2:  [95 %-98 %] 96 %  BP: (112-198)/() 156/83     Weight: 81.6 kg (180 lb)  Body mass index is 27.37 kg/m².        Physical Exam  HENT:      Head: Normocephalic.   Cardiovascular:      Rate and Rhythm: Normal rate.      Pulses:           Femoral pulses are 2+ on the right side and 2+ on the left side.       Popliteal pulses are 2+ on the right side and 2+ on the left side.        Dorsalis pedis pulses are detected w/ Doppler on the right side and detected w/ Doppler on the left side.      Comments: Edema of the right lower leg below the knee  Pulmonary:      Breath sounds: Normal breath sounds.   Abdominal:      Palpations: Abdomen is soft.   Musculoskeletal:         General: Normal range of motion.      Cervical back: Normal range of motion.   Skin:     General: Skin is warm and dry.   Neurological:      Mental Status: He is alert and oriented to person, place, and time.       Significant Labs:  BMP:   Recent Labs   Lab 09/02/22  0441   *   *   K 3.6   CL 96   CO2 25   BUN 16   CREATININE 1.1   CALCIUM 8.1*   MG 1.7     CBC:   Recent Labs   Lab 09/02/22  0441   WBC 19.30*   RBC 2.66*   HGB 7.4*   HCT 22.5*   *   MCV 85   MCH 27.8   MCHC 32.9       Significant Diagnostics:  I have reviewed all pertinent imaging results/findings within the past 24 hours.    Assessment/Plan:   Nonhealing right foot amputation with CTA concerning for tibial occlusive  disease.  Right lower extremity angiogram  Active Diagnoses:    Diagnosis Date Noted POA    Peripheral artery disease [I73.9] 09/01/2022 Yes    Osteomyelitis [M86.9] 08/26/2022 Unknown    Essential hypertension, not well controlled [I10] 09/10/2021 Yes     Chronic    Diabetes mellitus with hyperglycemia [E11.65] 09/2021 Yes      Problems Resolved During this Admission:       Thank you for your consult. I will sign off. Please contact us if you have any additional questions.    Jose Bonner MD  Vascular Surgery  [facility]

## 2022-09-02 NOTE — NURSING
Pt transferred from [facility] and arrived to [facility] at approx 1022pm. Pt stated he felt dizzy before he left [facility] and during transport to [facility], no distress observed MD NELLY notified of pt arrival. Will cont with admit     Pt later stated his dizziness has resolved and he believes the dizziness occurred because he had a late dinner. No other complaints noted at this time will cont to monitor.

## 2022-09-02 NOTE — PROGRESS NOTES
Consulted for placement of central line and arterial line in this patient with hypotension and anemia currently on Levophed infusion.    Upon discussion with the patient the patient has multiple questions and was not willing to consent to the line placement at this time he wanted to know if these procedures could be held off for another day or 2.  I explained to the patient the emergent need for these lines and he was still unwilling to consent and had multiple questions about his overall care which I was unable to provide satisfactory answers to him.    We will remain available for line placement once patient is willing to consent.

## 2022-09-02 NOTE — ANESTHESIA PROCEDURE NOTES
Central Line    Diagnosis: Inadequate IV access  Patient location during procedure: ICU    Staffing  Authorizing Provider: Edgar Gonzales MD  Performing Provider: Edgar Gonzales MD    Staffing  Performed: anesthesiologist   Anesthesiologist: Edgar Gonzales MD  Anesthesiologist was present at the time of the procedure.  Preanesthetic Checklist  Completed: patient identified, risks and benefits discussed, monitors and equipment checked, pre-op evaluation, timeout performed and anesthesia consent given  Indication   Indication: vascular access, med administration     Anesthesia   local infiltration    Central Line   Skin Prep: skin prepped with ChloraPrep, skin prep agent completely dried prior to procedure  Sterile Barriers Followed: Yes    All five maximal barriers used- gloves, gown, cap, mask, and large sterile sheet    hand hygiene performed prior to central venous catheter insertion  Location: right internal jugular.   Catheter type: triple lumen  Catheter Size: 7 Fr  Inserted Catheter Length: 15 cm  Ultrasound: vascular probe with ultrasound   Vessel Caliber: medium, patent  Vascular Doppler:  not done, compressibility normal  Needle advanced into vessel with real time Ultrasound guidance.  Guidewire confirmed in vessel.  sterile gel and probe cover used in ultrasound-guided central venous catheter insertion  Manometry: none  Insertion Attempts: 1   Securement:line sutured, chlorhexidine patch, sterile dressing applied and blood return through all ports    Post-Procedure   X-Ray: no pneumothorax on x-ray, placement verified by x-ray, tip termination and successful placement  Tip termination comments: svc   Adverse Events:none      Guidewire Guidewire removed intact. Guidewire removed intact, verified with nurse.  Additional Notes  Patient tolerated procedure well.  Chest x-ray shows central line in good position with the tip in the superior vena cava.  No evidence of pneumothorax or other line  related complications.  Okay to use right internal jugular triple-lumen catheter.  Please re-consult if needed.

## 2022-09-02 NOTE — HOSPITAL COURSE
Patient with H/o Uncontrolled DM/PAD , got transferred from [facility] with acute Osteomyelitis  (Rt foot infection, osteomyelitis of 4th and 5th metatarsal, abscess)  Pt underwent  right BKA   Received pRBC for acute anemia   Pt is very noncompliant and has poor knowledge regarding health issues  While in hospital he refused accuchecks and blood labs  Need iv abx for 2-3 more days, per ID team   Eventually pt was transferred to LTAC  His overall prognosis remains very poor and has high chance for readmissions

## 2022-09-02 NOTE — PROGRESS NOTES
Pt has a Hb of 6 with no active bleeding noted  Ordered 2 units of PRBC's  to be given  IV fluids  Protonix 80 mg IV X 1 then 8 mg per hour   GI consulted

## 2022-09-02 NOTE — NURSING
Report given to [facility] for room 1218.  Patient left via EMS for transport.  PICC line left in place and saline locked for transport.  Patient had large bowel movement prior to leaving.  Patient has complaints of dizziness but vital signs are stable.  Belongings sent with patient.  Dressing to RLE and boot to RLE in place.

## 2022-09-02 NOTE — ASSESSMENT & PLAN NOTE
Dr. Bonner's we consulted on the case follow-up recommendations  Continue with aspirin and atorvastatin

## 2022-09-02 NOTE — NURSING
62 yr old male  awake and alert   Pictures per nursing  thank you   Dressing change per nursing   For amputation BKA  right possible tomorrow

## 2022-09-02 NOTE — PT/OT/SLP EVAL
Occupational Therapy   Evaluation    Name: Ernst May  MRN: [id number]  Admitting Diagnosis:  Osteomyelitis  Recent Surgery: Procedure(s) (LRB):  Angiogram Extremity Unilateral (Right)      Recommendations:     Discharge Recommendations: LTACH (long-term acute care hospital)  Discharge Equipment Recommendations:  walker, rolling  Barriers to discharge:  Decreased caregiver support    Assessment:     Ernst May is a 62 y.o. male with a medical diagnosis of Osteomyelitis.  Performance deficits affecting function: weakness, impaired endurance, impaired self care skills, impaired functional mobility, gait instability, impaired balance, orthopedic precautions, pain, impaired skin, decreased safety awareness, impaired cardiopulmonary response to activity.      Rehab Prognosis: Good; patient would benefit from acute skilled OT services to address these deficits and reach maximum level of function.       Plan:     Patient to be seen 5 x/week to address the above listed problems via self-care/home management, therapeutic activities, therapeutic exercises  Plan of Care Expires: 10/02/22  Plan of Care Reviewed with: patient    Subjective     Chief Complaint: R foot pain  Patient/Family Comments/goals: return to OF    Occupational Profile:  Living Environment: Pt lives alone in a RV.   Previous level of function: independent with ADLs  Roles and Routines: homemaker, father  Equipment Used at Home:  none  Assistance upon Discharge: facility    Pain/Comfort:  Pain Rating 1: 2/10  Location - Side 1: Right  Location 1: foot  Pain Addressed 1: Reposition, Cessation of Activity, Distraction  Pain Rating Post-Intervention 1: 2/10    Patients cultural, spiritual, Holiness conflicts given the current situation: no    Objective:     Communicated with: nurse prior to session.  Patient found HOB elevated with peripheral IV, telemetry, bed alarm, PICC line upon OT entry to room.    General Precautions: Standard, fall   Orthopedic  Precautions:RLE non weight bearing   Braces: N/A  Respiratory Status: Room air    Occupational Performance:    Bed Mobility:    Patient completed Supine to Sit with stand by assistance  Patient completed Sit to Supine with stand by assistance    Functional Mobility/Transfers:  Not attempted today    Activities of Daily Living:  Grooming: stand by assistance while sitting EOB  Lower Body Dressing: minimum assistance at EOB    Cognitive/Visual Perceptual:  Cognitive/Psychosocial Skills:     -       Oriented to: Person, Place, Time, and Situation   -       Follows Commands/attention:Follows multistep  commands  -       Communication: clear/fluent  -       Memory: No Deficits noted  -       Safety awareness/insight to disability: impaired   -       Mood/Affect/Coping skills/emotional control: Pleasant    Physical Exam:  Upper Extremity Range of Motion:     -       Right Upper Extremity: WNL  -       Left Upper Extremity: WNL  Upper Extremity Strength:    -       Right Upper Extremity: WNL  -       Left Upper Extremity: WNL    AMPAC 6 Click ADL:  AMPAC Total Score: 19    Treatment & Education:  Role of OT, safety awareness, importance of OOB activity, call bell use.       Patient left HOB elevated with all lines intact, call button in reach, bed alarm on, and RN present    GOALS:   Multidisciplinary Problems       Occupational Therapy Goals          Problem: Occupational Therapy    Goal Priority Disciplines Outcome Interventions   Occupational Therapy Goal     OT, PT/OT     Description: Goals to be met by: 10/2/2022     Patient will increase functional independence with ADLs by performing:    UE Dressing with Cavalier.  LE Dressing with Cavalier.  Grooming while standing at sink with Supervision.  Toileting from toilet with Supervision for hygiene and clothing management.   Toilet transfer to toilet with Supervision.                         History:     Past Medical History:   Diagnosis Date    Back abscess  09/2021    CKD (chronic kidney disease) 09/2021    Diabetes mellitus with hyperglycemia 09/2021    Diabetic foot ulcer 09/2021    bilateral, severe abrasions    Hypertension     Osteomyelitis of cervical spine 09/2021    under back abscess    Sepsis 10/9/2021         Past Surgical History:   Procedure Laterality Date    CYST REMOVAL  2012    FOOT AMPUTATION Right 8/26/2022    Procedure: AMPUTATION, FOOT;  Surgeon: Dave Mathur DPM;  Location: [facility] OR;  Service: Podiatry;  Laterality: Right;  4th and 5th metatarsal     INCISION AND DRAINAGE OF ABSCESS Left 9/4/2021    Procedure: INCISION AND DRAINAGE, ABSCESS;  Surgeon: Surjit Chawla MD;  Location: [facility] OR;  Service: General;  Laterality: Left;    INCISION AND DRAINAGE OF ABSCESS Left 9/14/2021    Procedure: INCISION AND DRAINAGE, ABSCESS; DEBRIDEMENT;  Surgeon: Surjit Chawla MD;  Location: [facility];  Service: General;  Laterality: Left;    REPLACEMENT OF WOUND VACUUM-ASSISTED CLOSURE DEVICE Left 9/14/2021    Procedure: REPLACEMENT, WOUND VAC;  Surgeon: Surjit Chawla MD;  Location: [facility];  Service: General;  Laterality: Left;  EXCHANGE.       Time Tracking:     OT Date of Treatment: 09/02/22  OT Start Time: 1021  OT Stop Time: 1034  OT Total Time (min): 13 min    Billable Minutes:Evaluation 5  Therapeutic Activity 8    9/2/2022

## 2022-09-02 NOTE — HPI
"62 years old male past medical history of diabetes hypertension CKD previous history of osteomyelitis of C-spine  "admitted to [facility] on August 26 with fever, weakness, and right foot wound.  He was admitted with osteomyelitis of the right foot with cellulitis, sepsis, gangrene of the right foot, and anemia.  Podiatry was consulted and he underwent amputation of the right 5th toe.  He was treated with antibiotics and wound care.  PICC line was placed.  He had further debridement of his foot on August 30.  He was seen by Infectious Diseases, and antibiotics were adjusted.  Recommendation was for transfer to a facility with Vascular Surgery availability to determine if revascularization would improve wound healing.  Requesting transfer to Hospital Medicine at [facility] for further treatment.     September 1: Sodium 130, potassium 4.1, chloride 100, CO2 21, BUN 14, creatinine 1, glucose 213, AST 13, ALT 15, magnesium 1.8, phosphorus 2.9, .2, white blood cells 15.71, hemoglobin 7.1, hematocrit 21.7, platelets 486     August 31: CTA of the bilateral lower extremities:  1. Right lower extremity: Multiple areas of up to 50% stenosis including right deep femoral artery, popliteal artery. High-grade stenosis at the origin of the right anterior tibial artery (51-99%).  Flow is present in all trifurcation vessels at the origin but cannot be traced beyond the origin, for reasons detailed above.  Correlate with prior ultrasound  2. Left lower extremity: Multiple areas of up to 50% stenosis including mid segment deep femoral artery, distal popliteal artery.  Flow is present in all trifurcation vessels at the origin but cannot be traced beyond the origin, for reasons detailed above.  Correlate with prior ultrasound.  3. Open skin wound along the right foot with subcutaneous emphysema in the foot anterior ankle and anterior shin musculature.  Gas could be medially from the open wound or from " "gas-forming infection.  Evidence of osteomyelitis involving the cuboid.  4. Diffuse subcutaneous soft tissue edema of both lower legs especially on the right.  This could relate to cellulitis noninfectious inflammation or venous/lymphatic occlusion.  5. Cholelith.  6. Left nephrolith.     August 29: Chest x-ray noted PICC in good position.  Hypoventilation noted.     August 26:  Right foot wound anaerobic culture growing Bacteroides ovatus  Aerobic right foot my and Proteus penneri  -EKG with normal sinus rhythm and right bundle-branch block     August 25:  COVID negative  Blood cultures with no growth at 5 days"  "

## 2022-09-02 NOTE — PLAN OF CARE
[facility]  Initial Discharge Assessment       Primary Care Provider: Chemo Hinkle MD    Admission Diagnosis: Osteomyelitis [M86.9]    Admission Date: 9/1/2022  Expected Discharge Date: TBD        Discharge Barriers Identified: None    Payor: MEDICAID / Plan: [employer] [facility] / Product Type: Managed Medicaid /     Extended Emergency Contact Information  Primary Emergency Contact: Ernst May Jr  Address: [address] [place] [address]           [place] [address] [facility]  Home Phone: [phone]  Mobile Phone: [phone]  Relation: Son    Discharge Plan A: Home  Discharge Plan B: Home      [id number] [place]  [address]  [address] [facility] [address]  Phone: [phone] Fax: [phone]      Initial Assessment (most recent)       Adult Discharge Assessment - 09/02/22 1133          Discharge Assessment    Assessment Type Discharge Planning Assessment     Confirmed/corrected address, phone number and insurance Yes     Confirmed Demographics Correct on Facesheet     Source of Information patient     Communicated MEGHAN with patient/caregiver Date not available/Unable to determine     Reason For Admission Peripheral artery disease     Lives With alone     Facility Arrived From: home     Do you expect to return to your current living situation? Yes     Do you have help at home or someone to help you manage your care at home? No     Prior to hospitilization cognitive status: Alert/Oriented     Current cognitive status: Alert/Oriented     Walking or Climbing Stairs Difficulty none     Dressing/Bathing Difficulty none     Equipment Currently Used at Home none     Readmission within 30 days? Yes     Patient currently being followed by outpatient case management? No     Do you currently have service(s) that help you manage your care at home? No     Do you take prescription medications? Yes     Do you have prescription coverage? Yes     Do you have any  problems affording any of your prescribed medications? No     Is the patient taking medications as prescribed? yes     Who is going to help you get home at discharge? son     How do you get to doctors appointments? car, drives self     Are you on dialysis? No     Do you take coumadin? No     Discharge Plan A Home     Discharge Plan B Home     DME Needed Upon Discharge  --   TBD    Discharge Plan discussed with: Patient     Discharge Barriers Identified None

## 2022-09-03 ENCOUNTER — ANESTHESIA EVENT (OUTPATIENT)
Dept: SURGERY | Facility: HOSPITAL | Age: 62
DRG: 853 | End: 2022-09-03
Payer: MEDICAID

## 2022-09-03 LAB
ANION GAP SERPL CALC-SCNC: 7 MMOL/L (ref 8–16)
BUN SERPL-MCNC: 17 MG/DL (ref 8–23)
CALCIUM SERPL-MCNC: 7.8 MG/DL (ref 8.7–10.5)
CHLORIDE SERPL-SCNC: 97 MMOL/L (ref 95–110)
CO2 SERPL-SCNC: 25 MMOL/L (ref 23–29)
CREAT SERPL-MCNC: 1.2 MG/DL (ref 0.5–1.4)
EST. GFR  (NO RACE VARIABLE): >60 ML/MIN/1.73 M^2
FERRITIN SERPL-MCNC: 554 NG/ML (ref 20–300)
FOLATE SERPL-MCNC: 19.6 NG/ML (ref 4–24)
GLUCOSE SERPL-MCNC: 203 MG/DL (ref 70–110)
GLUCOSE SERPL-MCNC: 237 MG/DL (ref 70–110)
GLUCOSE SERPL-MCNC: 279 MG/DL (ref 70–110)
GLUCOSE SERPL-MCNC: 301 MG/DL (ref 70–110)
HCT VFR BLD AUTO: 24.2 % (ref 40–54)
HCT VFR BLD AUTO: 24.8 % (ref 40–54)
HCT VFR BLD AUTO: 26.7 % (ref 40–54)
HCT VFR BLD AUTO: 27.4 % (ref 40–54)
HGB BLD-MCNC: 7.9 G/DL (ref 14–18)
HGB BLD-MCNC: 8.3 G/DL (ref 14–18)
HGB BLD-MCNC: 8.9 G/DL (ref 14–18)
HGB BLD-MCNC: 9 G/DL (ref 14–18)
IRON SERPL-MCNC: 9 UG/DL (ref 45–160)
POTASSIUM SERPL-SCNC: 4 MMOL/L (ref 3.5–5.1)
SATURATED IRON: 8 % (ref 20–50)
SODIUM SERPL-SCNC: 129 MMOL/L (ref 136–145)
TOTAL IRON BINDING CAPACITY: 106 UG/DL (ref 250–450)
TRANSFERRIN SERPL-MCNC: 76 MG/DL (ref 200–375)
VIT B12 SERPL-MCNC: 701 PG/ML (ref 210–950)

## 2022-09-03 PROCEDURE — 99900031 HC PATIENT EDUCATION (STAT)

## 2022-09-03 PROCEDURE — 94799 UNLISTED PULMONARY SVC/PX: CPT

## 2022-09-03 PROCEDURE — 63600175 PHARM REV CODE 636 W HCPCS: Performed by: INTERNAL MEDICINE

## 2022-09-03 PROCEDURE — 21400001 HC TELEMETRY ROOM

## 2022-09-03 PROCEDURE — 82728 ASSAY OF FERRITIN: CPT | Performed by: INTERNAL MEDICINE

## 2022-09-03 PROCEDURE — 99900035 HC TECH TIME PER 15 MIN (STAT)

## 2022-09-03 PROCEDURE — 94761 N-INVAS EAR/PLS OXIMETRY MLT: CPT

## 2022-09-03 PROCEDURE — 85014 HEMATOCRIT: CPT | Mod: 91 | Performed by: INTERNAL MEDICINE

## 2022-09-03 PROCEDURE — 82746 ASSAY OF FOLIC ACID SERUM: CPT | Performed by: INTERNAL MEDICINE

## 2022-09-03 PROCEDURE — 85018 HEMOGLOBIN: CPT | Performed by: INTERNAL MEDICINE

## 2022-09-03 PROCEDURE — 85018 HEMOGLOBIN: CPT | Mod: 91 | Performed by: INTERNAL MEDICINE

## 2022-09-03 PROCEDURE — C9113 INJ PANTOPRAZOLE SODIUM, VIA: HCPCS | Performed by: INTERNAL MEDICINE

## 2022-09-03 PROCEDURE — 25000003 PHARM REV CODE 250: Performed by: INTERNAL MEDICINE

## 2022-09-03 PROCEDURE — 82607 VITAMIN B-12: CPT | Performed by: INTERNAL MEDICINE

## 2022-09-03 PROCEDURE — 99233 SBSQ HOSP IP/OBS HIGH 50: CPT | Mod: S$GLB,,, | Performed by: INTERNAL MEDICINE

## 2022-09-03 PROCEDURE — 80048 BASIC METABOLIC PNL TOTAL CA: CPT | Performed by: INTERNAL MEDICINE

## 2022-09-03 PROCEDURE — 99233 PR SUBSEQUENT HOSPITAL CARE,LEVL III: ICD-10-PCS | Mod: S$GLB,,, | Performed by: INTERNAL MEDICINE

## 2022-09-03 PROCEDURE — 84466 ASSAY OF TRANSFERRIN: CPT | Performed by: INTERNAL MEDICINE

## 2022-09-03 PROCEDURE — 85014 HEMATOCRIT: CPT | Performed by: INTERNAL MEDICINE

## 2022-09-03 RX ORDER — HYDROCODONE BITARTRATE AND ACETAMINOPHEN 5; 325 MG/1; MG/1
1 TABLET ORAL EVERY 4 HOURS PRN
Status: DISCONTINUED | OUTPATIENT
Start: 2022-09-03 | End: 2022-09-07 | Stop reason: HOSPADM

## 2022-09-03 RX ADMIN — ACETAMINOPHEN 650 MG: 325 TABLET ORAL at 12:09

## 2022-09-03 RX ADMIN — FAMOTIDINE 20 MG: 10 INJECTION, SOLUTION INTRAVENOUS at 08:09

## 2022-09-03 RX ADMIN — HYDROCHLOROTHIAZIDE 25 MG: 25 TABLET ORAL at 08:09

## 2022-09-03 RX ADMIN — VANCOMYCIN HYDROCHLORIDE 1250 MG: 1.25 INJECTION, POWDER, LYOPHILIZED, FOR SOLUTION INTRAVENOUS at 09:09

## 2022-09-03 RX ADMIN — CARVEDILOL 25 MG: 25 TABLET, FILM COATED ORAL at 09:09

## 2022-09-03 RX ADMIN — INSULIN ASPART 3 UNITS: 100 INJECTION, SOLUTION INTRAVENOUS; SUBCUTANEOUS at 09:09

## 2022-09-03 RX ADMIN — MEROPENEM AND SODIUM CHLORIDE 1 G: 1 INJECTION, SOLUTION INTRAVENOUS at 03:09

## 2022-09-03 RX ADMIN — INSULIN ASPART 4 UNITS: 100 INJECTION, SOLUTION INTRAVENOUS; SUBCUTANEOUS at 05:09

## 2022-09-03 RX ADMIN — MEROPENEM AND SODIUM CHLORIDE 1 G: 1 INJECTION, SOLUTION INTRAVENOUS at 10:09

## 2022-09-03 RX ADMIN — CARVEDILOL 25 MG: 25 TABLET, FILM COATED ORAL at 08:09

## 2022-09-03 RX ADMIN — FAMOTIDINE 20 MG: 10 INJECTION, SOLUTION INTRAVENOUS at 09:09

## 2022-09-03 RX ADMIN — ASPIRIN 81 MG CHEWABLE TABLET 81 MG: 81 TABLET CHEWABLE at 08:09

## 2022-09-03 RX ADMIN — FENTANYL CITRATE 25 MCG: 50 INJECTION, SOLUTION INTRAMUSCULAR; INTRAVENOUS at 07:09

## 2022-09-03 RX ADMIN — PANTOPRAZOLE SODIUM 8 MG/HR: 40 INJECTION, POWDER, FOR SOLUTION INTRAVENOUS at 08:09

## 2022-09-03 RX ADMIN — ATORVASTATIN CALCIUM 40 MG: 40 TABLET, FILM COATED ORAL at 09:09

## 2022-09-03 RX ADMIN — LISINOPRIL 20 MG: 20 TABLET ORAL at 08:09

## 2022-09-03 RX ADMIN — FENTANYL CITRATE 25 MCG: 50 INJECTION, SOLUTION INTRAMUSCULAR; INTRAVENOUS at 12:09

## 2022-09-03 NOTE — PROGRESS NOTES
"[facility] Medicine  Progress Note    Patient Name: Ernst May  MRN: [id number]  Patient Class: IP- Inpatient   Admission Date: 9/1/2022  Length of Stay: 2 days  Attending Physician: Harvey Clifford MD  Primary Care Provider: Chemo Hinkle MD        Subjective:     Principal Problem:Osteomyelitis        HPI:  62 years old male past medical history of diabetes hypertension CKD previous history of osteomyelitis of C-spine  "admitted to [facility] on August 26 with fever, weakness, and right foot wound.  He was admitted with osteomyelitis of the right foot with cellulitis, sepsis, gangrene of the right foot, and anemia.  Podiatry was consulted and he underwent amputation of the right 5th toe.  He was treated with antibiotics and wound care.  PICC line was placed.  He had further debridement of his foot on August 30.  He was seen by Infectious Diseases, and antibiotics were adjusted.  Recommendation was for transfer to a facility with Vascular Surgery availability to determine if revascularization would improve wound healing.  Requesting transfer to Hospital Medicine at [facility] for further treatment.     September 1: Sodium 130, potassium 4.1, chloride 100, CO2 21, BUN 14, creatinine 1, glucose 213, AST 13, ALT 15, magnesium 1.8, phosphorus 2.9, .2, white blood cells 15.71, hemoglobin 7.1, hematocrit 21.7, platelets 486     August 31: CTA of the bilateral lower extremities:  1. Right lower extremity: Multiple areas of up to 50% stenosis including right deep femoral artery, popliteal artery. High-grade stenosis at the origin of the right anterior tibial artery (51-99%).  Flow is present in all trifurcation vessels at the origin but cannot be traced beyond the origin, for reasons detailed above.  Correlate with prior ultrasound  2. Left lower extremity: Multiple areas of up to 50% stenosis including mid segment deep femoral artery, distal popliteal artery.  " "Flow is present in all trifurcation vessels at the origin but cannot be traced beyond the origin, for reasons detailed above.  Correlate with prior ultrasound.  3. Open skin wound along the right foot with subcutaneous emphysema in the foot anterior ankle and anterior shin musculature.  Gas could be medially from the open wound or from gas-forming infection.  Evidence of osteomyelitis involving the cuboid.  4. Diffuse subcutaneous soft tissue edema of both lower legs especially on the right.  This could relate to cellulitis noninfectious inflammation or venous/lymphatic occlusion.  5. Cholelith.  6. Left nephrolith.     August 29: Chest x-ray noted PICC in good position.  Hypoventilation noted.     August 26:  Right foot wound anaerobic culture growing Bacteroides ovatus  Aerobic right foot my and Proteus penneri  -EKG with normal sinus rhythm and right bundle-branch block     August 25:  COVID negative  Blood cultures with no growth at 5 days"      Overview/Hospital Course:  9/2/2022  Mr May had no complaints this AM but became hypotensive in his room before noon. His MAP was below 65 fluid resuscitation was initiated with 2 liters via PICC,  EKG NSR no stemi  Labs ordered including blood and urine cultures  A line and central line ordered    9/3/2022  Mr Gambino was sitting up in bed eating lunch with complaint of 6/10 right foot pain. He is scheduled for a right BKA tomorrow as both vascular surgery and podiatry feel that a vascular procedure would not be of benefit      Interval History: Pt has no complaints save right foot pain and concern Re a right BKA    Review of Systems   Constitutional:  Positive for fatigue.   HENT: Negative.     Eyes: Negative.    Respiratory: Negative.     Cardiovascular: Negative.    Gastrointestinal: Negative.    Endocrine: Positive for heat intolerance.   Genitourinary: Negative.    Musculoskeletal:  Positive for arthralgias, gait problem, joint swelling and myalgias.   Skin:  " Positive for wound.   Allergic/Immunologic: Positive for immunocompromised state.   Neurological:  Positive for weakness.   Hematological:  Bruises/bleeds easily.   Psychiatric/Behavioral:  The patient is nervous/anxious.    Objective:     Vital Signs (Most Recent):  Temp: 98.7 °F (37.1 °C) (09/03/22 1338)  Pulse: 74 (09/03/22 1338)  Resp: 20 (09/03/22 1338)  BP: 120/61 (09/03/22 1338)  SpO2: (!) 92 % (09/03/22 1338)   Vital Signs (24h Range):  Temp:  [98 °F (36.7 °C)-99.8 °F (37.7 °C)] 98.7 °F (37.1 °C)  Pulse:  [71-90] 74  Resp:  [0-32] 20  SpO2:  [92 %-100 %] 92 %  BP: ()/(48-79) 120/61     Weight: 81.6 kg (180 lb)  Body mass index is 27.37 kg/m².    Intake/Output Summary (Last 24 hours) at 9/3/2022 1527  Last data filed at 9/3/2022 1303  Gross per 24 hour   Intake 3961.87 ml   Output 1550 ml   Net 2411.87 ml      Physical Exam  Constitutional:       General: He is not in acute distress.     Appearance: He is not ill-appearing.   HENT:      Head: Normocephalic and atraumatic.      Nose: Nose normal.      Mouth/Throat:      Mouth: Mucous membranes are moist.   Eyes:      Extraocular Movements: Extraocular movements intact.      Pupils: Pupils are equal, round, and reactive to light.   Cardiovascular:      Rate and Rhythm: Normal rate and regular rhythm.   Pulmonary:      Effort: Pulmonary effort is normal.      Breath sounds: Normal breath sounds.   Abdominal:      General: Bowel sounds are normal. There is no distension.      Tenderness: There is no abdominal tenderness. There is no guarding or rebound.   Musculoskeletal:         General: Normal range of motion.      Cervical back: Normal range of motion and neck supple.      Comments: Right lateral foot with exposed bone and surrounding tissue necrosis  No bleeding or exudate   Skin:     General: Skin is warm.   Neurological:      Mental Status: He is alert and oriented to person, place, and time.   Psychiatric:      Comments: Very anxious        Significant Labs: All pertinent labs within the past 24 hours have been reviewed.  Recent Lab Results  (Last 5 results in the past 24 hours)        09/03/22  1127   09/03/22  1116   09/03/22  0938   09/03/22  0510   09/03/22  0027        Anion Gap       7         BUN       17         Calcium       7.8         Chloride       97         CO2       25         Creatinine       1.2         eGFR       >60.0         Ferritin     554           Folate     19.6           Glucose       203         Hematocrit 24.2       24.8   26.7  Comment: Patient received blood.       Hemoglobin 7.9       8.3   9.0  Comment: Patient received blood.       Iron     9           POC Glucose   237             Potassium       4.0         Saturated Iron     8           Sodium       129         TIBC     106           Transferrin     76           Vitamin B-12     701                                  Significant Imaging: I have reviewed all pertinent imaging results/findings within the past 24 hours.      Assessment/Plan:      * Osteomyelitis    Of right foot status post amputation 5th digit stay and PICC line placement and I and D on August 30th  Continue with cefepime and Flagyl.  Cultures growing E coli Proteus and Bacteroides.  ID, Dr Ibrahim, on the case.    'nicolas Lara and Kailey recommend right BKA  Dr Palacios to preform in the AM      Hypotension  Responding to IV fluids and Levophed  Blood cultures X 2 Urine culture  Vancomycin and Meropenem    Case discussed with Dr Ibrahim will continue Meropenem and vancomycin  Blood cultures are negative  Wound cultures growing aerobes and anaerobes  Continue vancomycin and meropenem      Peripheral artery disease  Dr. Bonner's we consulted on the case follow-up recommendations  Continue with aspirin and atorvastatin    Diabetes mellitus with hyperglycemia  Sliding scale    Essential hypertension, not well controlled    Patient on Coreg 25 b.i.d. HCTZ 25 daily lisinopril 20 mg a day      VTE Risk  Mitigation (From admission, onward)         Ordered     IP VTE HIGH RISK PATIENT  Once         09/01/22 2303     Place sequential compression device  Until discontinued         09/01/22 2303                Discharge Planning   MEGHAN:      Code Status: Full Code   Is the patient medically ready for discharge?:     Reason for patient still in hospital (select all that apply): Patient new problem, Treatment and Consult recommendations  Discharge Plan A: Home                  Harvey Clifford MD  Department of Hospital Medicine   [facility]

## 2022-09-03 NOTE — PLAN OF CARE
Problem: Skin Injury Risk Increased  Goal: Skin Health and Integrity  Outcome: Ongoing, Progressing  Intervention: Promote and Optimize Oral Intake  Flowsheets (Taken 9/3/2022 1236)  Oral Nutrition Promotion: calorie-dense liquids provided     Problem: Skin Injury Risk Increased  Goal: Skin Health and Integrity  Outcome: Ongoing, Progressing  Intervention: Promote and Optimize Oral Intake  Flowsheets (Taken 9/3/2022 1236)  Oral Nutrition Promotion: calorie-dense liquids provided

## 2022-09-03 NOTE — ASSESSMENT & PLAN NOTE
Responding to IV fluids and Levophed  Blood cultures X 2 Urine culture  Vancomycin and Meropenem    Case discussed with Dr Ibrahim will continue Meropenem and vancomycin  Blood cultures are negative  Wound cultures growing aerobes and anaerobes  Continue vancomycin and meropenem

## 2022-09-03 NOTE — CONSULTS
GASTROENTEROLOGY INPATIENT CONSULT NOTE  Patient Name: Ernst May  Patient MRN: [id number]  Patient : 1960    Admit Date: 2022  Service date: 9/3/2022    Reason for Consult:  profound anemia    PCP: Chemo Hinkle MD    No chief complaint on file.      HPI: Patient is a 62 y.o. male with PMHx  osteomyelitis.  With evidence of sepsis.  Developed hypotension yesterday.  Was concerned he may be GI bleeding but no evidence of melena.  Has profound anemia.  This appears to be related to his chronic disease.  Has never had an upper endoscopy or colonoscopy.  He told me he was not interested in having a colonoscopy IC was bleeding.  He is scheduled today for debridement of his foot.    Past Medical History:  Past Medical History:   Diagnosis Date    Back abscess 2021    CKD (chronic kidney disease) 2021    Diabetes mellitus with hyperglycemia 2021    Diabetic foot ulcer 2021    bilateral, severe abrasions    Hypertension     Osteomyelitis of cervical spine 2021    under back abscess    Sepsis 10/9/2021        Past Surgical History:  Past Surgical History:   Procedure Laterality Date    CYST REMOVAL  2012    FOOT AMPUTATION Right 2022    Procedure: AMPUTATION, FOOT;  Surgeon: Dave Mathur DPM;  Location: [facility] OR;  Service: Podiatry;  Laterality: Right;  4th and 5th metatarsal     INCISION AND DRAINAGE OF ABSCESS Left 2021    Procedure: INCISION AND DRAINAGE, ABSCESS;  Surgeon: Surjit Chawla MD;  Location: [facility] OR;  Service: General;  Laterality: Left;    INCISION AND DRAINAGE OF ABSCESS Left 2021    Procedure: INCISION AND DRAINAGE, ABSCESS; DEBRIDEMENT;  Surgeon: Surjit Chawla MD;  Location: [facility] OR;  Service: General;  Laterality: Left;    REPLACEMENT OF WOUND VACUUM-ASSISTED CLOSURE DEVICE Left 2021    Procedure: REPLACEMENT, WOUND VAC;  Surgeon: Surjit Chawla MD;  Location: [facility] OR;  Service: General;  Laterality: Left;  EXCHANGE.        Home  Medications:  Medications Prior to Admission   Medication Sig Dispense Refill Last Dose    acetaminophen (TYLENOL) 325 MG tablet Take 2 tablets (650 mg total) by mouth every 4 (four) hours as needed.  0     carvediloL (COREG) 25 MG tablet TAKE 1 TABLET BY MOUTH TWICE A DAY 60 tablet 1     diphenhydrAMINE (BENADRYL) 25 mg capsule Take 1 capsule (25 mg total) by mouth every 6 (six) hours as needed for Itching. 30 capsule 0     hydroCHLOROthiazide (HYDRODIURIL) 25 MG tablet TAKE 1 TABLET BY MOUTH EVERY DAY 30 tablet 1     HYDROcodone-acetaminophen (NORCO) 5-325 mg per tablet Take 1 tablet by mouth every 6 (six) hours as needed for Pain. 20 tablet 0     hydrocortisone 2.5 % cream Apply topically 2 (two) times daily. (Patient taking differently: Apply 1 application topically 2 (two) times daily.) 20 g 1     L.acidophil,parac-S.therm-Bif. (RISAQUAD) Cap capsule Take 1 capsule by mouth once daily. 30 capsule 1     LIDOcaine (LIDODERM) 5 % Place 1 patch onto the skin once daily. Remove & Discard patch within 12 hours or as directed by MD 30 patch 1     metFORMIN (GLUCOPHAGE) 500 MG tablet Take 500 mg by mouth 2 (two) times daily.       multivitamin Tab Take 1 tablet by mouth once daily.          Inpatient Medications:   aspirin  81 mg Oral Daily    atorvastatin  40 mg Oral QHS    carvediloL  25 mg Oral BID    famotidine (PF)  20 mg Intravenous Q12H    hydroCHLOROthiazide  25 mg Oral Daily    lisinopriL  20 mg Oral Daily    meropenem (MERREM) IVPB  1 g Intravenous Q8H    vancomycin (VANCOCIN) IVPB  1,250 mg Intravenous Q18H     sodium chloride, acetaminophen, dextrose 10%, dextrose 10%, fentaNYL, hydrALAZINE, insulin aspart U-100, magnesium oxide, magnesium oxide, potassium bicarbonate, potassium bicarbonate, potassium bicarbonate, potassium, sodium phosphates, potassium, sodium phosphates, potassium, sodium phosphates, sodium chloride 0.9%, Pharmacy to dose Vancomycin consult **AND** vancomycin - pharmacy to dose    Review  "of patient's allergies indicates:   Allergen Reactions    Neosporin [benzalkonium chloride]        Social History:   Social History     Occupational History    Not on file   Tobacco Use    Smoking status: Some Days     Types: Cigars    Smokeless tobacco: Never    Tobacco comments:     occassionally   Substance and Sexual Activity    Alcohol use: No    Drug use: No    Sexual activity: Not on file       Family History:   Family History   Problem Relation Age of Onset    Diabetes Mother     Heart disease Mother     Arthritis Father     Diabetes Father     Heart disease Father     Hypertension Father     Diabetes Maternal Grandmother        Review of Systems:  A 10 point review of systems was performed and was normal, except as mentioned in the HPI, including constitutional, HEENT, heme, lymph, cardiovascular, respiratory, gastrointestinal, genitourinary, neurologic, endocrine, psychiatric and musculoskeletal.      OBJECTIVE:    Physical Exam:  24 Hour Vital Sign Ranges: Temp:  [97.8 °F (36.6 °C)-99.8 °F (37.7 °C)] 98 °F (36.7 °C)  Pulse:  [71-90] 74  Resp:  [0-32] 29  SpO2:  [91 %-100 %] 100 %  BP: ()/(48-83) 138/69  Most recent vitals: /69   Pulse 74   Temp 98 °F (36.7 °C) (Oral)   Resp (!) 29   Ht 5' 8" (1.727 m)   Wt 81.6 kg (180 lb)   SpO2 100%   BMI 27.37 kg/m²    GEN: well-developed, well-nourished, awake and alert, non-toxic appearing adult  HEENT: PERRL, sclera anicteric, oral mucosa pink and moist without lesion  NECK: trachea midline; Good ROM  CV: regular rate and rhythm, no murmurs or gallops  RESP: clear to auscultation bilaterally, no wheezes, rhonci or rales  ABD: soft, non-tender, non-distended, normal bowel sounds  EXT: no swelling or edema, 2+ pulses distally  SKIN: no rashes or jaundice  PSYCH: normal affect    Labs:   Recent Labs     09/01/22  0509 09/02/22  0441 09/02/22  1340   WBC 15.71* 19.30* 18.82*   MCV 85 85 86   * 545* 403     Recent Labs     09/02/22  0441 " 09/02/22  1340 09/03/22  0510   * 131* 129*   K 3.6 3.9 4.0   CL 96 99 97   CO2 25 25 25   BUN 16 14 17   * 146* 203*     No results for input(s): ALB in the last 72 hours.    Invalid input(s): ALKP, SGOT, SGPT, TBIL, DBIL, TPRO  No results for input(s): PT, INR, PTT in the last 72 hours.      Radiology Review:  X-Ray Chest 1 View   Final Result            IMPRESSION / RECOMMENDATIONS:  Profound anemia which appears to be anemia of chronic disease.  He may have concomitant iron deficiency as well.  Will order laboratories on him.  No need for emergent endoscopy.  He has been transfused.  The nursing staff repeat reports no bleeding.  His BUN is normal suggesting no sign of upper GI bleeding.  At some point he should have colon screening once he has recovered from this foot infection.  He can follow up with this as an outpatient    Thank you for this consult.    Huy Kaba  9/3/2022  9:24 AM

## 2022-09-03 NOTE — SUBJECTIVE & OBJECTIVE
Interval History: Pt has no complaints save right foot pain and concern Re a right BKA    Review of Systems   Constitutional:  Positive for fatigue.   HENT: Negative.     Eyes: Negative.    Respiratory: Negative.     Cardiovascular: Negative.    Gastrointestinal: Negative.    Endocrine: Positive for heat intolerance.   Genitourinary: Negative.    Musculoskeletal:  Positive for arthralgias, gait problem, joint swelling and myalgias.   Skin:  Positive for wound.   Allergic/Immunologic: Positive for immunocompromised state.   Neurological:  Positive for weakness.   Hematological:  Bruises/bleeds easily.   Psychiatric/Behavioral:  The patient is nervous/anxious.    Objective:     Vital Signs (Most Recent):  Temp: 98.7 °F (37.1 °C) (09/03/22 1338)  Pulse: 74 (09/03/22 1338)  Resp: 20 (09/03/22 1338)  BP: 120/61 (09/03/22 1338)  SpO2: (!) 92 % (09/03/22 1338)   Vital Signs (24h Range):  Temp:  [98 °F (36.7 °C)-99.8 °F (37.7 °C)] 98.7 °F (37.1 °C)  Pulse:  [71-90] 74  Resp:  [0-32] 20  SpO2:  [92 %-100 %] 92 %  BP: ()/(48-79) 120/61     Weight: 81.6 kg (180 lb)  Body mass index is 27.37 kg/m².    Intake/Output Summary (Last 24 hours) at 9/3/2022 1527  Last data filed at 9/3/2022 1303  Gross per 24 hour   Intake 3961.87 ml   Output 1550 ml   Net 2411.87 ml      Physical Exam  Constitutional:       General: He is not in acute distress.     Appearance: He is not ill-appearing.   HENT:      Head: Normocephalic and atraumatic.      Nose: Nose normal.      Mouth/Throat:      Mouth: Mucous membranes are moist.   Eyes:      Extraocular Movements: Extraocular movements intact.      Pupils: Pupils are equal, round, and reactive to light.   Cardiovascular:      Rate and Rhythm: Normal rate and regular rhythm.   Pulmonary:      Effort: Pulmonary effort is normal.      Breath sounds: Normal breath sounds.   Abdominal:      General: Bowel sounds are normal. There is no distension.      Tenderness: There is no abdominal tenderness.  There is no guarding or rebound.   Musculoskeletal:         General: Normal range of motion.      Cervical back: Normal range of motion and neck supple.      Comments: Right lateral foot with exposed bone and surrounding tissue necrosis  No bleeding or exudate   Skin:     General: Skin is warm.   Neurological:      Mental Status: He is alert and oriented to person, place, and time.   Psychiatric:      Comments: Very anxious       Significant Labs: All pertinent labs within the past 24 hours have been reviewed.  Recent Lab Results  (Last 5 results in the past 24 hours)        09/03/22  1127   09/03/22  1116   09/03/22  0938   09/03/22  0510   09/03/22  0027        Anion Gap       7         BUN       17         Calcium       7.8         Chloride       97         CO2       25         Creatinine       1.2         eGFR       >60.0         Ferritin     554           Folate     19.6           Glucose       203         Hematocrit 24.2       24.8   26.7  Comment: Patient received blood.       Hemoglobin 7.9       8.3   9.0  Comment: Patient received blood.       Iron     9           POC Glucose   237             Potassium       4.0         Saturated Iron     8           Sodium       129         TIBC     106           Transferrin     76           Vitamin B-12     701                                  Significant Imaging: I have reviewed all pertinent imaging results/findings within the past 24 hours.

## 2022-09-03 NOTE — ANESTHESIA PREPROCEDURE EVALUATION
09/03/2022  Ernst May is a 62 y.o., male.      Pre-op Assessment    I have reviewed the Patient Summary Reports.     I have reviewed the Nursing Notes. I have reviewed the NPO Status.   I have reviewed the Medications.     Review of Systems  Anesthesia Hx:  No problems with previous Anesthesia Denies Hx of Anesthetic complications  Neg history of prior surgery. Denies Family Hx of Anesthesia complications.   Denies Personal Hx of Anesthesia complications.   Social:  No Alcohol Use, Smoker    Hematology/Oncology:     Oncology Normal    -- Anemia (recent transfusion):   EENT/Dental:EENT/Dental Normal   Cardiovascular:   Hypertension, poorly controlled PVD Hypotensive earlier in admission, improved now   Pulmonary:  Pulmonary Normal    Renal/:   Chronic Renal Disease (CKD), CRI Hx hyponatremia   Hepatic/GI:  Hepatic/GI Normal    Musculoskeletal:  Musculoskeletal Normal Hx of Osteo of cervical spine  Now with R LE cellulitis/abcess/osteomyelitis requiring surgical amputaion   Neurological:  Neurology Normal    Endocrine:   Diabetes, poorly controlled, type 2    Dermatological:  Skin Normal    Psych:  Psychiatric Normal           Patient Active Problem List   Diagnosis    Hyponatremia    Essential hypertension, not well controlled    Open wound of right foot    Diabetes mellitus with hyperglycemia    Osteomyelitis    Normocytic anemia    Cellulitis of right foot    Hypokalemia    HLD (hyperlipidemia)    Peripheral artery disease    Hypotension       Past Surgical History:   Procedure Laterality Date    CYST REMOVAL  2012    FOOT AMPUTATION Right 8/26/2022    Procedure: AMPUTATION, FOOT;  Surgeon: Dave Mathur DPM;  Location: [facility];  Service: Podiatry;  Laterality: Right;  4th and 5th metatarsal     INCISION AND DRAINAGE OF ABSCESS Left 9/4/2021    Procedure: INCISION AND DRAINAGE,  ABSCESS;  Surgeon: Surjit Chawla MD;  Location: [facility] OR;  Service: General;  Laterality: Left;    INCISION AND DRAINAGE OF ABSCESS Left 9/14/2021    Procedure: INCISION AND DRAINAGE, ABSCESS; DEBRIDEMENT;  Surgeon: Surjit Chawla MD;  Location: [facility] OR;  Service: General;  Laterality: Left;    REPLACEMENT OF WOUND VACUUM-ASSISTED CLOSURE DEVICE Left 9/14/2021    Procedure: REPLACEMENT, WOUND VAC;  Surgeon: Surjit Chawla MD;  Location: [facility] OR;  Service: General;  Laterality: Left;  EXCHANGE.        Tobacco Use:  The patient  reports that he has been smoking cigars. He has never used smokeless tobacco.     Results for orders placed or performed during the hospital encounter of 09/01/22   EKG 12-lead    Collection Time: 09/02/22 12:15 PM    Narrative    Test Reason : I95.9,    Vent. Rate : 071 BPM     Atrial Rate : 071 BPM     P-R Int : 148 ms          QRS Dur : 140 ms      QT Int : 438 ms       P-R-T Axes : 018 082 013 degrees     QTc Int : 475 ms    Normal sinus rhythm  Right bundle branch block  Abnormal ECG  When compared with ECG of 26-AUG-2022 06:00,  No significant change was found    Referred By: SUKHDEV MONAHAN           Confirmed By:              Lab Results   Component Value Date    WBC 18.82 (H) 09/02/2022    HGB 7.9 (L) 09/03/2022    HCT 24.2 (L) 09/03/2022    MCV 86 09/02/2022     09/02/2022     BMP  Lab Results   Component Value Date     (L) 09/03/2022    K 4.0 09/03/2022    CL 97 09/03/2022    CO2 25 09/03/2022    BUN 17 09/03/2022    CREATININE 1.2 09/03/2022    CALCIUM 7.8 (L) 09/03/2022    ANIONGAP 7 (L) 09/03/2022     (H) 09/03/2022     (H) 09/02/2022     (H) 09/02/2022       Results for orders placed during the hospital encounter of 09/02/21    Echo    Interpretation Summary  · The left ventricle is normal in size with concentric remodeling and normal systolic function.  · The estimated ejection fraction is 60%.  · Normal left ventricular diastolic  function.  · Normal right ventricular size with normal right ventricular systolic function.  · Mild tricuspid regurgitation.  · Mild mitral regurgitation.  · Mild left atrial enlargement.  · Intermediate central venous pressure (8 mmHg).  · There is a small left pleural effusion.          Physical Exam  General: Well nourished and Alert    Airway:  Mallampati: II   Mouth Opening: Normal  TM Distance: Normal  Tongue: Normal  Neck ROM: Normal ROM    Dental:  Intact    Chest/Lungs:  Clear to auscultation, Normal Respiratory Rate    Heart:  Rate: Normal  Rhythm: Regular Rhythm  Sounds: Normal        Anesthesia Plan  Type of Anesthesia, risks & benefits discussed:    Anesthesia Type: Gen ETT  Intra-op Monitoring Plan: Standard ASA Monitors and Central Line  Post Op Pain Control Plan: multimodal analgesia  Induction:  IV  Airway Plan: Video, Post-Induction  Informed Consent: Informed consent signed with the Patient and all parties understand the risks and agree with anesthesia plan.  All questions answered. Patient consented to blood products? Yes  ASA Score: 3  Anesthesia Plan Notes: KRISTIAN  Versed in holding prior to transfer to OR  Normal Saline IVF (pt with hyponatremia)l   NO steroids  Zofran 4 Benadryl 12.5    Ready For Surgery From Anesthesia Perspective.     .

## 2022-09-03 NOTE — PLAN OF CARE
09/03/22 0800   Patient Assessment/Suction   Level of Consciousness (AVPU) alert   Respiratory Effort Normal   Expansion/Accessory Muscles/Retractions no use of accessory muscles   All Lung Fields Breath Sounds clear   Rhythm/Pattern, Respiratory unlabored;pattern regular;depth regular   Cough Frequency no cough   PRE-TX-O2   O2 Device (Oxygen Therapy) room air   SpO2 99 %   Pulse Oximetry Type Intermittent   $ Pulse Oximetry - Multiple Charge Pulse Oximetry - Multiple   Pulse 75   Resp (!) 25   Education   $ Education Other (see comment)   Respiratory Evaluation   $ Care Plan Tech Time 15 min   $ Eval/Re-eval Charges Re-evaluation

## 2022-09-03 NOTE — ASSESSMENT & PLAN NOTE
Of right foot status post amputation 5th digit stay and PICC line placement and I and D on August 30th  Continue with cefepime and Flagyl.  Cultures growing E coli Proteus and Bacteroides.  ID, Dr Ibrahim, on the case.    Yessenia Lara and Kailey recommend right BKA  Dr Palacios to preform in the AM

## 2022-09-03 NOTE — PT/OT/SLP PROGRESS
Physical Therapy      Patient Name:  Ernst May   MRN:  [id number]    Patient not seen today secondary to Other (Comment) (Per discussion with podiatrist anticipate BKA, PT eval when medically appropriate after surgery). Will follow-up upon receiving new order s/p surgery.

## 2022-09-03 NOTE — PROGRESS NOTES
"Progress  Note  Infectious Disease    Reason for Consult:  Osteomyelitis    HPI: Ernst May is a 62 y.o. male with pMHx of uncontrolled NIDDM,  large back abscess with bacteremia (enterococcus and enterobacter),  C7 spinous osteomyelitis,  anemia, CKD, HTN<, Diabetic foot ulcers, noncompliant, and poor historian.     Patient presented to ED with fever,102.9 and foot  pain   He was diagnosed with acute osteomyelitis of the right fourth and fifth metatarsal with abscess with gangrene of the fifth metatarsal. Podiatry performed amputation of the fifth right toe as well as cultures of the area.     On 8/30= podiatrist : "removed sutures on lateral aspect of the proximal half of the incision I did a full-thickness excisional debridement of necrotic skin and subcutaneous tissue down to cuboid bone.  There was minimal amount of purulent drainage that was expressed. "And placed wound VAC.  Cultures are growing E coli, Proteus, Bacteroides.  Patient has received 6 days of vancomycin and cefepime  Tamx improved, fever and chills resolved.  I came and saw patient.  He is a poor historian as the explanations are extremely vague.    Bottom line he is very noncompliant due to his understanding and financial issues.  His diabetes is out of control.  Duration of wound I would call it uncertain.      08/31/2022  No new complains. No fever. + Same foot pain. He does not like to be disturbed today. [facility] been accepted [facility]  CTA of lower extremities is markedly abnormal as below  09/02/2022 Foot looks and smells bad, I dw multiple providers. Dr Moon says foot can not be saved. Dr Bonner says, bka should heal well with the current PAD status    Antibiotics (From admission, onward)      Start     Stop Route Frequency Ordered    09/02/22 1500  meropenem-0.9% sodium chloride 1 g/50 mL IVPB         -- IV Every 8 hours (non-standard times) 09/02/22 1317    09/02/22 1500  vancomycin 1.25 g in dextrose 5% 250 mL IVPB (ready to mix)    "      -- IV Every 18 hours 09/02/22 1346    09/02/22 1415  vancomycin - pharmacy to dose  (vancomycin IVPB)        See [facility] for full Linked Orders Report.    -- IV pharmacy to manage frequency 09/02/22 1317           Antifungals (From admission, onward)      None          Antivirals (From admission, onward)      None              Review of patient's allergies indicates:   Allergen Reactions    Neosporin [benzalkonium chloride]      Past Medical History:   Diagnosis Date    Back abscess 09/2021    CKD (chronic kidney disease) 09/2021    Diabetes mellitus with hyperglycemia 09/2021    Diabetic foot ulcer 09/2021    bilateral, severe abrasions    Hypertension     Osteomyelitis of cervical spine 09/2021    under back abscess    Sepsis 10/9/2021     Past Surgical History:   Procedure Laterality Date    CYST REMOVAL  2012    FOOT AMPUTATION Right 8/26/2022    Procedure: AMPUTATION, FOOT;  Surgeon: Dave Mathur DPM;  Location: [facility] OR;  Service: Podiatry;  Laterality: Right;  4th and 5th metatarsal     INCISION AND DRAINAGE OF ABSCESS Left 9/4/2021    Procedure: INCISION AND DRAINAGE, ABSCESS;  Surgeon: Surjit Chawla MD;  Location: [facility] OR;  Service: General;  Laterality: Left;    INCISION AND DRAINAGE OF ABSCESS Left 9/14/2021    Procedure: INCISION AND DRAINAGE, ABSCESS; DEBRIDEMENT;  Surgeon: Surjit Chawla MD;  Location: [facility] OR;  Service: General;  Laterality: Left;    REPLACEMENT OF WOUND VACUUM-ASSISTED CLOSURE DEVICE Left 9/14/2021    Procedure: REPLACEMENT, WOUND VAC;  Surgeon: Surjit Chawla MD;  Location: [facility] OR;  Service: General;  Laterality: Left;  EXCHANGE.       Review of Systems:  He had fevers and chills when he came in now resolved.  No change in vision, loss of vision or diplopia  No sinus congestion, purulent nasal discharge, post nasal drip or facial pain  No pain in mouth or throat. No problems with teeth, gums.  No chest pain, palpitations, syncope  No cough, sputum production,  shortness of breath, dyspnea on exertion, pleurisy, hemoptysis  No nausea, vomiting, diarrhea, constipation, blood in stool, or focal abd pain,  No dysphagia, odynophagia  No dysuria, hematuria, strangury, retention, incontinence, nocturia, prostatism,   No vaginal/penile discharge, genital ulcers, risk for STD  Other than foot changes, No swelling of joints, redness of joints, injuries, or new focal pain  No unusual headaches, dizziness, vertigo, numbness, paresthesias, neuropathy, falls  No anxiety, depression, substance abuse, sleep disturbance  No diabetes, thyroid, hypogonadal conditions  No bleeding, lymphadenopathy, anemia, malignancy, unusual bruising  No new rashes, lesions, or wounds  No TB exposure  No recent/prior steroids  Outdoor activities:  Travel:  No  Implants:  No  Antibiotic History:  None for a year as per patient    EXAM & DIAGNOSTICS REVIEWED:   Vitals:     Temp:  [97.8 °F (36.6 °C)-99.8 °F (37.7 °C)]   Temp: 98 °F (36.7 °C) (09/03/22 0701)  Pulse: 74 (09/03/22 0801)  Resp: (!) 25 (09/03/22 0801)  BP: 134/71 (09/03/22 0801)  SpO2: 99 % (09/03/22 0801)    Intake/Output Summary (Last 24 hours) at 9/3/2022 0852  Last data filed at 9/3/2022 0555  Gross per 24 hour   Intake 3674.43 ml   Output 1250 ml   Net 2424.43 ml       General:  In NAD. Alert and attentive, cooperative, comfortable  Eyes:  Anicteric, EOMI  ENT:  No ulcers, exudates, thrush, nares patent, dentition is poor  Neck:  supple,   Lungs: Clear, no consolidation, rales, wheezes, rub  Heart:  RRR, no gallop/murmur/rub noted  Abd:  Soft, NT, ND, normal BS, no masses or organomegaly appreciated.  :  Voids/Diallo, urine clear, no flank tenderness  Musc:  Joints without effusion, swelling, erythema, synovitis, muscle wasting.   Skin:  Other than legs and neck as below in pictures, No rashes. No palmar or plantar lesions. No subungual petechiae  Neuro:             Alert, attentive, speech fluent, face symmetric, moves all extremities, no  focal weakness. Ambulatory  Psych:  Calm, cooperative  Lymphatic:     No cervical, supraclavicular, axillary, or inguinal nodes  Extrem: Other than pics; No edema, erythema, phlebitis, cellulitis, warm and well perfused  VAD:  08/      Isolation:    Wound:   08/31/2022 Wound vac is not disturbed    08/30/2022 0829/2022                             Lines/Tubes/Drains:    General Labs reviewed:  Recent Labs   Lab 09/01/22  0509 09/02/22  0441 09/02/22  1340 09/03/22  0027 09/03/22  0510   WBC 15.71* 19.30* 18.82*  --   --    HGB 7.1* 7.4* 6.5* 9.0* 8.3*   HCT 21.7* 22.5* 20.4* 26.7* 24.8*   * 545* 403  --   --        Recent Labs   Lab 08/31/22  0354 09/01/22  0509 09/02/22  0441 09/02/22  1340 09/03/22  0510   * 130* 129* 131* 129*   K 3.9 4.1 3.6 3.9 4.0    100 96 99 97   CO2 24 21* 25 25 25   BUN 14 14 16 14 17   CREATININE 0.9 1.0 1.1 1.0 1.2   CALCIUM 8.2* 8.2* 8.1* 7.8* 7.8*   PROT 6.1 6.3  --  5.8*  --    BILITOT 0.3 0.3  --  0.6  --    ALKPHOS 130 136*  --  106  --    ALT 16 15  --  14  --    AST 17 13  --  20  --      Recent Labs   Lab 08/28/22  0353 08/30/22  0414 09/01/22  0509   .0* 230.7* 216.2*         Micro:  Microbiology Results (last 7 days)       Procedure Component Value Units Date/Time    Urine Culture High Risk [[id number]] Collected: 09/02/22 1309    Order Status: Completed Specimen: Urine, Catheterized Updated: 09/03/22 0802     Urine Culture, Routine No growth to date    Narrative:      Indicated criteria for high risk culture:->Other  Other (specify):->sepsis and hypotension    Blood culture [[id number]]     Order Status: No result Specimen: Blood     Blood culture [[id number]]     Order Status: No result Specimen: Blood            Escherichia coli Proteus penneri      CULTURE, AEROBIC  (SPECIFY SOURCE) CULTURE, AEROBIC  (SPECIFY SOURCE)    Amox/K Clav'ate <=8/4 mcg/mL Sensitive <=8/4 mcg/mL Sensitive    Amp/Sulbactam <=8/4 mcg/mL Sensitive <=8/4 mcg/mL  Sensitive    Ampicillin <=8 mcg/mL Sensitive      Cefazolin <=2 mcg/mL Sensitive      Cefepime <=2 mcg/mL Sensitive <=2 mcg/mL Sensitive    Ceftriaxone <=1 mcg/mL Sensitive <=1 mcg/mL Sensitive    Ciprofloxacin <=1 mcg/mL Sensitive <=1 mcg/mL Sensitive    Ertapenem <=0.5 mcg/mL Sensitive <=0.5 mcg/mL Sensitive    Gentamicin <=4 mcg/mL Sensitive <=4 mcg/mL Sensitive    Levofloxacin <=2 mcg/mL Sensitive <=2 mcg/mL Sensitive    Meropenem <=1 mcg/mL Sensitive <=1 mcg/mL Sensitive    Piperacillin/Tazo <=16 mcg/mL Sensitive <=16 mcg/mL Sensitive    Tetracycline <=4 mcg/mL Sensitive      Tobramycin <=4 mcg/mL Sensitive <=4 mcg/mL Sensitive    Trimeth/Sulfa <=2/38 mcg/mL Sensitive <=2/38 mcg/mL Sensitive        Imaging Reviewed:   CXR---PICC in good position.  Hypoventilation noted.    US arterial---- There are findings suggesting inflow disease with biphasic waveforms seen proximally within the right lower extremity arterial system.  No focally elevated regions of velocity diff is suggest focal stenosis are seen.  There is evidence of further stenosis involving the right posterior tibial artery.    CTA 1 . Right lower extremity: Multiple areas of up to 50% stenosis including right deep femoral artery, popliteal artery. High-grade stenosis at the origin of the right anterior tibial artery (51-99%).  Flow is present in all trifurcation vessels at the origin but cannot be traced beyond the origin, for reasons detailed above.  Correlate with prior ultrasound  2. Left lower extremity: Multiple areas of up to 50% stenosis including mid segment deep femoral artery, distal popliteal artery.  Flow is present in all trifurcation vessels at the origin but cannot be traced beyond the origin, for reasons detailed above.  Correlate with prior ultrasound.  3. Open skin wound along the right foot with subcutaneous emphysema in the foot anterior ankle and anterior shin musculature.  Gas could be medially from the open wound or from  gas-forming infection.  Evidence of osteomyelitis involving the cuboid.  4. Diffuse subcutaneous soft tissue edema of both lower legs especially on the right.  This could relate to cellulitis noninfectious inflammation or venous/lymphatic occlusion.  5. Cholelith.  6. Left nephrolith.    Cardiology:    IMPRESSION & PLAN     -- Rt foot infection, osteomyelitis of 4th and 5th metatarsal, abscess, status post IND and amputation of 5th great toe  -- Leukocytosis  --markedly increased CRP  -- PAD  -- DM terribly uncontrolled  --noncompliance   -- Left scalp dry, red patch, to follow with dermatologist as OP , aware    Recommendations:  Continue cefepime, which is covering E coli and Proteus  Continue flagyl, for anaerobic coverage given Bacteroides and other poss anaerobes Elevate leg  Unfortunately, despite aggressive treatment so far, patitn has to have BKA  Eat healthy, increase protein intake  Control DM     Medical Decision Making during this encounter was  [_] Low Complexity  [_] Moderate Complexity  [ xx ] High Complexity

## 2022-09-03 NOTE — CONSULTS
Orthopedic Surgery Consult    History of present illness:   Ernst May is a 62 y.o. male who presents with RIGHT foot infection which has remained refractory to all management.  Patient has been followed by podiatry and wound care.  Consult placed for consideration of BKA.        Allergies:   Review of patient's allergies indicates:   Allergen Reactions    Neosporin [benzalkonium chloride]          Past medical history:   Past Medical History:   Diagnosis Date    Back abscess 09/2021    CKD (chronic kidney disease) 09/2021    Diabetes mellitus with hyperglycemia 09/2021    Diabetic foot ulcer 09/2021    bilateral, severe abrasions    Hypertension     Osteomyelitis of cervical spine 09/2021    under back abscess    Sepsis 10/9/2021         Past surgical history:  Past Surgical History:   Procedure Laterality Date    CYST REMOVAL  2012    FOOT AMPUTATION Right 8/26/2022    Procedure: AMPUTATION, FOOT;  Surgeon: Dave Mathur DPM;  Location: [facility];  Service: Podiatry;  Laterality: Right;  4th and 5th metatarsal     INCISION AND DRAINAGE OF ABSCESS Left 9/4/2021    Procedure: INCISION AND DRAINAGE, ABSCESS;  Surgeon: Surjit Chawla MD;  Location: [facility] OR;  Service: General;  Laterality: Left;    INCISION AND DRAINAGE OF ABSCESS Left 9/14/2021    Procedure: INCISION AND DRAINAGE, ABSCESS; DEBRIDEMENT;  Surgeon: Surjit Chawla MD;  Location: [facility] OR;  Service: General;  Laterality: Left;    REPLACEMENT OF WOUND VACUUM-ASSISTED CLOSURE DEVICE Left 9/14/2021    Procedure: REPLACEMENT, WOUND VAC;  Surgeon: Surjit Chawla MD;  Location: [facility] OR;  Service: General;  Laterality: Left;  EXCHANGE.         Social history:   Reviewed per EPIC history for tobacco or alcohol use       Medications:    Current Facility-Administered Medications:     0.9%  NaCl infusion (for blood administration), , Intravenous, Q24H PRN, Harvey Clifford MD    0.9%  NaCl infusion, , Intravenous, Continuous, Harvey Clifford MD, Last  Rate: 200 mL/hr at 09/02/22 1513, New Bag at 09/02/22 1513    acetaminophen tablet 650 mg, 650 mg, Oral, Q4H PRN, Jayjay Curry MD, 650 mg at 09/03/22 0000    aspirin chewable tablet 81 mg, 81 mg, Oral, Daily, Jayjay Curry MD, 81 mg at 09/03/22 0800    atorvastatin tablet 40 mg, 40 mg, Oral, QHS, Jayjay Curry MD, 40 mg at 09/02/22 2344    carvediloL tablet 25 mg, 25 mg, Oral, BID, Jayjay Curry MD, 25 mg at 09/03/22 0800    dextrose 10% bolus 125 mL, 12.5 g, Intravenous, PRN, Jayjay Curry MD    dextrose 10% bolus 250 mL, 25 g, Intravenous, PRN, Jayjay Curry MD    famotidine (PF) injection 20 mg, 20 mg, Intravenous, Q12H, Jayjay Curry MD, 20 mg at 09/03/22 0800    fentaNYL 50 mcg/mL injection 25 mcg, 25 mcg, Intravenous, Q4H PRN, Harvey Clifford MD, 25 mcg at 09/03/22 0759    hydrALAZINE injection 10 mg, 10 mg, Intravenous, Q6H PRN, Jayjay Curry MD, 10 mg at 09/02/22 0613    hydroCHLOROthiazide tablet 25 mg, 25 mg, Oral, Daily, Jayjay Curry MD, 25 mg at 09/03/22 0800    insulin aspart U-100 pen 1-6 Units, 1-6 Units, Subcutaneous, PRN, Jayjay Curry MD    lisinopriL tablet 20 mg, 20 mg, Oral, Daily, Jayjay Curry MD, 20 mg at 09/03/22 0800    magnesium oxide tablet 800 mg, 800 mg, Oral, PRN, Jayjay Curry MD    magnesium oxide tablet 800 mg, 800 mg, Oral, PRN, Jayjay Curry MD    meropenem-0.9% sodium chloride 1 g/50 mL IVPB, 1 g, Intravenous, Q8H, Harvey Clifford MD, Last Rate: 16.7 mL/hr at 09/03/22 1003, 1 g at 09/03/22 1003    NORepinephrine 4 mg in dextrose 5% 250 mL infusion (premix) (titrating), 0-3 mcg/kg/min, Intravenous, Continuous, Harvey Clifford MD, Stopped at 09/02/22 1445    pantoprazole 40 mg in  mL infusion (ready to mix system), 8 mg/hr, Intravenous, Continuous, Harvey Clifford MD, Last Rate: 20 mL/hr at 09/03/22 0800, 8 mg/hr at 09/03/22 0800    potassium bicarbonate disintegrating tablet 35 mEq, 35 mEq, Oral, PRN, Jayjay Curry MD     potassium bicarbonate disintegrating tablet 50 mEq, 50 mEq, Oral, PRN, Jayjay Curry MD    potassium bicarbonate disintegrating tablet 60 mEq, 60 mEq, Oral, PRN, Jayjay Curry MD    potassium, sodium phosphates 280-160-250 mg packet 2 packet, 2 packet, Oral, PRN, Jayjay Curry MD    potassium, sodium phosphates 280-160-250 mg packet 2 packet, 2 packet, Oral, PRN, Jayjay Curry MD    potassium, sodium phosphates 280-160-250 mg packet 2 packet, 2 packet, Oral, PRN, Jayjay Curry MD    sodium chloride 0.9% flush 2 mL, 2 mL, Intravenous, Q6H PRN, Jayjay Curry MD    Pharmacy to dose Vancomycin consult, , , Once **AND** vancomycin - pharmacy to dose, , Intravenous, pharmacy to manage frequency, Harvey Clifford MD    vancomycin 1.25 g in dextrose 5% 250 mL IVPB (ready to mix), 1,250 mg, Intravenous, Q18H, Harvey Clifford MD, Last Rate: 166.7 mL/hr at 09/03/22 0926, 1,250 mg at 09/03/22 0926        Physical Exam:   Vitals:    09/03/22 0759 09/03/22 0801 09/03/22 0901 09/03/22 1001   BP:  134/71 138/69 129/64   BP Location:       Patient Position:       Pulse:  74 74 73   Resp: (!) 24 (!) 25 (!) 29 (!) 25   Temp:       TempSrc:       SpO2:  99% 100% 100%   Weight:       Height:         Recent Labs   Lab 09/02/22  1340 09/03/22  0510   CALCIUM 7.8* 7.8*   PROT 5.8*  --    * 129*   K 3.9 4.0   CO2 25 25   CL 99 97   BUN 14 17   CREATININE 1.0 1.2     Recent Labs   Lab 09/02/22  1340 09/03/22  0027 09/03/22  0510   WBC 18.82*  --   --    RBC 2.38*  --   --    HGB 6.5*   < > 8.3*   HCT 20.4*   < > 24.8*     --   --     < > = values in this interval not displayed.     No results for input(s): PT, INR, APTT in the last 72 hours.    Awake/alert/oriented x3, No acute distress, Afebrile, Vital signs stable  Normocephalic, Atraumatic  Heart is beating at normal rate  Good inspiratory effort with unlaboured breathing  Abdomen soft/nondistended/nontender    Right lower extremity  Motor intact  L2-S1  Sensation grossly intact L2-S2  >2s CR refill to digits  There is large stage four wound from previous 4th and 5th metatarsal resection with ascending cellulitis into the ankle/heel      Imaging:  EXAMINATION:  XR FOOT COMPLETE 3 VIEW RIGHT     CLINICAL HISTORY:  Osteomyelitis;.     TECHNIQUE:  AP, lateral, and oblique views of the right foot were performed.     COMPARISON:  None     FINDINGS:  There is acute osteomyelitis of the 5th metatarsal and proximal phalanx, with associated overlying displaced and angulated fractures.  There is also likely acute osteomyelitis of the lateral aspect of the 4th metatarsal head and neck.  There is a large soft tissue ulcer plantar to the 5th metatarsophalangeal joint with extensive underlying soft tissue gas.     Impression:     1. Acute osteomyelitis of the 5th metatarsal and proximal phalanx and the 4th metatarsal head and neck.  2. Large plantar soft tissue ulcer with extensive underlying soft tissue gas.  EXAMINATION:      CTA LOWER EXTREMITY BILATERAL     CLINICAL HISTORY:  PAd;  Unspecified open wound, right foot, subsequent encounter     TECHNIQUE:  Transaxial CT angiogram images from mid abdomen through both lower extremities.  Patient was administered 125 cc of Omnipaque 350 IV contrast.  Multiplanar reformats.     COMPARISON:  08/26/2022     FINDINGS:  There is a gallstone near the neck in a mildly distended gallbladder.  There is a 4 mm left renal stone.  Remaining solid abdominal organs unremarkable where included.     There is no enteric contrast which limits bowel assessment.  Query a small diverticulum along the 2nd duodenal segment near the ampulla.  There is also a diverticulum along the transverse duodenal segment.  No dilated bowel loops.  Moderate degree of stool throughout the colon which could indicate constipation.     Distended urinary bladder.  Normal size prostate.  No adenopathy.  Body wall edema.  Enlarged right iliac chain lymph node 11 mm  short axis series 2, image 379.  Diffuse subcutaneous soft tissue edema along both lower legs right greater than left.  There is a skin wound along the right lateral midfoot with adjacent subcutaneous emphysema tracking into the forefoot.  Subcutaneous emphysema also tracks proximally within the musculature of the anterior lower leg compartment with gas as high as the mid shin level.     Partial ankylosis of both SI joints.  Mild degenerative change of both hip joints.  Tricompartmental degenerative change of both knees with lateral patellar tilt.  Small left knee joint effusion.  There is a cortical defect along the right cuboid plantar lateral margin with gas extending into the intramedullary space.  The deep margin of the skin wound described above also abuts the cortex of the 3rd metatarsal base.  Partial foot amputation with resection of the 4th and 5th rays at the level of the TMT joint.     Mild plaque of the abdominal aorta.  All abdominal branch vessels are patent where included.  Note that the celiac artery is not included in the field of view.  There is plaque along the common iliac arteries bilaterally with less than 50% stenosis.  There is plaque at the origin and proximal aspect of both internal iliac arteries resulting in areas of up to 50% stenosis.     Right external iliac artery is patent.  Right femoral artery is patent.  Right deep femoral artery exhibits several areas of 50% stenosis.  Right SFA exhibits diffuse plaque with less than 50% stenosis.  There is plaque in the distal popliteal artery resulting in focal 50% stenosis just before the trifurcation.  There is venous contamination in the right calf which limits diagnostic quality.  There is a high-grade stenosis at the origin of the right anterior tibial artery series 2, image 1225.  There is flow at the origin of the right PTA and peroneal arteries (proximal calf).  However extensive plaque slow flow and venous contamination severely  limits any further characterization of the distal trifurcation vessels.     Left external iliac artery is patent as is the femoral artery.  Left deep femoral artery exhibits several areas of plaque and 1 area of 50% stenosis distally in the mid thigh.  Left SFA demonstrates diffuse plaque with focal area of stenosis of 50% in the mid SFA.  There is focal plaque and 50% stenosis in the distal popliteal artery at several sites.  There is venous contamination and extensive plaque in the trifurcation vessels which severely limits characterization.  There is only trickle flow identified in the trifurcation vessels beyond the origin (proximal calf).     Impression:     1. Right lower extremity: Multiple areas of up to 50% stenosis including right deep femoral artery, popliteal artery. High-grade stenosis at the origin of the right anterior tibial artery (51-99%).  Flow is present in all trifurcation vessels at the origin but cannot be traced beyond the origin, for reasons detailed above.  Correlate with prior ultrasound  2. Left lower extremity: Multiple areas of up to 50% stenosis including mid segment deep femoral artery, distal popliteal artery.  Flow is present in all trifurcation vessels at the origin but cannot be traced beyond the origin, for reasons detailed above.  Correlate with prior ultrasound.  3. Open skin wound along the right foot with subcutaneous emphysema in the foot anterior ankle and anterior shin musculature.  Gas could be medially from the open wound or from gas-forming infection.  Evidence of osteomyelitis involving the cuboid.  4. Diffuse subcutaneous soft tissue edema of both lower legs especially on the right.  This could relate to cellulitis noninfectious inflammation or venous/lymphatic occlusion.  5. Cholelith.  6. Left nephrolith    Assessment:   62 y.o. male with RIGHT foot osteomyelitis      Plan:   Will proceed to the OR for RIGHT BKA  Scheduled for the [facility]ios  MD  [facility]

## 2022-09-03 NOTE — PROGRESS NOTES
"Progress  Note  Infectious Disease    Reason for Consult:  Osteomyelitis    HPI: Ernst May is a 62 y.o. male with pMHx of uncontrolled NIDDM,  large back abscess with bacteremia (enterococcus and enterobacter),  C7 spinous osteomyelitis,  anemia, CKD, HTN<, Diabetic foot ulcers, noncompliant, and poor historian.     Patient presented to ED with fever,102.9 and foot  pain   He was diagnosed with acute osteomyelitis of the right fourth and fifth metatarsal with abscess with gangrene of the fifth metatarsal. Podiatry performed amputation of the fifth right toe as well as cultures of the area.     On 8/30= podiatrist : "removed sutures on lateral aspect of the proximal half of the incision I did a full-thickness excisional debridement of necrotic skin and subcutaneous tissue down to cuboid bone.  There was minimal amount of purulent drainage that was expressed. "And placed wound VAC.  Cultures are growing E coli, Proteus, Bacteroides.  Patient has received 6 days of vancomycin and cefepime  Tamx improved, fever and chills resolved.  I came and saw patient.  He is a poor historian as the explanations are extremely vague.    Bottom line he is very noncompliant due to his understanding and financial issues.  His diabetes is out of control.  Duration of wound I would call it uncertain.      08/31/2022  No new complains. No fever. + Same foot pain. He does not like to be disturbed today. [facility] accepted [facility]  CTA of lower extremities is markedly abnormal as below  09/02/2022 Foot looks and smells bad, I dw multiple providers. Dr Moon says foot can not be saved. Dr Bonner says, bka should heal well with the current PAD status  09/03/2022  Received prbc yesterday for hb of 6 , now a Protonix drip and GI has been consulted.    Foot is not disturbed.  Awaiting BKA.    I discussed with patient grieving after amputation, losing the foot.  I also discussed phantom pains etc..    Patient has some drainage from the " lesion on the left scalp, he is on antibiotics at the time, he will follow up as outpatient with dermatologist or general surgeon for sebaceous cyst removal.        Antibiotics (From admission, onward)      Start     Stop Route Frequency Ordered    09/02/22 1500  meropenem-0.9% sodium chloride 1 g/50 mL IVPB         -- IV Every 8 hours (non-standard times) 09/02/22 1317    09/02/22 1500  vancomycin 1.25 g in dextrose 5% 250 mL IVPB (ready to mix)         -- IV Every 18 hours 09/02/22 1346    09/02/22 1415  vancomycin - pharmacy to dose  (vancomycin IVPB)        [facility].    -- IV pharmacy to manage frequency 09/02/22 1317           Antifungals (From admission, onward)      None          Antivirals (From admission, onward)      None              Review of patient's allergies indicates:   Allergen Reactions    Neosporin [benzalkonium chloride]      Past Medical History:   Diagnosis Date    Back abscess 09/2021    CKD (chronic kidney disease) 09/2021    Diabetes mellitus with hyperglycemia 09/2021    Diabetic foot ulcer 09/2021    bilateral, severe abrasions    Hypertension     Osteomyelitis of cervical spine 09/2021    under back abscess    Sepsis 10/9/2021     Past Surgical History:   Procedure Laterality Date    CYST REMOVAL  2012    FOOT AMPUTATION Right 8/26/2022    Procedure: AMPUTATION, FOOT;  Surgeon: Dave Mathur DPM;  Location: [facility] OR;  Service: Podiatry;  Laterality: Right;  4th and 5th metatarsal     INCISION AND DRAINAGE OF ABSCESS Left 9/4/2021    Procedure: INCISION AND DRAINAGE, ABSCESS;  Surgeon: Surjit Chawla MD;  Location: [facility] OR;  Service: General;  Laterality: Left;    INCISION AND DRAINAGE OF ABSCESS Left 9/14/2021    Procedure: INCISION AND DRAINAGE, ABSCESS; DEBRIDEMENT;  Surgeon: Surjit Chawla MD;  Location: [facility] OR;  Service: General;  Laterality: Left;    REPLACEMENT OF WOUND VACUUM-ASSISTED CLOSURE DEVICE Left 9/14/2021    Procedure:  REPLACEMENT, WOUND VAC;  Surgeon: Surjit Chawla MD;  Location: [facility];  Service: General;  Laterality: Left;  EXCHANGE.       Review of Systems:  He had fevers and chills when he came in now resolved.  No change in vision, loss of vision or diplopia  No sinus congestion, purulent nasal discharge, post nasal drip or facial pain  No pain in mouth or throat. No problems with teeth, gums.  No chest pain, palpitations, syncope  No cough, sputum production, shortness of breath, dyspnea on exertion, pleurisy, hemoptysis  No nausea, vomiting, diarrhea, constipation, blood in stool, or focal abd pain,  No dysphagia, odynophagia  No dysuria, hematuria, strangury, retention, incontinence, nocturia, prostatism,   No vaginal/penile discharge, genital ulcers, risk for STD  Other than foot changes, No swelling of joints, redness of joints, injuries, or new focal pain  No unusual headaches, dizziness, vertigo, numbness, paresthesias, neuropathy, falls  No anxiety, depression, substance abuse, sleep disturbance  No diabetes, thyroid, hypogonadal conditions  No bleeding, lymphadenopathy, anemia, malignancy, unusual bruising  No new rashes, lesions, or wounds  No TB exposure  No recent/prior steroids  Outdoor activities:  Travel:  No  Implants:  No  Antibiotic History:  None for a year as per patient    EXAM & DIAGNOSTICS REVIEWED:   Vitals:     Temp:  [97.8 °F (36.6 °C)-99.8 °F (37.7 °C)]   Temp: 98 °F (36.7 °C) (09/03/22 0701)  Pulse: 74 (09/03/22 0801)  Resp: (!) 25 (09/03/22 0801)  BP: 134/71 (09/03/22 0801)  SpO2: 99 % (09/03/22 0801)    Intake/Output Summary (Last 24 hours) at 9/3/2022 0859  Last data filed at 9/3/2022 0555  Gross per 24 hour   Intake 3674.43 ml   Output 1250 ml   Net 2424.43 ml       General:  In NAD. Alert and attentive, cooperative, comfortable  Eyes:  Anicteric, EOMI  ENT:  No ulcers, exudates, thrush, nares patent, dentition is poor  Neck:  supple,   Lungs: Clear, no consolidation, rales, wheezes,  rub  Heart:  RRR, no gallop/murmur/rub noted  Abd:  Soft, NT, ND, normal BS, no masses or organomegaly appreciated.  :  myers, urine clear, no flank tenderness  Musc:  Joints without effusion, swelling, erythema, synovitis, muscle wasting.   Skin:  Other than legs and neck as below in pictures, No rashes. No palmar or plantar lesions. No subungual petechiae  Neuro:             Alert, attentive, speech fluent, face symmetric, moves all extremities, no focal weakness. Ambulatory  Psych:  Calm, cooperative  Lymphatic:     No cervical, supraclavicular, axillary, or inguinal nodes  Extrem: Other than pics; No edema, erythema, phlebitis, cellulitis, warm and well perfused  VAD:    09/02/2022 right IJ   Peripheral IV   08/29/2022  right basilic   09/02/2022 ureteral catheter        Isolation:    Wound:                    08/31/2022 Wound vac is not disturbed    08/30/2022 0829/2022                             General Labs reviewed:  Recent Labs   Lab 09/01/22  0509 09/02/22  0441 09/02/22  1340 09/03/22  0027 09/03/22  0510   WBC 15.71* 19.30* 18.82*  --   --    HGB 7.1* 7.4* 6.5* 9.0* 8.3*   HCT 21.7* 22.5* 20.4* 26.7* 24.8*   * 545* 403  --   --        Recent Labs   Lab 08/31/22  0354 09/01/22  0509 09/02/22  0441 09/02/22  1340 09/03/22  0510   * 130* 129* 131* 129*   K 3.9 4.1 3.6 3.9 4.0    100 96 99 97   CO2 24 21* 25 25 25   BUN 14 14 16 14 17   CREATININE 0.9 1.0 1.1 1.0 1.2   CALCIUM 8.2* 8.2* 8.1* 7.8* 7.8*   PROT 6.1 6.3  --  5.8*  --    BILITOT 0.3 0.3  --  0.6  --    ALKPHOS 130 136*  --  106  --    ALT 16 15  --  14  --    AST 17 13  --  20  --      Recent Labs   Lab 08/28/22  0353 08/30/22  0414 09/01/22  0509   .0* 230.7* 216.2*         Micro:  Microbiology Results (last 7 days)       Procedure Component Value Units Date/Time    Urine Culture High Risk [[id number]] Collected: 09/02/22 1309    Order Status: Completed Specimen: Urine, Catheterized Updated: 09/03/22  0802     Urine Culture, Routine No growth to date    Narrative:      Indicated criteria for high risk culture:->Other  Other (specify):->sepsis and hypotension    Blood culture [[id number]]     Order Status: No result Specimen: Blood     Blood culture [[id number]]     Order Status: No result Specimen: Blood            Escherichia coli Proteus penneri      CULTURE, AEROBIC  (SPECIFY SOURCE) CULTURE, AEROBIC  (SPECIFY SOURCE)    Amox/K Clav'ate <=8/4 mcg/mL Sensitive <=8/4 mcg/mL Sensitive    Amp/Sulbactam <=8/4 mcg/mL Sensitive <=8/4 mcg/mL Sensitive    Ampicillin <=8 mcg/mL Sensitive      Cefazolin <=2 mcg/mL Sensitive      Cefepime <=2 mcg/mL Sensitive <=2 mcg/mL Sensitive    Ceftriaxone <=1 mcg/mL Sensitive <=1 mcg/mL Sensitive    Ciprofloxacin <=1 mcg/mL Sensitive <=1 mcg/mL Sensitive    Ertapenem <=0.5 mcg/mL Sensitive <=0.5 mcg/mL Sensitive    Gentamicin <=4 mcg/mL Sensitive <=4 mcg/mL Sensitive    Levofloxacin <=2 mcg/mL Sensitive <=2 mcg/mL Sensitive    Meropenem <=1 mcg/mL Sensitive <=1 mcg/mL Sensitive    Piperacillin/Tazo <=16 mcg/mL Sensitive <=16 mcg/mL Sensitive    Tetracycline <=4 mcg/mL Sensitive      Tobramycin <=4 mcg/mL Sensitive <=4 mcg/mL Sensitive    Trimeth/Sulfa <=2/38 mcg/mL Sensitive <=2/38 mcg/mL Sensitive        Imaging Reviewed:   CXR---PICC in good position.  Hypoventilation noted.    US arterial---- There are findings suggesting inflow disease with biphasic waveforms seen proximally within the right lower extremity arterial system.  No focally elevated regions of velocity diff is suggest focal stenosis are seen.  There is evidence of further stenosis involving the right posterior tibial artery.    CTA 1 . Right lower extremity: Multiple areas of up to 50% stenosis including right deep femoral artery, popliteal artery. High-grade stenosis at the origin of the right anterior tibial artery (51-99%).  Flow is present in all trifurcation vessels at the origin but cannot be traced beyond the  origin, for reasons detailed above.  Correlate with prior ultrasound  2. Left lower extremity: Multiple areas of up to 50% stenosis including mid segment deep femoral artery, distal popliteal artery.  Flow is present in all trifurcation vessels at the origin but cannot be traced beyond the origin, for reasons detailed above.  Correlate with prior ultrasound.  3. Open skin wound along the right foot with subcutaneous emphysema in the foot anterior ankle and anterior shin musculature.  Gas could be medially from the open wound or from gas-forming infection.  Evidence of osteomyelitis involving the cuboid.  4. Diffuse subcutaneous soft tissue edema of both lower legs especially on the right.  This could relate to cellulitis noninfectious inflammation or venous/lymphatic occlusion.  5. Cholelith.  6. Left nephrolith.    Cardiology:    IMPRESSION & PLAN     -- Rt foot infection, osteomyelitis of 4th and 5th metatarsal, abscess, status post IND and amputation of 5th great toe;, despite aggressive treatment patient is not healing well and will go for right BKA amputation today.  -- Leukocytosis  --markedly increased CRP  -- PAD, as per vascular patient should heal BKA.  -- DM terribly uncontrolled, aware he needs to do better  --noncompliance aware he needs to do better  -- Left scalp dry, red patch, to follow with dermatologist as OP , aware, that needs to be surgically excised because it did either sebaceous cyst that is gets inflamed and infected intermittently, or something more serious.    Recommendations:  Because of recent deterioration with switched to vancomycin and meropenem yesterday  Continue antibiotics until 5 days postop.  Then stop them      Will sign off      Medical Decision Making during this encounter was  [_] Low Complexity  [_] Moderate Complexity  [ xx ] High Complexity

## 2022-09-03 NOTE — PLAN OF CARE
09/02/22 2025   Patient Assessment/Suction   Level of Consciousness (AVPU) alert   Respiratory Effort Unlabored   Expansion/Accessory Muscles/Retractions expansion symmetric   All Lung Fields Breath Sounds coarse   PRE-TX-O2   O2 Device (Oxygen Therapy) nasal cannula   $ Is the patient on Low Flow Oxygen? Yes   Flow (L/min) 2   SpO2 99 %   Pulse Oximetry Type Continuous   $ Pulse Oximetry - Multiple Charge Pulse Oximetry - Multiple   Pulse 77   Resp 16   Education   $ Education DME Oxygen;15 min

## 2022-09-03 NOTE — NURSING
Patient transferred to Cardio C room 3014. Report given to SCHUYLER Bryson. All meds taken with patient upon transfer. Patient transferred in wheelchair and tolerated well. All vitals stable and WDL.

## 2022-09-04 ENCOUNTER — ANESTHESIA (OUTPATIENT)
Dept: SURGERY | Facility: HOSPITAL | Age: 62
DRG: 853 | End: 2022-09-04
Payer: MEDICAID

## 2022-09-04 PROBLEM — S88.119A UNILATERAL COMPLETE BKA: Status: ACTIVE | Noted: 2022-09-04

## 2022-09-04 LAB
ANION GAP SERPL CALC-SCNC: 6 MMOL/L (ref 8–16)
BASOPHILS # BLD AUTO: 0.03 K/UL (ref 0–0.2)
BASOPHILS NFR BLD: 0.2 % (ref 0–1.9)
BUN SERPL-MCNC: 23 MG/DL (ref 8–23)
CALCIUM SERPL-MCNC: 7.1 MG/DL (ref 8.7–10.5)
CHLORIDE SERPL-SCNC: 101 MMOL/L (ref 95–110)
CO2 SERPL-SCNC: 22 MMOL/L (ref 23–29)
CREAT SERPL-MCNC: 1.1 MG/DL (ref 0.5–1.4)
DIFFERENTIAL METHOD: ABNORMAL
EOSINOPHIL # BLD AUTO: 0 K/UL (ref 0–0.5)
EOSINOPHIL NFR BLD: 0.3 % (ref 0–8)
ERYTHROCYTE [DISTWIDTH] IN BLOOD BY AUTOMATED COUNT: 14 % (ref 11.5–14.5)
EST. GFR  (NO RACE VARIABLE): >60 ML/MIN/1.73 M^2
GLUCOSE SERPL-MCNC: 201 MG/DL (ref 70–110)
GLUCOSE SERPL-MCNC: 204 MG/DL (ref 70–110)
GLUCOSE SERPL-MCNC: 215 MG/DL (ref 70–110)
GLUCOSE SERPL-MCNC: 217 MG/DL (ref 70–110)
GLUCOSE SERPL-MCNC: 222 MG/DL (ref 70–110)
GLUCOSE SERPL-MCNC: 240 MG/DL (ref 70–110)
GLUCOSE SERPL-MCNC: 244 MG/DL (ref 70–110)
HCT VFR BLD AUTO: 23.5 % (ref 40–54)
HCT VFR BLD AUTO: 24.7 % (ref 40–54)
HCT VFR BLD AUTO: 24.7 % (ref 40–54)
HGB BLD-MCNC: 7.5 G/DL (ref 14–18)
HGB BLD-MCNC: 8 G/DL (ref 14–18)
HGB BLD-MCNC: 8 G/DL (ref 14–18)
IMM GRANULOCYTES # BLD AUTO: 0.12 K/UL (ref 0–0.04)
IMM GRANULOCYTES NFR BLD AUTO: 0.9 % (ref 0–0.5)
LYMPHOCYTES # BLD AUTO: 1.1 K/UL (ref 1–4.8)
LYMPHOCYTES NFR BLD: 8.8 % (ref 18–48)
MCH RBC QN AUTO: 27.5 PG (ref 27–31)
MCHC RBC AUTO-ENTMCNC: 31.9 G/DL (ref 32–36)
MCV RBC AUTO: 86 FL (ref 82–98)
MONOCYTES # BLD AUTO: 0.7 K/UL (ref 0.3–1)
MONOCYTES NFR BLD: 5.7 % (ref 4–15)
NEUTROPHILS # BLD AUTO: 10.9 K/UL (ref 1.8–7.7)
NEUTROPHILS NFR BLD: 84.1 % (ref 38–73)
NRBC BLD-RTO: 0 /100 WBC
PLATELET # BLD AUTO: 437 K/UL (ref 150–450)
PMV BLD AUTO: 9.3 FL (ref 9.2–12.9)
POTASSIUM SERPL-SCNC: 3.5 MMOL/L (ref 3.5–5.1)
RBC # BLD AUTO: 2.73 M/UL (ref 4.6–6.2)
SODIUM SERPL-SCNC: 129 MMOL/L (ref 136–145)
VANCOMYCIN TROUGH SERPL-MCNC: 16.9 UG/ML
WBC # BLD AUTO: 12.99 K/UL (ref 3.9–12.7)

## 2022-09-04 PROCEDURE — 94761 N-INVAS EAR/PLS OXIMETRY MLT: CPT

## 2022-09-04 PROCEDURE — 71000039 HC RECOVERY, EACH ADD'L HOUR: Performed by: ORTHOPAEDIC SURGERY

## 2022-09-04 PROCEDURE — 25000003 PHARM REV CODE 250: Performed by: ANESTHESIOLOGY

## 2022-09-04 PROCEDURE — 63600175 PHARM REV CODE 636 W HCPCS: Performed by: NURSE ANESTHETIST, CERTIFIED REGISTERED

## 2022-09-04 PROCEDURE — 36000711: Performed by: ORTHOPAEDIC SURGERY

## 2022-09-04 PROCEDURE — 80202 ASSAY OF VANCOMYCIN: CPT | Performed by: INTERNAL MEDICINE

## 2022-09-04 PROCEDURE — 27000221 HC OXYGEN, UP TO 24 HOURS

## 2022-09-04 PROCEDURE — 27202107 HC XP QUATRO SENSOR: Performed by: ANESTHESIOLOGY

## 2022-09-04 PROCEDURE — 27201423 OPTIME MED/SURG SUP & DEVICES STERILE SUPPLY: Performed by: ORTHOPAEDIC SURGERY

## 2022-09-04 PROCEDURE — 37000008 HC ANESTHESIA 1ST 15 MINUTES: Performed by: ORTHOPAEDIC SURGERY

## 2022-09-04 PROCEDURE — 85018 HEMOGLOBIN: CPT | Performed by: INTERNAL MEDICINE

## 2022-09-04 PROCEDURE — 85014 HEMATOCRIT: CPT | Performed by: INTERNAL MEDICINE

## 2022-09-04 PROCEDURE — 36000710: Performed by: ORTHOPAEDIC SURGERY

## 2022-09-04 PROCEDURE — 25000003 PHARM REV CODE 250: Performed by: NURSE ANESTHETIST, CERTIFIED REGISTERED

## 2022-09-04 PROCEDURE — 80048 BASIC METABOLIC PNL TOTAL CA: CPT | Performed by: INTERNAL MEDICINE

## 2022-09-04 PROCEDURE — 99900031 HC PATIENT EDUCATION (STAT)

## 2022-09-04 PROCEDURE — 71000033 HC RECOVERY, INTIAL HOUR: Performed by: ORTHOPAEDIC SURGERY

## 2022-09-04 PROCEDURE — 25000003 PHARM REV CODE 250: Performed by: INTERNAL MEDICINE

## 2022-09-04 PROCEDURE — 63600175 PHARM REV CODE 636 W HCPCS: Performed by: ANESTHESIOLOGY

## 2022-09-04 PROCEDURE — 21400001 HC TELEMETRY ROOM

## 2022-09-04 PROCEDURE — C9113 INJ PANTOPRAZOLE SODIUM, VIA: HCPCS | Performed by: INTERNAL MEDICINE

## 2022-09-04 PROCEDURE — 37000009 HC ANESTHESIA EA ADD 15 MINS: Performed by: ORTHOPAEDIC SURGERY

## 2022-09-04 PROCEDURE — 27000656 HC EYE GOGGLES: Performed by: ANESTHESIOLOGY

## 2022-09-04 PROCEDURE — 63600175 PHARM REV CODE 636 W HCPCS: Performed by: INTERNAL MEDICINE

## 2022-09-04 PROCEDURE — 27000673 HC TUBING BLOOD Y: Performed by: ANESTHESIOLOGY

## 2022-09-04 PROCEDURE — 85025 COMPLETE CBC W/AUTO DIFF WBC: CPT | Performed by: INTERNAL MEDICINE

## 2022-09-04 PROCEDURE — 82962 GLUCOSE BLOOD TEST: CPT | Performed by: ORTHOPAEDIC SURGERY

## 2022-09-04 RX ORDER — DIPHENHYDRAMINE HYDROCHLORIDE 50 MG/ML
12.5 INJECTION INTRAMUSCULAR; INTRAVENOUS
Status: DISCONTINUED | OUTPATIENT
Start: 2022-09-04 | End: 2022-09-06

## 2022-09-04 RX ORDER — MIDAZOLAM HYDROCHLORIDE 1 MG/ML
INJECTION INTRAMUSCULAR; INTRAVENOUS
Status: DISCONTINUED | OUTPATIENT
Start: 2022-09-04 | End: 2022-09-04

## 2022-09-04 RX ORDER — ONDANSETRON 2 MG/ML
INJECTION INTRAMUSCULAR; INTRAVENOUS
Status: DISCONTINUED | OUTPATIENT
Start: 2022-09-04 | End: 2022-09-04

## 2022-09-04 RX ORDER — LIDOCAINE HYDROCHLORIDE 20 MG/ML
INJECTION, SOLUTION EPIDURAL; INFILTRATION; INTRACAUDAL; PERINEURAL
Status: DISCONTINUED | OUTPATIENT
Start: 2022-09-04 | End: 2022-09-04

## 2022-09-04 RX ORDER — HYDROCODONE BITARTRATE AND ACETAMINOPHEN 500; 5 MG/1; MG/1
TABLET ORAL
Status: DISCONTINUED | OUTPATIENT
Start: 2022-09-04 | End: 2022-09-07 | Stop reason: HOSPADM

## 2022-09-04 RX ORDER — HYDROMORPHONE HYDROCHLORIDE 1 MG/ML
0.2 INJECTION, SOLUTION INTRAMUSCULAR; INTRAVENOUS; SUBCUTANEOUS EVERY 5 MIN PRN
Status: DISCONTINUED | OUTPATIENT
Start: 2022-09-04 | End: 2022-09-06

## 2022-09-04 RX ORDER — FAMOTIDINE 10 MG/ML
INJECTION INTRAVENOUS
Status: DISCONTINUED | OUTPATIENT
Start: 2022-09-04 | End: 2022-09-04

## 2022-09-04 RX ORDER — INSULIN ASPART 100 [IU]/ML
4 INJECTION, SOLUTION INTRAVENOUS; SUBCUTANEOUS ONCE
Status: DISCONTINUED | OUTPATIENT
Start: 2022-09-04 | End: 2022-09-04

## 2022-09-04 RX ORDER — OXYCODONE HYDROCHLORIDE 5 MG/1
5 TABLET ORAL
Status: DISCONTINUED | OUTPATIENT
Start: 2022-09-04 | End: 2022-09-06

## 2022-09-04 RX ORDER — PROPOFOL 10 MG/ML
VIAL (ML) INTRAVENOUS
Status: DISCONTINUED | OUTPATIENT
Start: 2022-09-04 | End: 2022-09-04

## 2022-09-04 RX ORDER — MEPERIDINE HYDROCHLORIDE 50 MG/ML
12.5 INJECTION INTRAMUSCULAR; INTRAVENOUS; SUBCUTANEOUS EVERY 10 MIN PRN
Status: ACTIVE | OUTPATIENT
Start: 2022-09-04 | End: 2022-09-04

## 2022-09-04 RX ORDER — SUCCINYLCHOLINE CHLORIDE 20 MG/ML
INJECTION INTRAMUSCULAR; INTRAVENOUS
Status: DISCONTINUED | OUTPATIENT
Start: 2022-09-04 | End: 2022-09-04

## 2022-09-04 RX ORDER — ROCURONIUM BROMIDE 10 MG/ML
INJECTION, SOLUTION INTRAVENOUS
Status: DISCONTINUED | OUTPATIENT
Start: 2022-09-04 | End: 2022-09-04

## 2022-09-04 RX ORDER — ONDANSETRON 2 MG/ML
4 INJECTION INTRAMUSCULAR; INTRAVENOUS DAILY PRN
Status: DISCONTINUED | OUTPATIENT
Start: 2022-09-04 | End: 2022-09-07 | Stop reason: HOSPADM

## 2022-09-04 RX ORDER — FENTANYL CITRATE 50 UG/ML
INJECTION, SOLUTION INTRAMUSCULAR; INTRAVENOUS
Status: DISCONTINUED | OUTPATIENT
Start: 2022-09-04 | End: 2022-09-04

## 2022-09-04 RX ORDER — DULOXETIN HYDROCHLORIDE 20 MG/1
20 CAPSULE, DELAYED RELEASE ORAL 2 TIMES DAILY
Status: DISCONTINUED | OUTPATIENT
Start: 2022-09-04 | End: 2022-09-06

## 2022-09-04 RX ORDER — PANTOPRAZOLE SODIUM 40 MG/10ML
40 INJECTION, POWDER, LYOPHILIZED, FOR SOLUTION INTRAVENOUS EVERY 12 HOURS
Status: DISCONTINUED | OUTPATIENT
Start: 2022-09-04 | End: 2022-09-06

## 2022-09-04 RX ORDER — SODIUM CHLORIDE 0.9 % (FLUSH) 0.9 %
10 SYRINGE (ML) INJECTION
Status: DISCONTINUED | OUTPATIENT
Start: 2022-09-04 | End: 2022-09-07 | Stop reason: HOSPADM

## 2022-09-04 RX ADMIN — INSULIN ASPART 2 UNITS: 100 INJECTION, SOLUTION INTRAVENOUS; SUBCUTANEOUS at 04:09

## 2022-09-04 RX ADMIN — SUCCINYLCHOLINE CHLORIDE 120 MG: 20 INJECTION, SOLUTION INTRAMUSCULAR; INTRAVENOUS at 09:09

## 2022-09-04 RX ADMIN — HYDROMORPHONE HYDROCHLORIDE 0.2 MG: 1 INJECTION, SOLUTION INTRAMUSCULAR; INTRAVENOUS; SUBCUTANEOUS at 11:09

## 2022-09-04 RX ADMIN — MEROPENEM AND SODIUM CHLORIDE 1 G: 1 INJECTION, SOLUTION INTRAVENOUS at 03:09

## 2022-09-04 RX ADMIN — PANTOPRAZOLE SODIUM 40 MG: 40 INJECTION, POWDER, FOR SOLUTION INTRAVENOUS at 11:09

## 2022-09-04 RX ADMIN — SODIUM CHLORIDE, SODIUM LACTATE, POTASSIUM CHLORIDE, AND CALCIUM CHLORIDE: .6; .31; .03; .02 INJECTION, SOLUTION INTRAVENOUS at 09:09

## 2022-09-04 RX ADMIN — VANCOMYCIN HYDROCHLORIDE 1250 MG: 1.25 INJECTION, POWDER, LYOPHILIZED, FOR SOLUTION INTRAVENOUS at 08:09

## 2022-09-04 RX ADMIN — OXYCODONE HYDROCHLORIDE 5 MG: 5 TABLET ORAL at 04:09

## 2022-09-04 RX ADMIN — OXYCODONE HYDROCHLORIDE 5 MG: 5 TABLET ORAL at 11:09

## 2022-09-04 RX ADMIN — LIDOCAINE HYDROCHLORIDE 80 MG: 20 INJECTION, SOLUTION INTRAVENOUS at 09:09

## 2022-09-04 RX ADMIN — DULOXETINE 20 MG: 20 CAPSULE, DELAYED RELEASE ORAL at 09:09

## 2022-09-04 RX ADMIN — VANCOMYCIN HYDROCHLORIDE 1250 MG: 1.25 INJECTION, POWDER, LYOPHILIZED, FOR SOLUTION INTRAVENOUS at 04:09

## 2022-09-04 RX ADMIN — POTASSIUM BICARBONATE 50 MEQ: 977.5 TABLET, EFFERVESCENT ORAL at 03:09

## 2022-09-04 RX ADMIN — CARVEDILOL 25 MG: 25 TABLET, FILM COATED ORAL at 02:09

## 2022-09-04 RX ADMIN — HYDROMORPHONE HYDROCHLORIDE 0.2 MG: 1 INJECTION, SOLUTION INTRAMUSCULAR; INTRAVENOUS; SUBCUTANEOUS at 10:09

## 2022-09-04 RX ADMIN — ACETAMINOPHEN 650 MG: 325 TABLET ORAL at 11:09

## 2022-09-04 RX ADMIN — MIDAZOLAM HYDROCHLORIDE 2 MG: 1 INJECTION, SOLUTION INTRAMUSCULAR; INTRAVENOUS at 09:09

## 2022-09-04 RX ADMIN — INSULIN ASPART 2 UNITS: 100 INJECTION, SOLUTION INTRAVENOUS; SUBCUTANEOUS at 02:09

## 2022-09-04 RX ADMIN — ATORVASTATIN CALCIUM 40 MG: 40 TABLET, FILM COATED ORAL at 09:09

## 2022-09-04 RX ADMIN — INSULIN ASPART 2 UNITS: 100 INJECTION, SOLUTION INTRAVENOUS; SUBCUTANEOUS at 09:09

## 2022-09-04 RX ADMIN — ONDANSETRON 4 MG: 2 INJECTION INTRAMUSCULAR; INTRAVENOUS at 09:09

## 2022-09-04 RX ADMIN — HYDROCHLOROTHIAZIDE 25 MG: 25 TABLET ORAL at 02:09

## 2022-09-04 RX ADMIN — FAMOTIDINE 20 MG: 10 INJECTION, SOLUTION INTRAVENOUS at 09:09

## 2022-09-04 RX ADMIN — HYDROCODONE BITARTRATE AND ACETAMINOPHEN 1 TABLET: 5; 325 TABLET ORAL at 05:09

## 2022-09-04 RX ADMIN — HYDROCODONE BITARTRATE AND ACETAMINOPHEN 1 TABLET: 5; 325 TABLET ORAL at 09:09

## 2022-09-04 RX ADMIN — SUGAMMADEX 200 MG: 100 INJECTION, SOLUTION INTRAVENOUS at 10:09

## 2022-09-04 RX ADMIN — INSULIN HUMAN 4 UNITS: 100 INJECTION, SOLUTION PARENTERAL at 11:09

## 2022-09-04 RX ADMIN — PROPOFOL 80 MG: 10 INJECTION, EMULSION INTRAVENOUS at 09:09

## 2022-09-04 RX ADMIN — LISINOPRIL 20 MG: 20 TABLET ORAL at 02:09

## 2022-09-04 RX ADMIN — ROCURONIUM BROMIDE 30 MG: 10 INJECTION, SOLUTION INTRAVENOUS at 09:09

## 2022-09-04 RX ADMIN — FENTANYL CITRATE 100 MCG: 50 INJECTION INTRAMUSCULAR; INTRAVENOUS at 09:09

## 2022-09-04 RX ADMIN — ASPIRIN 81 MG CHEWABLE TABLET 81 MG: 81 TABLET CHEWABLE at 02:09

## 2022-09-04 RX ADMIN — DEXTROSE 2 G: 50 INJECTION, SOLUTION INTRAVENOUS at 09:09

## 2022-09-04 RX ADMIN — MEROPENEM AND SODIUM CHLORIDE 1 G: 1 INJECTION, SOLUTION INTRAVENOUS at 10:09

## 2022-09-04 RX ADMIN — FAMOTIDINE 20 MG: 10 INJECTION, SOLUTION INTRAVENOUS at 02:09

## 2022-09-04 NOTE — PLAN OF CARE
09/03/22 2050   Patient Assessment/Suction   Level of Consciousness (AVPU) alert   Respiratory Effort Unlabored   Expansion/Accessory Muscles/Retractions expansion symmetric   All Lung Fields Breath Sounds clear   Rhythm/Pattern, Respiratory depth regular;pattern regular   Cough Frequency infrequent   Cough Type good;nonproductive   PRE-TX-O2   O2 Device (Oxygen Therapy) room air   SpO2 95 %   Pulse Oximetry Type Continuous   $ Pulse Oximetry - Multiple Charge Pulse Oximetry - Multiple   Pulse 71   Resp 20   Education   $ Education DME Oxygen;15 min

## 2022-09-04 NOTE — PROGRESS NOTES
Pharmacokinetic Assessment Follow Up: IV Vancomycin    Vancomycin serum concentration assessment(s):    The trough level was drawn correctly and can be used to guide therapy at this time. The measurement is within the desired definitive target range of 15 to 20 mcg/mL.    Vancomycin Regimen Plan:    Continue regimen to Vancomycin 1250 mg IV every 18 hours with next serum trough concentration measured at 0800 prior to 6th dose on 09/06    Drug levels (last 3 results):  Recent Labs   Lab Result Units 09/04/22  0400   Vancomycin-Trough ug/mL 16.9       Pharmacy will continue to follow and monitor vancomycin.    Please contact pharmacy at extension [phone] for questions regarding this assessment.    Thank you for the consult,   Ramakrishna Iyer       Patient brief summary:  Ernst May is a 62 y.o. male initiated on antimicrobial therapy with IV Vancomycin for treatment of bone/joint infection    Drug Allergies:   Review of patient's allergies indicates:   Allergen Reactions    Neosporin [benzalkonium chloride]        Actual Body Weight:   81.6 kg    Renal Function:   Estimated Creatinine Clearance: 61.8 mL/min (based on SCr of 1.2 mg/dL).,     CBC (last 72 hours):  Recent Labs   Lab Result Units 09/02/22  0441 09/02/22  1340 09/03/22  0027 09/03/22  0510 09/03/22  1127 09/03/22  1755 09/04/22  0400   WBC K/uL 19.30* 18.82*  --   --   --   --   --    Hemoglobin g/dL 7.4* 6.5* 9.0* 8.3* 7.9* 8.9* 8.0*  8.0*   Hematocrit % 22.5* 20.4* 26.7* 24.8* 24.2* 27.4* 24.7*  24.7*   Platelets K/uL 545* 403  --   --   --   --   --    Gran % % 81.9* 86.7*  --   --   --   --   --    Lymph % % 6.3* 4.1*  --   --   --   --   --    Mono % % 9.7 7.1  --   --   --   --   --    Eosinophil % % 0.6 0.5  --   --   --   --   --    Basophil % % 0.2 0.3  --   --   --   --   --    Differential Method  Automated Automated  --   --   --   --   --        Metabolic Panel (last 72 hours):  Recent Labs   Lab Result Units 09/02/22  0338 09/02/22  0441  09/02/22  1225 09/02/22  1309 09/02/22  1340 09/03/22  0510   Sodium mmol/L  --  129*  --   --  131* 129*   Potassium mmol/L  --  3.6  --   --  3.9 4.0   Chloride mmol/L  --  96  --   --  99 97   CO2 mmol/L  --  25  --   --  25 25   Glucose mg/dL  --  135*  --   --  146* 203*   Glucose, UA  1+*  --   --  Negative  --   --    BUN mg/dL  --  16  --   --  14 17   Creatinine mg/dL  --  1.1  --   --  1.0 1.2   Albumin g/dL  --   --   --   --  1.6*  --    Total Bilirubin mg/dL  --   --   --   --  0.6  --    Alkaline Phosphatase U/L  --   --   --   --  106  --    AST U/L  --   --   --   --  20  --    ALT U/L  --   --   --   --  14  --    Magnesium mg/dL  --  1.7 1.7  --   --   --    Phosphorus mg/dL  --  3.3  --   --   --   --        Vancomycin Administrations:  vancomycin given in the last 96 hours                     vancomycin 1.25 g in dextrose 5% 250 mL IVPB (ready to mix) (mg) 1,250 mg New Bag 09/04/22 0400     1,250 mg New Bag 09/03/22 0926     1,250 mg New Bag 09/02/22 1512                    Microbiologic Results:  Microbiology Results (last 7 days)       Procedure Component Value Units Date/Time    Urine Culture High Risk [[id number]] Collected: 09/02/22 1309    Order Status: Completed Specimen: Urine, Catheterized Updated: 09/03/22 0802     Urine Culture, Routine No growth to date    Narrative:      Indicated criteria for high risk culture:->Other  Other (specify):->sepsis and hypotension    Blood culture [[id number]]     Order Status: No result Specimen: Blood     Blood culture [[id number]]     Order Status: No result Specimen: Blood

## 2022-09-04 NOTE — PROGRESS NOTES
"[facility] Medicine  Progress Note    Patient Name: Ernst May  MRN: [id number]  Patient Class: IP- Inpatient   Admission Date: 9/1/2022  Length of Stay: 3 days  Attending Physician: Harvey Clifford MD  Primary Care Provider: Chemo Hinkle MD        Subjective:     Principal Problem:Osteomyelitis        HPI:  62 years old male past medical history of diabetes hypertension CKD previous history of osteomyelitis of C-spine  "admitted to [facility] on August 26 with fever, weakness, and right foot wound.  He was admitted with osteomyelitis of the right foot with cellulitis, sepsis, gangrene of the right foot, and anemia.  Podiatry was consulted and he underwent amputation of the right 5th toe.  He was treated with antibiotics and wound care.  PICC line was placed.  He had further debridement of his foot on August 30.  He was seen by Infectious Diseases, and antibiotics were adjusted.  Recommendation was for transfer to a facility with Vascular Surgery availability to determine if revascularization would improve wound healing.  Requesting transfer to Hospital Medicine at [facility] for further treatment.     September 1: Sodium 130, potassium 4.1, chloride 100, CO2 21, BUN 14, creatinine 1, glucose 213, AST 13, ALT 15, magnesium 1.8, phosphorus 2.9, .2, white blood cells 15.71, hemoglobin 7.1, hematocrit 21.7, platelets 486     August 31: CTA of the bilateral lower extremities:  1. Right lower extremity: Multiple areas of up to 50% stenosis including right deep femoral artery, popliteal artery. High-grade stenosis at the origin of the right anterior tibial artery (51-99%).  Flow is present in all trifurcation vessels at the origin but cannot be traced beyond the origin, for reasons detailed above.  Correlate with prior ultrasound  2. Left lower extremity: Multiple areas of up to 50% stenosis including mid segment deep femoral artery, distal popliteal artery.  " "Flow is present in all trifurcation vessels at the origin but cannot be traced beyond the origin, for reasons detailed above.  Correlate with prior ultrasound.  3. Open skin wound along the right foot with subcutaneous emphysema in the foot anterior ankle and anterior shin musculature.  Gas could be medially from the open wound or from gas-forming infection.  Evidence of osteomyelitis involving the cuboid.  4. Diffuse subcutaneous soft tissue edema of both lower legs especially on the right.  This could relate to cellulitis noninfectious inflammation or venous/lymphatic occlusion.  5. Cholelith.  6. Left nephrolith.     August 29: Chest x-ray noted PICC in good position.  Hypoventilation noted.     August 26:  Right foot wound anaerobic culture growing Bacteroides ovatus  Aerobic right foot my and Proteus penneri  -EKG with normal sinus rhythm and right bundle-branch block     August 25:  COVID negative  Blood cultures with no growth at 5 days"      Overview/Hospital Course:  9/2/2022  Mr May had no complaints this AM but became hypotensive in his room before noon. His MAP was below 65 fluid resuscitation was initiated with 2 liters via PICC,  EKG NSR no stemi  Labs ordered including blood and urine cultures  A line and central line ordered    9/3/2022  Mr Gambino was sitting up in bed eating lunch with complaint of 6/10 right foot pain. He is scheduled for a right BKA tomorrow as both vascular surgery and podiatry feel that a vascular procedure would not be of benefit    9/4/2022  Mr May is S/P right BKA. He is somnolent and states that he is OK but has nothing further to say      Interval History: Pt is S/P right BKA and is somnolent. He has a depressed affect. I an adding Duloxitine to his regimine    Review of Systems   Unable to perform ROS: Other (refused further comment)   Objective:     Vital Signs (Most Recent):  Temp: 97.5 °F (36.4 °C) (09/04/22 1510)  Pulse: 68 (09/04/22 1510)  Resp: 14 (09/04/22 " 1510)  BP: (!) 166/85 (09/04/22 1510)  SpO2: 96 % (09/04/22 1510)   Vital Signs (24h Range):  Temp:  [97.5 °F (36.4 °C)-99.1 °F (37.3 °C)] 97.5 °F (36.4 °C)  Pulse:  [67-90] 68  Resp:  [12-20] 14  SpO2:  [92 %-99 %] 96 %  BP: (120-166)/(62-85) 166/85     Weight: 81.6 kg (180 lb)  Body mass index is 27.37 kg/m².    Intake/Output Summary (Last 24 hours) at 9/4/2022 1552  Last data filed at 9/4/2022 1220  Gross per 24 hour   Intake 850 ml   Output 150 ml   Net 700 ml      Physical Exam  Vitals reviewed.   Constitutional:       General: He is not in acute distress.  HENT:      Head: Normocephalic and atraumatic.      Nose: Nose normal.      Mouth/Throat:      Mouth: Mucous membranes are moist.   Eyes:      Extraocular Movements: Extraocular movements intact.      Pupils: Pupils are equal, round, and reactive to light.   Cardiovascular:      Rate and Rhythm: Normal rate and regular rhythm.   Pulmonary:      Effort: Pulmonary effort is normal. No respiratory distress.      Breath sounds: No wheezing, rhonchi or rales.   Abdominal:      General: There is no distension.      Tenderness: There is no abdominal tenderness. There is no guarding or rebound.   Musculoskeletal:      Cervical back: Normal range of motion and neck supple.      Comments: Right BKA   Skin:     General: Skin is warm.   Neurological:      Mental Status: He is alert and oriented to person, place, and time.   Psychiatric:      Comments: Dulled affect       Significant Labs: All pertinent labs within the past 24 hours have been reviewed.  Recent Lab Results  (Last 5 results in the past 24 hours)        09/04/22  1238   09/04/22  1204   09/04/22  1041   09/04/22  0541   09/04/22  0400        Anion Gap         6       BUN         23       Calcium         7.1       Chloride         101       CO2         22       Creatinine         1.1       eGFR         >60.0       Glucose         201       Hematocrit         24.7                24.7       Hemoglobin          8.0                8.0       POC Glucose 222   204   215   217         Potassium         3.5       Sodium         129       Vancomycin-Trough         16.9  Comment: Therapeutic Range: 10.0-20.0 ug/mL                              Significant Imaging: I have reviewed all pertinent imaging results/findings within the past 24 hours.      Assessment/Plan:      * Osteomyelitis    Of right foot status post amputation 5th digit stay and PICC line placement and I and D on August 30th  Continue with cefepime and Flagyl.  Cultures growing E coli Proteus and Bacteroides.  ID, Dr Ibrahim, on the case.    's Marco and Kailey recommend right BKA  Dr Palacios to preform in the AM      Unilateral complete BKA    Pt is somnolent  CBC stat  Neuro checks Q 4    Hypotension  Responding to IV fluids and Levophed  Blood cultures X 2 Urine culture  Vancomycin and Meropenem    Case discussed with Dr Ibrahim will continue Meropenem and vancomycin  Blood cultures are negative  Wound cultures growing aerobes and anaerobes  Continue vancomycin and meropenem      Peripheral artery disease  Dr. Bonner's we consulted on the case follow-up recommendations  Continue with aspirin and atorvastatin    Diabetes mellitus with hyperglycemia  Sliding scale    Essential hypertension, not well controlled    Patient on Coreg 25 b.i.d. HCTZ 25 daily lisinopril 20 mg a day      VTE Risk Mitigation (From admission, onward)           Ordered     IP VTE HIGH RISK PATIENT  Once         09/01/22 2303     Place sequential compression device  Until discontinued         09/01/22 2303                    Discharge Planning   MEGHAN:      Code Status: Full Code   Is the patient medically ready for discharge?:     Reason for patient still in hospital (select all that apply): Treatment, Consult recommendations and Pending disposition  Discharge Plan A: Home                  Harvey Clifford MD  Department of [facility]   [facility]

## 2022-09-04 NOTE — ANESTHESIA POSTPROCEDURE EVALUATION
Anesthesia Post Evaluation    Patient: Ernst May    Procedure(s) Performed: Procedure(s) (LRB):  AMPUTATION, BELOW KNEE (Right)    Final Anesthesia Type: general      Patient location during evaluation: PACU  Patient participation: Yes- Able to Participate  Level of consciousness: awake and alert and oriented  Post-procedure vital signs: reviewed and stable  Pain management: adequate  Airway patency: patent    PONV status at discharge: No PONV  Anesthetic complications: no      Cardiovascular status: blood pressure returned to baseline and hemodynamically stable  Respiratory status: unassisted, spontaneous ventilation and nasal cannula  Hydration status: euvolemic  Follow-up not needed.          Vitals Value Taken Time   /65 09/04/22 1145   Temp 36.6 °C (97.8 °F) 09/04/22 1037   Pulse 69 09/04/22 1146   Resp 16 09/04/22 1146   SpO2 98 % 09/04/22 1146   Vitals shown include unvalidated device data.      No case tracking events are documented in the log.      Pain/Pardeep Score: Pain Rating Prior to Med Admin: 10 (9/4/2022 11:31 AM)  Pain Rating Post Med Admin: 6 (9/3/2022  7:59 AM)  Pardeep Score: 9 (9/4/2022 11:30 AM)

## 2022-09-04 NOTE — SUBJECTIVE & OBJECTIVE
Interval History: Pt is S/P right BKA and is somnolent. He has a depressed affect    Review of Systems   Unable to perform ROS: Other (refused further comment)   Objective:     Vital Signs (Most Recent):  Temp: 97.5 °F (36.4 °C) (09/04/22 1510)  Pulse: 68 (09/04/22 1510)  Resp: 14 (09/04/22 1510)  BP: (!) 166/85 (09/04/22 1510)  SpO2: 96 % (09/04/22 1510)   Vital Signs (24h Range):  Temp:  [97.5 °F (36.4 °C)-99.1 °F (37.3 °C)] 97.5 °F (36.4 °C)  Pulse:  [67-90] 68  Resp:  [12-20] 14  SpO2:  [92 %-99 %] 96 %  BP: (120-166)/(62-85) 166/85     Weight: 81.6 kg (180 lb)  Body mass index is 27.37 kg/m².    Intake/Output Summary (Last 24 hours) at 9/4/2022 1552  Last data filed at 9/4/2022 1220  Gross per 24 hour   Intake 850 ml   Output 150 ml   Net 700 ml      Physical Exam  Vitals reviewed.   Constitutional:       General: He is not in acute distress.  HENT:      Head: Normocephalic and atraumatic.      Nose: Nose normal.      Mouth/Throat:      Mouth: Mucous membranes are moist.   Eyes:      Extraocular Movements: Extraocular movements intact.      Pupils: Pupils are equal, round, and reactive to light.   Cardiovascular:      Rate and Rhythm: Normal rate and regular rhythm.   Pulmonary:      Effort: Pulmonary effort is normal. No respiratory distress.      Breath sounds: No wheezing, rhonchi or rales.   Abdominal:      General: There is no distension.      Tenderness: There is no abdominal tenderness. There is no guarding or rebound.   Musculoskeletal:      Cervical back: Normal range of motion and neck supple.      Comments: Right BKA   Skin:     General: Skin is warm.   Neurological:      Mental Status: He is alert and oriented to person, place, and time.   Psychiatric:      Comments: Dulled affect       Significant Labs: All pertinent labs within the past 24 hours have been reviewed.  Recent Lab Results  (Last 5 results in the past 24 hours)        09/04/22  1238   09/04/22  1204   09/04/22  1041   09/04/22  0541    09/04/22  0400        Anion Gap         6       BUN         23       Calcium         7.1       Chloride         101       CO2         22       Creatinine         1.1       eGFR         >60.0       Glucose         201       Hematocrit         24.7                24.7       Hemoglobin         8.0                8.0       POC Glucose 222   204   215   217         Potassium         3.5       Sodium         129       Vancomycin-Trough         16.9  Comment: Therapeutic Range: 10.0-20.0 ug/mL                              Significant Imaging: I have reviewed all pertinent imaging results/findings within the past 24 hours.

## 2022-09-04 NOTE — OP NOTE
Orthopedic Surgery Operative Report        DATE OF PROCEDURE: 09/04/2022  PREOPERATIVE DIAGNOSIS:  Osteomyelitis [M86.9]  POSTOPERATIVE DIAGNOSIS: Same.   PROCEDURE PERFORMED: Right lower extremity below the knee amputation  SURGEON: Surgeon(s) and Role:     * Simone Palacios MD - Primary         Azra Kelly  ANESTHESIA: General endotracheal.   ESTIMATED BLOOD LOSS: 100cc  IMPLANTS: none     INDICATIONS FOR PROCEDURE: Patient is a 62 y.o. male who with Right foot gangrene complicated with significant infection.  This procedure, as well as, alternatives to this procedure was discussed at length with the patient. Risks and benefits were also discussed. Risks include but are not limited to bleeding, infection, numbness, scarring, damage to major neurovascular structures, limb length/rotation discrepancy, failure of hardware, need for further surgery, loss of function, myocardial infarction, deep venous thrombosis, pulmonary embolism, nonunion, malunion and death. Patient understood these well and consented for the procedure as described.      PROCEDURE IN DETAIL:  The patient was identified in the preoperative holding area and site was marked. The patient was wheeled into the Operating Room and general endotracheal anesthesia was induced in the patient's hospital bed. The patient was then moved over to the operative table and placed into a supine position. A well-padded tourniquet was placed on the thigh. The operative extremity were then prepped and draped in the usual sterile fashion. A timeout was taken to confirm the patient, site, surgery, surgeon and administration of preoperative antibiotics. All agreed and we proceeded.   Marking the skin anteriorly 10cm distal to the tibial tubercle and posteriorly 4cm beyond the anterior skin incision.  The limb was exsanguinated by elevating the limb for 1 min and the tourniquet was inflated to 300mm Hg.  The skin was incised sharply with a scalpel.  Hemostasis was  achieved with electrocautery.  The superficial and deep peroneal nerves were identified, placed under gentle traction and sharply resected allowing retraction into the soft tissues.  Anterior compartment musculature was debulked using a large amputation knife. The anterior tibial artery was isolated, and ligated.  Using a power saw, the tibial shaft was cut at the level of the anterior skin incision and the fibula was cut 2 cm proximal to the tibia cut.  The posterior musculature was debulked with a large amputation blade, through which the tibial nerve was identified, placed under gentle retraction and allowed to retract into the soft tissue. The posterior vascular bundle was identified and ligated.  The touniquet was release and absolute hemostasis was achieve.  The tibial was beveled to smooth the end and four drill holed were made proximal to the bevel.  The gastroc aponeurosis was prepared using a locking style Krackow suture and secured through the tibia.  A submuscular hemovac drain tube was placed and pierced through the anterolateral soft tissues proximal to the incision.  The wound was irrigated thoroughly and then closed in the usual fashion.  Soft dressings were applied and the residual limb placed in a knee immobilizer.  The patient was awakened from anesthesia without complication and transported to the PACU in stable condition.  The amputated limb was sent to pathology.  There were no complications.  All counts were correct.          PLAN FOR THE PATIENT: Remain NWB of the RLE.  Admit to the floor for recovery     Simone Palacios M.D.  [facility]

## 2022-09-05 LAB
ANION GAP SERPL CALC-SCNC: 4 MMOL/L (ref 8–16)
BACTERIA UR CULT: NO GROWTH
BLD PROD TYP BPU: NORMAL
BLOOD UNIT EXPIRATION DATE: NORMAL
BLOOD UNIT TYPE CODE: 5100
BLOOD UNIT TYPE: NORMAL
BUN SERPL-MCNC: 28 MG/DL (ref 8–23)
CALCIUM SERPL-MCNC: 8 MG/DL (ref 8.7–10.5)
CHLORIDE SERPL-SCNC: 96 MMOL/L (ref 95–110)
CO2 SERPL-SCNC: 30 MMOL/L (ref 23–29)
CODING SYSTEM: NORMAL
CREAT SERPL-MCNC: 1.2 MG/DL (ref 0.5–1.4)
DISPENSE STATUS: NORMAL
EST. GFR  (NO RACE VARIABLE): >60 ML/MIN/1.73 M^2
GLUCOSE SERPL-MCNC: 228 MG/DL (ref 70–110)
GLUCOSE SERPL-MCNC: 236 MG/DL (ref 70–110)
GLUCOSE SERPL-MCNC: 242 MG/DL (ref 70–110)
GLUCOSE SERPL-MCNC: 259 MG/DL (ref 70–110)
GLUCOSE SERPL-MCNC: 260 MG/DL (ref 70–110)
HCT VFR BLD AUTO: 26.5 % (ref 40–54)
HGB BLD-MCNC: 8.4 G/DL (ref 14–18)
NUM UNITS TRANS PACKED RBC: NORMAL
POTASSIUM SERPL-SCNC: 4.2 MMOL/L (ref 3.5–5.1)
SODIUM SERPL-SCNC: 130 MMOL/L (ref 136–145)

## 2022-09-05 PROCEDURE — 85018 HEMOGLOBIN: CPT | Performed by: INTERNAL MEDICINE

## 2022-09-05 PROCEDURE — 80048 BASIC METABOLIC PNL TOTAL CA: CPT | Performed by: INTERNAL MEDICINE

## 2022-09-05 PROCEDURE — P9016 RBC LEUKOCYTES REDUCED: HCPCS | Performed by: INTERNAL MEDICINE

## 2022-09-05 PROCEDURE — 25000003 PHARM REV CODE 250: Performed by: ANESTHESIOLOGY

## 2022-09-05 PROCEDURE — 63600175 PHARM REV CODE 636 W HCPCS: Performed by: INTERNAL MEDICINE

## 2022-09-05 PROCEDURE — 21400001 HC TELEMETRY ROOM

## 2022-09-05 PROCEDURE — 36430 TRANSFUSION BLD/BLD COMPNT: CPT

## 2022-09-05 PROCEDURE — C9113 INJ PANTOPRAZOLE SODIUM, VIA: HCPCS | Performed by: INTERNAL MEDICINE

## 2022-09-05 PROCEDURE — 25000003 PHARM REV CODE 250: Performed by: INTERNAL MEDICINE

## 2022-09-05 PROCEDURE — 85014 HEMATOCRIT: CPT | Performed by: INTERNAL MEDICINE

## 2022-09-05 RX ORDER — OXYCODONE HYDROCHLORIDE 5 MG/1
10 TABLET ORAL EVERY 6 HOURS PRN
Status: DISCONTINUED | OUTPATIENT
Start: 2022-09-05 | End: 2022-09-06

## 2022-09-05 RX ADMIN — OXYCODONE HYDROCHLORIDE 10 MG: 5 TABLET ORAL at 11:09

## 2022-09-05 RX ADMIN — INSULIN ASPART 2 UNITS: 100 INJECTION, SOLUTION INTRAVENOUS; SUBCUTANEOUS at 08:09

## 2022-09-05 RX ADMIN — CARVEDILOL 25 MG: 25 TABLET, FILM COATED ORAL at 08:09

## 2022-09-05 RX ADMIN — PANTOPRAZOLE SODIUM 40 MG: 40 INJECTION, POWDER, FOR SOLUTION INTRAVENOUS at 09:09

## 2022-09-05 RX ADMIN — INSULIN ASPART 3 UNITS: 100 INJECTION, SOLUTION INTRAVENOUS; SUBCUTANEOUS at 04:09

## 2022-09-05 RX ADMIN — PANTOPRAZOLE SODIUM 40 MG: 40 INJECTION, POWDER, FOR SOLUTION INTRAVENOUS at 08:09

## 2022-09-05 RX ADMIN — CARVEDILOL 25 MG: 25 TABLET, FILM COATED ORAL at 09:09

## 2022-09-05 RX ADMIN — INSULIN ASPART 2 UNITS: 100 INJECTION, SOLUTION INTRAVENOUS; SUBCUTANEOUS at 09:09

## 2022-09-05 RX ADMIN — HYDROCHLOROTHIAZIDE 25 MG: 25 TABLET ORAL at 08:09

## 2022-09-05 RX ADMIN — LISINOPRIL 20 MG: 20 TABLET ORAL at 08:09

## 2022-09-05 RX ADMIN — DULOXETINE 20 MG: 20 CAPSULE, DELAYED RELEASE ORAL at 09:09

## 2022-09-05 RX ADMIN — DULOXETINE 20 MG: 20 CAPSULE, DELAYED RELEASE ORAL at 08:09

## 2022-09-05 RX ADMIN — INSULIN ASPART 3 UNITS: 100 INJECTION, SOLUTION INTRAVENOUS; SUBCUTANEOUS at 11:09

## 2022-09-05 RX ADMIN — CARVEDILOL 25 MG: 25 TABLET, FILM COATED ORAL at 03:09

## 2022-09-05 RX ADMIN — OXYCODONE HYDROCHLORIDE 10 MG: 5 TABLET ORAL at 05:09

## 2022-09-05 RX ADMIN — HYDROCODONE BITARTRATE AND ACETAMINOPHEN 1 TABLET: 5; 325 TABLET ORAL at 03:09

## 2022-09-05 RX ADMIN — ATORVASTATIN CALCIUM 40 MG: 40 TABLET, FILM COATED ORAL at 09:09

## 2022-09-05 RX ADMIN — MEROPENEM AND SODIUM CHLORIDE 1 G: 1 INJECTION, SOLUTION INTRAVENOUS at 05:09

## 2022-09-05 RX ADMIN — VANCOMYCIN HYDROCHLORIDE 1250 MG: 1.25 INJECTION, POWDER, LYOPHILIZED, FOR SOLUTION INTRAVENOUS at 03:09

## 2022-09-05 RX ADMIN — OXYCODONE HYDROCHLORIDE 5 MG: 5 TABLET ORAL at 06:09

## 2022-09-05 RX ADMIN — ASPIRIN 81 MG CHEWABLE TABLET 81 MG: 81 TABLET CHEWABLE at 08:09

## 2022-09-05 RX ADMIN — OXYCODONE HYDROCHLORIDE 5 MG: 5 TABLET ORAL at 09:09

## 2022-09-05 RX ADMIN — MEROPENEM AND SODIUM CHLORIDE 1 G: 1 INJECTION, SOLUTION INTRAVENOUS at 12:09

## 2022-09-05 RX ADMIN — MEROPENEM AND SODIUM CHLORIDE 1 G: 1 INJECTION, SOLUTION INTRAVENOUS at 08:09

## 2022-09-05 NOTE — PLAN OF CARE
Problem: Adult Inpatient Plan of Care  Goal: Plan of Care Review  Outcome: Ongoing, Progressing  Goal: Patient-Specific Goal (Individualized)  Outcome: Ongoing, Progressing  Goal: Absence of Hospital-Acquired Illness or Injury  Outcome: Ongoing, Progressing  Goal: Optimal Comfort and Wellbeing  Outcome: Ongoing, Progressing  Goal: Readiness for Transition of Care  Outcome: Ongoing, Progressing     Problem: Diabetes Comorbidity  Goal: Blood Glucose Level Within Targeted Range  Outcome: Ongoing, Progressing     Problem: Infection  Goal: Absence of Infection Signs and Symptoms  Outcome: Ongoing, Progressing     Problem: Impaired Wound Healing  Goal: Optimal Wound Healing  Outcome: Ongoing, Progressing     Problem: Skin Injury Risk Increased  Goal: Skin Health and Integrity  Outcome: Ongoing, Progressing     Problem: Fall Injury Risk  Goal: Absence of Fall and Fall-Related Injury  Outcome: Ongoing, Progressing

## 2022-09-05 NOTE — PROGRESS NOTES
Infectious disease.  Progress note      I signed off on 09/03  This is a Chart check.   Afebrile  WBC improving 12.9      Status post amputation on 09/04    Plan as per 09/03  -- Continue antibiotics at least 5 days postop., assuming no new concerning events.    --Follow-up with Dermatology about left scalp lesion.

## 2022-09-05 NOTE — PLAN OF CARE
09/04/22 2016   Patient Assessment/Suction   Level of Consciousness (AVPU) alert   Respiratory Effort Unlabored   Expansion/Accessory Muscles/Retractions expansion symmetric   All Lung Fields Breath Sounds clear   Rhythm/Pattern, Respiratory depth regular;pattern regular   Cough Frequency infrequent   Cough Type good;nonproductive   PRE-TX-O2   O2 Device (Oxygen Therapy) nasal cannula   $ Is the patient on Low Flow Oxygen? Yes   Flow (L/min) 2   SpO2 97 %   Pulse Oximetry Type Intermittent   $ Pulse Oximetry - Multiple Charge Pulse Oximetry - Multiple   Pulse 62   Resp 16   Positioning   Body Position position changed independently;supine   Head of Bed (HOB) Positioning HOB elevated   Positioning/Transfer Devices pillows   Education   $ Education DME Oxygen;15 min

## 2022-09-05 NOTE — SUBJECTIVE & OBJECTIVE
Interval History: Mr May is concerned Re his rehabilitation post right BKA    Review of Systems   Constitutional:  Positive for diaphoresis and fatigue.   HENT: Negative.     Eyes: Negative.    Respiratory: Negative.     Cardiovascular: Negative.    Gastrointestinal: Negative.    Endocrine: Positive for cold intolerance and heat intolerance.   Genitourinary: Negative.    Musculoskeletal:  Positive for arthralgias, gait problem and myalgias.   Skin:  Positive for wound.   Allergic/Immunologic: Positive for immunocompromised state.   Neurological:  Positive for weakness.   Hematological:  Bruises/bleeds easily.   Psychiatric/Behavioral:  Positive for dysphoric mood. The patient is nervous/anxious.    Objective:     Vital Signs (Most Recent):  Temp: 98.1 °F (36.7 °C) (09/05/22 1100)  Pulse: 70 (09/05/22 1100)  Resp: 16 (09/05/22 1113)  BP: (!) 144/86 (09/05/22 1100)  SpO2: 96 % (09/05/22 1100)   Vital Signs (24h Range):  Temp:  [97.5 °F (36.4 °C)-99.1 °F (37.3 °C)] 98.1 °F (36.7 °C)  Pulse:  [62-71] 70  Resp:  [14-18] 16  SpO2:  [9 %-98 %] 96 %  BP: (126-166)/(62-90) 144/86     Weight: 79.1 kg (174 lb 6.1 oz)  Body mass index is 26.51 kg/m².    Intake/Output Summary (Last 24 hours) at 9/5/2022 1341  Last data filed at 9/5/2022 0816  Gross per 24 hour   Intake 1289 ml   Output 1500 ml   Net -211 ml      Physical Exam  Vitals and nursing note reviewed.   Constitutional:       General: He is not in acute distress.  HENT:      Head: Normocephalic and atraumatic.      Nose: Nose normal.      Mouth/Throat:      Mouth: Mucous membranes are moist.   Eyes:      Extraocular Movements: Extraocular movements intact.      Pupils: Pupils are equal, round, and reactive to light.   Cardiovascular:      Rate and Rhythm: Normal rate and regular rhythm.   Pulmonary:      Effort: Pulmonary effort is normal.      Breath sounds: Normal breath sounds.   Abdominal:      General: Bowel sounds are normal. There is no distension.       Tenderness: There is no abdominal tenderness. There is no guarding or rebound.   Musculoskeletal:      Cervical back: Normal range of motion and neck supple.      Comments: Right BKA   Skin:     General: Skin is warm.      Comments: Right BKA wound dressed   Neurological:      Mental Status: He is alert and oriented to person, place, and time.   Psychiatric:      Comments: Mildly dysphoric mood       Significant Labs: All pertinent labs within the past 24 hours have been reviewed.  Recent Lab Results  (Last 5 results in the past 24 hours)        09/05/22  1101   09/05/22  0717   09/05/22  0520   09/04/22  2020   09/04/22  1710        Anion Gap   4             Baso #         0.03       Basophil %         0.2       BUN   28             Calcium   8.0             Chloride   96             CO2   30             Creatinine   1.2             Differential Method         Automated       eGFR   >60.0             Eos #         0.0       Eosinophil %         0.3       Glucose   242             Gran # (ANC)         10.9       Gran %         84.1       Hematocrit   26.5       23.5       Hemoglobin   8.4       7.5       Immature Grans (Abs)         0.12  Comment: Mild elevation in immature granulocytes is non specific and   can be seen in a variety of conditions including stress response,   acute inflammation, trauma and pregnancy. Correlation with other   laboratory and clinical findings is essential.         Immature Granulocytes         0.9       Lymph #         1.1       Lymph %         8.8       MCH         27.5       MCHC         31.9       MCV         86       Mono #         0.7       Mono %         5.7       MPV         9.3       nRBC         0       Platelets         437       POC Glucose 260     228   240         Potassium   4.2             RBC         2.73       RDW         14.0       Sodium   130             WBC         12.99                              Significant Imaging: I have reviewed all pertinent imaging  results/findings within the past 24 hours.

## 2022-09-05 NOTE — PROGRESS NOTES
"formerly [facility] Medicine  Progress Note    Patient Name: Ernst May  MRN: [id number]  Patient Class: IP- Inpatient   Admission Date: 9/1/2022  Length of Stay: 4 days  Attending Physician: Harvey Clifford MD  Primary Care Provider: Chemo Hinkle MD        Subjective:     Principal Problem:Osteomyelitis        HPI:  62 years old male past medical history of diabetes hypertension CKD previous history of osteomyelitis of C-spine  "admitted to [facility] on August 26 with fever, weakness, and right foot wound.  He was admitted with osteomyelitis of the right foot with cellulitis, sepsis, gangrene of the right foot, and anemia.  Podiatry was consulted and he underwent amputation of the right 5th toe.  He was treated with antibiotics and wound care.  PICC line was placed.  He had further debridement of his foot on August 30.  He was seen by Infectious Diseases, and antibiotics were adjusted.  Recommendation was for transfer to a facility with Vascular Surgery availability to determine if revascularization would improve wound healing.  Requesting transfer to Hospital Medicine at [facility] for further treatment.     September 1: Sodium 130, potassium 4.1, chloride 100, CO2 21, BUN 14, creatinine 1, glucose 213, AST 13, ALT 15, magnesium 1.8, phosphorus 2.9, .2, white blood cells 15.71, hemoglobin 7.1, hematocrit 21.7, platelets 486     August 31: CTA of the bilateral lower extremities:  1. Right lower extremity: Multiple areas of up to 50% stenosis including right deep femoral artery, popliteal artery. High-grade stenosis at the origin of the right anterior tibial artery (51-99%).  Flow is present in all trifurcation vessels at the origin but cannot be traced beyond the origin, for reasons detailed above.  Correlate with prior ultrasound  2. Left lower extremity: Multiple areas of up to 50% stenosis including mid segment deep femoral artery, distal popliteal artery.  " "Flow is present in all trifurcation vessels at the origin but cannot be traced beyond the origin, for reasons detailed above.  Correlate with prior ultrasound.  3. Open skin wound along the right foot with subcutaneous emphysema in the foot anterior ankle and anterior shin musculature.  Gas could be medially from the open wound or from gas-forming infection.  Evidence of osteomyelitis involving the cuboid.  4. Diffuse subcutaneous soft tissue edema of both lower legs especially on the right.  This could relate to cellulitis noninfectious inflammation or venous/lymphatic occlusion.  5. Cholelith.  6. Left nephrolith.     August 29: Chest x-ray noted PICC in good position.  Hypoventilation noted.     August 26:  Right foot wound anaerobic culture growing Bacteroides ovatus  Aerobic right foot my and Proteus penneri  -EKG with normal sinus rhythm and right bundle-branch block     August 25:  COVID negative  Blood cultures with no growth at 5 days"      Overview/Hospital Course:  9/2/2022  Mr May had no complaints this AM but became hypotensive in his room before noon. His MAP was below 65 fluid resuscitation was initiated with 2 liters via PICC,  EKG NSR no stemi  Labs ordered including blood and urine cultures  A line and central line ordered    9/3/2022  Mr Gambino was sitting up in bed eating lunch with complaint of 6/10 right foot pain. He is scheduled for a right BKA tomorrow as both vascular surgery and podiatry feel that a vascular procedure would not be of benefit    9/4/2022  Mr May is S/P right BKA. He is somnolent and states that he is OK but has nothing further to say    9/5/2022  Mr May is feeling stronger post transfusion. He denies weakness, fever or chills. He would like to sit up and is wondering about placement/rehat?      Interval History: Mr May is concerned Re his rehabilitation post right BKA    Review of Systems   Constitutional:  Positive for diaphoresis and fatigue.   HENT: " Negative.     Eyes: Negative.    Respiratory: Negative.     Cardiovascular: Negative.    Gastrointestinal: Negative.    Endocrine: Positive for cold intolerance and heat intolerance.   Genitourinary: Negative.    Musculoskeletal:  Positive for arthralgias, gait problem and myalgias.   Skin:  Positive for wound.   Allergic/Immunologic: Positive for immunocompromised state.   Neurological:  Positive for weakness.   Hematological:  Bruises/bleeds easily.   Psychiatric/Behavioral:  Positive for dysphoric mood. The patient is nervous/anxious.    Objective:     Vital Signs (Most Recent):  Temp: 98.1 °F (36.7 °C) (09/05/22 1100)  Pulse: 70 (09/05/22 1100)  Resp: 16 (09/05/22 1113)  BP: (!) 144/86 (09/05/22 1100)  SpO2: 96 % (09/05/22 1100)   Vital Signs (24h Range):  Temp:  [97.5 °F (36.4 °C)-99.1 °F (37.3 °C)] 98.1 °F (36.7 °C)  Pulse:  [62-71] 70  Resp:  [14-18] 16  SpO2:  [9 %-98 %] 96 %  BP: (126-166)/(62-90) 144/86     Weight: 79.1 kg (174 lb 6.1 oz)  Body mass index is 26.51 kg/m².    Intake/Output Summary (Last 24 hours) at 9/5/2022 1341  Last data filed at 9/5/2022 0816  Gross per 24 hour   Intake 1289 ml   Output 1500 ml   Net -211 ml      Physical Exam  Vitals and nursing note reviewed.   Constitutional:       General: He is not in acute distress.  HENT:      Head: Normocephalic and atraumatic.      Nose: Nose normal.      Mouth/Throat:      Mouth: Mucous membranes are moist.   Eyes:      Extraocular Movements: Extraocular movements intact.      Pupils: Pupils are equal, round, and reactive to light.   Cardiovascular:      Rate and Rhythm: Normal rate and regular rhythm.   Pulmonary:      Effort: Pulmonary effort is normal.      Breath sounds: Normal breath sounds.   Abdominal:      General: Bowel sounds are normal. There is no distension.      Tenderness: There is no abdominal tenderness. There is no guarding or rebound.   Musculoskeletal:      Cervical back: Normal range of motion and neck supple.       Comments: Right BKA   Skin:     General: Skin is warm.      Comments: Right BKA wound dressed   Neurological:      Mental Status: He is alert and oriented to person, place, and time.   Psychiatric:      Comments: Mildly dysphoric mood       Significant Labs: All pertinent labs within the past 24 hours have been reviewed.  Recent Lab Results  (Last 5 results in the past 24 hours)        09/05/22  1101   09/05/22  0717   09/05/22  0520   09/04/22  2020   09/04/22  1710        Anion Gap   4             Baso #         0.03       Basophil %         0.2       BUN   28             Calcium   8.0             Chloride   96             CO2   30             Creatinine   1.2             Differential Method         Automated       eGFR   >60.0             Eos #         0.0       Eosinophil %         0.3       Glucose   242             Gran # (ANC)         10.9       Gran %         84.1       Hematocrit   26.5       23.5       Hemoglobin   8.4       7.5       Immature Grans (Abs)         0.12  Comment: Mild elevation in immature granulocytes is non specific and   can be seen in a variety of conditions including stress response,   acute inflammation, trauma and pregnancy. Correlation with other   laboratory and clinical findings is essential.         Immature Granulocytes         0.9       Lymph #         1.1       Lymph %         8.8       MCH         27.5       MCHC         31.9       MCV         86       Mono #         0.7       Mono %         5.7       MPV         9.3       nRBC         0       Platelets         437       POC Glucose 260     228   240         Potassium   4.2             RBC         2.73       RDW         14.0       Sodium   130             WBC         12.99                              Significant Imaging: I have reviewed all pertinent imaging results/findings within the past 24 hours.      Assessment/Plan:      * Osteomyelitis    Of right foot status post amputation 5th digit stay and PICC line placement and I  and D on August 30th  Continue with cefepime and Flagyl.  Cultures growing E coli Proteus and Bacteroides.  ID, Dr Ibrahim, on the case.    's Marco and Kailey recommend right BKA  Dr Palacios to preform in the AM      Unilateral complete BKA    Pt is somnolent  CBC stat  Neuro checks Q 4    Hypotension  Responding to IV fluids and Levophed  Blood cultures X 2 Urine culture  Vancomycin and Meropenem    Case discussed with Dr Ibrahim will continue Meropenem and vancomycin  Blood cultures are negative  Wound cultures growing aerobes and anaerobes  Continue vancomycin and meropenem      Peripheral artery disease  Dr. Bonner's we consulted on the case follow-up recommendations  Continue with aspirin and atorvastatin    Diabetes mellitus with hyperglycemia  Sliding scale    Essential hypertension, not well controlled    Patient on Coreg 25 b.i.d. HCTZ 25 daily lisinopril 20 mg a day      VTE Risk Mitigation (From admission, onward)         Ordered     IP VTE HIGH RISK PATIENT  Once         09/01/22 2303     Place sequential compression device  Until discontinued         09/01/22 2303                Discharge Planning   MEGAHN:      Code Status: Full Code   Is the patient medically ready for discharge?:     Reason for patient still in hospital (select all that apply): Patient new problem, Treatment, Consult recommendations and Pending disposition  Discharge Plan A: Home                  Harvey Clifford MD  Department of Hospital Medicine   [facility]

## 2022-09-06 PROBLEM — Z91.199 NONCOMPLIANCE: Status: ACTIVE | Noted: 2022-09-06

## 2022-09-06 PROBLEM — D64.9 ACUTE ON CHRONIC ANEMIA: Status: ACTIVE | Noted: 2022-09-06

## 2022-09-06 LAB
ANION GAP SERPL CALC-SCNC: 5 MMOL/L (ref 8–16)
BASOPHILS # BLD AUTO: 0.05 K/UL (ref 0–0.2)
BASOPHILS NFR BLD: 0.4 % (ref 0–1.9)
BUN SERPL-MCNC: 33 MG/DL (ref 8–23)
CALCIUM SERPL-MCNC: 8.2 MG/DL (ref 8.7–10.5)
CHLORIDE SERPL-SCNC: 94 MMOL/L (ref 95–110)
CO2 SERPL-SCNC: 29 MMOL/L (ref 23–29)
CREAT SERPL-MCNC: 1.1 MG/DL (ref 0.5–1.4)
DIFFERENTIAL METHOD: ABNORMAL
EOSINOPHIL # BLD AUTO: 0.2 K/UL (ref 0–0.5)
EOSINOPHIL NFR BLD: 1.9 % (ref 0–8)
ERYTHROCYTE [DISTWIDTH] IN BLOOD BY AUTOMATED COUNT: 13.8 % (ref 11.5–14.5)
EST. GFR  (NO RACE VARIABLE): >60 ML/MIN/1.73 M^2
GLUCOSE SERPL-MCNC: 201 MG/DL (ref 70–110)
GLUCOSE SERPL-MCNC: 223 MG/DL (ref 70–110)
GLUCOSE SERPL-MCNC: 230 MG/DL (ref 70–110)
GLUCOSE SERPL-MCNC: 241 MG/DL (ref 70–110)
GLUCOSE SERPL-MCNC: 261 MG/DL (ref 70–110)
HCT VFR BLD AUTO: 27.5 % (ref 40–54)
HGB BLD-MCNC: 8.8 G/DL (ref 14–18)
IMM GRANULOCYTES # BLD AUTO: 0.11 K/UL (ref 0–0.04)
IMM GRANULOCYTES NFR BLD AUTO: 0.9 % (ref 0–0.5)
LYMPHOCYTES # BLD AUTO: 1.7 K/UL (ref 1–4.8)
LYMPHOCYTES NFR BLD: 14.1 % (ref 18–48)
MCH RBC QN AUTO: 27.1 PG (ref 27–31)
MCHC RBC AUTO-ENTMCNC: 32 G/DL (ref 32–36)
MCV RBC AUTO: 85 FL (ref 82–98)
MONOCYTES # BLD AUTO: 1 K/UL (ref 0.3–1)
MONOCYTES NFR BLD: 8.1 % (ref 4–15)
NEUTROPHILS # BLD AUTO: 8.9 K/UL (ref 1.8–7.7)
NEUTROPHILS NFR BLD: 74.6 % (ref 38–73)
NRBC BLD-RTO: 0 /100 WBC
PLATELET # BLD AUTO: 453 K/UL (ref 150–450)
PMV BLD AUTO: 9.2 FL (ref 9.2–12.9)
POTASSIUM SERPL-SCNC: 4.3 MMOL/L (ref 3.5–5.1)
RBC # BLD AUTO: 3.25 M/UL (ref 4.6–6.2)
SODIUM SERPL-SCNC: 128 MMOL/L (ref 136–145)
VANCOMYCIN TROUGH SERPL-MCNC: 20.3 UG/ML
WBC # BLD AUTO: 11.9 K/UL (ref 3.9–12.7)

## 2022-09-06 PROCEDURE — 25000003 PHARM REV CODE 250: Performed by: INTERNAL MEDICINE

## 2022-09-06 PROCEDURE — 21400001 HC TELEMETRY ROOM

## 2022-09-06 PROCEDURE — 80202 ASSAY OF VANCOMYCIN: CPT | Performed by: INTERNAL MEDICINE

## 2022-09-06 PROCEDURE — 63600175 PHARM REV CODE 636 W HCPCS: Performed by: INTERNAL MEDICINE

## 2022-09-06 PROCEDURE — 97530 THERAPEUTIC ACTIVITIES: CPT

## 2022-09-06 PROCEDURE — C9113 INJ PANTOPRAZOLE SODIUM, VIA: HCPCS | Performed by: INTERNAL MEDICINE

## 2022-09-06 PROCEDURE — 97162 PT EVAL MOD COMPLEX 30 MIN: CPT

## 2022-09-06 PROCEDURE — 97110 THERAPEUTIC EXERCISES: CPT

## 2022-09-06 PROCEDURE — 85025 COMPLETE CBC W/AUTO DIFF WBC: CPT | Performed by: INTERNAL MEDICINE

## 2022-09-06 PROCEDURE — 80048 BASIC METABOLIC PNL TOTAL CA: CPT | Performed by: INTERNAL MEDICINE

## 2022-09-06 PROCEDURE — 97168 OT RE-EVAL EST PLAN CARE: CPT

## 2022-09-06 PROCEDURE — 25000003 PHARM REV CODE 250: Performed by: ANESTHESIOLOGY

## 2022-09-06 RX ORDER — FAMOTIDINE 20 MG/1
20 TABLET, FILM COATED ORAL 2 TIMES DAILY
Status: DISCONTINUED | OUTPATIENT
Start: 2022-09-06 | End: 2022-09-07 | Stop reason: HOSPADM

## 2022-09-06 RX ADMIN — MEROPENEM AND SODIUM CHLORIDE 1 G: 1 INJECTION, SOLUTION INTRAVENOUS at 12:09

## 2022-09-06 RX ADMIN — INSULIN ASPART 2 UNITS: 100 INJECTION, SOLUTION INTRAVENOUS; SUBCUTANEOUS at 07:09

## 2022-09-06 RX ADMIN — DULOXETINE 20 MG: 20 CAPSULE, DELAYED RELEASE ORAL at 09:09

## 2022-09-06 RX ADMIN — HYDROCODONE BITARTRATE AND ACETAMINOPHEN 1 TABLET: 5; 325 TABLET ORAL at 09:09

## 2022-09-06 RX ADMIN — OXYCODONE HYDROCHLORIDE 5 MG: 5 TABLET ORAL at 03:09

## 2022-09-06 RX ADMIN — INSULIN ASPART 2 UNITS: 100 INJECTION, SOLUTION INTRAVENOUS; SUBCUTANEOUS at 09:09

## 2022-09-06 RX ADMIN — LISINOPRIL 20 MG: 20 TABLET ORAL at 09:09

## 2022-09-06 RX ADMIN — PANTOPRAZOLE SODIUM 40 MG: 40 INJECTION, POWDER, FOR SOLUTION INTRAVENOUS at 09:09

## 2022-09-06 RX ADMIN — INSULIN ASPART 2 UNITS: 100 INJECTION, SOLUTION INTRAVENOUS; SUBCUTANEOUS at 04:09

## 2022-09-06 RX ADMIN — CARVEDILOL 25 MG: 25 TABLET, FILM COATED ORAL at 09:09

## 2022-09-06 RX ADMIN — OXYCODONE HYDROCHLORIDE 5 MG: 5 TABLET ORAL at 12:09

## 2022-09-06 RX ADMIN — HYDROCHLOROTHIAZIDE 25 MG: 25 TABLET ORAL at 09:09

## 2022-09-06 RX ADMIN — OXYCODONE HYDROCHLORIDE 5 MG: 5 TABLET ORAL at 06:09

## 2022-09-06 RX ADMIN — ATORVASTATIN CALCIUM 40 MG: 40 TABLET, FILM COATED ORAL at 09:09

## 2022-09-06 RX ADMIN — OXYCODONE HYDROCHLORIDE 5 MG: 5 TABLET ORAL at 07:09

## 2022-09-06 RX ADMIN — FAMOTIDINE 20 MG: 20 TABLET ORAL at 09:09

## 2022-09-06 RX ADMIN — MEROPENEM AND SODIUM CHLORIDE 1 G: 1 INJECTION, SOLUTION INTRAVENOUS at 04:09

## 2022-09-06 RX ADMIN — MEROPENEM AND SODIUM CHLORIDE 1 G: 1 INJECTION, SOLUTION INTRAVENOUS at 09:09

## 2022-09-06 RX ADMIN — ASPIRIN 81 MG CHEWABLE TABLET 81 MG: 81 TABLET CHEWABLE at 09:09

## 2022-09-06 RX ADMIN — VANCOMYCIN HYDROCHLORIDE 1250 MG: 1.25 INJECTION, POWDER, LYOPHILIZED, FOR SOLUTION INTRAVENOUS at 12:09

## 2022-09-06 RX ADMIN — INSULIN ASPART 3 UNITS: 100 INJECTION, SOLUTION INTRAVENOUS; SUBCUTANEOUS at 12:09

## 2022-09-06 NOTE — PHYSICIAN QUERY
PT Name: Ernst May  MR #: [id number]     DOCUMENTATION CLARIFICATION      CDS: Dany COELLO,RN        Contact information:[email or web address]   This form is a permanent document in the medical record.    Query Date: September 6, 2022    By submitting this query, we are merely seeking further clarification of documentation to reflect the severity of illness of your patient. Please utilize your independent clinical judgment when addressing the question(s) below.     The Medical Record contains the following:   Indicators   Supporting Clinical Findings Location in Medical Record   x Documentation of Sepsis, Septic Shock Sepsis  This patient does have evidence of infective focus  My overall impression is sepsis. Vital signs were reviewed and noted in progress note.  Antibiotics given-       8/26 Hospital Medicine H&P      x Blood Culture Blood Culture, Routine: No growth after 5 days.  8/25 Blood Culture Lab    Respiratory Culture     x Urine Culture Urine Culture, Routine: No growth 9/2 Urine Culture Lab   x Other Culture Right foot wound  Anaerobic Culture: Bacteroides Ovatus Many    Specimen: Foot, Right ; Wound  Aerobic Bacterial Culture: Escherichia Coli Moderate  Proteus Penneri Few. No other significant isolate       8/26 Culture Labs         8/26 Culture Labs   x Acute/Chronic Illness Osteomyelitis of right foot, HLD, hypokalemia, cellulitis of right foot, normocytic anemia, diabetes mellitus with hyperglycemia, open wound of right foot, essential hypertension, not well controlled, hyponatremia      8/29 [facility] Medicine [facility]      x Medication/Treatment Cefepime in dextrose 5% IVPB 2 g  Intravenous ED 1 Time 1 dose(s) given  Cefepime in dextrose 5% IVPB Intravenous Every 12 hours  Metronidazole IVPB 500 mg Intravenous Every 8 hours  Sodium Chloride 0.9% bolus 1,000 ml Intravenous ED 1Time 1 dose(s) given  Vancomycin 1.25 g in dextrose 5% 250 ml IVPB Intravenous Every 18 hours  Vancomycin 1.25 g in  dextrose 5% 250 ml IVPB Intravenous Once 1 dose(s) given  Vancomycin 1.5 g in dextrose 5% 250 ml  IVPB Intravenous Every 18 hours  Vancomycin 1.5 g in dextrose 5% 250 ml IVPB Intravenous Once 1 dose(s) given  Vancomycin 1.5 g in dextrose 5% 250 ml  IVPB Intravenous ED 1 Time  1 dose(s) given  8/25---->8/26 MAR     8/26---->9/1 MAR     8/31---->9/1 MAR     8/26 MAR       8/29---->8/30 MAR     8/27 MAR     8/28--->8/29 MAR     8/26 MAR     8/25---->8/26 MAR    Other        Provider, please further specify the sepsis diagnosis:   [ x  ] Due to E. Coli   [   ] Due to Bacteroides Ovatus   [   ] Due to Proteus Penneri   [   ] Other specification (please specify): ____________________   [   ] Clinically Undetermined       Please document in your progress notes daily for the duration of treatment until resolved, and include in your discharge summary.  Form No. [id number]

## 2022-09-06 NOTE — PT/OT/SLP EVAL
Physical Therapy Evaluation    Patient Name:  Ernst May   MRN:  [id number]    Recommendations:     Discharge Recommendations:  nursing facility, skilled   Discharge Equipment Recommendations:  (to be determined at next level of care)   Barriers to discharge: Inaccessible home, Decreased caregiver support, and pt lives alone in an RV    Assessment:     Ernst May is a 62 y.o. male admitted with a medical diagnosis of Osteomyelitis.  He presents with the following impairments/functional limitations:  weakness, impaired endurance, impaired self care skills, impaired functional mobility, gait instability, impaired balance, decreased lower extremity function, pain, edema, impaired cardiopulmonary response to activity, orthopedic precautions.    Pt found sitting up in chair and agreeable to working with PT. Pt A & O x  4 and has the following co-morbidities: DM, PAD.  Pt tolerated session fairly and required maximal to total A of 2 persons for safe mobilization during session today. PT had to block L knee to provide leverage in order to get pt standing from chair and prevent pt from sliding to the floor. Pt would benefit from acute PT during hospitalization to increase strength, endurance and safety with mobility and would benefit from SNF prior to discharge. Pt currently lives alone in an RV with 3 ladder-style steps to enter; therefore will need assistance with finding a different living situation from family, if possible.      Rehab Prognosis: Fair; patient would benefit from acute skilled PT services to address these deficits and reach maximum level of function.    Recent Surgery: Procedure(s) (LRB):  AMPUTATION, BELOW KNEE (Right) 2 Days Post-Op    Plan:     During this hospitalization, patient to be seen 6 x/week to address the identified rehab impairments via therapeutic activities, therapeutic exercises, wheelchair management/training and progress toward the following goals:    Plan of Care Expires:   10/04/22    Subjective     Chief Complaint: L LE is weak  Patient/Family Comments/goals: SNF  Pain/Comfort:  Pain Rating 1: 0/10 (at rest)  Location - Side 1: Right  Location 1: leg  Pain Addressed 1: Reposition, Distraction, Cessation of Activity  Pain Rating Post-Intervention 1:  (not rated during standing/transfer chair to bed.)    Patients cultural, spiritual, Restorationism conflicts given the current situation:      Living Environment:  Pt reported he lives in an  with 3 steps to enter(ladder-style).   Prior to admission, patients level of function was independent.  Equipment used at home: none.  DME owned (not currently used): none.  Upon discharge, patient will have assistance from unknown.    Objective:     Communicated with SCHUYLER Martinez prior to and after session.  Patient found up in chair with chair check, oxygen, peripheral IV, telemetry, knee immobilizer  upon PT entry to room.    General Precautions: Standard, fall, diabetic   Orthopedic Precautions:RLE non weight bearing   Braces: Knee immobilizer  Respiratory Status: Nasal cannula, flow 2 L/min    Exams:  Cognitive Exam:  Patient is oriented to Person, Place, Time, and Situation  RLE ROM: WFL at hip. Pt in knee immobilizer which PT did not remove.  RLE Strength: NT  LLE ROM: WFL  LLE Strength: hip flex 3/5, knee ext/flex grossly 4-5    Functional Mobility:  Transfers:     Sit to Stand:  maximal assistance, total assistance, of 2 persons, and with vc for technique with rolling walker and PT had to block pt's L knee on 2nd attempt to provide leverage as pt was sliding forward toward floor on 1st attempt.    Therapeutic Activities and Exercises:   Pt performed L LE there ex sitting x 10 reps with vc for proper execution and joint protection: ap, laq, marching, hip abd/add, gs/qs.  Pt also performed hip flex and hip abd/add with R LE x 10 reps.     AM-PAC 6 CLICK MOBILITY  Total Score:11     Patient left up in chair with all lines intact, call button in  reach, chair alarm on, and RN notified. Later after tx session pt's RN called for assist from PT to get pt back into bed. PT & tech assisted pt back into bed with total A x 2 persons for stand/squat pivot transfer to EOB and mod A sit > supine with max vc for technique.    GOALS:   Multidisciplinary Problems       Physical Therapy Goals          Problem: Physical Therapy    Goal Priority Disciplines Outcome Goal Variances Interventions   Physical Therapy Goal     PT, PT/OT      Description: Goals to be met by: 10/4/22     Patient will increase functional independence with mobility by performin. Supine to/from sit with Minimal Assistance  2. Sit to stand transfer with Moderate Assistance  3. Bed to chair transfer with Moderate Assistance using Slideboard  4. Wheelchair propulsion x 100 feet with Stand-by Assistance using bilateral uppper extremities                         History:     Past Medical History:   Diagnosis Date    Back abscess 2021    CKD (chronic kidney disease) 2021    Diabetes mellitus with hyperglycemia 2021    Diabetic foot ulcer 2021    bilateral, severe abrasions    Hypertension     Osteomyelitis of cervical spine 2021    under back abscess    Sepsis 10/9/2021       Past Surgical History:   Procedure Laterality Date    BELOW KNEE AMPUTATION OF LOWER EXTREMITY Right 2022    Procedure: AMPUTATION, BELOW KNEE;  Surgeon: Simone Palacios MD;  Location: [facility] OR;  Service: Orthopedics;  Laterality: Right;    CYST REMOVAL  2012    FOOT AMPUTATION Right 2022    Procedure: AMPUTATION, FOOT;  Surgeon: Dave Mathur DPM;  Location: [facility] OR;  Service: Podiatry;  Laterality: Right;  4th and 5th metatarsal     INCISION AND DRAINAGE OF ABSCESS Left 2021    Procedure: INCISION AND DRAINAGE, ABSCESS;  Surgeon: Surjit Chawla MD;  Location: [facility] OR;  Service: General;  Laterality: Left;    INCISION AND DRAINAGE OF ABSCESS Left 2021    Procedure: INCISION AND  DRAINAGE, ABSCESS; DEBRIDEMENT;  Surgeon: Surjit Chawla MD;  Location: [facility] OR;  Service: General;  Laterality: Left;    REPLACEMENT OF WOUND VACUUM-ASSISTED CLOSURE DEVICE Left 9/14/2021    Procedure: REPLACEMENT, WOUND VAC;  Surgeon: Surjit Chawla MD;  Location: [facility] OR;  Service: General;  Laterality: Left;  EXCHANGE.       Time Tracking:     PT Received On: 09/06/22  PT Start Time: 1048     PT Stop Time: 1111  PT Total Time (min): 23 min     Billable Minutes: Evaluation 10 and Therapeutic Exercise 13      09/06/2022

## 2022-09-06 NOTE — PROGRESS NOTES
"[facility]  Adult Nutrition   Progress Note (Initial Assessment)     SUMMARY     Recommendations  Recommendation/Intervention:   1) Continue current diet order.   2) Continue ONS Glucerna w/ bkfst and ONS Unjurt w/ lunch + dinner. Continue Morgan BID to help skin integrity. 3) RD to continue to monitor pt's PO intake, labs, and plan of care.   4) Menu  to continue to obtain meal preferences.    Goals: Pt to consume/tolerate > 75% PO intake to meet estimated energy needs.  Nutrition Goal Status: new    Dietitian Rounds Brief  LOS. Pt is a 61 y/o M admitted for osteomyelitis. Pt presented to ED w/ wound to the R foot. Right toe 5th was gangrenous upon evaluation. S/p amputation of right 5th toe on 8/26. Further debridement on 8/30. Pt then transferred to [facility] from [facility] on 9/4. S/p BKA on 9/4. Pt now on regular consistency/texture diet w/ cardiac and diabetic restrictions in place. Pt receiving ONS Glucerna w/ bkfst, ONS Unjury w/ lunch + dinner, and Morgan BID for skin integrity. Pt w/ low PO intake, consuming ~25% of meals. RD to continue to monitor.   LBM: 9/6/22      Diet order:   Current Diet Order: Diabetic, cardiac     Evaluation of Received Nutrient/Fluid Intake  Energy Calories Required: not meeting needs  Protein Required: not meeting needs  Fluid Required: not meeting needs  Tolerance: tolerating     % Intake of Estimated Energy Needs: 25 - 50 %  % Meal Intake: 25 - 50 %      Intake/Output Summary (Last 24 hours) at 9/6/2022 1408  Last data filed at 9/6/2022 1200  Gross per 24 hour   Intake 360 ml   Output 3050 ml   Net -2690 ml        Anthropometrics  Temp: 97.8 °F (36.6 °C)  Height Method: Stated  Height: 5' 8" (172.7 cm)  Height (inches): 68 in  Weight Method: Bed Scale  Weight: 77.2 kg (170 lb 3.1 oz)  Weight (lb): 170.2 lb  Ideal Body Weight (IBW), Male: 154 lb  % Ideal Body Weight, Male (lb): 116.88 %  BMI (Calculated): 25.9  BMI Grade: 25 - 29.9 - overweight   "     Estimated/Assessed Needs  Weight Used For Calorie Calculations: 77.2 kg (170 lb 3.1 oz)  Energy Calorie Requirements (kcal): 2527-4179 (25-30)  Energy Need Method: Kcal/kg  Protein Requirements: 62-77 (0.8-1.0 gm/kg)  Weight Used For Protein Calculations: 77.2 kg (170 lb 3.1 oz)  Fluid Requirements (mL): 2510-6828 (30-40 ml/kg)     RDA Method (mL): 1930       Reason for Assessment  Reason For Assessment: length of stay  Diagnosis: other (see comments) (Osteomyelitis)  Relevant Medical History: HTN, DM 2, peripheral artery disease, hypotenison , wound of R foot, HLD    Nutrition/Diet History  Spiritual, Cultural Beliefs, Religion Practices, Values that Affect Care: no  Food Allergies: NKFA    Nutrition Risk Screen  Nutrition Risk Screen: no indicators present     MST Score: 0  Have you recently lost weight without trying?: No  Weight loss score: 0  Have you been eating poorly because of a decreased appetite?: No  Appetite score: 0       Weight History:  Wt Readings from Last 5 Encounters:   09/06/22 77.2 kg (170 lb 3.1 oz)   09/02/22 81.6 kg (180 lb)   08/26/22 73 kg (161 lb)   09/01/22 73 kg (160 lb 15 oz)   11/24/21 73.5 kg (162 lb)        Medications:Pertinent Medications Reviewed  Scheduled Meds:   aspirin  81 mg Oral Daily    atorvastatin  40 mg Oral QHS    carvediloL  25 mg Oral BID    DULoxetine  20 mg Oral BID    famotidine  20 mg Oral BID    hydroCHLOROthiazide  25 mg Oral Daily    lisinopriL  20 mg Oral Daily    meropenem (MERREM) IVPB  1 g Intravenous Q8H    [START ON 9/7/2022] vancomycin (VANCOCIN) IVPB  1,250 mg Intravenous Q22H     Labs: Pertinent Labs Reviewed  Clinical Chemistry:  Recent Labs   Lab 08/31/22  0354 09/01/22  0509 09/02/22  0441 09/02/22  1225 09/02/22  1340 09/03/22  0510 09/06/22  0436   * 130* 129*  --  131*   < > 128*   K 3.9 4.1 3.6  --  3.9   < > 4.3    100 96  --  99   < > 94*   CO2 24 21* 25  --  25   < > 29   GLU 83 213* 135*  --  146*   < > 201*   BUN 14 14 16   --  14   < > 33*   CREATININE 0.9 1.0 1.1  --  1.0   < > 1.1   CALCIUM 8.2* 8.2* 8.1*  --  7.8*   < > 8.2*   PROT 6.1 6.3  --   --  5.8*  --   --    ALBUMIN 1.5* 1.4*  --   --  1.6*  --   --    BILITOT 0.3 0.3  --   --  0.6  --   --    ALKPHOS 130 136*  --   --  106  --   --    AST 17 13  --   --  20  --   --    ALT 16 15  --   --  14  --   --    ANIONGAP 7* 9 8  --  7*   < > 5*   MG 1.8 1.8 1.7 1.7  --   --   --    PHOS 3.0 2.9 3.3  --   --   --   --     < > = values in this interval not displayed.     CBC:   Recent Labs   Lab 09/06/22  0436   WBC 11.90   RBC 3.25*   HGB 8.8*   HCT 27.5*   *   MCV 85   MCH 27.1   MCHC 32.0     Cardiac Profile:  Recent Labs   Lab 09/02/22  1225   *   TROPONINI 0.052*     Inflammatory Labs:  Recent Labs   Lab 09/01/22  0509   .2*     Diabetes:  Recent Labs   Lab 09/01/22  1142 09/01/22  1605 09/01/22 2024   POCTGLUCOSE 325* 232* 159*     Monitor and Evaluation  Food and Nutrient Intake: energy intake, food and beverage intake  Food and Nutrient Adminstration: diet order  Knowledge/Beliefs/Attitudes: food and nutrition knowledge/skill  Physical Activity and Function: nutrition-related ADLs and IADLs  Anthropometric Measurements: weight, weight change, body mass index  Biochemical Data, Medical Tests and Procedures: electrolyte and renal panel, gastrointestinal profile, glucose/endocrine profile  Nutrition-Focused Physical Findings: overall appearance     Nutrition Risk  Level of Risk/Frequency of Follow-up: moderate     Nutrition Follow-Up  RD Follow-up?: Yes      Belen Mcgarry, BATSHEVA 09/06/2022 2:08 PM

## 2022-09-06 NOTE — PROGRESS NOTES
Pharmacokinetic Assessment Follow Up: IV Vancomycin    Vancomycin serum concentration assessment(s):    The trough level was drawn correctly and can be used to guide therapy at this time. The measurement is above the desired definitive target range of 15 to 20 mcg/mL.    Vancomycin Regimen Plan:    Change regimen to Vancomycin 1250 mg IV every 22 hours with next serum trough concentration measured at 0500 prior to 0600 dose on 09/09/2022    Drug levels (last 3 results):  Recent Labs   Lab Result Units 09/04/22  0400 09/06/22  0806   Vancomycin-Trough ug/mL 16.9 20.3*       Pharmacy will continue to follow and monitor vancomycin.    Please contact pharmacy at extension [phone] for questions regarding this assessment.    Thank you for the consult,   Fito Richter       Patient brief summary:  Ernst May is a 62 y.o. male initiated on antimicrobial therapy with IV Vancomycin for treatment of bacteremia    The patient's current regimen is Vancomycin 1250 mg every 22 hours    Drug Allergies:   Review of patient's allergies indicates:   Allergen Reactions    Neosporin [benzalkonium chloride]        Actual Body Weight:   77.2 kg    Renal Function:   Estimated Creatinine Clearance: 67.4 mL/min (based on SCr of 1.1 mg/dL).,     Dialysis Method (if applicable):  N/A    CBC (last 72 hours):  Recent Labs   Lab Result Units 09/03/22  1127 09/03/22  1755 09/04/22  0400 09/04/22  1710 09/05/22  0717 09/06/22  0436   WBC K/uL  --   --   --  12.99*  --  11.90   Hemoglobin g/dL 7.9* 8.9* 8.0*  8.0* 7.5* 8.4* 8.8*   Hematocrit % 24.2* 27.4* 24.7*  24.7* 23.5* 26.5* 27.5*   Platelets K/uL  --   --   --  437  --  453*   Gran % %  --   --   --  84.1*  --  74.6*   Lymph % %  --   --   --  8.8*  --  14.1*   Mono % %  --   --   --  5.7  --  8.1   Eosinophil % %  --   --   --  0.3  --  1.9   Basophil % %  --   --   --  0.2  --  0.4   Differential Method   --   --   --  Automated  --  Automated       Metabolic Panel (last 72  hours):  Recent Labs   Lab Result Units 09/04/22  0400 09/05/22  0717 09/06/22  0436   Sodium mmol/L 129* 130* 128*   Potassium mmol/L 3.5 4.2 4.3   Chloride mmol/L 101 96 94*   CO2 mmol/L 22* 30* 29   Glucose mg/dL 201* 242* 201*   BUN mg/dL 23 28* 33*   Creatinine mg/dL 1.1 1.2 1.1       Vancomycin Administrations:  vancomycin given in the last 96 hours                     vancomycin 1.25 g in dextrose 5% 250 mL IVPB (ready to mix) (mg) 1,250 mg New Bag 09/05/22 1517     1,250 mg New Bag 09/04/22 2031     1,250 mg New Bag  0400     1,250 mg New Bag 09/03/22 0926     1,250 mg New Bag 09/02/22 1512                    Microbiologic Results:  Microbiology Results (last 7 days)       Procedure Component Value Units Date/Time    Urine Culture High Risk [[id number]] Collected: 09/02/22 1309    Order Status: Completed Specimen: Urine, Catheterized Updated: 09/05/22 0654     Urine Culture, Routine No growth    Narrative:      Indicated criteria for high risk culture:->Other  Other (specify):->sepsis and hypotension    Blood culture [[id number]]     Order Status: No result Specimen: Blood     Blood culture [[id number]]     Order Status: No result Specimen: Blood

## 2022-09-06 NOTE — PLAN OF CARE
Problem: Adult Inpatient Plan of Care  Goal: Plan of Care Review  Outcome: Ongoing, Progressing  Goal: Patient-Specific Goal (Individualized)  Outcome: Ongoing, Progressing  Goal: Absence of Hospital-Acquired Illness or Injury  Outcome: Ongoing, Progressing  Goal: Optimal Comfort and Wellbeing  Outcome: Ongoing, Progressing  Goal: Readiness for Transition of Care  Outcome: Ongoing, Progressing     Problem: Diabetes Comorbidity  Goal: Blood Glucose Level Within Targeted Range  Outcome: Ongoing, Progressing     Problem: Infection  Goal: Absence of Infection Signs and Symptoms  Outcome: Ongoing, Progressing     Problem: Fall Injury Risk  Goal: Absence of Fall and Fall-Related Injury  Outcome: Ongoing, Progressing     Problem: Skin Injury Risk Increased  Goal: Skin Health and Integrity  Outcome: Ongoing, Progressing     Problem: Impaired Wound Healing  Goal: Optimal Wound Healing  Outcome: Ongoing, Progressing

## 2022-09-06 NOTE — CARE UPDATE
Mehnaz with [facility] stated via secure chat that they will be ready for patient on tomorrow, unable to admit today due to staff restraints. MD aware.

## 2022-09-06 NOTE — PT/OT/SLP RE-EVAL
Occupational Therapy   Re-evaluation    Name: Ernst May  MRN: [id number]  Admitting Diagnosis:  Osteomyelitis  Recent Surgery: Procedure(s) (LRB):  AMPUTATION, BELOW KNEE (Right) 2 Days Post-Op    Recommendations:     Discharge Recommendations:    Discharge Equipment Recommendations:     Barriers to discharge:  Decreased caregiver support    Assessment:     Ernst May is a 62 y.o. male with a medical diagnosis of Osteomyelitis.  He presents with general weakness s/p R BKA.  Performance deficits affecting function are weakness, impaired endurance, impaired self care skills, impaired functional mobility, gait instability, impaired balance, impaired cognition, decreased upper extremity function, decreased lower extremity function, decreased safety awareness, orthopedic precautions.      Rehab Prognosis:  fair; patient would benefit from acute skilled OT services to address these deficits and reach maximum level of function.       Plan:     Patient to be seen 5 x/week to address the above listed problems via self-care/home management, therapeutic activities, therapeutic exercises  Plan of Care Expires: 10/06/22  Plan of Care Reviewed with: patient    Subjective     Chief Complaint: R BKA soreness  Patient/Family stated goals: improved mobility and ADL independence  Communicated with: nurse prior to session.  Pain/Comfort:  Pain Rating 1: 3/10  Location 1:  (R BKA)  Pain Addressed 1: Reposition, Distraction  Pain Rating Post-Intervention 1: 3/10    Objective:     Communicated with: nurse prior to session.  Patient found HOB elevated with: telemetry, peripheral IV upon OT entry to room.    General Precautions: Standard, fall   Orthopedic Precautions:RLE non weight bearing   Braces: N/A  Respiratory Status: Room air    Occupational Performance:    Bed Mobility:    Patient completed Scooting/Bridging with minimum assistance  Patient completed Supine to Sit with minimum assistance  Patient completed Sit to Supine with  minimum assistance  Performed unsupported sitting EOB with contact guard assistance.    Functional Mobility/Transfers:  Patient completed Bed <> Chair Transfer using Stand Pivot technique with moderate assistance with hand-held assist    Cognitive/Visual Perceptual:  Cognitive/Psychosocial Skills:     -       Oriented to: Person and Situation   -       Follows Commands/attention:Follows one-step commands  -       Communication: clear/fluent  -       Memory: Impaired STM  -       Safety awareness/insight to disability: impaired   -       Mood/Affect/Coping skills/emotional control: Cooperative and Flat affect  Visual/Perceptual:      -Intact Acuity    Physical Exam:  Balance:    -       Sitting: Contact Guard, Standing: Moderate Assist  Upper Extremity Range of Motion:     -       Right Upper Extremity: WFL  -       Left Upper Extremity: WFL  Upper Extremity Strength:    -       Right Upper Extremity: 3+/5  -       Left Upper Extremity: 3+/5   Strength:    -       Right Upper Extremity: fair  -       Left Upper Extremity: fair  Fine Motor Coordination:    -       Intact    AMPAC 6 Click:  AMPAC Total Score: 15    Treatment & Education:  Patient educated on the purpose of Occupational Therapy and the importance of getting OOB.    Patient left up in chair with all lines intact, call button in reach, and chair alarm on    GOALS:   Multidisciplinary Problems       Occupational Therapy Goals          Problem: Occupational Therapy    Goal Priority Disciplines Outcome Interventions   Occupational Therapy Goal     OT, PT/OT     Description: Goals to be met by: 10/2/2022     Patient will increase functional independence with ADLs by performing:    UE Dressing with [place].  LE Dressing with [place].  Grooming while standing at sink with Supervision.  Toileting from toilet with Supervision for hygiene and clothing management.   Toilet transfer to toilet with Supervision.                         History:     Past  Medical History:   Diagnosis Date    Back abscess 09/2021    CKD (chronic kidney disease) 09/2021    Diabetes mellitus with hyperglycemia 09/2021    Diabetic foot ulcer 09/2021    bilateral, severe abrasions    Hypertension     Osteomyelitis of cervical spine 09/2021    under back abscess    Sepsis 10/9/2021         Past Surgical History:   Procedure Laterality Date    BELOW KNEE AMPUTATION OF LOWER EXTREMITY Right 9/4/2022    Procedure: AMPUTATION, BELOW KNEE;  Surgeon: Simone Palacios MD;  Location: [facility] OR;  Service: Orthopedics;  Laterality: Right;    CYST REMOVAL  2012    FOOT AMPUTATION Right 8/26/2022    Procedure: AMPUTATION, FOOT;  Surgeon: Dave Mathur DPM;  Location: [facility] OR;  Service: Podiatry;  Laterality: Right;  4th and 5th metatarsal     INCISION AND DRAINAGE OF ABSCESS Left 9/4/2021    Procedure: INCISION AND DRAINAGE, ABSCESS;  Surgeon: Surjit Chawla MD;  Location: [facility];  Service: General;  Laterality: Left;    INCISION AND DRAINAGE OF ABSCESS Left 9/14/2021    Procedure: INCISION AND DRAINAGE, ABSCESS; DEBRIDEMENT;  Surgeon: Surjit Chawla MD;  Location: [facility] OR;  Service: General;  Laterality: Left;    REPLACEMENT OF WOUND VACUUM-ASSISTED CLOSURE DEVICE Left 9/14/2021    Procedure: REPLACEMENT, WOUND VAC;  Surgeon: Surjit Chawla MD;  Location: [facility];  Service: General;  Laterality: Left;  EXCHANGE.       Time Tracking:     OT Date of Treatment: 09/06/22  OT Start Time: 0910  OT Stop Time: 0940  OT Total Time (min): 30 min    Billable Minutes:Re-eval 15  Therapeutic Activity 15    9/6/2022

## 2022-09-06 NOTE — CARE UPDATE
CM received secure chat from [employer] today stating that [facility] accepted this patient when the patient was at [facility].  Mehnaz stated that they have the authorization from medicaid but will need to get an update that will take the same day to get.

## 2022-09-06 NOTE — PLAN OF CARE
Goals to be met by: 10/4/22     Patient will increase functional independence with mobility by performin. Supine to/from sit with Minimal Assistance  2. Sit to stand transfer with Moderate Assistance  3. Bed to chair transfer with Moderate Assistance using Slideboard  4. Wheelchair propulsion x 100 feet with Stand-by Assistance using bilateral uppper extremities

## 2022-09-07 VITALS
SYSTOLIC BLOOD PRESSURE: 141 MMHG | BODY MASS INDEX: 25.79 KG/M2 | OXYGEN SATURATION: 98 % | HEART RATE: 65 BPM | TEMPERATURE: 97 F | RESPIRATION RATE: 17 BRPM | HEIGHT: 68 IN | WEIGHT: 170.19 LBS | DIASTOLIC BLOOD PRESSURE: 84 MMHG

## 2022-09-07 PROBLEM — D64.9 ACUTE ON CHRONIC ANEMIA: Status: RESOLVED | Noted: 2022-09-06 | Resolved: 2022-09-07

## 2022-09-07 PROBLEM — D64.9 NORMOCYTIC ANEMIA: Status: RESOLVED | Noted: 2022-08-26 | Resolved: 2022-09-07

## 2022-09-07 PROBLEM — M86.9 OSTEOMYELITIS: Status: RESOLVED | Noted: 2022-08-26 | Resolved: 2022-09-07

## 2022-09-07 LAB
GLUCOSE SERPL-MCNC: 232 MG/DL (ref 70–110)
GLUCOSE SERPL-MCNC: 271 MG/DL (ref 70–110)
GLUCOSE SERPL-MCNC: 281 MG/DL (ref 70–110)

## 2022-09-07 PROCEDURE — 97530 THERAPEUTIC ACTIVITIES: CPT

## 2022-09-07 PROCEDURE — 63600175 PHARM REV CODE 636 W HCPCS: Performed by: INTERNAL MEDICINE

## 2022-09-07 PROCEDURE — 25000003 PHARM REV CODE 250: Performed by: INTERNAL MEDICINE

## 2022-09-07 RX ORDER — MEROPENEM AND SODIUM CHLORIDE 1 G/50ML
1 INJECTION, SOLUTION INTRAVENOUS EVERY 8 HOURS
Qty: 300 ML | Refills: 0 | Status: ON HOLD
Start: 2022-09-07 | End: 2022-10-06 | Stop reason: HOSPADM

## 2022-09-07 RX ORDER — SYRING-NEEDL,DISP,INSUL,0.3 ML 29 G X1/2"
148 SYRINGE, EMPTY DISPOSABLE MISCELLANEOUS ONCE
Status: DISCONTINUED | OUTPATIENT
Start: 2022-09-07 | End: 2022-09-07

## 2022-09-07 RX ORDER — LACTULOSE 10 G/15ML
30 SOLUTION ORAL 3 TIMES DAILY
Status: DISCONTINUED | OUTPATIENT
Start: 2022-09-07 | End: 2022-09-07 | Stop reason: HOSPADM

## 2022-09-07 RX ORDER — ATORVASTATIN CALCIUM 40 MG/1
40 TABLET, FILM COATED ORAL NIGHTLY
Qty: 30 TABLET | Refills: 0 | Status: ON HOLD
Start: 2022-09-07 | End: 2022-10-06 | Stop reason: SDUPTHER

## 2022-09-07 RX ORDER — LISINOPRIL 20 MG/1
20 TABLET ORAL DAILY
Qty: 30 TABLET | Refills: 0 | Status: ON HOLD
Start: 2022-09-08 | End: 2022-10-06 | Stop reason: HOSPADM

## 2022-09-07 RX ORDER — NAPROXEN SODIUM 220 MG/1
81 TABLET, FILM COATED ORAL DAILY
Refills: 0 | Status: ON HOLD
Start: 2022-09-08 | End: 2022-10-06 | Stop reason: SDUPTHER

## 2022-09-07 RX ADMIN — HYDROCHLOROTHIAZIDE 25 MG: 25 TABLET ORAL at 08:09

## 2022-09-07 RX ADMIN — CARVEDILOL 25 MG: 25 TABLET, FILM COATED ORAL at 08:09

## 2022-09-07 RX ADMIN — HYDROCODONE BITARTRATE AND ACETAMINOPHEN 1 TABLET: 5; 325 TABLET ORAL at 03:09

## 2022-09-07 RX ADMIN — FAMOTIDINE 20 MG: 20 TABLET ORAL at 08:09

## 2022-09-07 RX ADMIN — ASPIRIN 81 MG CHEWABLE TABLET 81 MG: 81 TABLET CHEWABLE at 08:09

## 2022-09-07 RX ADMIN — INSULIN ASPART 3 UNITS: 100 INJECTION, SOLUTION INTRAVENOUS; SUBCUTANEOUS at 04:09

## 2022-09-07 RX ADMIN — VANCOMYCIN HYDROCHLORIDE 1250 MG: 1.25 INJECTION, POWDER, LYOPHILIZED, FOR SOLUTION INTRAVENOUS at 11:09

## 2022-09-07 RX ADMIN — INSULIN ASPART 3 UNITS: 100 INJECTION, SOLUTION INTRAVENOUS; SUBCUTANEOUS at 11:09

## 2022-09-07 RX ADMIN — MEROPENEM AND SODIUM CHLORIDE 1 G: 1 INJECTION, SOLUTION INTRAVENOUS at 12:09

## 2022-09-07 RX ADMIN — LISINOPRIL 20 MG: 20 TABLET ORAL at 08:09

## 2022-09-07 RX ADMIN — MEROPENEM AND SODIUM CHLORIDE 1 G: 1 INJECTION, SOLUTION INTRAVENOUS at 08:09

## 2022-09-07 RX ADMIN — MEROPENEM AND SODIUM CHLORIDE 1 G: 1 INJECTION, SOLUTION INTRAVENOUS at 04:09

## 2022-09-07 NOTE — PT/OT/SLP PROGRESS
Occupational Therapy   Treatment    Name: Ernst May  MRN: [id number]  Admitting Diagnosis:  Osteomyelitis  3 Days Post-Op    Recommendations:     Discharge Recommendations:  (LTAC vs SNF)  Discharge Equipment Recommendations:  walker, rolling  Barriers to discharge:  Decreased caregiver support    Assessment:     Ernst May is a 62 y.o. male with a medical diagnosis of Osteomyelitis.  He presents with improving medical acuity. Patient participated in bed mobility, unsupported sitting EOB and bed/chair transfers using rolling walker. Performance deficits affecting function are weakness, impaired endurance, impaired self care skills, impaired sensation, impaired functional mobility, gait instability, impaired balance, decreased lower extremity function, decreased safety awareness, impaired cardiopulmonary response to activity.     Rehab Prognosis:  Fair; patient would benefit from acute skilled OT services to address these deficits and reach maximum level of function.       Plan:     Patient to be seen 5 x/week to address the above listed problems via self-care/home management, therapeutic activities, therapeutic exercises  Plan of Care Expires: 10/06/22  Plan of Care Reviewed with: patient    Subjective     Pain/Comfort:  Pain Rating 1: 0/10  Pain Rating Post-Intervention 1: 0/10    Objective:     Communicated with: nurse prior to session.  Patient found HOB elevated with telemetry, peripheral IV, oxygen, PICC line upon OT entry to room.    General Precautions: Standard, fall   Orthopedic Precautions:RLE non weight bearing   Braces: Knee immobilizer  Respiratory Status: Nasal cannula, flow 2 L/min     Occupational Performance:     Bed Mobility:    Patient completed Scooting/Bridging with contact guard assistance  Patient completed Supine to Sit with contact guard assistance   Performed unsupported sitting EOB with stand by assistance.    Functional Mobility/Transfers:  Patient completed Bed <> Chair Transfer  using Stand Pivot technique with moderate assistance with hand-held assist and rolling walker    [facility] 6 Click ADL:      Treatment & Education:  Patient educated on functional transfers and using both hands to take body weight when stepping with left foot.    Patient left up in chair with all lines intact, call button in reach, and chair alarm on    GOALS:   Multidisciplinary Problems       Occupational Therapy Goals          Problem: Occupational Therapy    Goal Priority Disciplines Outcome Interventions   Occupational Therapy Goal     OT, PT/OT     Description: Goals to be met by: 10/2/2022     Patient will increase functional independence with ADLs by performing:    UE Dressing with [place].  LE Dressing with [place].  Grooming while standing at sink with Supervision.  Toileting from toilet with Supervision for hygiene and clothing management.   Toilet transfer to toilet with Supervision.                         Time Tracking:     OT Date of Treatment: 09/07/22  OT Start Time: 0956  OT Stop Time: 1020  OT Total Time (min): 24 min    Billable Minutes:Therapeutic Activity 24    OT/IGLESIA: OT          9/7/2022

## 2022-09-07 NOTE — PT/OT/SLP PROGRESS
"Physical Therapy Treatment    Patient Name:  Ernst May   MRN:  [id number]    Recommendations:     Discharge Recommendations:  nursing facility, skilled   Discharge Equipment Recommendations:  (to be determined at next level of care)   Barriers to discharge: Decreased caregiver support    Assessment:     Ernst May is a 62 y.o. male admitted with a medical diagnosis of Osteomyelitis.  He presents with the following impairments/functional limitations:  weakness, impaired endurance, impaired self care skills, impaired functional mobility, gait instability, impaired balance, decreased lower extremity function, pain, edema, impaired cardiopulmonary response to activity, orthopedic precautions.    Pt found sitting up in chair and agreeable to working with PT. Pt tolerated session better today with increased effort on pt's part during sit <> stand.     Rehab Prognosis: Fair; patient would benefit from acute skilled PT services to address these deficits and reach maximum level of function.    Recent Surgery: Procedure(s) (LRB):  AMPUTATION, BELOW KNEE (Right) 3 Days Post-Op    Plan:     During this hospitalization, patient to be seen 6 x/week to address the identified rehab impairments via therapeutic activities, therapeutic exercises, wheelchair management/training and progress toward the following goals:    Plan of Care Expires:  10/04/22    Subjective     Chief Complaint: Pt reported lightheadedness/dizziness during standing  Patient/Family Comments/goals: SNF  Pain/Comfort:  Pain Rating 1:  ("not too bad")  Location - Side 1: Right  Location 1: leg  Pain Addressed 1: Reposition, Distraction, Cessation of Activity      Objective:     Communicated with [facility] prior to session.  Patient found up in chair with chair check, myers catheter, oxygen, peripheral IV, PICC line, telemetry, pulse ox (continuous), knee immobilizer upon PT entry to room.     General Precautions: Standard, fall   Orthopedic Precautions:RLE " non weight bearing   Braces: Knee immobilizer  Respiratory Status: Nasal cannula, flow 1 L/min     Functional Mobility:  Bed Mobility:     Sit to Supine: stand by assistance  Transfers:     Sit to Stand:  moderate assistance, maximal assistance, and of 2 persons with rolling walker and and max vc for technique, x 2 trials of 15-30 seconds each  Bed to Chair: maximal assistance and of 2 persons with  no AD and pt holding bed rail   using  Squat Pivot      AM-PAC 6 CLICK MOBILITY          Therapeutic Activities and Exercises:   Prior to working on sit <> stand PT had pt work on triceps press up using chair arm rests with L LE blocked for safety/leverage & mod a x 2 required x 5 reps to reinforce proper technique to initiate standing from chair.    Patient left HOB elevated with all lines intact, call button in reach, bed alarm on, and RN notified..    GOALS:   Multidisciplinary Problems       Physical Therapy Goals          Problem: Physical Therapy    Goal Priority Disciplines Outcome Goal Variances Interventions   Physical Therapy Goal     PT, PT/OT      Description: Goals to be met by: 10/4/22     Patient will increase functional independence with mobility by performin. Supine to/from sit with Minimal Assistance  2. Sit to stand transfer with Moderate Assistance  3. Bed to chair transfer with Moderate Assistance using Slideboard  4. Wheelchair propulsion x 100 feet with Stand-by Assistance using bilateral uppper extremities                         Time Tracking:     PT Received On: 22  PT Start Time: 1323     PT Stop Time: 1350  PT Total Time (min): 27 min     Billable Minutes: Therapeutic Activity 27    Treatment Type: Treatment  PT/PTA: PT           2022

## 2022-09-07 NOTE — PLAN OF CARE
Patient cleared to discharge by case management.  Patient discharging to [facility].  Report info sent from [employer] via secure chat, and forwarded to the patient nurse via secure chat.    Bed assignment: Please call report on night shift to charge nursedennis at 6:15pm patient has van transport scheduled for 630pm pickup. patient will admit to room 204. phone number is [phone]       09/07/22 1443   Final Note   Assessment Type Final Discharge Note   Anticipated Discharge Disposition LTAC   Post-Acute Status   Post-Acute Authorization Placement   Patient choice form signed by patient/caregiver List with quality metrics by geographic area provided   Discharge Delays None known at this time

## 2022-09-07 NOTE — CARE UPDATE
spoke with patient son Ernst May Jr via phone at #[phone] at 2:48pm.  Ernst reported that they not have a choice and gave verbal consent via phone at 2:48pm.    Consent witness by NICOLÁS/PADMA Pérez, and Allen Alvarado RN

## 2022-09-07 NOTE — NURSING
Called report to Hermes, the charge nurse at [facility]. Awaiting patient transport via van that's scheduled to arrive at 1830.

## 2022-09-07 NOTE — PHYSICIAN QUERY
"PT Name: Ernst May  MR #: [id number]     DOCUMENTATION CLARIFICATION     CDS/: Rachele Kim               Contact information:  This form is a permanent document in the medical record.     Query Date: September 7, 2022                                                                                                                                                                                     By submitting this query, we are merely seeking further clarification of documentation.    Please utilize your independent clinical judgment when addressing the question(s) below.  The Medical Record contains the following:  Indicators Location in Medical Record   Risk factors;  63yo male transferred from [facility] for vascular surgery consult.    Admitted w/ Osteo- Cultures growing E coli Proteus and Bacteroides, ID following.    ED   Clinical indicators;  "admitted to [facility] on August 26 with fever, weakness, and right foot wound.  He was admitted with osteomyelitis of the right foot with cellulitis, sepsis, gangrene of the right foot, and anemia"    Pt with hypotension responding to fluid resuscitation and levophed to a MAP > 65    There are gangrenous/infectious changes to the majority of the lateral right foot.  Bone is fully exposed and palpable at the base of the right foot wound.  Moderate surrounding edema and erythema.  Malodorous.    Because of recent deterioration with switched to vancomycin and meropenem yesterday     Assess : Right lateral foot with exposed bone and surrounding tissue necrosis    S/P R BKA     to 71  /98 to 79/54  RR 21 to 32  MAP 53 to 111    WBC 15.71 > 18.82 > 12.99 > 11.99  Gran % 79.1 > 86.7 > 84.1 > 74.6  .2     H&P          Hospitalist PN 09/02      Podiatry 09/02          ID 09/03      Hospitalist PN 09/03      Op Rpt 09/04    VS flowsheet          Labs    Interventions;  Admitted to ICU    ID consult    Cefepime 1g IV Q8 hrs (09/02)  Meropenem 1g IV " Q8 hrs (09/02 to current)  Metronidazole 500mg IV Q8 hrs (09/02)  Norepinephrine 0-3mcg/kg/min IV titrate to MAP 65 (09/02 to 09/03)  Vancomycin 1.250g IV Q18 hrs (09/02 to 09/06)  Vancomycin 1.250g IV Q22 hrs (09/07 to current)  Vancomycin 1.250g IV x1 (09/06)     Transfer on 09/02    MD jany ROJAS        Dear doctor please indicate if [facility] continued treatment for Sepsis following transfer from [facility].     [   ] Sepsis due to E.coli, Proteus and Bacteroides      [   ] Sepsis due to (suspected) organism (please specify)     [ X  ] Sepsis with Septic Shock     [   ] Sepsis Ruled Out     [   ] Osteomyelitis only         Please document in your progress notes daily for the duration of treatment until resolved and include in your discharge summary.

## 2022-09-07 NOTE — NURSING
PATIENT REFUSED INSULIN SLIDING SCALE THIS AM DUE TO HE REPORTS THAT HIS SUGARS ARE NOT THAT HIGH.

## 2022-09-07 NOTE — SUBJECTIVE & OBJECTIVE
Interval History:     Review of Systems   Constitutional:  Negative for activity change and appetite change.   HENT:  Negative for congestion and dental problem.    Eyes:  Negative for discharge and itching.   Respiratory:  Negative for shortness of breath.    Cardiovascular:  Negative for chest pain.   Gastrointestinal:  Negative for abdominal distention and abdominal pain.   Endocrine: Negative for cold intolerance.   Genitourinary:  Negative for difficulty urinating and dysuria.   Musculoskeletal:  Negative for arthralgias and back pain.   Skin:  Negative for color change.   Neurological:  Negative for dizziness and facial asymmetry.   Hematological:  Negative for adenopathy.   Psychiatric/Behavioral:  Negative for agitation and behavioral problems.    Objective:     Vital Signs (Most Recent):  Temp: 97.7 °F (36.5 °C) (09/06/22 1937)  Pulse: 66 (09/06/22 1937)  Resp: 17 (09/06/22 1937)  BP: (!) 144/79 (09/06/22 1937)  SpO2: 98 % (09/06/22 1937)   Vital Signs (24h Range):  Temp:  [97.7 °F (36.5 °C)-98.7 °F (37.1 °C)] 97.7 °F (36.5 °C)  Pulse:  [66-73] 66  Resp:  [14-18] 17  SpO2:  [95 %-99 %] 98 %  BP: (131-179)/() 144/79     Weight: 77.2 kg (170 lb 3.1 oz)  Body mass index is 25.88 kg/m².    Intake/Output Summary (Last 24 hours) at 9/6/2022 1942  Last data filed at 9/6/2022 1330  Gross per 24 hour   Intake 1080 ml   Output 2450 ml   Net -1370 ml      Physical Exam  Vitals and nursing note reviewed.   Constitutional:       Appearance: He is well-developed.   HENT:      Head: Atraumatic.      Right Ear: External ear normal.      Left Ear: External ear normal.      Nose: Nose normal.      Mouth/Throat:      Mouth: Mucous membranes are moist.   Eyes:      General: No scleral icterus.  Cardiovascular:      Rate and Rhythm: Normal rate.   Pulmonary:      Effort: Pulmonary effort is normal.   Abdominal:      General: There is no distension.   Musculoskeletal:         General: Normal range of motion.      Cervical  back: Full passive range of motion without pain and normal range of motion.      Comments: R BKA    Skin:     General: Skin is warm.   Neurological:      Mental Status: He is alert and oriented to person, place, and time.       Significant Labs: All pertinent labs within the past 24 hours have been reviewed.  CBC:   Recent Labs   Lab 09/05/22 0717 09/06/22  0436   WBC  --  11.90   HGB 8.4* 8.8*   HCT 26.5* 27.5*   PLT  --  453*     CMP:   Recent Labs   Lab 09/05/22 0717 09/06/22  0436   * 128*   K 4.2 4.3   CL 96 94*   CO2 30* 29   * 201*   BUN 28* 33*   CREATININE 1.2 1.1   CALCIUM 8.0* 8.2*   ANIONGAP 4* 5*       Significant Imaging: I have reviewed all pertinent imaging results/findings within the past 24 hours.

## 2022-09-07 NOTE — ASSESSMENT & PLAN NOTE
Rt foot infection, osteomyelitis of 4th and 5th metatarsal, abscess, status post right BKA   Need iv abx for 2-3 more days

## 2022-09-07 NOTE — PROGRESS NOTES
"[facility]  Progress Note    Patient Name: Ernst May  MRN: [id number]  Patient Class: IP- Inpatient   Admission Date: 9/1/2022  Length of Stay: 5 days  Attending Physician: Praveen Taylor MD  Primary Care Provider: Chemo Hinkle MD        Subjective:     Principal Problem:Osteomyelitis        HPI:  62 years old male past medical history of diabetes hypertension CKD previous history of osteomyelitis of C-spine  "admitted to [facility] on August 26 with fever, weakness, and right foot wound.  He was admitted with osteomyelitis of the right foot with cellulitis, sepsis, gangrene of the right foot, and anemia.  Podiatry was consulted and he underwent amputation of the right 5th toe.  He was treated with antibiotics and wound care.  PICC line was placed.  He had further debridement of his foot on August 30.  He was seen by Infectious Diseases, and antibiotics were adjusted.  Recommendation was for transfer to a facility with Vascular Surgery availability to determine if revascularization would improve wound healing.  Requesting transfer to Hospital Medicine at [facility] for further treatment.     September 1: Sodium 130, potassium 4.1, chloride 100, CO2 21, BUN 14, creatinine 1, glucose 213, AST 13, ALT 15, magnesium 1.8, phosphorus 2.9, .2, white blood cells 15.71, hemoglobin 7.1, hematocrit 21.7, platelets 486     August 31: CTA of the bilateral lower extremities:  1. Right lower extremity: Multiple areas of up to 50% stenosis including right deep femoral artery, popliteal artery. High-grade stenosis at the origin of the right anterior tibial artery (51-99%).  Flow is present in all trifurcation vessels at the origin but cannot be traced beyond the origin, for reasons detailed above.  Correlate with prior ultrasound  2. Left lower extremity: Multiple areas of up to 50% stenosis including mid segment deep femoral artery, distal popliteal artery.  Flow is " "present in all trifurcation vessels at the origin but cannot be traced beyond the origin, for reasons detailed above.  Correlate with prior ultrasound.  3. Open skin wound along the right foot with subcutaneous emphysema in the foot anterior ankle and anterior shin musculature.  Gas could be medially from the open wound or from gas-forming infection.  Evidence of osteomyelitis involving the cuboid.  4. Diffuse subcutaneous soft tissue edema of both lower legs especially on the right.  This could relate to cellulitis noninfectious inflammation or venous/lymphatic occlusion.  5. Cholelith.  6. Left nephrolith.     August 29: Chest x-ray noted PICC in good position.  Hypoventilation noted.     August 26:  Right foot wound anaerobic culture growing Bacteroides ovatus  Aerobic right foot my and Proteus penneri  -EKG with normal sinus rhythm and right bundle-branch block     August 25:  COVID negative  Blood cultures with no growth at 5 days"      Overview/Hospital Course:  9/2/2022  Mr May had no complaints this AM but became hypotensive in his room before noon. His MAP was below 65 fluid resuscitation was initiated with 2 liters via PICC,  EKG NSR no stemi  Labs ordered including blood and urine cultures  A line and central line ordered    9/3/2022  Mr Gambino was sitting up in bed eating lunch with complaint of 6/10 right foot pain. He is scheduled for a right BKA tomorrow as both vascular surgery and podiatry feel that a vascular procedure would not be of benefit    9/4/2022  Mr May is S/P right BKA. He is somnolent and states that he is OK but has nothing further to say    9/5/2022  Mr May is feeling stronger post transfusion. He denies weakness, fever or chills. He would like to sit up and is wondering about placement/rehat?    09/06  Medically stable to go to extended care tomorrow  No new concerns      Interval History:     Review of Systems   Constitutional:  Negative for activity change and appetite " change.   HENT:  Negative for congestion and dental problem.    Eyes:  Negative for discharge and itching.   Respiratory:  Negative for shortness of breath.    Cardiovascular:  Negative for chest pain.   Gastrointestinal:  Negative for abdominal distention and abdominal pain.   Endocrine: Negative for cold intolerance.   Genitourinary:  Negative for difficulty urinating and dysuria.   Musculoskeletal:  Negative for arthralgias and back pain.   Skin:  Negative for color change.   Neurological:  Negative for dizziness and facial asymmetry.   Hematological:  Negative for adenopathy.   Psychiatric/Behavioral:  Negative for agitation and behavioral problems.    Objective:     Vital Signs (Most Recent):  Temp: 97.7 °F (36.5 °C) (09/06/22 1937)  Pulse: 66 (09/06/22 1937)  Resp: 17 (09/06/22 1937)  BP: (!) 144/79 (09/06/22 1937)  SpO2: 98 % (09/06/22 1937)   Vital Signs (24h Range):  Temp:  [97.7 °F (36.5 °C)-98.7 °F (37.1 °C)] 97.7 °F (36.5 °C)  Pulse:  [66-73] 66  Resp:  [14-18] 17  SpO2:  [95 %-99 %] 98 %  BP: (131-179)/() 144/79     Weight: 77.2 kg (170 lb 3.1 oz)  Body mass index is 25.88 kg/m².    Intake/Output Summary (Last 24 hours) at 9/6/2022 1942  Last data filed at 9/6/2022 1330  Gross per 24 hour   Intake 1080 ml   Output 2450 ml   Net -1370 ml      Physical Exam  Vitals and nursing note reviewed.   Constitutional:       Appearance: He is well-developed.   HENT:      Head: Atraumatic.      Right Ear: External ear normal.      Left Ear: External ear normal.      Nose: Nose normal.      Mouth/Throat:      Mouth: Mucous membranes are moist.   Eyes:      General: No scleral icterus.  Cardiovascular:      Rate and Rhythm: Normal rate.   Pulmonary:      Effort: Pulmonary effort is normal.   Abdominal:      General: There is no distension.   Musculoskeletal:         General: Normal range of motion.      Cervical back: Full passive range of motion without pain and normal range of motion.      Comments: FERNANDA MATHEW     Skin:     General: Skin is warm.   Neurological:      Mental Status: He is alert and oriented to person, place, and time.       Significant Labs: All pertinent labs within the past 24 hours have been reviewed.  CBC:   Recent Labs   Lab 09/05/22 0717 09/06/22 0436   WBC  --  11.90   HGB 8.4* 8.8*   HCT 26.5* 27.5*   PLT  --  453*     CMP:   Recent Labs   Lab 09/05/22 0717 09/06/22 0436   * 128*   K 4.2 4.3   CL 96 94*   CO2 30* 29   * 201*   BUN 28* 33*   CREATININE 1.2 1.1   CALCIUM 8.0* 8.2*   ANIONGAP 4* 5*       Significant Imaging: I have reviewed all pertinent imaging results/findings within the past 24 hours.      Assessment/Plan:      * Osteomyelitis  Rt foot infection, osteomyelitis of 4th and 5th metatarsal, abscess, status post right BKA   Need iv abx for 2-3 more days    Unilateral complete BKA  R BKA     Normocytic anemia  Acute on chronic anemia  AOCD  S/p one unit pRBC       Noncompliance  Ongoing issue       Acute on chronic anemia  As above       Peripheral artery disease  Continue with aspirin and atorvastatin    Diabetes mellitus with hyperglycemia  Maintain present regime  Uncontrolled     Essential hypertension, not well controlled  Maintain present regime  Ongoing issue      VTE Risk Mitigation (From admission, onward)         Ordered     IP VTE HIGH RISK PATIENT  Once         09/01/22 2303     Place sequential compression device  Until discontinued         09/01/22 2303                Discharge Planning   MEGHAN: 9/7/2022     Code Status: Full Code   Is the patient medically ready for discharge?:     Reason for patient still in hospital (select all that apply): Pending disposition  Discharge Plan A: Home                  Praveen Taylor MD  Department of Hospital Medicine   [facility]

## 2022-09-08 NOTE — PT/OT/SLP DISCHARGE
Occupational Therapy Discharge Summary    Ernst May  MRN: [id number]   Principal Problem: Osteomyelitis      Patient Discharged from acute Occupational Therapy on 9/7/2022.  Please refer to prior OT note dated 9/7/2022 for functional status.    Assessment:      Patient appropriate for care in another setting.    Objective:     GOALS:   Multidisciplinary Problems       Occupational Therapy Goals       Not on file              Multidisciplinary Problems (Resolved)          Problem: Occupational Therapy    Goal Priority Disciplines Outcome Interventions   Occupational Therapy Goal   (Resolved)     OT, PT/OT Met    Description: Goals to be met by: 10/2/2022     Patient will increase functional independence with ADLs by performing:    UE Dressing with [place].  LE Dressing with [place].  Grooming while standing at sink with Supervision.  Toileting from toilet with Supervision for hygiene and clothing management.   Toilet transfer to toilet with Supervision.                         Reasons for Discontinuation of Therapy Services  Transfer to alternate level of care.      Plan:     Patient Discharged to: Long Term Acute Care    9/8/2022

## 2022-09-08 NOTE — DISCHARGE SUMMARY
"[facility] Medicine  Discharge Summary      Patient Name: Ernst May  MRN: [id number]  Patient Class: IP- Inpatient  Admission Date: 9/1/2022  Hospital Length of Stay: 6 days  Discharge Date and Time:  09/07/2022 8:54 PM  Attending Physician: No att. providers found   Discharging Provider: Praveen Taylor MD  Primary Care Provider: Chemo Hinkle MD      HPI:   62 years old male past medical history of diabetes hypertension CKD previous history of osteomyelitis of C-spine  "admitted to [facility] on August 26 with fever, weakness, and right foot wound.  He was admitted with osteomyelitis of the right foot with cellulitis, sepsis, gangrene of the right foot, and anemia.  Podiatry was consulted and he underwent amputation of the right 5th toe.  He was treated with antibiotics and wound care.  PICC line was placed.  He had further debridement of his foot on August 30.  He was seen by Infectious Diseases, and antibiotics were adjusted.  Recommendation was for transfer to a facility with Vascular Surgery availability to determine if revascularization would improve wound healing.  Requesting transfer to Hospital Medicine at [facility] for further treatment.     September 1: Sodium 130, potassium 4.1, chloride 100, CO2 21, BUN 14, creatinine 1, glucose 213, AST 13, ALT 15, magnesium 1.8, phosphorus 2.9, .2, white blood cells 15.71, hemoglobin 7.1, hematocrit 21.7, platelets 486     August 31: CTA of the bilateral lower extremities:  1. Right lower extremity: Multiple areas of up to 50% stenosis including right deep femoral artery, popliteal artery. High-grade stenosis at the origin of the right anterior tibial artery (51-99%).  Flow is present in all trifurcation vessels at the origin but cannot be traced beyond the origin, for reasons detailed above.  Correlate with prior ultrasound  2. Left lower extremity: Multiple areas of up to 50% stenosis including mid segment " "deep femoral artery, distal popliteal artery.  Flow is present in all trifurcation vessels at the origin but cannot be traced beyond the origin, for reasons detailed above.  Correlate with prior ultrasound.  3. Open skin wound along the right foot with subcutaneous emphysema in the foot anterior ankle and anterior shin musculature.  Gas could be medially from the open wound or from gas-forming infection.  Evidence of osteomyelitis involving the cuboid.  4. Diffuse subcutaneous soft tissue edema of both lower legs especially on the right.  This could relate to cellulitis noninfectious inflammation or venous/lymphatic occlusion.  5. Cholelith.  6. Left nephrolith.     August 29: Chest x-ray noted PICC in good position.  Hypoventilation noted.     August 26:  Right foot wound anaerobic culture growing Bacteroides ovatus  Aerobic right foot my and Proteus penneri  -EKG with normal sinus rhythm and right bundle-branch block     August 25:  COVID negative  Blood cultures with no growth at 5 days"      Procedure(s) (LRB):  AMPUTATION, BELOW KNEE (Right)      Hospital Course:        Patient with H/o Uncontrolled DM/PAD , got transferred from [facility] with acute Osteomyelitis  (Rt foot infection, osteomyelitis of 4th and 5th metatarsal, abscess)  Pt underwent  right BKA   Received pRBC for acute anemia   Pt is very noncompliant and has poor knowledge regarding health issues  While in hospital he refused accuchecks and blood labs  Need iv abx for 2-3 more days, per ID team   Eventually pt was transferred to LTAC  His overall prognosis remains very poor and has high chance for readmissions        Goals of Care Treatment Preferences:  Code Status: Full Code      Consults:   Consults (From admission, onward)        Status Ordering Provider     Inpatient consult to   Once        Provider:  (Not yet assigned)    Acknowledged IVELISSE LOPEZ     Inpatient consult to Registered Dietitian/Nutritionist  Once      "   Provider:  (Not yet assigned)    Completed IVELISSE LOPEZ     Inpatient consult to Orthopedic Surgery  Once        Provider:  Simone Palacios MD    Completed JULIAN DUEÑAS     Inpatient consult to Gastroenterology  Once        Provider:  Huy Kaba MD    Completed IVELISSE LOPEZ     Pharmacy to dose Vancomycin consult  Once        Provider:  (Not yet assigned)   See Hyperspace for full Linked Orders Report.    Acknowledged IVELISSE LOPEZ     Inpatient consult to Anesthesiology  Once        Provider:  Edgar Gonzales MD    Acknowledged IVELISSE LOPEZ     Inpatient consult to Infectious Diseases  Once        Provider:  Ariana Ibrahim MD    Acknowledged PITA BALL     Inpatient consult to Podiatry  Once        Provider:  Dave Mathur DPM    Acknowledged PITA BALL     Inpatient consult to Vascular Surgery  Once        Provider:  Jose Bonner MD    Completed PITA BALL          No new Assessment & Plan notes have been filed under this hospital service since the last note was generated.  Service: Hospital Medicine    Final Active Diagnoses:    Diagnosis Date Noted POA    Unilateral complete BKA [S88.119A] 09/04/2022 No    Noncompliance [Z91.19] 09/06/2022 Not Applicable    Peripheral artery disease [I73.9] 09/01/2022 Yes    Essential hypertension, not well controlled [I10] 09/10/2021 Yes     Chronic    Diabetes mellitus with hyperglycemia [E11.65] 09/2021 Yes      Problems Resolved During this Admission:    Diagnosis Date Noted Date Resolved POA    PRINCIPAL PROBLEM:  Osteomyelitis [M86.9] 08/26/2022 09/07/2022 Yes    Normocytic anemia [D64.9] 08/26/2022 09/07/2022 Yes    Acute on chronic anemia [D64.9] 09/06/2022 09/07/2022 Unknown       Discharged Condition: good    Disposition: Long Term Acute Care    Follow Up:   Follow-up Information     Chemo Hinkle MD Follow up in 1 week(s).    Specialty: Family Medicine  Contact information:  [address]  [address]  [facility] [address]  [phone]                       Patient Instructions:      Diet Cardiac       Significant Diagnostic Studies: Labs:   CMP   Recent Labs   Lab 09/06/22  0436   *   K 4.3   CL 94*   CO2 29   *   BUN 33*   CREATININE 1.1   CALCIUM 8.2*   ANIONGAP 5*    and CBC   Recent Labs   Lab 09/06/22  0436   WBC 11.90   HGB 8.8*   HCT 27.5*   *       Pending Diagnostic Studies:     Procedure Component Value Units Date/Time    Echo Saline Bubble? No [[id number]] Resulted: 09/02/22 1332    Order Status: Sent Lab Status: In process Updated: 09/02/22 1332     BSA 1.98 m2      TDI SEPTAL 0.09 m/s      LV LATERAL E/E' RATIO 11.60 m/s      LV SEPTAL E/E' RATIO 12.89 m/s      TDI LATERAL 0.10 m/s      LVIDd 4.65 cm      IVS 0.97 cm      Posterior Wall 0.94 cm      LVIDs 2.76 cm      FS 41 %      LV mass 151.82 g      LA size 2.75 cm      RVDD 3.23 cm      TAPSE 1.64 cm      Left Ventricle Relative Wall Thickness 0.40 cm      AV mean gradient 3 mmHg      AV valve area 2.79 cm2      AV index (prosthetic) 0.94     E/A ratio 1.13     Mean e' 0.10 m/s      E wave deceleration time 182.33 msec      LVOT diameter 1.94 cm      LVOT area 3.0 cm2      LVOT peak solomon 98.76 m/s      LVOT peak VTI 22.95 cm      Ao VTI 24.32 cm      Mr max solomon 341 m/s      LVOT stroke volume 67.80 cm3      E/E' ratio 12.21 m/s      MV Peak E Solomon 1.16 m/s      TR Max Solomon 2.75 m/s      MV Peak A Solomon 1.03 m/s      LV Systolic Volume 27.71 mL      LV Systolic Volume Index 14.2 mL/m2      LV Diastolic Volume 100.51 mL      LV Diastolic Volume Index 51.54 mL/m2      LV Mass Index 78 g/m2      Triscuspid Valve Regurgitation Peak Gradient 30 mmHg     Lactic acid, plasma [[id number]] Collected: 09/02/22 1225    Order Status: Sent Lab Status: In process Updated: 09/02/22 1352    Specimen: Blood          Medications:  Reconciled Home Medications:      Medication List      START taking these medications    aspirin 81 MG  Chew  Take 1 tablet (81 mg total) by mouth once daily.  Start taking on: September 8, 2022     atorvastatin 40 MG tablet  Commonly known as: LIPITOR  Take 1 tablet (40 mg total) by mouth every evening.     lisinopriL 20 MG tablet  Commonly known as: PRINIVIL,ZESTRIL  Take 1 tablet (20 mg total) by mouth once daily.  Start taking on: September 8, 2022     meropenem-0.9% sodium chloride 1 gram/50 mL Pgbk  Inject 50 mLs (1 g total) into the vein every 8 (eight) hours. for 2 days     VANCOMYCIN 1.25 G/250 ML D5W (READY TO MIX)  Inject 250 mLs (1,250 mg total) into the vein once daily. for 2 days        CONTINUE taking these medications    acetaminophen 325 MG tablet  Commonly known as: TYLENOL  Take 2 tablets (650 mg total) by mouth every 4 (four) hours as needed.     HYDROcodone-acetaminophen 5-325 mg per tablet  Commonly known as: NORCO  Take 1 tablet by mouth every 6 (six) hours as needed for Pain.     metFORMIN 500 MG tablet  Commonly known as: GLUCOPHAGE  Take 500 mg by mouth 2 (two) times daily.     multivitamin Tab  Take 1 tablet by mouth once daily.        STOP taking these medications    carvediloL 25 MG tablet  Commonly known as: COREG     diphenhydrAMINE 25 mg capsule  Commonly known as: BENADRYL     hydroCHLOROthiazide 25 MG tablet  Commonly known as: HYDRODIURIL     hydrocortisone 2.5 % cream     L.acidophil,parac-S.therm-Bif. Cap capsule  Commonly known as: Risaquad     LIDOcaine 5 %  Commonly known as: LIDODERM            Indwelling Lines/Drains at time of discharge:   Lines/Drains/Airways     Peripherally Inserted Central Catheter Line  Duration           PICC Double Lumen 08/29/22 1222 right basilic 9 days              Physical Exam  Cardiovascular:      Rate and Rhythm: Normal rate.   Neurological:      Mental Status: He is alert and oriented to person, place, and time.       Time spent on the discharge of patient: 45 minutes         Praveen Taylor MD  Department of Hospital Medicine  [facility]  [facility]

## 2022-09-09 PROBLEM — Z89.511 S/P BKA (BELOW KNEE AMPUTATION) UNILATERAL, RIGHT: Status: ACTIVE | Noted: 2022-09-04

## 2022-09-15 ENCOUNTER — TELEPHONE (OUTPATIENT)
Dept: MEDSURG UNIT | Facility: HOSPITAL | Age: 62
End: 2022-09-15

## 2022-09-15 NOTE — DISCHARGE SUMMARY
"DISCHARGE SUMMARY  [facility] Medicine    Team: [facility] VIRTUAL TEAM 1    Patient Name: Ernst May  YOB: 1960    Admit Date: 8/25/2022    Discharge Date: 9/1/2022    Discharge Attending Physician: Gail Shirley     Admitting Resident    The attending portion of this evaluation, treatment, and documentation was performed via Telemedicine AudioVisual using the secure [employer] software platform with 2 way audio/video. The provider was located off-site and the patient is located in the hospital. The aforementioned video software was utilized to document the relevant history and physical exam      Diagnoses:  Active Hospital Problems    Diagnosis  POA    Hypokalemia [E87.6]  No    HLD (hyperlipidemia) [E78.5]  No    Cellulitis of right foot [L03.115]  Yes    Open wound of right foot [S91.301A]  Yes    Essential hypertension, not well controlled [I10]  Yes     Chronic    Hyponatremia [E87.1]  Yes    Diabetes mellitus with hyperglycemia [E11.65]  Yes      Resolved Hospital Problems    Diagnosis Date Resolved POA    *Osteomyelitis [M86.9] 09/07/2022 Yes    Dry gangrene [I96] 08/27/2022 Yes    Sepsis [A41.9] 08/27/2022 Yes    Normocytic anemia [D64.9] 09/07/2022 Yes       Discharged Condition: admit problems have stabilized       HOSPITAL COURSE:      Initial Presentation:    Mr. May is a 62 year old male with a history of NIDDM, HTN, CKDIII, and osteomyelitis of the cervical spine who presents today with complaints of weakness. It is severe. It is associated with hyperglycemia, dizziness, fever 102.9, and rt foot wound. He denies chest pain, SOB, cough, or LOC. He was in his truck and having difficulty reaching to the other side and states some "do-gooders" called EMS to help him out. He has not been compliant with any of his medications in months. He noticed his rt 5th toe turned black about 12 days ago. He has ulcerations and erythema along the right foot adjacent to the 5th toe. He was noted to be " febrile in the ED T 102.9, , /95. Glucose 448 and lactate 0.9. Foot Xray: 1. Acute osteomyelitis of the 5th metatarsal and proximal phalanx and the 4th metatarsal head and neck. 2. Large plantar soft tissue ulcer with extensive underlying soft tissue gas        Overview/Hospital Course:  Ernst May is a 62 year old male with a past medical history of NIDDM, HTN and osteomyelitis for the cervical spine who presented with acute osteomyelitis of the right fourth and fifth metatarsal with abscess with gangrene of the fifth metatarsal. Podiatry was consulted and performed amputation of the fifth right toe as well as cultures of the area. Surgical wound cultures show GNRs. He initiated on vancomycin and cefepime.  PT/OT has been consulted. Wound care and infectious disease specialty were consulted.  PICC line was placed, will require long term IV abx and wound care. Spoke with CM for LTAC placement.  Wound cultures resulted positive for E.coli and proteus penneri.  Vancomycin was discontinued and flagyl added.  WBCs and inflammatory markers trended.       Course of Principle Problem for Admission:    Osteomyelitis of right foot  POD 2 right fifth toe amputation per Dr. Mathur.  -Vancomycin and cefepime in hospital  8/30 - required additional debridement  Vascular study concerning for severe PAD  Transfer to hospital with vascular services.       CONSULTS: orthopedics, ID team.     Last CBC/BMP/HgbA1c (if applicable):  Recent Results (from the past 336 hour(s))   CBC Auto Differential    Collection Time: 09/15/22  4:53 AM   Result Value Ref Range    WBC 11.25 3.90 - 12.70 K/uL    Hemoglobin 9.6 (L) 14.0 - 18.0 g/dL    Hematocrit 30.5 (L) 40.0 - 54.0 %    Platelets 379 150 - 450 K/uL   CBC Auto Differential    Collection Time: 09/12/22  5:48 AM   Result Value Ref Range    WBC 10.05 3.90 - 12.70 K/uL    Hemoglobin 9.2 (L) 14.0 - 18.0 g/dL    Hematocrit 28.5 (L) 40.0 - 54.0 %    Platelets 355 150 - 450 K/uL    CBC Auto Differential    Collection Time: 09/08/22  5:35 AM   Result Value Ref Range    WBC 10.89 3.90 - 12.70 K/uL    Hemoglobin 9.5 (L) 14.0 - 18.0 g/dL    Hematocrit 29.6 (L) 40.0 - 54.0 %    Platelets 437 150 - 450 K/uL     Recent Results (from the past 336 hour(s))   Basic Metabolic Panel    Collection Time: 09/12/22  1:02 PM   Result Value Ref Range    Sodium 134 (L) 136 - 145 mmol/L    Potassium 5.1 3.5 - 5.1 mmol/L    Chloride 95 95 - 110 mmol/L    CO2 29 23 - 29 mmol/L    BUN 30 (H) 8 - 23 mg/dL    Creatinine 1.1 0.5 - 1.4 mg/dL    Calcium 9.0 8.7 - 10.5 mg/dL    Anion Gap 10 8 - 16 mmol/L   Basic metabolic panel    Collection Time: 09/06/22  4:36 AM   Result Value Ref Range    Sodium 128 (L) 136 - 145 mmol/L    Potassium 4.3 3.5 - 5.1 mmol/L    Chloride 94 (L) 95 - 110 mmol/L    CO2 29 23 - 29 mmol/L    BUN 33 (H) 8 - 23 mg/dL    Creatinine 1.1 0.5 - 1.4 mg/dL    Calcium 8.2 (L) 8.7 - 10.5 mg/dL    Anion Gap 5 (L) 8 - 16 mmol/L   Basic metabolic panel    Collection Time: 09/05/22  7:17 AM   Result Value Ref Range    Sodium 130 (L) 136 - 145 mmol/L    Potassium 4.2 3.5 - 5.1 mmol/L    Chloride 96 95 - 110 mmol/L    CO2 30 (H) 23 - 29 mmol/L    BUN 28 (H) 8 - 23 mg/dL    Creatinine 1.2 0.5 - 1.4 mg/dL    Calcium 8.0 (L) 8.7 - 10.5 mg/dL    Anion Gap 4 (L) 8 - 16 mmol/L     Lab Results   Component Value Date    HGBA1C 12.3 (H) 08/26/2022       Disposition:  transfer to hospital    Future Scheduled Appointments:  No future appointments.    Follow-up Plans from This Hospitalization:  Orthopedics, vascular     Discharge Medication List:        Medication List        ASK your doctor about these medications      acetaminophen 325 MG tablet  Commonly known as: TYLENOL  Take 2 tablets (650 mg total) by mouth every 4 (four) hours as needed.     carvediloL 25 MG tablet  Commonly known as: COREG  TAKE 1 TABLET BY MOUTH TWICE A DAY     diphenhydrAMINE 25 mg capsule  Commonly known as: BENADRYL  Take 1 capsule (25 mg  "total) by mouth every 6 (six) hours as needed for Itching.     hydroCHLOROthiazide 25 MG tablet  Commonly known as: HYDRODIURIL  TAKE 1 TABLET BY MOUTH EVERY DAY     HYDROcodone-acetaminophen 5-325 mg per tablet  Commonly known as: NORCO  Take 1 tablet by mouth every 6 (six) hours as needed for Pain.     hydrocortisone 2.5 % cream  Apply topically 2 (two) times daily.     L.acidophil,parac-S.therm-Bif. Cap capsule  Commonly known as: Risaquad  Take 1 capsule by mouth once daily.     LIDOcaine 5 %  Commonly known as: LIDODERM  Place 1 patch onto the skin once daily. Remove & Discard patch within 12 hours or as directed by MD     metFORMIN 500 MG tablet  Commonly known as: GLUCOPHAGE     multivitamin Tab  Take 1 tablet by mouth once daily.               Patient Instructions:  Discharge Procedure Orders   WALKER FOR HOME USE     Order Specific Question Answer Comments   Type of Walker: Adult (5'4"-6'6")    With wheels? Yes    Height: 5' 8" (1.727 m)    Weight: 73 kg (161 lb)    Length of need (1-99 months): 66    Does patient have medical equipment at home? none    Please check all that apply: Patient's condition impairs ambulation.    Please check all that apply: Patient needs help to get in and out of chair.    Please check all that apply: Patient is unable to safely ambulate without equipment.    Please check all that apply: Walker will be used for gait training.        Signing Physician:  Gail Shirley MD   "

## 2022-09-23 LAB
AV INDEX (PROSTH): 0.94
AV MEAN GRADIENT: 3 MMHG
AV VALVE AREA: 2.79 CM2
BSA FOR ECHO PROCEDURE: 1.98 M2
CV ECHO LV RWT: 0.4 CM
DOP CALC AO VTI: 24.32 CM
DOP CALC LVOT AREA: 3 CM2
DOP CALC LVOT DIAMETER: 1.94 CM
DOP CALC LVOT PEAK VEL: 98.76 M/S
DOP CALC LVOT STROKE VOLUME: 67.8 CM3
DOP CALCLVOT PEAK VEL VTI: 22.95 CM
E WAVE DECELERATION TIME: 182.33 MSEC
E/A RATIO: 1.13
E/E' RATIO: 12.21 M/S
ECHO LV POSTERIOR WALL: 0.94 CM (ref 0.6–1.1)
EJECTION FRACTION: 65 %
FRACTIONAL SHORTENING: 41 % (ref 28–44)
INTERVENTRICULAR SEPTUM: 0.97 CM (ref 0.6–1.1)
LEFT ATRIUM SIZE: 2.75 CM
LEFT INTERNAL DIMENSION IN SYSTOLE: 2.76 CM (ref 2.1–4)
LEFT VENTRICLE DIASTOLIC VOLUME INDEX: 54.33 ML/M2
LEFT VENTRICLE DIASTOLIC VOLUME: 100.51 ML
LEFT VENTRICLE MASS INDEX: 82 G/M2
LEFT VENTRICLE SYSTOLIC VOLUME INDEX: 15 ML/M2
LEFT VENTRICLE SYSTOLIC VOLUME: 27.71 ML
LEFT VENTRICULAR INTERNAL DIMENSION IN DIASTOLE: 4.65 CM (ref 3.5–6)
LEFT VENTRICULAR MASS: 151.82 G
LV LATERAL E/E' RATIO: 11.6 M/S
LV SEPTAL E/E' RATIO: 12.89 M/S
MV PEAK A VEL: 1.03 M/S
MV PEAK E VEL: 1.16 M/S
PISA MRMAX VEL: 341 M/S
PISA TR MAX VEL: 2.75 M/S
RIGHT VENTRICULAR END-DIASTOLIC DIMENSION: 3.23 CM
TDI LATERAL: 0.1 M/S
TDI SEPTAL: 0.09 M/S
TDI: 0.1 M/S
TR MAX PG: 30 MMHG
TRICUSPID ANNULAR PLANE SYSTOLIC EXCURSION: 1.64 CM

## 2022-09-26 LAB — FUNGUS SPEC CULT: NORMAL

## 2022-10-01 PROBLEM — N39.0 UTI (URINARY TRACT INFECTION): Status: ACTIVE | Noted: 2022-10-01

## 2022-10-06 PROBLEM — E87.5 HYPERKALEMIA: Status: RESOLVED | Noted: 2021-09-11 | Resolved: 2022-10-06

## 2022-10-15 LAB
ACID FAST MOD KINY STN SPEC: NORMAL
MYCOBACTERIUM SPEC QL CULT: NORMAL

## 2023-01-09 PROBLEM — N39.0 UTI (URINARY TRACT INFECTION): Status: RESOLVED | Noted: 2022-10-01 | Resolved: 2023-01-09

## 2023-02-28 ENCOUNTER — HOSPITAL ENCOUNTER (EMERGENCY)
Facility: HOSPITAL | Age: 63
Discharge: HOME | End: 2023-03-01
Attending: EMERGENCY MEDICINE
Payer: MEDICAID

## 2023-02-28 DIAGNOSIS — E11.65 TYPE 2 DIABETES MELLITUS WITH HYPERGLYCEMIA, UNSPECIFIED WHETHER LONG TERM INSULIN USE: ICD-10-CM

## 2023-02-28 DIAGNOSIS — I10 HYPERTENSION, UNSPECIFIED TYPE: ICD-10-CM

## 2023-02-28 DIAGNOSIS — L30.9 ECZEMA, UNSPECIFIED TYPE: ICD-10-CM

## 2023-02-28 DIAGNOSIS — T14.8XXA WOUND DRAINAGE: ICD-10-CM

## 2023-02-28 DIAGNOSIS — L03.211 CELLULITIS OF FACE: Primary | ICD-10-CM

## 2023-02-28 DIAGNOSIS — R59.1 LYMPHADENOPATHY: ICD-10-CM

## 2023-02-28 LAB
ALBUMIN SERPL BCP-MCNC: 3.4 G/DL (ref 3.5–5.2)
ALP SERPL-CCNC: 129 U/L (ref 55–135)
ALT SERPL W/O P-5'-P-CCNC: 17 U/L (ref 10–44)
ANION GAP SERPL CALC-SCNC: 4 MMOL/L (ref 8–16)
AST SERPL-CCNC: 19 U/L (ref 10–40)
BASOPHILS # BLD AUTO: 0.09 K/UL (ref 0–0.2)
BASOPHILS NFR BLD: 0.9 % (ref 0–1.9)
BILIRUB SERPL-MCNC: 0.4 MG/DL (ref 0.1–1)
BUN SERPL-MCNC: 30 MG/DL (ref 8–23)
CALCIUM SERPL-MCNC: 9.2 MG/DL (ref 8.7–10.5)
CHLORIDE SERPL-SCNC: 100 MMOL/L (ref 95–110)
CO2 SERPL-SCNC: 28 MMOL/L (ref 23–29)
CREAT SERPL-MCNC: 1.5 MG/DL (ref 0.5–1.4)
DIFFERENTIAL METHOD: ABNORMAL
EOSINOPHIL # BLD AUTO: 0.3 K/UL (ref 0–0.5)
EOSINOPHIL NFR BLD: 2.9 % (ref 0–8)
ERYTHROCYTE [DISTWIDTH] IN BLOOD BY AUTOMATED COUNT: 12.7 % (ref 11.5–14.5)
EST. GFR  (NO RACE VARIABLE): 52.3 ML/MIN/1.73 M^2
GLUCOSE SERPL-MCNC: 231 MG/DL (ref 70–110)
GLUCOSE SERPL-MCNC: 308 MG/DL (ref 70–110)
HCT VFR BLD AUTO: 38.6 % (ref 40–54)
HGB BLD-MCNC: 13.2 G/DL (ref 14–18)
IMM GRANULOCYTES # BLD AUTO: 0.04 K/UL (ref 0–0.04)
IMM GRANULOCYTES NFR BLD AUTO: 0.4 % (ref 0–0.5)
LYMPHOCYTES # BLD AUTO: 2.2 K/UL (ref 1–4.8)
LYMPHOCYTES NFR BLD: 21.4 % (ref 18–48)
MCH RBC QN AUTO: 29.2 PG (ref 27–31)
MCHC RBC AUTO-ENTMCNC: 34.2 G/DL (ref 32–36)
MCV RBC AUTO: 85 FL (ref 82–98)
MONOCYTES # BLD AUTO: 0.8 K/UL (ref 0.3–1)
MONOCYTES NFR BLD: 7.8 % (ref 4–15)
NEUTROPHILS # BLD AUTO: 6.8 K/UL (ref 1.8–7.7)
NEUTROPHILS NFR BLD: 66.6 % (ref 38–73)
NRBC BLD-RTO: 0 /100 WBC
PLATELET # BLD AUTO: 323 K/UL (ref 150–450)
PMV BLD AUTO: 10 FL (ref 9.2–12.9)
POTASSIUM SERPL-SCNC: 4.6 MMOL/L (ref 3.5–5.1)
PROT SERPL-MCNC: 8.4 G/DL (ref 6–8.4)
RBC # BLD AUTO: 4.52 M/UL (ref 4.6–6.2)
SODIUM SERPL-SCNC: 132 MMOL/L (ref 136–145)
WBC # BLD AUTO: 10.17 K/UL (ref 3.9–12.7)

## 2023-02-28 PROCEDURE — 25000003 PHARM REV CODE 250: Performed by: EMERGENCY MEDICINE

## 2023-02-28 PROCEDURE — 99285 EMERGENCY DEPT VISIT HI MDM: CPT | Mod: 25

## 2023-02-28 PROCEDURE — 80053 COMPREHEN METABOLIC PANEL: CPT | Performed by: NURSE PRACTITIONER

## 2023-02-28 PROCEDURE — 82962 GLUCOSE BLOOD TEST: CPT

## 2023-02-28 PROCEDURE — 85025 COMPLETE CBC W/AUTO DIFF WBC: CPT | Performed by: NURSE PRACTITIONER

## 2023-02-28 PROCEDURE — 96365 THER/PROPH/DIAG IV INF INIT: CPT

## 2023-02-28 PROCEDURE — 96361 HYDRATE IV INFUSION ADD-ON: CPT

## 2023-02-28 PROCEDURE — 63600175 PHARM REV CODE 636 W HCPCS: Mod: JG | Performed by: EMERGENCY MEDICINE

## 2023-02-28 RX ORDER — CARVEDILOL 12.5 MG/1
25 TABLET ORAL
Status: COMPLETED | OUTPATIENT
Start: 2023-03-01 | End: 2023-03-01

## 2023-02-28 RX ORDER — AMLODIPINE BESYLATE 5 MG/1
5 TABLET ORAL
Status: COMPLETED | OUTPATIENT
Start: 2023-03-01 | End: 2023-03-01

## 2023-02-28 RX ADMIN — DALBAVANCIN 1500 MG: 500 INJECTION, POWDER, FOR SOLUTION INTRAVENOUS at 10:02

## 2023-02-28 RX ADMIN — SODIUM CHLORIDE 1000 ML: 0.9 INJECTION, SOLUTION INTRAVENOUS at 08:02

## 2023-03-01 VITALS
SYSTOLIC BLOOD PRESSURE: 214 MMHG | HEIGHT: 67 IN | WEIGHT: 175.25 LBS | TEMPERATURE: 99 F | BODY MASS INDEX: 27.51 KG/M2 | DIASTOLIC BLOOD PRESSURE: 105 MMHG | RESPIRATION RATE: 16 BRPM | OXYGEN SATURATION: 98 % | HEART RATE: 88 BPM

## 2023-03-01 PROCEDURE — 25000003 PHARM REV CODE 250: Performed by: EMERGENCY MEDICINE

## 2023-03-01 RX ADMIN — AMLODIPINE BESYLATE 5 MG: 5 TABLET ORAL at 12:03

## 2023-03-01 RX ADMIN — CARVEDILOL 25 MG: 12.5 TABLET, FILM COATED ORAL at 12:03

## 2023-03-01 NOTE — ED NOTES
"Pt upset and tells me after the dr comes in next hes leaving. States hes been here all day and wants to just go home and sleep. When I asked if he was going to let us treat his bp he states "yall are the reason it is what it is."  "

## 2023-03-01 NOTE — DISCHARGE INSTRUCTIONS
Continue taking blood pressure medications as prescribed    Continue taking diabetic medications as prescribed

## 2023-03-01 NOTE — ED PROVIDER NOTES
Encounter Date: 2/28/2023       History     Chief Complaint   Patient presents with    Wound Check     Wound to right side of neck since October, pt states wound not healing because he keep picking at wound     Emergent evaluation of a 62-year-old male with history of hyperglycemia with poorly controlled diabetes, peripheral vascular disease diabetic foot ulcers/ gangrene, osteomyelitis resulting in right BKA, prior wounds and abscesses osteomyelitis of cervical spine, CKD, sepsis, hypertension who presents to the ER due to wounds to his chin within his beard.  Patient reports he is always suffered with severe eczema.  He began to develop these wounds that began as dry skin and folliculitis in October when he was being treated for the right lower extremity wounds and amputation he reports they have gradually worsened he continues to pick at them because they itch.  Wounds have gradually gotten larger and now drain pus at night.  He noticed increased redness swelling in the noticed nodules underneath each side of the chin he is concerned for cyst or abscess formation.  He denies any fevers chills or sweats.  No nausea vomiting.  He reports he is not talked to anyone about the symptoms he denies any difficulty swallowing or painful swallowing no hoarse voice.  No shortness of breath or wheezing pain is 2/10    Review of patient's allergies indicates:   Allergen Reactions    Neosporin [benzalkonium chloride]      Past Medical History:   Diagnosis Date    Back abscess 09/2021    CKD (chronic kidney disease) 09/2021    Diabetes mellitus with hyperglycemia 09/2021    Diabetic foot ulcer 09/2021    bilateral, severe abrasions    Hypertension     Osteomyelitis of cervical spine 09/2021    under back abscess    Sepsis 10/9/2021     Past Surgical History:   Procedure Laterality Date    BELOW KNEE AMPUTATION OF LOWER EXTREMITY Right 9/4/2022    Procedure: AMPUTATION, BELOW KNEE;  Surgeon: Simone Palacios MD;  Location:  [facility] OR;  Service: Orthopedics;  Laterality: Right;    CYST REMOVAL  2012    FOOT AMPUTATION Right 8/26/2022    Procedure: AMPUTATION, FOOT;  Surgeon: Dave Mathur DPM;  Location: [facility] OR;  Service: Podiatry;  Laterality: Right;  4th and 5th metatarsal     INCISION AND DRAINAGE OF ABSCESS Left 9/4/2021    Procedure: INCISION AND DRAINAGE, ABSCESS;  Surgeon: Surjit Chawla MD;  Location: [facility] OR;  Service: General;  Laterality: Left;    INCISION AND DRAINAGE OF ABSCESS Left 9/14/2021    Procedure: INCISION AND DRAINAGE, ABSCESS; DEBRIDEMENT;  Surgeon: Surjit Chawla MD;  Location: [facility] OR;  Service: General;  Laterality: Left;    REPLACEMENT OF WOUND VACUUM-ASSISTED CLOSURE DEVICE Left 9/14/2021    Procedure: REPLACEMENT, WOUND VAC;  Surgeon: Surjit Chawla MD;  Location: [facility];  Service: General;  Laterality: Left;  EXCHANGE.     Family History   Problem Relation Age of Onset    Diabetes Mother     Heart disease Mother     Arthritis Father     Diabetes Father     Heart disease Father     Hypertension Father     Diabetes Maternal Grandmother      Social History     Tobacco Use    Smoking status: Some Days     Types: Cigars    Smokeless tobacco: Never    Tobacco comments:     occassionally   Substance Use Topics    Alcohol use: No    Drug use: No     Review of Systems   Constitutional:  Negative for activity change, appetite change, chills, diaphoresis, fatigue and fever.   HENT:  Positive for facial swelling. Negative for congestion, postnasal drip, rhinorrhea, sore throat, trouble swallowing and voice change.    Respiratory:  Negative for cough, chest tightness and shortness of breath.    Cardiovascular:  Negative for chest pain and palpitations.   Gastrointestinal:  Negative for abdominal pain, constipation, diarrhea, nausea and vomiting.   Musculoskeletal:  Negative for myalgias, neck pain and neck stiffness.   Skin:  Positive for color change, rash and wound. Negative for pallor.    Allergic/Immunologic: Positive for immunocompromised state.   Neurological:  Negative for dizziness, weakness, light-headedness, numbness and headaches.   All other systems reviewed and are negative.    Physical Exam     Initial Vitals [02/28/23 1805]   BP Pulse Resp Temp SpO2   (!) 172/98 90 16 98.8 °F (37.1 °C) 98 %      MAP       --         Physical Exam    Nursing note and vitals reviewed.  Constitutional: He appears well-developed and well-nourished. He is not diaphoretic. No distress.   HENT:   Head: Normocephalic and atraumatic.   Right Ear: External ear normal.   Left Ear: External ear normal.   Nose: Nose normal.   Mouth/Throat: Oropharynx is clear and moist.   Eyes: Conjunctivae and EOM are normal. Pupils are equal, round, and reactive to light.   Neck: Neck supple. No thyromegaly present. No stridor present. No tracheal tenderness present. No tracheal deviation present. No JVD present.       Normal range of motion.  Cardiovascular:  Normal rate, regular rhythm, normal heart sounds and intact distal pulses.     Exam reveals no gallop and no friction rub.       No murmur heard.  Blood pressure 172/98   Pulmonary/Chest: Breath sounds normal. No stridor. No respiratory distress. He has no wheezes. He has no rhonchi. He has no rales. He exhibits no tenderness.   Abdominal: Abdomen is soft. Bowel sounds are normal. He exhibits no distension and no mass. There is no abdominal tenderness. There is no rebound and no guarding.   Musculoskeletal:         General: No edema. Normal range of motion.      Cervical back: Normal range of motion and neck supple. Edema and erythema present. No rigidity. No spinous process tenderness or muscular tenderness. Normal range of motion.     Lymphadenopathy:     He has no cervical adenopathy.   Neurological: He is alert and oriented to person, place, and time. He has normal strength. No cranial nerve deficit or sensory deficit.   Skin: Skin is warm and dry. No rash noted. No  erythema. No pallor.   Psychiatric: He has a normal mood and affect. His behavior is normal. Judgment and thought content normal.       ED Course   Procedures  Labs Reviewed   CBC W/ AUTO DIFFERENTIAL - Abnormal; Notable for the following components:       Result Value    RBC 4.52 (*)     Hemoglobin 13.2 (*)     Hematocrit 38.6 (*)     All other components within normal limits   COMPREHENSIVE METABOLIC PANEL - Abnormal; Notable for the following components:    Sodium 132 (*)     Glucose 308 (*)     BUN 30 (*)     Creatinine 1.5 (*)     Albumin 3.4 (*)     Anion Gap 4 (*)     eGFR 52.3 (*)     All other components within normal limits   POCT GLUCOSE - Abnormal; Notable for the following components:    POC Glucose 231 (*)     All other components within normal limits   POCT GLUCOSE MONITORING CONTINUOUS          Imaging Results              US Soft Tissue Head Neck Thyroid (Final result)  Result time 02/28/23 22:50:11      Final result by Amira Wilson MD (02/28/23 22:50:11)                   Narrative:    PROCEDURE:    US THYROID  dated  2/28/2023 9:51 PM CST    CLINICAL HISTORY:   Male 62 years of age.    TECHNIQUE:  Ultrasound was performed of the right submandibular soft tissue in the area of concern.    PREVIOUS STUDIES:  None Available    FINDINGS:    There is no fluid collection. 2 right submandibular lymph nodes are present. One measures 1.1 cm x 0.8 cm x 1.0 cm, the second measures 1.2 cm x 0.9 cm x 1.0 cm.    IMPRESSION:  1.  No fluid collection.  2.  2 mildly enlarged right submandibular lymph nodes.    Electronically signed by:  Amira Grey MD  2/28/2023 10:50 PM [facility] Workstation: [id number]                                     Medications   amLODIPine tablet 5 mg (has no administration in time range)   carvediloL tablet 25 mg (has no administration in time range)   sodium chloride 0.9% bolus 1,000 mL 1,000 mL (0 mLs Intravenous Stopped 2/28/23 2129)   dalbavancin (DALVANCE) 1,500 mg in dextrose 5 %  (D5W) 325 mL infusion (0 mg Intravenous Stopped 2/28/23 7050)     Medical Decision Making:   Clinical Tests:   Lab Tests: Ordered and Reviewed  Radiological Study: Ordered and Reviewed  ED Management:  Emergent evaluation of a 62-year-old male with history of hyperglycemia with poorly controlled diabetes, peripheral vascular disease diabetic foot ulcers/ gangrene, osteomyelitis resulting in right BKA, prior wounds and abscesses osteomyelitis of cervical spine, CKD, sepsis, hypertension who presents to the ER due to wounds to his chin within his beard.  Patient reports he is always suffered with severe eczema.  He began to develop these wounds that began as dry skin and folliculitis in October when he was being treated for the right lower extremity wounds and amputation he reports they have gradually worsened he continues to pick at them because they itch.  Wounds have gradually gotten larger and now drain pus at night.  He noticed increased redness swelling in the noticed nodules underneath each side of the chin he is concerned for cyst or abscess formation.  He denies any fevers chills or sweats.  No nausea vomiting.  He reports he is not talked to anyone about the symptoms he denies any difficulty swallowing or painful swallowing no hoarse voice.  No shortness of breath or wheezing pain is 2/10  Blood pressure elevated 172/98 other vitals are normal patient is afebrile.  Patient has open superficial wet appearing wounds with purulence draining on light palpation from the bilateral submandibular region and to the chin within his beard folliculitis with possible carbuncles or abscess versus lymphadenopathy.  Will do ultrasound soft tissue to further evaluate.  Patient had lab work done prior to my arrival.  He would a normal white count, had elevated H&H compared to prior consistent with hemo concentration, elevated BUN of 30, creatinine 1.5 which is increased from prior, sodium 132 likely pseudohyponatremia due to  glucose with 308.  Other labs are normal  Patient be given 1 L of IV normal saline and will recheck an Accu-Chek due to the hyperglycemia.  We will also clean the wounds since patient continuously is touching them.  And apply nonstick bandages        [facility]    Patient presents for emergent evaluation of acute wound to chin that poses a threat to life and/or bodily function.   Differential diagnosis includes but was not limited to sepsis, DKA, Vipin's angina, necrotizing fasciitis, folliculitis, carbuncle, abscess to the chin and submandibular region, lymphadenopathy, cellulitis, deep space infection   In the ED patient found to have acute type 2 diabetes with hyperglycemia, dehydration, chronic nonhealing wounds to the chin and submandibular space with folliculitis severe eczema, lymphadenopathy, uncontrolled hypertension  I ordered labs and personally reviewed them.  Labs significant for see above discussion    I ordered ultrasound soft tissue neck and personally reviewed it and reviewed the radiologist interpretation.  Ultrasound soft tissue neck significant for .      Discharge MDM     Patient was managed in the ED with IV Dalvance 1500 mg, 1 liter NS for hyperglycemia and dehydration  The response to treatment was good repeat glucose was 231.  Patient's blood pressure began to trend upward became 125/120 patient was also agitated and upset reported he felt flank pain when he was receiving dowel Beckman.  We slowed the drip and explained to him that it will not cause him acute kidney injury.  Patient was very irritable.  Discussed with patient what medications he takes for his blood pressure what he will be due for his evening doses and he reports he takes no blood pressure medications.  He was offered the blood pressure medications that he is prescribed including Norvasc 5 mg and Coreg 25 mg and he agreed to take them orally.  I explained to him that he should take his clonidine if blood pressure remains high 165  systolic at home.  Patient reports he is not want to take any blood pressure medicines in his also noncompliant with his diabetes medicines I have referred him to wound care but he reports he is not going to go to wound care.  Patient was discharged in stable condition.  Detailed return precautions discussed.  Patient was told to follow up with primary care physician or specialist based on their diagnosis  Mary Arriola MD                            Clinical Impression:   Final diagnoses:  [L24.A9] Wound drainage  [L24.A9] Wound drainage - wound in beard- possible abscess vs LAD vs induration  [I10] Hypertension, unspecified type  [L03.211] Cellulitis of face (Primary)  [R59.1] Lymphadenopathy  [L30.9] Eczema, unspecified type  [E11.65] Type 2 diabetes mellitus with hyperglycemia, unspecified whether long term insulin use        ED Disposition Condition    Discharge Stable          ED Prescriptions    None       Follow-up Information       Follow up With Specialties Details Why Contact Info Additional Information    [facility] - Wound [facility] Wound Care Schedule an appointment as soon as possible for a visit   [address]  [place] [address] Central registration is on 1st Floor at Main Entrance    [facility] - Emergency Dept Emergency Medicine Go to  If symptoms worsen [address] [id number] [phone] 1st floor    Your primary care provider  Schedule an appointment as soon as possible for a visit  for further hypertension management and diabetes               Mary Arriola MD  03/01/23 0001

## 2023-06-01 NOTE — TRANSFER OF CARE
"Anesthesia Transfer of Care Note    Patient: Ernst May    Procedure(s) Performed: Procedure(s) (LRB):  AMPUTATION, BELOW KNEE (Right)    Patient location: PACU    Anesthesia Type: general    Transport from OR: Transported from OR on 2-3 L/min O2 by NC with adequate spontaneous ventilation    Post pain: adequate analgesia    Post assessment: no apparent anesthetic complications    Post vital signs: stable    Level of consciousness: awake and alert    Nausea/Vomiting: no nausea/vomiting    Complications: none    Transfer of care protocol was followed      Last vitals:   Visit Vitals  BP (!) 154/73 (BP Location: Left arm, Patient Position: Lying)   Pulse 82   Temp 36.7 °C (98.1 °F) (Oral)   Resp 18   Ht 5' 8" (1.727 m)   Wt 81.6 kg (180 lb)   SpO2 (!) 93%   BMI 27.37 kg/m²     " Bilobed Transposition Flap Text: The defect edges were debeveled with a #15 scalpel blade. Given the location of the defect and the proximity to free margins a bilobed transposition flap was deemed most appropriate. Using a sterile surgical marker, an appropriate bilobe flap drawn around the defect. The area thus outlined was incised deep to adipose tissue with a #15 scalpel blade. The skin margins were undermined to an appropriate distance in all directions utilizing iris scissors. Following this, the designed flap was carried over into the primary defect and sutured into place.

## 2023-06-22 ENCOUNTER — HOSPITAL ENCOUNTER (OUTPATIENT)
Facility: HOSPITAL | Age: 63
Discharge: HOME | End: 2023-06-23
Attending: EMERGENCY MEDICINE | Admitting: HOSPITALIST
Payer: MEDICAID

## 2023-06-22 DIAGNOSIS — I10 HYPERTENSION: ICD-10-CM

## 2023-06-22 DIAGNOSIS — R21 RASH OF FACE: Primary | ICD-10-CM

## 2023-06-22 DIAGNOSIS — I16.1 HYPERTENSIVE EMERGENCY: ICD-10-CM

## 2023-06-22 DIAGNOSIS — N17.9 AKI (ACUTE KIDNEY INJURY): ICD-10-CM

## 2023-06-22 DIAGNOSIS — Z91.199 NONCOMPLIANCE: ICD-10-CM

## 2023-06-22 DIAGNOSIS — R07.9 CHEST PAIN: ICD-10-CM

## 2023-06-22 DIAGNOSIS — R79.89 ELEVATED TROPONIN: ICD-10-CM

## 2023-06-22 LAB
ALBUMIN SERPL BCP-MCNC: 3 G/DL (ref 3.5–5.2)
ALLENS TEST: ABNORMAL
ALP SERPL-CCNC: 130 U/L (ref 55–135)
ALT SERPL W/O P-5'-P-CCNC: 15 U/L (ref 10–44)
ANION GAP SERPL CALC-SCNC: 11 MMOL/L (ref 8–16)
AST SERPL-CCNC: 20 U/L (ref 10–40)
BACTERIA #/AREA URNS HPF: NORMAL /HPF
BASOPHILS # BLD AUTO: 0.07 K/UL (ref 0–0.2)
BASOPHILS NFR BLD: 0.8 % (ref 0–1.9)
BILIRUB SERPL-MCNC: 0.2 MG/DL (ref 0.1–1)
BILIRUB UR QL STRIP: NEGATIVE
BNP SERPL-MCNC: 183 PG/ML (ref 0–99)
BUN SERPL-MCNC: 39 MG/DL (ref 8–23)
CALCIUM SERPL-MCNC: 8.9 MG/DL (ref 8.7–10.5)
CHLORIDE SERPL-SCNC: 104 MMOL/L (ref 95–110)
CLARITY UR: CLEAR
CO2 SERPL-SCNC: 23 MMOL/L (ref 23–29)
COLOR UR: YELLOW
CREAT SERPL-MCNC: 2 MG/DL (ref 0.5–1.4)
DELSYS: ABNORMAL
DIFFERENTIAL METHOD: ABNORMAL
EOSINOPHIL # BLD AUTO: 0.3 K/UL (ref 0–0.5)
EOSINOPHIL NFR BLD: 3.3 % (ref 0–8)
ERYTHROCYTE [DISTWIDTH] IN BLOOD BY AUTOMATED COUNT: 12.2 % (ref 11.5–14.5)
EST. GFR  (NO RACE VARIABLE): 37 ML/MIN/1.73 M^2
GLUCOSE SERPL-MCNC: 275 MG/DL (ref 70–110)
GLUCOSE UR QL STRIP: ABNORMAL
HCO3 UR-SCNC: 25.9 MMOL/L (ref 24–28)
HCT VFR BLD AUTO: 31.2 % (ref 40–54)
HGB BLD-MCNC: 10.5 G/DL (ref 14–18)
HGB UR QL STRIP: NEGATIVE
HYALINE CASTS #/AREA URNS LPF: 0 /LPF
IMM GRANULOCYTES # BLD AUTO: 0.02 K/UL (ref 0–0.04)
IMM GRANULOCYTES NFR BLD AUTO: 0.2 % (ref 0–0.5)
KETONES UR QL STRIP: NEGATIVE
LEUKOCYTE ESTERASE UR QL STRIP: NEGATIVE
LYMPHOCYTES # BLD AUTO: 2.1 K/UL (ref 1–4.8)
LYMPHOCYTES NFR BLD: 23 % (ref 18–48)
MAGNESIUM SERPL-MCNC: 1.8 MG/DL (ref 1.6–2.6)
MCH RBC QN AUTO: 28.6 PG (ref 27–31)
MCHC RBC AUTO-ENTMCNC: 33.7 G/DL (ref 32–36)
MCV RBC AUTO: 85 FL (ref 82–98)
MICROSCOPIC COMMENT: NORMAL
MODE: ABNORMAL
MONOCYTES # BLD AUTO: 0.8 K/UL (ref 0.3–1)
MONOCYTES NFR BLD: 8.7 % (ref 4–15)
NEUTROPHILS # BLD AUTO: 5.9 K/UL (ref 1.8–7.7)
NEUTROPHILS NFR BLD: 64 % (ref 38–73)
NITRITE UR QL STRIP: NEGATIVE
NRBC BLD-RTO: 0 /100 WBC
PCO2 BLDA: 50.3 MMHG (ref 35–45)
PH SMN: 7.32 [PH] (ref 7.35–7.45)
PH UR STRIP: 6 [PH] (ref 5–8)
PLATELET # BLD AUTO: 280 K/UL (ref 150–450)
PMV BLD AUTO: 9.9 FL (ref 9.2–12.9)
PO2 BLDA: 31 MMHG (ref 40–60)
POC BE: 0 MMOL/L
POC SATURATED O2: 54 % (ref 95–100)
POCT GLUCOSE: 243 MG/DL (ref 70–110)
POTASSIUM SERPL-SCNC: 4.6 MMOL/L (ref 3.5–5.1)
PROT SERPL-MCNC: 7.5 G/DL (ref 6–8.4)
PROT UR QL STRIP: ABNORMAL
RBC # BLD AUTO: 3.67 M/UL (ref 4.6–6.2)
RBC #/AREA URNS HPF: 1 /HPF (ref 0–4)
SAMPLE: ABNORMAL
SITE: ABNORMAL
SODIUM SERPL-SCNC: 138 MMOL/L (ref 136–145)
SP GR UR STRIP: 1.01 (ref 1–1.03)
SP02: 97
TROPONIN I SERPL DL<=0.01 NG/ML-MCNC: 0.13 NG/ML (ref 0–0.03)
TROPONIN I SERPL DL<=0.01 NG/ML-MCNC: 0.16 NG/ML (ref 0–0.03)
URN SPEC COLLECT METH UR: ABNORMAL
UROBILINOGEN UR STRIP-ACNC: NEGATIVE EU/DL
WBC # BLD AUTO: 9.28 K/UL (ref 3.9–12.7)
WBC #/AREA URNS HPF: 0 /HPF (ref 0–5)
YEAST URNS QL MICRO: NORMAL

## 2023-06-22 PROCEDURE — 63600175 PHARM REV CODE 636 W HCPCS: Performed by: NURSE PRACTITIONER

## 2023-06-22 PROCEDURE — 84484 ASSAY OF TROPONIN QUANT: CPT | Performed by: EMERGENCY MEDICINE

## 2023-06-22 PROCEDURE — 96375 TX/PRO/DX INJ NEW DRUG ADDON: CPT

## 2023-06-22 PROCEDURE — 82803 BLOOD GASES ANY COMBINATION: CPT

## 2023-06-22 PROCEDURE — G0378 HOSPITAL OBSERVATION PER HR: HCPCS

## 2023-06-22 PROCEDURE — 80053 COMPREHEN METABOLIC PANEL: CPT | Performed by: EMERGENCY MEDICINE

## 2023-06-22 PROCEDURE — 84484 ASSAY OF TROPONIN QUANT: CPT | Mod: 91 | Performed by: NURSE PRACTITIONER

## 2023-06-22 PROCEDURE — 83735 ASSAY OF MAGNESIUM: CPT | Performed by: EMERGENCY MEDICINE

## 2023-06-22 PROCEDURE — 83880 ASSAY OF NATRIURETIC PEPTIDE: CPT | Performed by: EMERGENCY MEDICINE

## 2023-06-22 PROCEDURE — 25000003 PHARM REV CODE 250: Performed by: NURSE PRACTITIONER

## 2023-06-22 PROCEDURE — 93010 EKG 12-LEAD: ICD-10-PCS | Mod: ,,, | Performed by: INTERNAL MEDICINE

## 2023-06-22 PROCEDURE — 25000003 PHARM REV CODE 250: Performed by: EMERGENCY MEDICINE

## 2023-06-22 PROCEDURE — 36415 COLL VENOUS BLD VENIPUNCTURE: CPT | Performed by: NURSE PRACTITIONER

## 2023-06-22 PROCEDURE — 99285 EMERGENCY DEPT VISIT HI MDM: CPT | Mod: 25

## 2023-06-22 PROCEDURE — 63600175 PHARM REV CODE 636 W HCPCS: Performed by: EMERGENCY MEDICINE

## 2023-06-22 PROCEDURE — 82962 GLUCOSE BLOOD TEST: CPT

## 2023-06-22 PROCEDURE — 96361 HYDRATE IV INFUSION ADD-ON: CPT

## 2023-06-22 PROCEDURE — 81000 URINALYSIS NONAUTO W/SCOPE: CPT | Performed by: EMERGENCY MEDICINE

## 2023-06-22 PROCEDURE — 96365 THER/PROPH/DIAG IV INF INIT: CPT

## 2023-06-22 PROCEDURE — 85025 COMPLETE CBC W/AUTO DIFF WBC: CPT | Performed by: EMERGENCY MEDICINE

## 2023-06-22 PROCEDURE — 93010 ELECTROCARDIOGRAM REPORT: CPT | Mod: ,,, | Performed by: INTERNAL MEDICINE

## 2023-06-22 PROCEDURE — 93005 ELECTROCARDIOGRAM TRACING: CPT

## 2023-06-22 PROCEDURE — 99900035 HC TECH TIME PER 15 MIN (STAT)

## 2023-06-22 RX ORDER — ATORVASTATIN CALCIUM 40 MG/1
40 TABLET, FILM COATED ORAL NIGHTLY
Status: DISCONTINUED | OUTPATIENT
Start: 2023-06-22 | End: 2023-06-23 | Stop reason: HOSPADM

## 2023-06-22 RX ORDER — ACETAMINOPHEN 325 MG/1
650 TABLET ORAL EVERY 8 HOURS PRN
Status: DISCONTINUED | OUTPATIENT
Start: 2023-06-22 | End: 2023-06-23 | Stop reason: HOSPADM

## 2023-06-22 RX ORDER — DEXTROSE 40 %
15 GEL (GRAM) ORAL
Status: DISCONTINUED | OUTPATIENT
Start: 2023-06-22 | End: 2023-06-23 | Stop reason: HOSPADM

## 2023-06-22 RX ORDER — CLONIDINE HYDROCHLORIDE 0.1 MG/1
0.1 TABLET ORAL 3 TIMES DAILY PRN
Status: DISCONTINUED | OUTPATIENT
Start: 2023-06-22 | End: 2023-06-23 | Stop reason: HOSPADM

## 2023-06-22 RX ORDER — DEXTROSE 40 %
30 GEL (GRAM) ORAL
Status: DISCONTINUED | OUTPATIENT
Start: 2023-06-22 | End: 2023-06-23 | Stop reason: HOSPADM

## 2023-06-22 RX ORDER — INSULIN ASPART 100 [IU]/ML
0-5 INJECTION, SOLUTION INTRAVENOUS; SUBCUTANEOUS
Status: DISCONTINUED | OUTPATIENT
Start: 2023-06-22 | End: 2023-06-23 | Stop reason: HOSPADM

## 2023-06-22 RX ORDER — ENOXAPARIN SODIUM 100 MG/ML
30 INJECTION SUBCUTANEOUS EVERY 24 HOURS
Status: DISCONTINUED | OUTPATIENT
Start: 2023-06-23 | End: 2023-06-22

## 2023-06-22 RX ORDER — MORPHINE SULFATE 4 MG/ML
2 INJECTION, SOLUTION INTRAMUSCULAR; INTRAVENOUS EVERY 4 HOURS PRN
Status: DISCONTINUED | OUTPATIENT
Start: 2023-06-22 | End: 2023-06-23 | Stop reason: HOSPADM

## 2023-06-22 RX ORDER — CARVEDILOL 12.5 MG/1
25 TABLET ORAL 2 TIMES DAILY
Status: DISCONTINUED | OUTPATIENT
Start: 2023-06-22 | End: 2023-06-23 | Stop reason: HOSPADM

## 2023-06-22 RX ORDER — NAPROXEN SODIUM 220 MG/1
81 TABLET, FILM COATED ORAL DAILY
Status: DISCONTINUED | OUTPATIENT
Start: 2023-06-23 | End: 2023-06-23 | Stop reason: HOSPADM

## 2023-06-22 RX ORDER — SIMETHICONE 80 MG
1 TABLET,CHEWABLE ORAL 4 TIMES DAILY PRN
Status: DISCONTINUED | OUTPATIENT
Start: 2023-06-22 | End: 2023-06-23 | Stop reason: HOSPADM

## 2023-06-22 RX ORDER — NALOXONE HCL 0.4 MG/ML
0.02 VIAL (ML) INJECTION
Status: DISCONTINUED | OUTPATIENT
Start: 2023-06-22 | End: 2023-06-23 | Stop reason: HOSPADM

## 2023-06-22 RX ORDER — SODIUM CHLORIDE 0.9 % (FLUSH) 0.9 %
3 SYRINGE (ML) INJECTION EVERY 12 HOURS PRN
Status: DISCONTINUED | OUTPATIENT
Start: 2023-06-22 | End: 2023-06-23 | Stop reason: HOSPADM

## 2023-06-22 RX ORDER — AMLODIPINE BESYLATE 5 MG/1
10 TABLET ORAL
Status: COMPLETED | OUTPATIENT
Start: 2023-06-22 | End: 2023-06-22

## 2023-06-22 RX ORDER — ASPIRIN 325 MG
325 TABLET ORAL
Status: COMPLETED | OUTPATIENT
Start: 2023-06-22 | End: 2023-06-22

## 2023-06-22 RX ORDER — GLUCAGON 1 MG
1 KIT INJECTION
Status: DISCONTINUED | OUTPATIENT
Start: 2023-06-22 | End: 2023-06-23 | Stop reason: HOSPADM

## 2023-06-22 RX ORDER — TALC
6 POWDER (GRAM) TOPICAL NIGHTLY PRN
Status: DISCONTINUED | OUTPATIENT
Start: 2023-06-22 | End: 2023-06-23 | Stop reason: HOSPADM

## 2023-06-22 RX ORDER — ACETAMINOPHEN 650 MG/1
650 SUPPOSITORY RECTAL EVERY 4 HOURS PRN
Status: DISCONTINUED | OUTPATIENT
Start: 2023-06-22 | End: 2023-06-23 | Stop reason: HOSPADM

## 2023-06-22 RX ORDER — MAG HYDROX/ALUMINUM HYD/SIMETH 200-200-20
30 SUSPENSION, ORAL (FINAL DOSE FORM) ORAL 4 TIMES DAILY PRN
Status: DISCONTINUED | OUTPATIENT
Start: 2023-06-22 | End: 2023-06-23 | Stop reason: HOSPADM

## 2023-06-22 RX ORDER — AMLODIPINE BESYLATE 5 MG/1
5 TABLET ORAL NIGHTLY
Status: DISCONTINUED | OUTPATIENT
Start: 2023-06-23 | End: 2023-06-23 | Stop reason: HOSPADM

## 2023-06-22 RX ORDER — PROMETHAZINE HYDROCHLORIDE 25 MG/1
25 TABLET ORAL EVERY 6 HOURS PRN
Status: DISCONTINUED | OUTPATIENT
Start: 2023-06-22 | End: 2023-06-23 | Stop reason: HOSPADM

## 2023-06-22 RX ORDER — ENOXAPARIN SODIUM 100 MG/ML
40 INJECTION SUBCUTANEOUS EVERY 24 HOURS
Status: DISCONTINUED | OUTPATIENT
Start: 2023-06-23 | End: 2023-06-23 | Stop reason: HOSPADM

## 2023-06-22 RX ORDER — HYDROCODONE BITARTRATE AND ACETAMINOPHEN 5; 325 MG/1; MG/1
1 TABLET ORAL EVERY 6 HOURS PRN
Status: DISCONTINUED | OUTPATIENT
Start: 2023-06-22 | End: 2023-06-23 | Stop reason: HOSPADM

## 2023-06-22 RX ORDER — POLYETHYLENE GLYCOL 3350 17 G/17G
17 POWDER, FOR SOLUTION ORAL DAILY PRN
Status: DISCONTINUED | OUTPATIENT
Start: 2023-06-22 | End: 2023-06-23 | Stop reason: HOSPADM

## 2023-06-22 RX ORDER — ONDANSETRON 2 MG/ML
4 INJECTION INTRAMUSCULAR; INTRAVENOUS EVERY 8 HOURS PRN
Status: DISCONTINUED | OUTPATIENT
Start: 2023-06-22 | End: 2023-06-23 | Stop reason: HOSPADM

## 2023-06-22 RX ORDER — LABETALOL HYDROCHLORIDE 5 MG/ML
10 INJECTION, SOLUTION INTRAVENOUS
Status: COMPLETED | OUTPATIENT
Start: 2023-06-22 | End: 2023-06-22

## 2023-06-22 RX ORDER — GABAPENTIN 400 MG/1
400 CAPSULE ORAL EVERY 8 HOURS
Status: DISCONTINUED | OUTPATIENT
Start: 2023-06-23 | End: 2023-06-23 | Stop reason: HOSPADM

## 2023-06-22 RX ORDER — SODIUM CHLORIDE, SODIUM LACTATE, POTASSIUM CHLORIDE, CALCIUM CHLORIDE 600; 310; 30; 20 MG/100ML; MG/100ML; MG/100ML; MG/100ML
INJECTION, SOLUTION INTRAVENOUS CONTINUOUS
Status: DISCONTINUED | OUTPATIENT
Start: 2023-06-22 | End: 2023-06-23

## 2023-06-22 RX ADMIN — SODIUM CHLORIDE, POTASSIUM CHLORIDE, SODIUM LACTATE AND CALCIUM CHLORIDE: 600; 310; 30; 20 INJECTION, SOLUTION INTRAVENOUS at 09:06

## 2023-06-22 RX ADMIN — ATORVASTATIN CALCIUM 40 MG: 40 TABLET, FILM COATED ORAL at 11:06

## 2023-06-22 RX ADMIN — AMLODIPINE BESYLATE 10 MG: 5 TABLET ORAL at 04:06

## 2023-06-22 RX ADMIN — ASPIRIN 325 MG ORAL TABLET 325 MG: 325 PILL ORAL at 06:06

## 2023-06-22 RX ADMIN — VANCOMYCIN HYDROCHLORIDE 1500 MG: 1.5 INJECTION, POWDER, LYOPHILIZED, FOR SOLUTION INTRAVENOUS at 06:06

## 2023-06-22 RX ADMIN — CARVEDILOL 25 MG: 12.5 TABLET, FILM COATED ORAL at 11:06

## 2023-06-22 RX ADMIN — LABETALOL HYDROCHLORIDE 10 MG: 5 INJECTION INTRAVENOUS at 06:06

## 2023-06-22 NOTE — Clinical Note
Diagnosis: Hypertensive emergency [154629]   Future Attending Provider: JORGE AMADOR [[id number]]   Admitting Provider:: JORGE AMADOR. [[id number]]

## 2023-06-22 NOTE — ED PROVIDER NOTES
SCRIBE #1 NOTE: I, Renee Torres, am scribing for, and in the presence of, Marc Contreras MD. I have scribed the entire note.       History     Chief Complaint   Patient presents with    Rash     Pt states for the past few months he has been dealing with a rash underneath his chin that is now red with some purulent drainage coming from it. Pt states he has been a multiple of places for antibiotics but nothing has helped.       Review of patient's allergies indicates:   Allergen Reactions    Neosporin [benzalkonium chloride]          History of Present Illness     HPI    6/22/2023, 4:26 PM  History obtained from the patient      History of Present Illness: Ernst May is a 62 y.o. male patient with a PMHx of HTN, CKD, DM, and sepsis who presents to the Emergency Department for evaluation of rash underneath his chin which onset gradually in September while in the hospital for R BKA. Patient reports picking at the area,having tried abx in the past, but ultimately the rash will not heal. Pt reports noncompliance with BG monitoring and all medications.  Symptoms are constant and moderate in severity. No mitigating or exacerbating factors reported. Patient denies any CP, difficulty speaking, fever, and all other sxs at this time. No further complaints or concerns at this time.       Arrival mode: Personal vehicle      PCP: Primary Doctor No        Past Medical History:  Past Medical History:   Diagnosis Date    Back abscess 09/2021    CKD (chronic kidney disease) 09/2021    Diabetes mellitus with hyperglycemia 09/2021    Diabetic foot ulcer 09/2021    bilateral, severe abrasions    Hypertension     Osteomyelitis of cervical spine 09/2021    under back abscess    Sepsis 10/9/2021       Past Surgical History:  Past Surgical History:   Procedure Laterality Date    BELOW KNEE AMPUTATION OF LOWER EXTREMITY Right 9/4/2022    Procedure: AMPUTATION, BELOW KNEE;  Surgeon: Simone Palacios MD;  Location: [facility] OR;  Service:  Orthopedics;  Laterality: Right;    CYST REMOVAL  2012    FOOT AMPUTATION Right 8/26/2022    Procedure: AMPUTATION, FOOT;  Surgeon: Dave Mathur DPM;  Location: [facility] [place] OR;  Service: Podiatry;  Laterality: Right;  4th and 5th metatarsal     INCISION AND DRAINAGE OF ABSCESS Left 9/4/2021    Procedure: INCISION AND DRAINAGE, ABSCESS;  Surgeon: Surjit Chawla MD;  Location: [facility] OR;  Service: General;  Laterality: Left;    INCISION AND DRAINAGE OF ABSCESS Left 9/14/2021    Procedure: INCISION AND DRAINAGE, ABSCESS; DEBRIDEMENT;  Surgeon: Surjit Chawla MD;  Location: [facility] OR;  Service: General;  Laterality: Left;    REPLACEMENT OF WOUND VACUUM-ASSISTED CLOSURE DEVICE Left 9/14/2021    Procedure: REPLACEMENT, WOUND VAC;  Surgeon: Surjit Chawla MD;  Location: [facility] OR;  Service: General;  Laterality: Left;  EXCHANGE.         Family History:  Family History   Problem Relation Age of Onset    Diabetes Mother     Heart disease Mother     Arthritis Father     Diabetes Father     Heart disease Father     Hypertension Father     Diabetes Maternal Grandmother        Social History:  Social History     Tobacco Use    Smoking status: Some Days     Types: Cigars    Smokeless tobacco: Never    Tobacco comments:     occassionally   Substance and Sexual Activity    Alcohol use: No    Drug use: No    Sexual activity: Not on file        Review of Systems     Review of Systems   Constitutional:  Positive for fever.        (-) trouble speaking   Cardiovascular:  Negative for chest pain.   Skin:  Positive for rash.    Physical Exam     Initial Vitals [06/22/23 1515]   BP Pulse Resp Temp SpO2   (!) 214/114 86 19 97.9 °F (36.6 °C) 98 %      MAP       --          Physical Exam  Nursing Notes and Vital Signs Reviewed.  Constitutional: Patient is in no apparent distress. Well-developed and well-nourished.  Head: Atraumatic. Normocephalic.  Eyes: PERRL. EOM intact. Conjunctivae are not pale. No scleral icterus.  ENT: Mucous membranes  are moist. Oropharynx is clear and symmetric.    Neck: Supple. Full ROM. No lymphadenopathy.  Cardiovascular: Regular rate. Regular rhythm. No murmurs, rubs, or gallops. Distal pulses are 2+ and symmetric.  Pulmonary/Chest: No respiratory distress. Clear to auscultation bilaterally. No wheezing or rales.  Abdominal: Soft and non-distended.  There is no tenderness.  No rebound, guarding, or rigidity.   Genitourinary: No CVA tenderness  Musculoskeletal: Moves all extremities. R BKA.   Skin: Warm and dry. Excoriation to forehead over a small pustule and linear region of excoriations and skin break down submandibular aspect. Thickened skin around edges of excoriation on submandibular region with no obvious signs of active infection. No abscess, drainage. The submandibular space is soft.       Neurological:  Alert, awake, and appropriate.  Normal speech.  No acute focal neurological deficits are appreciated.  Psychiatric: Normal affect. Good eye contact. Appropriate in content.     ED Course   Critical Care    Date/Time: 6/22/2023 5:43 PM  Performed by: Marc Contreras MD  Authorized by: Marc Contreras MD   Direct patient critical care time: 18 minutes  Additional history critical care time: 13 minutes  Ordering / reviewing critical care time: 7 minutes  Documentation critical care time: 7 minutes  Total critical care time (exclusive of procedural time) : 45 minutes  Critical care time was exclusive of separately billable procedures and treating other patients and teaching time.  Critical care was necessary to treat or prevent imminent or life-threatening deterioration of the following conditions: hyperglycemia, kidney injury, and elevated troponin.  Critical care was time spent personally by me on the following activities: blood draw for specimens, development of treatment plan with patient or surrogate, discussions with consultants, interpretation of cardiac output measurements, evaluation of patient's response to  treatment, examination of patient, obtaining history from patient or surrogate, ordering and performing treatments and interventions, ordering and review of laboratory studies, ordering and review of radiographic studies, re-evaluation of patient's condition, review of old charts and pulse oximetry.      ED Vital Signs:  Vitals:    06/22/23 1515 06/22/23 1651 06/22/23 1749 06/22/23 1817   BP: (!) 214/114 (!) 210/99  (!) 213/115   Pulse: 86      Resp: 19      Temp: 97.9 °F (36.6 °C)      TempSrc: Oral      SpO2: 98%      Weight:   80.7 kg (177 lb 14.6 oz)        Abnormal Lab Results:  Labs Reviewed   CBC W/ AUTO DIFFERENTIAL - Abnormal; Notable for the following components:       Result Value    RBC 3.67 (*)     Hemoglobin 10.5 (*)     Hematocrit 31.2 (*)     All other components within normal limits   COMPREHENSIVE METABOLIC PANEL - Abnormal; Notable for the following components:    Glucose 275 (*)     BUN 39 (*)     Creatinine 2.0 (*)     Albumin 3.0 (*)     eGFR 37 (*)     All other components within normal limits   B-TYPE NATRIURETIC PEPTIDE - Abnormal; Notable for the following components:     (*)     All other components within normal limits   TROPONIN I - Abnormal; Notable for the following components:    Troponin I 0.157 (*)     All other components within normal limits   POCT GLUCOSE - Abnormal; Notable for the following components:    POCT Glucose 243 (*)     All other components within normal limits   ISTAT PROCEDURE - Abnormal; Notable for the following components:    POC PH 7.319 (*)     POC PCO2 50.3 (*)     POC PO2 31 (*)     POC SATURATED O2 54 (*)     All other components within normal limits   MAGNESIUM   URINALYSIS, REFLEX TO URINE CULTURE   POCT GLUCOSE MONITORING CONTINUOUS        All Lab Results:  Results for orders placed or performed during the hospital encounter of 06/22/23   CBC auto differential   Result Value Ref Range    WBC 9.28 3.90 - 12.70 K/uL    RBC 3.67 (L) 4.60 - 6.20 M/uL     Hemoglobin 10.5 (L) 14.0 - 18.0 g/dL    Hematocrit 31.2 (L) 40.0 - 54.0 %    MCV 85 82 - 98 fL    MCH 28.6 27.0 - 31.0 pg    MCHC 33.7 32.0 - 36.0 g/dL    RDW 12.2 11.5 - 14.5 %    Platelets 280 150 - 450 K/uL    MPV 9.9 9.2 - 12.9 fL    Immature Granulocytes 0.2 0.0 - 0.5 %    Gran # (ANC) 5.9 1.8 - 7.7 K/uL    Immature Grans (Abs) 0.02 0.00 - 0.04 K/uL    Lymph # 2.1 1.0 - 4.8 K/uL    Mono # 0.8 0.3 - 1.0 K/uL    Eos # 0.3 0.0 - 0.5 K/uL    Baso # 0.07 0.00 - 0.20 K/uL    nRBC 0 0 /100 WBC    Gran % 64.0 38.0 - 73.0 %    Lymph % 23.0 18.0 - 48.0 %    Mono % 8.7 4.0 - 15.0 %    Eosinophil % 3.3 0.0 - 8.0 %    Basophil % 0.8 0.0 - 1.9 %    Differential Method Automated    Comprehensive metabolic panel   Result Value Ref Range    Sodium 138 136 - 145 mmol/L    Potassium 4.6 3.5 - 5.1 mmol/L    Chloride 104 95 - 110 mmol/L    CO2 23 23 - 29 mmol/L    Glucose 275 (H) 70 - 110 mg/dL    BUN 39 (H) 8 - 23 mg/dL    Creatinine 2.0 (H) 0.5 - 1.4 mg/dL    Calcium 8.9 8.7 - 10.5 mg/dL    Total Protein 7.5 6.0 - 8.4 g/dL    Albumin 3.0 (L) 3.5 - 5.2 g/dL    Total Bilirubin 0.2 0.1 - 1.0 mg/dL    Alkaline Phosphatase 130 55 - 135 U/L    AST 20 10 - 40 U/L    ALT 15 10 - 44 U/L    eGFR 37 (A) >60 mL/min/1.73 m^2    Anion Gap 11 8 - 16 mmol/L   Magnesium   Result Value Ref Range    Magnesium 1.8 1.6 - 2.6 mg/dL   Brain natriuretic peptide   Result Value Ref Range     (H) 0 - 99 pg/mL   Troponin I   Result Value Ref Range    Troponin I 0.157 (H) 0.000 - 0.026 ng/mL   POCT glucose   Result Value Ref Range    POCT Glucose 243 (H) 70 - 110 mg/dL   ISTAT PROCEDURE   Result Value Ref Range    POC PH 7.319 (L) 7.35 - 7.45    POC PCO2 50.3 (H) 35 - 45 mmHg    POC PO2 31 (L) 40 - 60 mmHg    POC HCO3 25.9 24 - 28 mmol/L    POC BE 0 -2 to 2 mmol/L    POC SATURATED O2 54 (L) 95 - 100 %    Sample VENOUS     Site Other     Allens Test N/A     DelSys Room Air     Mode SPONT     [id number]          Imaging Results:  Imaging Results               X-Ray Chest AP Portable (Final result)  Result time 06/22/23 18:04:35      Final result by Renny Menon MD (06/22/23 18:04:35)                   Impression:      No acute abnormality.    Cardiomegaly      Electronically signed by: Renny Menon  Date:    06/22/2023  Time:    18:04               Narrative:    EXAMINATION:  XR CHEST AP PORTABLE    CLINICAL HISTORY:  Hypertension;    TECHNIQUE:  Single frontal view of the chest was performed.    COMPARISON:  None    FINDINGS:  The lungs are clear, with normal appearance of pulmonary vasculature and no pleural effusion or pneumothorax.    Cardiomegaly the hilar and mediastinal contours are unremarkable.    Bones are intact.                                       The EKG was ordered, reviewed, and independently interpreted by the ED provider.  Interpretation time: 17:49  Rate: 81 BPM  Rhythm: normal sinus rhythm  Interpretation: Indeterminate axis. Right bundle branch block. No STEMI.             The Emergency Provider reviewed the vital signs and test results, which are outlined above.     ED Discussion     6:22 PM: Discussed case with Dr. Mccloud ([facility]). Dr. Padilla agrees with current care and management of pt and accepts admission.   Admitting Service: [facility] medicine   Admitting Physician: Dr. Padilla  Admit to: Obs    6:24 PM: Re-evaluated pt. I have discussed test results, shared treatment plan, and the need for admission with patient and family at bedside. Pt and family express understanding at this time and agree with all information. All questions answered. Pt and family have no further questions or concerns at this time. Pt is ready for admit.        ED Course as of 06/22/23 1822   Thu Jun 22, 2023   1742 Patient initially refusing EKG.  Patient was considering leaving, however we discussed the grave seriousness of his chronic medical problems and his lab work today.  Patient agrees to receive EKG. [BA]      ED Course User Index  [BA]  Marc Contreras MD     Medical Decision Making:   Clinical Tests:   Lab Tests: Ordered and Reviewed  Radiological Study: Ordered and Reviewed  Medical Tests: Ordered and Reviewed         ED Medication(s):  Medications   aspirin tablet 325 mg (has no administration in time range)   vancomycin - pharmacy to dose (has no administration in time range)   vancomycin 1,500 mg in dextrose 5 % (D5W) 250 mL IVPB (Vial-Mate) (has no administration in time range)   amLODIPine tablet 10 mg (10 mg Oral Given 6/22/23 1651)   labetaloL injection 10 mg (10 mg Intravenous Given 6/22/23 1817)       New Prescriptions    No medications on file               Scribe Attestation:   Scribe #1: I performed the above scribed service and the documentation accurately describes the services I performed. I attest to the accuracy of the note.     Attending:   Physician Attestation Statement for Scribe #1: I, Marc Contreras MD, personally performed the services described in this documentation, as scribed by Renee Torres, in my presence, and it is both accurate and complete.           Clinical Impression       ICD-10-CM ICD-9-CM   1. Rash of face  R21 782.1   2. Hypertension  I10 401.9   3. JOAN (acute kidney injury)  N17.9 584.9   4. Elevated troponin  R77.8 790.6   5. Hypertensive emergency  I16.1 401.9       Disposition:   Disposition: Placed in Observation  Condition: Fair       Marc Contreras MD  06/23/23 0027

## 2023-06-23 VITALS
DIASTOLIC BLOOD PRESSURE: 60 MMHG | SYSTOLIC BLOOD PRESSURE: 113 MMHG | BODY MASS INDEX: 26.19 KG/M2 | HEART RATE: 61 BPM | WEIGHT: 172.81 LBS | TEMPERATURE: 98 F | OXYGEN SATURATION: 97 % | HEIGHT: 68 IN | RESPIRATION RATE: 18 BRPM

## 2023-06-23 PROBLEM — R79.89 ELEVATED TROPONIN: Status: ACTIVE | Noted: 2023-06-23

## 2023-06-23 PROBLEM — R21 RASH OF NECK: Status: RESOLVED | Noted: 2023-06-23 | Resolved: 2023-06-23

## 2023-06-23 PROBLEM — S10.91XA: Status: ACTIVE | Noted: 2023-06-23

## 2023-06-23 PROBLEM — R21 RASH OF NECK: Status: ACTIVE | Noted: 2023-06-23

## 2023-06-23 PROCEDURE — G0378 HOSPITAL OBSERVATION PER HR: HCPCS

## 2023-06-23 PROCEDURE — 25000003 PHARM REV CODE 250: Performed by: NURSE PRACTITIONER

## 2023-06-23 PROCEDURE — 96361 HYDRATE IV INFUSION ADD-ON: CPT

## 2023-06-23 RX ORDER — LISINOPRIL 40 MG/1
40 TABLET ORAL DAILY
Qty: 15 TABLET | Refills: 0
Start: 2023-06-23

## 2023-06-23 RX ORDER — GLIPIZIDE 5 MG/1
5 TABLET ORAL
Qty: 15 TABLET | Refills: 0
Start: 2023-06-23

## 2023-06-23 RX ORDER — CLONIDINE HYDROCHLORIDE 0.1 MG/1
0.1 TABLET ORAL 3 TIMES DAILY PRN
Qty: 15 TABLET | Refills: 0 | Status: SHIPPED | OUTPATIENT
Start: 2023-06-23

## 2023-06-23 RX ORDER — CICLOPIROX OLAMINE 7.7 MG/G
CREAM TOPICAL 2 TIMES DAILY
Qty: 30 G | Refills: 1 | Status: SHIPPED | OUTPATIENT
Start: 2023-06-23 | End: 2023-06-23 | Stop reason: HOSPADM

## 2023-06-23 RX ORDER — AMLODIPINE BESYLATE 5 MG/1
5 TABLET ORAL NIGHTLY
Qty: 15 TABLET | Refills: 0
Start: 2023-06-23

## 2023-06-23 RX ORDER — NYSTATIN 100000 U/G
CREAM TOPICAL 2 TIMES DAILY
Qty: 15 G | Refills: 1 | Status: SHIPPED | OUTPATIENT
Start: 2023-06-23

## 2023-06-23 RX ORDER — CARVEDILOL 25 MG/1
25 TABLET ORAL 2 TIMES DAILY
Qty: 30 TABLET | Refills: 0
Start: 2023-06-23

## 2023-06-23 RX ORDER — METFORMIN HYDROCHLORIDE 500 MG/1
500 TABLET ORAL 2 TIMES DAILY
Qty: 15 TABLET | Refills: 0
Start: 2023-06-23

## 2023-06-23 RX ADMIN — CARVEDILOL 25 MG: 12.5 TABLET, FILM COATED ORAL at 08:06

## 2023-06-23 RX ADMIN — ASPIRIN 81 MG CHEWABLE TABLET 81 MG: 81 TABLET CHEWABLE at 08:06

## 2023-06-23 RX ADMIN — THERA TABS 1 TABLET: TAB at 08:06

## 2023-06-23 NOTE — PLAN OF CARE
[facility] - Med Surg  Discharge Final Note    Primary Care Provider: Primary Doctor No    Expected Discharge Date: 6/23/2023    Final Discharge Note (most recent)       Final Note - 06/23/23 1334          Final Note    Assessment Type Final Discharge Note     Anticipated Discharge Disposition Home or Self Care     Hospital Resources/Appts/Education Provided Appointments scheduled and added to AVS                       PP f/u scheduled for June 30th 9am. The [facility] clinic on [address].

## 2023-06-23 NOTE — PHARMACY MED REC
"Admission Medication History     The home medication history was taken by Fransico Johns.    You may go to "Admission" then "Reconcile Home Medications" tabs to review and/or act upon these items.     The home medication list has been updated by the Pharmacy department.   Please read ALL comments highlighted in yellow.   Please address this information as you see fit.    Feel free to contact us if you have any questions or require assistance.      Medications listed below were obtained from: Patient/family and Analytic software- [employer]  (Not in a hospital admission)      Potential issues to be addressed PRIOR TO DISCHARGE  Please discuss with the patient barriers to adherence with medication treatment plans  Patient requires education regarding drug therapies   Drug cost interfering with therapy: (ALL MEDICATIONS)    Fransico Johns  [id number]    Current Outpatient Medications on File Prior to Encounter   Medication Sig Dispense Refill Last Dose    acetaminophen (TYLENOL) 325 MG tablet Take 2 tablets (650 mg total) by mouth every 4 (four) hours as needed for Pain.  0     amLODIPine (NORVASC) 5 MG tablet Take 1 tablet (5 mg total) by mouth every evening. 30 tablet 11 More than a month    ascorbic acid, vitamin C, (VITAMIN C) 500 MG tablet Take 1 tablet (500 mg total) by mouth once daily.       aspirin 81 MG Chew Take 1 tablet (81 mg total) by mouth once daily.  0 More than a month    atorvastatin (LIPITOR) 40 MG tablet Take 1 tablet (40 mg total) by mouth every evening. 30 tablet 0 More than a month    carvediloL (COREG) 25 MG tablet Take 1 tablet (25 mg total) by mouth 2 (two) times daily. 60 tablet 11 More than a month    cloNIDine (CATAPRES) 0.1 MG tablet Take 1 tablet (0.1 mg total) by mouth 3 (three) times daily as needed (170).   More than a month    gabapentin (NEURONTIN) 400 MG capsule Take 1 capsule (400 mg total) by mouth every 8 (eight) hours. 90 capsule 11 More than a month    glipiZIDE " (GLUCOTROL) 5 MG tablet Take 1 tablet (5 mg total) by mouth daily with breakfast. 30 tablet 11 More than a month    glucose 4 GM chewable tablet Take 4 tablets (16 g total) by mouth as needed for Low blood sugar.  12     HYDROcodone-acetaminophen (NORCO) 5-325 mg per tablet Take 1 tablet by mouth every 6 (six) hours as needed for Pain. 20 tablet 0     L.acidophil,parac-S.therm-Bif. (RISAQUAD) Cap capsule Take 1 capsule by mouth once daily.       lisinopriL (PRINIVIL,ZESTRIL) 40 MG tablet Take 1 tablet (40 mg total) by mouth once daily. 90 tablet 3 More than a month    metFORMIN (GLUCOPHAGE) 500 MG tablet Take 1 tablet (500 mg total) by mouth 2 (two) times daily.   More than a month    multivitamin Tab Take 1 tablet by mouth once daily.                              .

## 2023-06-23 NOTE — CONSULTS
Pharmacokinetic Initial Assessment: IV Vancomycin    Assessment/Plan:    Initiate intravenous vancomycin with loading dose of 20 mg/kg = 1500 mg once with subsequent doses when random concentrations are less than 20 mcg/mL  Desired empiric serum trough concentration is 10 to 20 mcg/mL  Draw vancomycin random level on 6/23/23 at 1230.  Pharmacy will continue to follow and monitor vancomycin.      Please contact pharmacy at extension [phone] with any questions regarding this assessment.     Thank you for the consult,   Angeles Keita PharmD       Patient brief summary:  Ernst May is a 62 y.o. male initiated on antimicrobial therapy with IV Vancomycin for treatment of suspected skin & soft tissue infection    Drug Allergies:   Review of patient's allergies indicates:   Allergen Reactions    Neosporin [benzalkonium chloride]        Actual Body Weight: 80.7 kg    Renal Function: Estimated Creatinine Clearance: 38.9 mL/min (A) (based on SCr of 2 mg/dL (H)).,     Dialysis Method (if applicable): N/A    CBC (last 72 hours):  Recent Labs   Lab Result Units 06/22/23  1650   WBC K/uL 9.28   Hemoglobin g/dL 10.5*   Hematocrit % 31.2*   Platelets K/uL 280   Gran % % 64.0   Lymph % % 23.0   Mono % % 8.7   Eosinophil % % 3.3   Basophil % % 0.8   Differential Method  Automated       Metabolic Panel (last 72 hours):  Recent Labs   Lab Result Units 06/22/23  1650   Sodium mmol/L 138   Potassium mmol/L 4.6   Chloride mmol/L 104   CO2 mmol/L 23   Glucose mg/dL 275*   BUN mg/dL 39*   Creatinine mg/dL 2.0*   Albumin g/dL 3.0*   Total Bilirubin mg/dL 0.2   Alkaline Phosphatase U/L 130   AST U/L 20   ALT U/L 15   Magnesium mg/dL 1.8       Drug levels (last 3 results):  No results for input(s): VANCOMYCINRA, VANCORANDOM, VANCOMYCINPE, VANCOPEAK, VANCOMYCINTR, VANCOTROUGH in the last 72 hours.    Microbiologic Results:  Microbiology Results (last 7 days)       ** No results found for the last 168 hours. **          Thank you for allowing  us to participate in this patient's care.     Angeles Keita, PharmD 06/22/2023 7:13 PM

## 2023-06-23 NOTE — DISCHARGE SUMMARY
[facility] Medicine  Discharge Summary      Patient Name: Ernst May  MRN: [id number]  [facility]): [id number]  Patient Class: OP- Observation  Admission Date: 6/22/2023  Hospital Length of Stay: 0 days  Discharge Date and Time:  06/23/2023 1:22 PM  Attending Physician: Jasmyne Sanchez MD   Discharging Provider: Jasmyne Sanchez MD  Primary Care Provider: Primary Doctor Tanika    Primary Care Team: Networked reference to record PCT     HPI:   Ernst May is a 62 y.o. male with a PMH  has a past medical history of Back abscess (09/2021), CKD (chronic kidney disease) (09/2021), Diabetes mellitus with hyperglycemia (09/2021), Diabetic foot ulcer (09/2021), Hypertension, Osteomyelitis of cervical spine (09/2021), and Sepsis (10/9/2021).  Presented to the ER for evaluation of rash to his neck region.  Patient reports he has history of eczema and is always constantly itching, but states since after having his right BKA he has been scratching his beard on his neck more frequently causing unhealed scab wounds.  Patient also reports that his blood pressure and sugars have been abnormal and states he is not been taking his medicine for few months due to affordability and not being able to get in to be seen by PCP.  Denies any actual associated symptoms versus any other complaints at this time.  ER workup revealed BUN/creatinine 39/2.0 mm CBG of 275, , troponin of 0.157, chest x-ray without acute findings.  EKG revealed normal sinus rhythm with right bundle-branch block with a ventricular rate of 81 beats per minute with a QT/QTC of 4 6/471.  ABG also revealed pH of 7.319, pCO2 of 50.3, PO2 of 31, HC03 of 25.9, and O2 saturation 54%.  Denies any use of home oxygen or shortness of breath at this time.  Patient's blood pressure also noted to be elevated upon arrival to ED at 214/114.  Patient received total of 10 mg Norvasc, 325 mg ASA, 10 mg labetalol, and 1.5 g of vancomycin in ED. hospital Medicine consulted to  admit patient for hypertension and trending of troponins.  Patient in agreement with treatment plan.  Patient admitted under observation status.    PCP: Primary Doctor No           * No surgery found *      Hospital Course:   6/23  Admitted for elevated troponin(s) and concerns for rash under neck. Patient with hypertensive urgency. Troponin(s) elevated but trended down, due to demand ischemia. Patient states having moved from [place] and needs primary care provider for chronic medical conditions.    Neck rash, chronic. Ongoing for 3-6 months. Has tried topical antibiotic(s) but makes worse. Reports pruritis and admits to scratching. Prescription for antifungal delivered to bedside. Uncontrolled diabetes mellitus. Ps lupillo, right.    Case management consulted for discharge planning to establish care with primary care provider.    Patient seen and evaluated by me. Patient was determined to be suitable for discharge. Patient deemed stable for discharge to home with nurse practitioner to visit home program. Medication(s) delivered to bedside prior to discharge.         Goals of Care Treatment Preferences:  Code Status: Full Code      Consults:   Consults (From admission, onward)        Status Ordering Provider     Inpatient consult to Social Work/Case Management  Once        Provider:  (Not yet assigned)    Completed EVY AMAYA     Inpatient consult to Diabetes educator  Once        Provider:  (Not yet assigned)    Ordered EVY AMAYA     IP consult to case management  Once        Provider:  (Not yet assigned)    Completed JORGE AMADOR     Pharmacy to dose Vancomycin consult  Once        Provider:  (Not yet assigned)   See [facility] for full Linked Orders Report.    Acknowledged ANTONIO WESTON          No new Assessment & Plan notes have been filed under this hospital service since the last note was generated.  Service: Hospital Medicine    Final Active Diagnoses:    Diagnosis Date Noted POA    PRINCIPAL  PROBLEM:  Neck abrasion, initial encounter [S10.91XA] 06/23/2023 Unknown    Elevated troponin [R77.8] 06/23/2023 Unknown    HLD (hyperlipidemia) [E78.5] 08/28/2022 Yes    Essential hypertension, not well controlled [I10] 09/10/2021 Yes     Chronic    Diabetes mellitus with hyperglycemia [E11.65] 09/2021 Yes      Problems Resolved During this Admission:    Diagnosis Date Noted Date Resolved POA    Rash of neck [R21] 06/23/2023 06/23/2023 Unknown       Discharged Condition: stable    Disposition: Home or Self Care    Follow Up:    Patient Instructions:      Ambulatory referral/consult to [facility]   Standing Status: Future   Referral Priority: Routine Referral Type: Consultation   Referral Reason: Specialty Services Required   Number of Visits Requested: 1     Diet diabetic     Diet Cardiac     Activity as tolerated       Significant Diagnostic Studies: Labs:   CMP   Recent Labs   Lab 06/22/23  1650      K 4.6      CO2 23   *   BUN 39*   CREATININE 2.0*   CALCIUM 8.9   PROT 7.5   ALBUMIN 3.0*   BILITOT 0.2   ALKPHOS 130   AST 20   ALT 15   ANIONGAP 11   , CBC   Recent Labs   Lab 06/22/23  1650   WBC 9.28   HGB 10.5*   HCT 31.2*      , A1C: patient refused blood draws and All labs within the past 24 hours have been reviewed  Radiology: X-Ray: CXR: portable    Pending Diagnostic Studies:     Procedure Component Value Units Date/Time    Creatinine, Serum [[id number]]     Order Status: Sent Lab Status: No result     Specimen: Blood     Hemoglobin A1c [[id number]]     Order Status: Sent Lab Status: No result     Specimen: Blood     Vancomycin, Random [[id number]]     Order Status: Sent Lab Status: No result     Specimen: Blood          Medications:  Reconciled Home Medications:      Medication List      START taking these medications    ciclopirox 0.77 % Crea  Commonly known as: LOPROX  Apply topically 2 (two) times daily. Apply to affected area, chin         CHANGE how you take these medications    cloNIDine 0.1 MG tablet  Commonly known as: CATAPRES  Take 1 tablet (0.1 mg total) by mouth 3 (three) times daily as needed.  What changed: reasons to take this        CONTINUE taking these medications    acetaminophen 325 MG tablet  Commonly known as: TYLENOL  Take 2 tablets (650 mg total) by mouth every 4 (four) hours as needed for Pain.     amLODIPine 5 MG tablet  Commonly known as: NORVASC  Take 1 tablet (5 mg total) by mouth every evening.     ascorbic acid (vitamin C) 500 MG tablet  Commonly known as: VITAMIN C  Take 1 tablet (500 mg total) by mouth once daily.     aspirin 81 MG Chew  Take 1 tablet (81 mg total) by mouth once daily.     atorvastatin 40 MG tablet  Commonly known as: LIPITOR  Take 1 tablet (40 mg total) by mouth every evening.     carvediloL 25 MG tablet  Commonly known as: COREG  Take 1 tablet (25 mg total) by mouth 2 (two) times daily.     gabapentin 400 MG capsule  Commonly known as: NEURONTIN  Take 1 capsule (400 mg total) by mouth every 8 (eight) hours.     glipiZIDE 5 MG tablet  Commonly known as: GLUCOTROL  Take 1 tablet (5 mg total) by mouth daily with breakfast.     glucose 4 GM chewable tablet  Take 4 tablets (16 g total) by mouth as needed for Low blood sugar.     HYDROcodone-acetaminophen 5-325 mg per tablet  Commonly known as: NORCO  Take 1 tablet by mouth every 6 (six) hours as needed for Pain.     L.acidophil,parac-S.therm-Bif. Cap capsule  Commonly known as: Risaquad  Take 1 capsule by mouth once daily.     lisinopriL 40 MG tablet  Commonly known as: PRINIVIL,ZESTRIL  Take 1 tablet (40 mg total) by mouth once daily.     metFORMIN 500 MG tablet  Commonly known as: GLUCOPHAGE  Take 1 tablet (500 mg total) by mouth 2 (two) times daily.     multivitamin Tab  Take 1 tablet by mouth once daily.            Indwelling Lines/Drains at time of discharge:   Lines/Drains/Airways     None                 Time spent on the discharge of patient: 51  minutes         Jasmyne Sanchez MD  Department of Hospital Medicine  [facility]

## 2023-06-23 NOTE — HPI
Ernst May is a 62 y.o. male with a PMH  has a past medical history of Back abscess (09/2021), CKD (chronic kidney disease) (09/2021), Diabetes mellitus with hyperglycemia (09/2021), Diabetic foot ulcer (09/2021), Hypertension, Osteomyelitis of cervical spine (09/2021), and Sepsis (10/9/2021).  Presented to the ER for evaluation of rash to his neck region.  Patient reports he has history of eczema and is always constantly itching, but states since after having his right BKA he has been scratching his beard on his neck more frequently causing unhealed scab wounds.  Patient also reports that his blood pressure and sugars have been abnormal and states he is not been taking his medicine for few months due to affordability and not being able to get in to be seen by PCP.  Denies any actual associated symptoms versus any other complaints at this time.  ER workup revealed BUN/creatinine 39/2.0 mm CBG of 275, , troponin of 0.157, chest x-ray without acute findings.  EKG revealed normal sinus rhythm with right bundle-branch block with a ventricular rate of 81 beats per minute with a QT/QTC of 4 6/471.  ABG also revealed pH of 7.319, pCO2 of 50.3, PO2 of 31, HC03 of 25.9, and O2 saturation 54%.  Denies any use of home oxygen or shortness of breath at this time.  Patient's blood pressure also noted to be elevated upon arrival to ED at 214/114.  Patient received total of 10 mg Norvasc, 325 mg ASA, 10 mg labetalol, and 1.5 g of vancomycin in ED. hospital Medicine consulted to admit patient for hypertension and trending of troponins.  Patient in agreement with treatment plan.  Patient admitted under observation status.    PCP: Primary Doctor No

## 2023-06-23 NOTE — ASSESSMENT & PLAN NOTE
Patient is chronically on statin.will continue for now. Last Lipid Panel:   Lab Results   Component Value Date    CHOL 135 08/28/2022    HDL 12 (L) 08/28/2022    LDLCALC 79.0 08/28/2022    TRIG 220 (H) 08/28/2022    CHOLHDL 8.9 (L) 08/28/2022     Plan:  -Continue home medication  -low fat/low calorie diet

## 2023-06-23 NOTE — ASSESSMENT & PLAN NOTE
Receive vancomycin in ED. no visible signs of infection noted.  Plan:  -continue to monitor   -DC with oral antibiotics  -wound care education

## 2023-06-23 NOTE — PROGRESS NOTES
Pharmacist Renal Dose Adjustment Note    Ernst May is a 62 y.o. male being treated with the medication enoxaparin.    Patient Data:    Vital Signs (Most Recent):  Temp: 97.7 °F (36.5 °C) (06/22/23 1900)  Pulse: 85 (06/22/23 1900)  Resp: 18 (06/22/23 1900)  BP: (!) 168/87 (06/22/23 1900)  SpO2: 99 % (06/22/23 1900) Vital Signs (72h Range):  Temp:  [97.7 °F (36.5 °C)-97.9 °F (36.6 °C)]   Pulse:  [74-86]   Resp:  [18-22]   BP: (160-239)/()   SpO2:  [98 %-99 %]      Recent Labs   Lab 06/22/23  1650   CREATININE 2.0*     Serum creatinine: 2 mg/dL (H) 06/22/23 1650  Estimated creatinine clearance: 38.9 mL/min (A)    Enoxaparin 30 mg every 24 hours was adjusted to 40 mg daily according to [facility] renal dose adjustment policy        Pharmacist's Name: Alyson Sands PharmD 6/22/2023 8:17 PM    Pharmacist's Extension: [phone]

## 2023-06-23 NOTE — PLAN OF CARE
[facility]urg  Initial Discharge Assessment       Primary Care Provider: Primary Doctor No    Admission Diagnosis: Hypertension [I10]  Elevated troponin [R77.8]  JOAN (acute kidney injury) [N17.9]  Chest pain [R07.9]  Hypertensive emergency [I16.1]  Rash of face [R21]    Admission Date: 6/22/2023  Expected Discharge Date: per attending         Payor: MEDICAID / Plan: [employer] / Product Type: Managed Medicaid /     Extended Emergency Contact Information  Primary Emergency Contact: Ernst May Jr  Address: [address], [address]           [place] [address] [facility]  Home Phone: [phone]  Mobile Phone: [phone]  Relation: Son    Discharge Plan A: Home         [id number] [place] - [address]  [address] [address]  Phone: [phone] Fax: [phone]      Initial Assessment (most recent)       Adult Discharge Assessment - 06/23/23 0920          Discharge Assessment    Assessment Type Discharge Planning Assessment     Confirmed/corrected address, phone number and insurance Yes     Confirmed Demographics Correct on Facesheet     Source of Information family     When was your last doctors appointment? --   n/a    Communicated MEGHAN with patient/caregiver Date not available/Unable to determine     Reason For Admission neck abrasion     People in Home alone     Do you expect to return to your current living situation? Yes     Do you have help at home or someone to help you manage your care at home? No     Prior to hospitilization cognitive status: Alert/Oriented     Current cognitive status: Alert/Oriented     Equipment Currently Used at Home walker, rolling;cane, quad     Readmission within 30 days? No     Patient currently being followed by outpatient case management? No     Do you currently have service(s) that help you manage your care at home? No     Do you take prescription medications? Yes     Do you have prescription coverage? Yes      Coverage medicaid     Do you have any problems affording any of your prescribed medications? No     Is the patient taking medications as prescribed? yes     Who is going to help you get home at discharge? son     How do you get to doctors appointments? family or friend will provide     Are you on dialysis? No     Do you take coumadin? No     Discharge Plan A Home

## 2023-06-23 NOTE — ASSESSMENT & PLAN NOTE
Patient's FSGs are uncontrolled due to hyperglycemia on current medication regimen.  Last A1c reviewed-   Lab Results   Component Value Date    HGBA1C 12.3 (H) 08/26/2022     Most recent fingerstick glucose reviewed-   Recent Labs   Lab 06/22/23  1638   POCTGLUCOSE 243*     Current correctional scale  Low  Maintain anti-hyperglycemic dose as follows-   Antihyperglycemics (From admission, onward)    Start     Stop Route Frequency Ordered    06/22/23 2107  insulin aspart U-100 pen 0-5 Units         -- SubQ Before meals & nightly PRN 06/22/23 2007        Most recent A1c measuring   Hemoglobin A1C   Date Value Ref Range Status   08/26/2022 12.3 (H) 4.0 - 5.6 % Final     Comment:     ADA Screening Guidelines:  5.7-6.4%  Consistent with prediabetes  >or=6.5%  Consistent with diabetes    High levels of fetal hemoglobin interfere with the HbA1C  assay. Heterozygous hemoglobin variants (HbS, HgC, etc)do  not significantly interfere with this assay.   However, presence of multiple variants may affect accuracy.     10/25/2021 8.4 (H) 4.0 - 5.6 % Final     Comment:     ADA Screening Guidelines:  5.7-6.4%  Consistent with prediabetes  >or=6.5%  Consistent with diabetes    High levels of fetal hemoglobin interfere with the HbA1C  assay. Heterozygous hemoglobin variants (HbS, HgC, etc)do  not significantly interfere with this assay.   However, presence of multiple variants may affect accuracy.     09/02/2021 14.0 (H) 4.5 - 6.2 % Final     Comment:     According to ADA guidelines, hemoglobin A1C <7.0% represents  optimal control in non-pregnant diabetic patients.  Different  metrics may apply to specific populations.   Standards of Medical Care in Diabetes - 2016.    For the purpose of screening for the presence of diabetes:  <5.7%     Consistent with the absence of diabetes  5.7-6.4%  Consistent with increasing risk for diabetes   (prediabetes)  >or=6.5%  Consistent with diabetes    Currently no consensus exists for use of  hemoglobin A1C  for diagnosis of diabetes for children.        Plan:  -SSI  -Accu-checks   -Hypoglycemic protocol   -Continue home gabapentin   -Hold oral antihyperglycemics while inpatient

## 2023-06-23 NOTE — PLAN OF CARE
Problem: Adult Inpatient Plan of Care  Goal: Plan of Care Review  Outcome: Ongoing, Progressing  Goal: Patient-Specific Goal (Individualized)  Outcome: Ongoing, Progressing  Goal: Absence of Hospital-Acquired Illness or Injury  Outcome: Ongoing, Progressing  Goal: Optimal Comfort and Wellbeing  Outcome: Ongoing, Progressing  Goal: Readiness for Transition of Care  Outcome: Ongoing, Progressing     Problem: Diabetes Comorbidity  Goal: Blood Glucose Level Within Targeted Range  Outcome: Ongoing, Progressing     Problem: Impaired Wound Healing  Goal: Optimal Wound Healing  Outcome: Ongoing, Progressing     Problem: Pain Acute  Goal: Acceptable Pain Control and Functional Ability  Outcome: Ongoing, Progressing     Problem: Skin Injury Risk Increased  Goal: Skin Health and Integrity  Outcome: Ongoing, Progressing     Problem: Self-Care Deficit  Goal: Improved Ability to Complete Activities of Daily Living  Outcome: Ongoing, Progressing     Problem: Coping Ineffective  Goal: Effective Coping  Outcome: Ongoing, Progressing     Pt admitted from ED  Discussed POC with pt, verbalized understanding.  Patient remains free from injury. Safety precautions maintained. No s/s of acute distress.  Purposeful rounding every two hours.   Pain controlled per MD order. IVF infusing.  Diet orders continued this shift, pt diet: cardiac   Cardiac monitoring in place, tele box number [id number]  Vital signs refused during night except for initial admit vital signs.  Chart and orders review completed. Pt education about care completed.

## 2023-06-23 NOTE — HOSPITAL COURSE
6/23  Admitted for elevated troponin(s) and concerns for rash under neck. Patient with hypertensive urgency. Troponin(s) elevated but trended down, due to demand ischemia. Patient states having moved from [place] and needs primary care provider for chronic medical conditions.    Neck rash, chronic. Ongoing for 3-6 months. Has tried topical antibiotic(s) but makes worse. Reports pruritis and admits to scratching. Prescription for antifungal delivered to bedside. Uncontrolled diabetes mellitus. [facility] lupillo, right.    Case management consulted for discharge planning to establish care with primary care provider.    Patient seen and evaluated by me. Patient was determined to be suitable for discharge. Patient deemed stable for discharge to home with nurse practitioner to visit home program. Medication(s) delivered to bedside prior to discharge.

## 2023-06-23 NOTE — ASSESSMENT & PLAN NOTE
Current BP mildly elevated, inproved after po/iv  antihyprtensive meds given in ED. likely elevated secondary to noncompliance antihypertensives.  Plan:  -Optimize pain control   -Continue home medications (Norvasc, Coreg, Catapres), titrate as needed   -hold lisinopril 2/2 kidney dysfunction  -Monitor BP  -Low salt/cardiac diet when not NPO  -IV hydralazine prn for SBP>160 or DBP>90

## 2023-06-23 NOTE — H&P
[facility]  History & Physical    Patient Name: Ernst May  MRN: [id number]  Patient Class: OP- Observation  Admission Date: 6/22/2023  Attending Physician: Tomas Padilla MD   Primary Care Provider: Primary Doctor No         Patient information was obtained from patient, past medical records and ER records.     Subjective:     Principal Problem:Neck abrasion, initial encounter    Chief Complaint:   Chief Complaint   Patient presents with    Rash     Pt states for the past few months he has been dealing with a rash underneath his chin that is now red with some purulent drainage coming from it. Pt states he has been a multiple of places for antibiotics but nothing has helped.          HPI: Ernst May is a 62 y.o. male with a PMH  has a past medical history of Back abscess (09/2021), CKD (chronic kidney disease) (09/2021), Diabetes mellitus with hyperglycemia (09/2021), Diabetic foot ulcer (09/2021), Hypertension, Osteomyelitis of cervical spine (09/2021), and Sepsis (10/9/2021).  Presented to the ER for evaluation of rash to his neck region.  Patient reports he has history of eczema and is always constantly itching, but states since after having his right BKA he has been scratching his beard on his neck more frequently causing unhealed scab wounds.  Patient also reports that his blood pressure and sugars have been abnormal and states he is not been taking his medicine for few months due to affordability and not being able to get in to be seen by PCP.  Denies any actual associated symptoms versus any other complaints at this time.  ER workup revealed BUN/creatinine 39/2.0 mm CBG of 275, , troponin of 0.157, chest x-ray without acute findings.  EKG revealed normal sinus rhythm with right bundle-branch block with a ventricular rate of 81 beats per minute with a QT/QTC of 4 6/471.  ABG also revealed pH of 7.319, pCO2 of 50.3, PO2 of 31, HC03 of 25.9, and O2 saturation 54%.   Denies any use of home oxygen or shortness of breath at this time.  Patient's blood pressure also noted to be elevated upon arrival to ED at 214/114.  Patient received total of 10 mg Norvasc, 325 mg ASA, 10 mg labetalol, and 1.5 g of vancomycin in ED. hospital Medicine consulted to admit patient for hypertension and trending of troponins.  Patient in agreement with treatment plan.  Patient admitted under observation status.    PCP: Primary Doctor No           Past Medical History:   Diagnosis Date    Back abscess 09/2021    CKD (chronic kidney disease) 09/2021    Diabetes mellitus with hyperglycemia 09/2021    Diabetic foot ulcer 09/2021    bilateral, severe abrasions    Hypertension     Osteomyelitis of cervical spine 09/2021    under back abscess    Sepsis 10/9/2021       Past Surgical History:   Procedure Laterality Date    BELOW KNEE AMPUTATION OF LOWER EXTREMITY Right 9/4/2022    Procedure: AMPUTATION, BELOW KNEE;  Surgeon: Simone Palacios MD;  Location: [facility] OR;  Service: Orthopedics;  Laterality: Right;    CYST REMOVAL  2012    FOOT AMPUTATION Right 8/26/2022    Procedure: AMPUTATION, FOOT;  Surgeon: Dave Mathur DPM;  Location: [facility] OR;  Service: Podiatry;  Laterality: Right;  4th and 5th metatarsal     INCISION AND DRAINAGE OF ABSCESS Left 9/4/2021    Procedure: INCISION AND DRAINAGE, ABSCESS;  Surgeon: Surjit Chawla MD;  Location: [facility] OR;  Service: General;  Laterality: Left;    INCISION AND DRAINAGE OF ABSCESS Left 9/14/2021    Procedure: INCISION AND DRAINAGE, ABSCESS; DEBRIDEMENT;  Surgeon: Sujrit Chawla MD;  Location: [facility] OR;  Service: General;  Laterality: Left;    REPLACEMENT OF WOUND VACUUM-ASSISTED CLOSURE DEVICE Left 9/14/2021    Procedure: REPLACEMENT, WOUND VAC;  Surgeon: Surjit Chawla MD;  Location: [facility];  Service: General;  Laterality: Left;  EXCHANGE.       Review of patient's allergies indicates:   Allergen Reactions    Neosporin [benzalkonium  chloride]        No current facility-administered medications on file prior to encounter.     Current Outpatient Medications on File Prior to Encounter   Medication Sig    acetaminophen (TYLENOL) 325 MG tablet Take 2 tablets (650 mg total) by mouth every 4 (four) hours as needed for Pain.    amLODIPine (NORVASC) 5 MG tablet Take 1 tablet (5 mg total) by mouth every evening.    ascorbic acid, vitamin C, (VITAMIN C) 500 MG tablet Take 1 tablet (500 mg total) by mouth once daily.    aspirin 81 MG Chew Take 1 tablet (81 mg total) by mouth once daily.    atorvastatin (LIPITOR) 40 MG tablet Take 1 tablet (40 mg total) by mouth every evening.    carvediloL (COREG) 25 MG tablet Take 1 tablet (25 mg total) by mouth 2 (two) times daily.    cloNIDine (CATAPRES) 0.1 MG tablet Take 1 tablet (0.1 mg total) by mouth 3 (three) times daily as needed (170).    gabapentin (NEURONTIN) 400 MG capsule Take 1 capsule (400 mg total) by mouth every 8 (eight) hours.    glipiZIDE (GLUCOTROL) 5 MG tablet Take 1 tablet (5 mg total) by mouth daily with breakfast.    glucose 4 GM chewable tablet Take 4 tablets (16 g total) by mouth as needed for Low blood sugar.    HYDROcodone-acetaminophen (NORCO) 5-325 mg per tablet Take 1 tablet by mouth every 6 (six) hours as needed for Pain.    L.acidophil,parac-S.therm-Bif. (RISAQUAD) Cap capsule Take 1 capsule by mouth once daily.    lisinopriL (PRINIVIL,ZESTRIL) 40 MG tablet Take 1 tablet (40 mg total) by mouth once daily.    metFORMIN (GLUCOPHAGE) 500 MG tablet Take 1 tablet (500 mg total) by mouth 2 (two) times daily.    multivitamin Tab Take 1 tablet by mouth once daily.     Family History       Problem Relation (Age of Onset)    Arthritis Father    Diabetes Mother, Father, Maternal Grandmother    Heart disease Mother, Father    Hypertension Father          Tobacco Use    Smoking status: Some Days     Types: Cigars    Smokeless tobacco: Never    Tobacco comments:     occassionally    Substance and Sexual Activity    Alcohol use: No    Drug use: No    Sexual activity: Not on file     Review of Systems   Cardiovascular:  Negative for chest pain, palpitations and leg swelling.   Skin:  Positive for color change and wound.   All other systems reviewed and are negative.  Objective:     Vital Signs (Most Recent):  Temp:  (refused vitals) (06/23/23 0059)  Pulse: 76 (06/23/23 0525)  Resp: 18 (06/22/23 2039)  BP:  (refused) (06/23/23 0059)  SpO2: 95 % (06/22/23 2039) Vital Signs (24h Range):  Temp:  [97.7 °F (36.5 °C)-98.4 °F (36.9 °C)] 98.4 °F (36.9 °C)  Pulse:  [67-86] 76  Resp:  [18-22] 18  SpO2:  [95 %-99 %] 95 %  BP: (136-239)/() 136/82     Weight: 78.4 kg (172 lb 13.5 oz)  Body mass index is 26.28 kg/m².     Physical Exam  Vitals and nursing note reviewed.   Constitutional:       General: He is awake. He is not in acute distress.     Appearance: Normal appearance. He is well-developed and well-groomed. He is not ill-appearing, toxic-appearing or diaphoretic.   HENT:      Head: Normocephalic and atraumatic.   Eyes:      Extraocular Movements: Extraocular movements intact.      Conjunctiva/sclera: Conjunctivae normal.   Cardiovascular:      Rate and Rhythm: Normal rate and regular rhythm.      Pulses: Normal pulses.      Heart sounds: Normal heart sounds. No murmur heard.  Pulmonary:      Effort: Pulmonary effort is normal.      Breath sounds: Normal breath sounds.   Abdominal:      General: Bowel sounds are normal.      Palpations: Abdomen is soft.      Tenderness: There is no abdominal tenderness.   Musculoskeletal:      Cervical back: Normal range of motion and neck supple.      Comments: 5/5 strength throughout      Right Lower Extremity: Right leg is amputated below knee.   Skin:     General: Skin is warm and dry.      Capillary Refill: Capillary refill takes less than 2 seconds.      Findings: Rash (Excoriated rash with scabs noted to neck below chin/jaw line..) present.           Neurological:      General: No focal deficit present.      Mental Status: He is alert and oriented to person, place, and time. Mental status is at baseline.      GCS: GCS eye subscore is 4. GCS verbal subscore is 5. GCS motor subscore is 6.      Cranial Nerves: Cranial nerves 2-12 are intact.      Sensory: Sensation is intact.      Motor: Motor function is intact.      Coordination: Coordination is intact.   Psychiatric:         Mood and Affect: Mood normal.         Behavior: Behavior normal. Behavior is cooperative.            LABS:  Recent Results (from the past 24 hour(s))   POCT glucose    Collection Time: 06/22/23  4:38 PM   Result Value Ref Range    POCT Glucose 243 (H) 70 - 110 mg/dL   CBC auto differential    Collection Time: 06/22/23  4:50 PM   Result Value Ref Range    WBC 9.28 3.90 - 12.70 K/uL    RBC 3.67 (L) 4.60 - 6.20 M/uL    Hemoglobin 10.5 (L) 14.0 - 18.0 g/dL    Hematocrit 31.2 (L) 40.0 - 54.0 %    MCV 85 82 - 98 fL    MCH 28.6 27.0 - 31.0 pg    MCHC 33.7 32.0 - 36.0 g/dL    RDW 12.2 11.5 - 14.5 %    Platelets 280 150 - 450 K/uL    MPV 9.9 9.2 - 12.9 fL    Immature Granulocytes 0.2 0.0 - 0.5 %    Gran # (ANC) 5.9 1.8 - 7.7 K/uL    Immature Grans (Abs) 0.02 0.00 - 0.04 K/uL    Lymph # 2.1 1.0 - 4.8 K/uL    Mono # 0.8 0.3 - 1.0 K/uL    Eos # 0.3 0.0 - 0.5 K/uL    Baso # 0.07 0.00 - 0.20 K/uL    nRBC 0 0 /100 WBC    Gran % 64.0 38.0 - 73.0 %    Lymph % 23.0 18.0 - 48.0 %    Mono % 8.7 4.0 - 15.0 %    Eosinophil % 3.3 0.0 - 8.0 %    Basophil % 0.8 0.0 - 1.9 %    Differential Method Automated    Comprehensive metabolic panel    Collection Time: 06/22/23  4:50 PM   Result Value Ref Range    Sodium 138 136 - 145 mmol/L    Potassium 4.6 3.5 - 5.1 mmol/L    Chloride 104 95 - 110 mmol/L    CO2 23 23 - 29 mmol/L    Glucose 275 (H) 70 - 110 mg/dL    BUN 39 (H) 8 - 23 mg/dL    Creatinine 2.0 (H) 0.5 - 1.4 mg/dL    Calcium 8.9 8.7 - 10.5 mg/dL    Total Protein 7.5 6.0 - 8.4 g/dL    Albumin 3.0 (L) 3.5 - 5.2  g/dL    Total Bilirubin 0.2 0.1 - 1.0 mg/dL    Alkaline Phosphatase 130 55 - 135 U/L    AST 20 10 - 40 U/L    ALT 15 10 - 44 U/L    eGFR 37 (A) >60 mL/min/1.73 m^2    Anion Gap 11 8 - 16 mmol/L   Magnesium    Collection Time: 06/22/23  4:50 PM   Result Value Ref Range    Magnesium 1.8 1.6 - 2.6 mg/dL   Brain natriuretic peptide    Collection Time: 06/22/23  4:50 PM   Result Value Ref Range     (H) 0 - 99 pg/mL   Troponin I    Collection Time: 06/22/23  4:50 PM   Result Value Ref Range    Troponin I 0.157 (H) 0.000 - 0.026 ng/mL   ISTAT PROCEDURE    Collection Time: 06/22/23  5:08 PM   Result Value Ref Range    POC PH 7.319 (L) 7.35 - 7.45    POC PCO2 50.3 (H) 35 - 45 mmHg    POC PO2 31 (L) 40 - 60 mmHg    POC HCO3 25.9 24 - 28 mmol/L    POC BE 0 -2 to 2 mmol/L    POC SATURATED O2 54 (L) 95 - 100 %    Sample VENOUS     Site Other     Allens Test N/A     DelSys Room Air     Mode SPONT     Sp02 97    Troponin I    Collection Time: 06/22/23  8:25 PM   Result Value Ref Range    Troponin I 0.126 (H) 0.000 - 0.026 ng/mL   Urinalysis, Reflex to Urine Culture Urine, Clean Catch    Collection Time: 06/22/23  9:01 PM    Specimen: Urine   Result Value Ref Range    Specimen UA Urine, Clean Catch     Color, UA Yellow Yellow, Straw, Gabby    Appearance, UA Clear Clear    pH, UA 6.0 5.0 - 8.0    Specific Gravity, UA 1.015 1.005 - 1.030    Protein, UA 2+ (A) Negative    Glucose, UA 3+ (A) Negative    Ketones, UA Negative Negative    Bilirubin (UA) Negative Negative    Occult Blood UA Negative Negative    Nitrite, UA Negative Negative    Urobilinogen, UA Negative <2.0 EU/dL    Leukocytes, UA Negative Negative   Urinalysis Microscopic    Collection Time: 06/22/23  9:01 PM   Result Value Ref Range    RBC, UA 1 0 - 4 /hpf    WBC, UA 0 0 - 5 /hpf    Bacteria None None-Occ /hpf    Yeast, UA None None    Hyaline Casts, UA 0 0-1/lpf /lpf    Microscopic Comment SEE COMMENT        RADIOLOGY  X-Ray Chest AP Portable    Result Date:  6/22/2023  EXAMINATION: XR CHEST AP PORTABLE CLINICAL HISTORY: Hypertension; TECHNIQUE: Single frontal view of the chest was performed. COMPARISON: None FINDINGS: The lungs are clear, with normal appearance of pulmonary vasculature and no pleural effusion or pneumothorax. Cardiomegaly the hilar and mediastinal contours are unremarkable. Bones are intact.     No acute abnormality. Cardiomegaly Electronically signed by: Renny Menon Date:    06/22/2023 Time:    18:04      EKG    MICROBIOLOGY    MDM     Amount and/or Complexity of Data Reviewed  Clinical lab tests: reviewed  Tests in the radiology section of CPT®: reviewed  Tests in the medicine section of CPT®: reviewed  Discussion of test results with the performing providers: yes  Decide to obtain previous medical records or to obtain history from someone other than the patient: yes  Obtain history from someone other than the patient: yes  Review and summarize past medical records: yes  Discuss the patient with other providers: yes  Independent visualization of images, tracings, or specimens: yes          Assessment/Plan:     * Neck abrasion, initial encounter  Receive vancomycin in ED. no visible signs of infection noted.  Plan:  -continue to monitor   -DC with oral antibiotics  -wound care education      Elevated troponin  Plan:  -tele monitoring  -trend trops  -repeat ekg prn  -cards consult if warranted      Essential hypertension, not well controlled  Current BP mildly elevated, inproved after po/iv  antihyprtensive meds given in ED. likely elevated secondary to noncompliance antihypertensives.  Plan:  -Optimize pain control   -Continue home medications (Norvasc, Coreg, Catapres), titrate as needed   -hold lisinopril 2/2 kidney dysfunction  -Monitor BP  -Low salt/cardiac diet when not NPO  -IV hydralazine prn for SBP>160 or DBP>90           Diabetes mellitus with hyperglycemia  Patient's FSGs are uncontrolled due to hyperglycemia on current medication  regimen.  Last A1c reviewed-   Lab Results   Component Value Date    HGBA1C 12.3 (H) 08/26/2022     Most recent fingerstick glucose reviewed-   Recent Labs   Lab 06/22/23  1638   POCTGLUCOSE 243*     Current correctional scale  Low  Maintain anti-hyperglycemic dose as follows-   Antihyperglycemics (From admission, onward)    Start     Stop Route Frequency Ordered    06/22/23 2107  insulin aspart U-100 pen 0-5 Units         -- SubQ Before meals & nightly PRN 06/22/23 2007        Most recent A1c measuring   Hemoglobin A1C   Date Value Ref Range Status   08/26/2022 12.3 (H) 4.0 - 5.6 % Final     Comment:     ADA Screening Guidelines:  5.7-6.4%  Consistent with prediabetes  >or=6.5%  Consistent with diabetes    High levels of fetal hemoglobin interfere with the HbA1C  assay. Heterozygous hemoglobin variants (HbS, HgC, etc)do  not significantly interfere with this assay.   However, presence of multiple variants may affect accuracy.     10/25/2021 8.4 (H) 4.0 - 5.6 % Final     Comment:     ADA Screening Guidelines:  5.7-6.4%  Consistent with prediabetes  >or=6.5%  Consistent with diabetes    High levels of fetal hemoglobin interfere with the HbA1C  assay. Heterozygous hemoglobin variants (HbS, HgC, etc)do  not significantly interfere with this assay.   However, presence of multiple variants may affect accuracy.     09/02/2021 14.0 (H) 4.5 - 6.2 % Final     Comment:     According to ADA guidelines, hemoglobin A1C <7.0% represents  optimal control in non-pregnant diabetic patients.  Different  metrics may apply to specific populations.   Standards of Medical Care in Diabetes - 2016.    For the purpose of screening for the presence of diabetes:  <5.7%     Consistent with the absence of diabetes  5.7-6.4%  Consistent with increasing risk for diabetes   (prediabetes)  >or=6.5%  Consistent with diabetes    Currently no consensus exists for use of hemoglobin A1C  for diagnosis of diabetes for children.         Plan:  -SSI  -Accu-checks   -Hypoglycemic protocol   -Continue home gabapentin   -Hold oral antihyperglycemics while inpatient          HLD (hyperlipidemia)  Patient is chronically on statin.will continue for now. Last Lipid Panel:   Lab Results   Component Value Date    CHOL 135 08/28/2022    HDL 12 (L) 08/28/2022    LDLCALC 79.0 08/28/2022    TRIG 220 (H) 08/28/2022    CHOLHDL 8.9 (L) 08/28/2022     Plan:  -Continue home medication  -low fat/low calorie diet        VTE Risk Mitigation (From admission, onward)         Ordered     enoxaparin injection 40 mg  Daily         06/22/23 2015     IP VTE HIGH RISK PATIENT  Once         06/22/23 2007     Place sequential compression device  Until discontinued         06/22/23 2007              //Core Measures   -DVT proph: SCDs, lovenox  -Code status Full    -Surrogate:none    Components of this note were documented using a voice recognition system and are subject to errors not corrected at the time the document was proof read. Please contact the author for any clarifications.     Michael Padilla NP  Department of Hospital Medicine  [facility] Med [facility]

## 2023-06-26 ENCOUNTER — PES CALL (OUTPATIENT)
Dept: ADMINISTRATIVE | Facility: CLINIC | Age: 63
End: 2023-06-26
Payer: MEDICAID

## 2023-06-27 ENCOUNTER — PES CALL (OUTPATIENT)
Dept: ADMINISTRATIVE | Facility: CLINIC | Age: 63
End: 2023-06-27
Payer: MEDICAID

## 2023-06-30 ENCOUNTER — PATIENT OUTREACH (OUTPATIENT)
Dept: ADMINISTRATIVE | Facility: OTHER | Age: 63
End: 2023-06-30
Payer: MEDICAID

## 2023-06-30 ENCOUNTER — OFFICE VISIT (OUTPATIENT)
Dept: PRIMARY CARE CLINIC | Facility: CLINIC | Age: 63
End: 2023-06-30
Payer: MEDICAID

## 2023-06-30 ENCOUNTER — LAB VISIT (OUTPATIENT)
Dept: LAB | Facility: HOSPITAL | Age: 63
End: 2023-06-30
Attending: NURSE PRACTITIONER
Payer: MEDICAID

## 2023-06-30 VITALS
SYSTOLIC BLOOD PRESSURE: 138 MMHG | OXYGEN SATURATION: 97 % | BODY MASS INDEX: 26.1 KG/M2 | HEIGHT: 68 IN | DIASTOLIC BLOOD PRESSURE: 82 MMHG | HEART RATE: 89 BPM | WEIGHT: 172.19 LBS | TEMPERATURE: 97 F

## 2023-06-30 DIAGNOSIS — S01.80XD OPEN WOUND, FACE, WITHOUT COMPLICATION, SUBSEQUENT ENCOUNTER: ICD-10-CM

## 2023-06-30 DIAGNOSIS — Z12.5 ENCOUNTER FOR PROSTATE CANCER SCREENING: ICD-10-CM

## 2023-06-30 DIAGNOSIS — E78.5 HYPERLIPIDEMIA, UNSPECIFIED HYPERLIPIDEMIA TYPE: ICD-10-CM

## 2023-06-30 DIAGNOSIS — I10 ESSENTIAL HYPERTENSION: Chronic | ICD-10-CM

## 2023-06-30 DIAGNOSIS — Z89.511 S/P BKA (BELOW KNEE AMPUTATION) UNILATERAL, RIGHT: ICD-10-CM

## 2023-06-30 DIAGNOSIS — E11.65 TYPE 2 DIABETES MELLITUS WITH HYPERGLYCEMIA, WITHOUT LONG-TERM CURRENT USE OF INSULIN: ICD-10-CM

## 2023-06-30 DIAGNOSIS — R79.89 ELEVATED TROPONIN: ICD-10-CM

## 2023-06-30 DIAGNOSIS — E11.65 TYPE 2 DIABETES MELLITUS WITH HYPERGLYCEMIA, WITHOUT LONG-TERM CURRENT USE OF INSULIN: Primary | ICD-10-CM

## 2023-06-30 DIAGNOSIS — Z11.59 ENCOUNTER FOR HEPATITIS C SCREENING TEST FOR LOW RISK PATIENT: ICD-10-CM

## 2023-06-30 DIAGNOSIS — Z11.4 SCREENING FOR HIV (HUMAN IMMUNODEFICIENCY VIRUS): ICD-10-CM

## 2023-06-30 DIAGNOSIS — E87.6 HYPOKALEMIA: ICD-10-CM

## 2023-06-30 LAB
ALBUMIN SERPL BCP-MCNC: 3 G/DL (ref 3.5–5.2)
ALBUMIN/CREAT UR: 2597.4 UG/MG (ref 0–30)
ALP SERPL-CCNC: 128 U/L (ref 55–135)
ALT SERPL W/O P-5'-P-CCNC: 15 U/L (ref 10–44)
ANION GAP SERPL CALC-SCNC: 14 MMOL/L (ref 8–16)
AST SERPL-CCNC: 25 U/L (ref 10–40)
BASOPHILS # BLD AUTO: 0.09 K/UL (ref 0–0.2)
BASOPHILS NFR BLD: 1 % (ref 0–1.9)
BILIRUB SERPL-MCNC: 0.3 MG/DL (ref 0.1–1)
BUN SERPL-MCNC: 36 MG/DL (ref 8–23)
CALCIUM SERPL-MCNC: 9.1 MG/DL (ref 8.7–10.5)
CHLORIDE SERPL-SCNC: 101 MMOL/L (ref 95–110)
CHOLEST SERPL-MCNC: 176 MG/DL (ref 120–199)
CHOLEST/HDLC SERPL: 5.3 {RATIO} (ref 2–5)
CO2 SERPL-SCNC: 20 MMOL/L (ref 23–29)
COMPLEXED PSA SERPL-MCNC: 2.5 NG/ML (ref 0–4)
CREAT SERPL-MCNC: 2.1 MG/DL (ref 0.5–1.4)
CREAT UR-MCNC: 76 MG/DL (ref 23–375)
DIFFERENTIAL METHOD: ABNORMAL
EOSINOPHIL # BLD AUTO: 0.4 K/UL (ref 0–0.5)
EOSINOPHIL NFR BLD: 4.5 % (ref 0–8)
ERYTHROCYTE [DISTWIDTH] IN BLOOD BY AUTOMATED COUNT: 13.1 % (ref 11.5–14.5)
EST. GFR  (NO RACE VARIABLE): 34.9 ML/MIN/1.73 M^2
ESTIMATED AVG GLUCOSE: 246 MG/DL (ref 68–131)
GLUCOSE SERPL-MCNC: 312 MG/DL (ref 70–110)
HBA1C MFR BLD: 10.2 % (ref 4–5.6)
HCT VFR BLD AUTO: 35.1 % (ref 40–54)
HCV AB SERPL QL IA: NORMAL
HDLC SERPL-MCNC: 33 MG/DL (ref 40–75)
HDLC SERPL: 18.8 % (ref 20–50)
HGB BLD-MCNC: 11 G/DL (ref 14–18)
HIV 1+2 AB+HIV1 P24 AG SERPL QL IA: NORMAL
IMM GRANULOCYTES # BLD AUTO: 0.02 K/UL (ref 0–0.04)
IMM GRANULOCYTES NFR BLD AUTO: 0.2 % (ref 0–0.5)
LDLC SERPL CALC-MCNC: 105.8 MG/DL (ref 63–159)
LYMPHOCYTES # BLD AUTO: 1.7 K/UL (ref 1–4.8)
LYMPHOCYTES NFR BLD: 19.1 % (ref 18–48)
MCH RBC QN AUTO: 28.6 PG (ref 27–31)
MCHC RBC AUTO-ENTMCNC: 31.3 G/DL (ref 32–36)
MCV RBC AUTO: 91 FL (ref 82–98)
MICROALBUMIN UR DL<=1MG/L-MCNC: 1974 UG/ML
MONOCYTES # BLD AUTO: 0.6 K/UL (ref 0.3–1)
MONOCYTES NFR BLD: 7.2 % (ref 4–15)
NEUTROPHILS # BLD AUTO: 6.1 K/UL (ref 1.8–7.7)
NEUTROPHILS NFR BLD: 68 % (ref 38–73)
NONHDLC SERPL-MCNC: 143 MG/DL
NRBC BLD-RTO: 0 /100 WBC
PLATELET # BLD AUTO: 293 K/UL (ref 150–450)
PMV BLD AUTO: 10.7 FL (ref 9.2–12.9)
POTASSIUM SERPL-SCNC: 5.5 MMOL/L (ref 3.5–5.1)
PROT SERPL-MCNC: 7.7 G/DL (ref 6–8.4)
RBC # BLD AUTO: 3.84 M/UL (ref 4.6–6.2)
SODIUM SERPL-SCNC: 135 MMOL/L (ref 136–145)
T3FREE SERPL-MCNC: 2.5 PG/ML (ref 2.3–4.2)
T4 FREE SERPL-MCNC: 0.88 NG/DL (ref 0.71–1.51)
TRIGL SERPL-MCNC: 186 MG/DL (ref 30–150)
TSH SERPL DL<=0.005 MIU/L-ACNC: 2.82 UIU/ML (ref 0.4–4)
WBC # BLD AUTO: 8.95 K/UL (ref 3.9–12.7)

## 2023-06-30 PROCEDURE — 3062F POS MACROALBUMINURIA REV: CPT | Mod: CPTII,,, | Performed by: NURSE PRACTITIONER

## 2023-06-30 PROCEDURE — 3046F PR MOST RECENT HEMOGLOBIN A1C LEVEL > 9.0%: ICD-10-PCS | Mod: CPTII,,, | Performed by: NURSE PRACTITIONER

## 2023-06-30 PROCEDURE — 1159F PR MEDICATION LIST DOCUMENTED IN MEDICAL RECORD: ICD-10-PCS | Mod: CPTII,,, | Performed by: NURSE PRACTITIONER

## 2023-06-30 PROCEDURE — 99204 PR OFFICE/OUTPT VISIT, NEW, LEVL IV, 45-59 MIN: ICD-10-PCS | Mod: S$PBB,,, | Performed by: NURSE PRACTITIONER

## 2023-06-30 PROCEDURE — 3008F BODY MASS INDEX DOCD: CPT | Mod: CPTII,,, | Performed by: NURSE PRACTITIONER

## 2023-06-30 PROCEDURE — 3079F PR MOST RECENT DIASTOLIC BLOOD PRESSURE 80-89 MM HG: ICD-10-PCS | Mod: CPTII,,, | Performed by: NURSE PRACTITIONER

## 2023-06-30 PROCEDURE — 84439 ASSAY OF FREE THYROXINE: CPT | Performed by: NURSE PRACTITIONER

## 2023-06-30 PROCEDURE — 3075F PR MOST RECENT SYSTOLIC BLOOD PRESS GE 130-139MM HG: ICD-10-PCS | Mod: CPTII,,, | Performed by: NURSE PRACTITIONER

## 2023-06-30 PROCEDURE — 1159F MED LIST DOCD IN RCRD: CPT | Mod: CPTII,,, | Performed by: NURSE PRACTITIONER

## 2023-06-30 PROCEDURE — 84481 FREE ASSAY (FT-3): CPT | Performed by: NURSE PRACTITIONER

## 2023-06-30 PROCEDURE — 80053 COMPREHEN METABOLIC PANEL: CPT | Performed by: NURSE PRACTITIONER

## 2023-06-30 PROCEDURE — 3066F PR DOCUMENTATION OF TREATMENT FOR NEPHROPATHY: ICD-10-PCS | Mod: CPTII,,, | Performed by: NURSE PRACTITIONER

## 2023-06-30 PROCEDURE — 99204 OFFICE O/P NEW MOD 45 MIN: CPT | Mod: S$PBB,,, | Performed by: NURSE PRACTITIONER

## 2023-06-30 PROCEDURE — 3075F SYST BP GE 130 - 139MM HG: CPT | Mod: CPTII,,, | Performed by: NURSE PRACTITIONER

## 2023-06-30 PROCEDURE — 87389 HIV-1 AG W/HIV-1&-2 AB AG IA: CPT | Performed by: NURSE PRACTITIONER

## 2023-06-30 PROCEDURE — 84153 ASSAY OF PSA TOTAL: CPT | Performed by: NURSE PRACTITIONER

## 2023-06-30 PROCEDURE — 3046F HEMOGLOBIN A1C LEVEL >9.0%: CPT | Mod: CPTII,,, | Performed by: NURSE PRACTITIONER

## 2023-06-30 PROCEDURE — 80061 LIPID PANEL: CPT | Performed by: NURSE PRACTITIONER

## 2023-06-30 PROCEDURE — 84443 ASSAY THYROID STIM HORMONE: CPT | Performed by: NURSE PRACTITIONER

## 2023-06-30 PROCEDURE — 4010F ACE/ARB THERAPY RXD/TAKEN: CPT | Mod: CPTII,,, | Performed by: NURSE PRACTITIONER

## 2023-06-30 PROCEDURE — 3008F PR BODY MASS INDEX (BMI) DOCUMENTED: ICD-10-PCS | Mod: CPTII,,, | Performed by: NURSE PRACTITIONER

## 2023-06-30 PROCEDURE — 85025 COMPLETE CBC W/AUTO DIFF WBC: CPT | Performed by: NURSE PRACTITIONER

## 2023-06-30 PROCEDURE — 3079F DIAST BP 80-89 MM HG: CPT | Mod: CPTII,,, | Performed by: NURSE PRACTITIONER

## 2023-06-30 PROCEDURE — 99999 PR PBB SHADOW E&M-EST. PATIENT-LVL V: CPT | Mod: PBBFAC,,, | Performed by: NURSE PRACTITIONER

## 2023-06-30 PROCEDURE — 99215 OFFICE O/P EST HI 40 MIN: CPT | Mod: PBBFAC,PN | Performed by: NURSE PRACTITIONER

## 2023-06-30 PROCEDURE — 36415 COLL VENOUS BLD VENIPUNCTURE: CPT | Mod: PN | Performed by: NURSE PRACTITIONER

## 2023-06-30 PROCEDURE — 86803 HEPATITIS C AB TEST: CPT | Performed by: NURSE PRACTITIONER

## 2023-06-30 PROCEDURE — 3066F NEPHROPATHY DOC TX: CPT | Mod: CPTII,,, | Performed by: NURSE PRACTITIONER

## 2023-06-30 PROCEDURE — 82570 ASSAY OF URINE CREATININE: CPT | Performed by: NURSE PRACTITIONER

## 2023-06-30 PROCEDURE — 83036 HEMOGLOBIN GLYCOSYLATED A1C: CPT | Performed by: NURSE PRACTITIONER

## 2023-06-30 PROCEDURE — 3062F PR POS MACROALBUMINURIA RESULT DOCUMENTED/REVIEW: ICD-10-PCS | Mod: CPTII,,, | Performed by: NURSE PRACTITIONER

## 2023-06-30 PROCEDURE — 99999 PR PBB SHADOW E&M-EST. PATIENT-LVL V: ICD-10-PCS | Mod: PBBFAC,,, | Performed by: NURSE PRACTITIONER

## 2023-06-30 PROCEDURE — 4010F PR ACE/ARB THEARPY RXD/TAKEN: ICD-10-PCS | Mod: CPTII,,, | Performed by: NURSE PRACTITIONER

## 2023-06-30 RX ORDER — MUPIROCIN 20 MG/G
OINTMENT TOPICAL 3 TIMES DAILY
Qty: 22 G | Refills: 0 | Status: SHIPPED | OUTPATIENT
Start: 2023-06-30 | End: 2023-06-30 | Stop reason: SDUPTHER

## 2023-06-30 RX ORDER — MUPIROCIN 20 MG/G
OINTMENT TOPICAL 3 TIMES DAILY
Qty: 22 G | Refills: 0 | Status: SHIPPED | OUTPATIENT
Start: 2023-06-30 | End: 2023-07-10

## 2023-06-30 RX ORDER — CEPHALEXIN 500 MG/1
500 CAPSULE ORAL
Qty: 3 CAPSULE | Refills: 0 | Status: SHIPPED | OUTPATIENT
Start: 2023-06-30 | End: 2023-07-01

## 2023-06-30 NOTE — PROGRESS NOTES
CHW - Initial Contact    This Community Health Worker completed the Social Determinant of Health questionnaire with patient during clinic visit today.    Pt identified barriers of most importance are. Patient shared no barriers.  Referrals to community agencies completed with patient consent outside of [facility] include. No outside referrals at this time.  Referrals were put through [facility] - no  Support and Services: None  Other information discussed the patient needs help with.  No other information was discussed.   Follow up required: Yes  Follow-up Outreach - Due: 7/7/2023

## 2023-06-30 NOTE — PROGRESS NOTES
Subjective:       Patient ID: Ernst May is a 63 y.o. male.    Chief Complaint: Establish Care (Hospital f/u appt)      History of Present Illness:   Ernst May 63 y.o. male presents today or hospital follow up from 6/22/23 for rash (cellulitis) to face. Patient also is here to establish care and address care gaps. Patient informed to take medications as directed and return to clinic in 1 month for follow up. Treatment options and alternatives were discussed with the patient. Patient provided opportunity to ask additional questions.  All questions were answered. Voices understanding and acceptance of this advice. Instructed to call back if any further questions or concerns.      Past Medical History:   Diagnosis Date    Back abscess 09/2021    CKD (chronic kidney disease) 09/2021    Diabetes mellitus with hyperglycemia 09/2021    Diabetic foot ulcer 09/2021    bilateral, severe abrasions    Hypertension     Osteomyelitis of cervical spine 09/2021    under back abscess    Sepsis 10/9/2021     Family History   Problem Relation Age of Onset    Diabetes Mother     Heart disease Mother     Arthritis Father     Diabetes Father     Heart disease Father     Hypertension Father     Diabetes Maternal Grandmother      Social History     Socioeconomic History    Marital status: Single   Tobacco Use    Smoking status: Some Days     Types: Cigars    Smokeless tobacco: Never    Tobacco comments:     occassionally   Substance and Sexual Activity    Alcohol use: No    Drug use: No     Social Determinants of Health     Financial Resource Strain: Low Risk     Difficulty of Paying Living Expenses: Not hard at all   Food Insecurity: No Food Insecurity    Worried About Running Out of Food in the Last Year: Never true    Ran Out of Food in the Last Year: Never true   Transportation Needs: No Transportation Needs    Lack of Transportation (Medical): No    Lack of Transportation (Non-Medical): No   Physical Activity: Sufficiently  Active    Days of Exercise per Week: 5 days    Minutes of Exercise per Session: 30 min   Stress: No Stress Concern Present    Feeling of Stress : Only a little   Social Connections: Socially Isolated    Frequency of Communication with Friends and Family: More than three times a week    Frequency of Social Gatherings with Friends and Family: More than three times a week    Attends Faith Services: Never    Active Member of Clubs or Organizations: No    Attends Club or Organization Meetings: Never    Marital Status: Never    Housing Stability: Low Risk     Unable to Pay for Housing in the Last Year: No    Number of Places Lived in the Last Year: 1    Unstable Housing in the Last Year: No     Outpatient Encounter Medications as of 2023   Medication Sig Dispense Refill    acetaminophen (TYLENOL) 325 MG tablet Take 2 tablets (650 mg total) by mouth every 4 (four) hours as needed for Pain.  0    amLODIPine (NORVASC) 5 MG tablet Take 1 tablet (5 mg total) by mouth every evening. 15 tablet 0    ascorbic acid, vitamin C, (VITAMIN C) 500 MG tablet Take 1 tablet (500 mg total) by mouth once daily.      aspirin 81 MG Chew Take 1 tablet (81 mg total) by mouth once daily.  0    atorvastatin (LIPITOR) 40 MG tablet Take 1 tablet (40 mg total) by mouth every evening. 30 tablet 0    carvediloL (COREG) 25 MG tablet Take 1 tablet (25 mg total) by mouth 2 (two) times daily. 30 tablet 0    [] cephALEXin (KEFLEX) 500 MG capsule Take 1 capsule (500 mg total) by mouth three times daily with food. for 3 doses 3 capsule 0    cloNIDine (CATAPRES) 0.1 MG tablet Take 1 tablet (0.1 mg total) by mouth 3 (three) times daily as needed. 15 tablet 0    gabapentin (NEURONTIN) 400 MG capsule Take 1 capsule (400 mg total) by mouth every 8 (eight) hours. 90 capsule 11    glipiZIDE (GLUCOTROL) 5 MG tablet Take 1 tablet (5 mg total) by mouth daily with breakfast. 15 tablet 0    glucose 4 GM chewable tablet Take 4 tablets (16 g total)  by mouth as needed for Low blood sugar.  12    HYDROcodone-acetaminophen (NORCO) 5-325 mg per tablet Take 1 tablet by mouth every 6 (six) hours as needed for Pain. 20 tablet 0    L.acidophil,parac-S.therm-Bif. (RISAQUAD) Cap capsule Take 1 capsule by mouth once daily.      lisinopriL (PRINIVIL,ZESTRIL) 40 MG tablet Take 1 tablet (40 mg total) by mouth once daily. 15 tablet 0    metFORMIN (GLUCOPHAGE) 500 MG tablet Take 1 tablet (500 mg total) by mouth 2 (two) times daily. 15 tablet 0    multivitamin Tab Take 1 tablet by mouth once daily.      mupirocin (BACTROBAN) 2 % ointment Apply topically 3 (three) times daily. for 10 days 22 g 0    nystatin (MYCOSTATIN) cream Apply topically 2 (two) times daily. 15 g 1    [DISCONTINUED] mupirocin (BACTROBAN) 2 % ointment Apply topically 3 (three) times daily. for 10 days 22 g 0     No facility-administered encounter medications on file as of 6/30/2023.       Review of Systems   Constitutional:  Negative for appetite change, chills and fever.   HENT:  Negative for ear pain, sinus pressure, sore throat and trouble swallowing.    Eyes:  Negative for visual disturbance.   Respiratory:  Negative for shortness of breath.    Cardiovascular:  Negative for chest pain.   Gastrointestinal:  Negative for abdominal pain, diarrhea, nausea and vomiting.   Endocrine: Negative for cold intolerance, polyphagia and polyuria.   Genitourinary:  Negative for decreased urine volume and dysuria.   Musculoskeletal:  Negative for back pain.        Right BKA   Skin:  Positive for wound. Negative for rash.   Allergic/Immunologic: Negative for environmental allergies and food allergies.   Neurological:  Negative for dizziness, tremors, weakness and numbness.   Hematological:  Does not bruise/bleed easily.   Psychiatric/Behavioral:  Negative for confusion and hallucinations. The patient is not nervous/anxious and is not hyperactive.    All other systems reviewed and are negative.    Objective:      BP  "138/82 (BP Location: Left arm, Patient Position: Sitting, BP Method: Medium (Manual))   Pulse 89   Temp 97.1 °F (36.2 °C) (Temporal)   Ht 5' 8" (1.727 m)   Wt 78.1 kg (172 lb 3.2 oz)   SpO2 97%   BMI 26.18 kg/m²   Physical Exam  Constitutional:       Appearance: He is well-developed. He is obese.   HENT:      Head: Normocephalic.        Comments: Multiple open wounds to facial area erythematous with some yellowish drainage observed. Presentation consistent with cellulitis.       Nose: Nose normal.   Eyes:      Pupils: Pupils are equal, round, and reactive to light.   Cardiovascular:      Rate and Rhythm: Normal rate.      Heart sounds: Normal heart sounds.   Pulmonary:      Effort: Pulmonary effort is normal.      Breath sounds: Normal breath sounds.   Abdominal:      General: Bowel sounds are normal.      Palpations: Abdomen is soft.   Musculoskeletal:         General: Normal range of motion.      Cervical back: Normal range of motion.      Comments: R BKA- stump no evidence of breakdown or wound.       Right Lower Extremity: Right leg is amputated below knee.   Skin:     General: Skin is warm and dry.          Neurological:      Mental Status: He is alert and oriented to person, place, and time.   Psychiatric:         Behavior: Behavior normal.       Results for orders placed or performed in visit on 06/30/23   MICROALBUMIN / CREATININE RATIO URINE   Result Value Ref Range    Microalbumin, Urine 1974.0 ug/mL    Creatinine, Urine 76.0 23.0 - 375.0 mg/dL    Microalb/Creat Ratio 2597.4 (H) 0.0 - 30.0 ug/mg               Assessment:       1. Type 2 diabetes mellitus with hyperglycemia, without long-term current use of insulin    2. Essential hypertension, not well controlled    3. Hyperlipidemia, unspecified hyperlipidemia type    4. S/P BKA (below knee amputation) unilateral, right    5. Hypokalemia    6. Open wound, face, without complication, subsequent encounter    7. Screening for HIV (human " immunodeficiency virus)    8. Encounter for hepatitis C screening test for low risk patient    9. Encounter for prostate cancer screening    10. Elevated troponin        Plan:   Type 2 diabetes mellitus with hyperglycemia, without long-term current use of insulin  -     CBC Auto Differential; Future; Expected date: 06/30/2023  -     Comprehensive Metabolic Panel; Future; Expected date: 06/30/2023  -     Hemoglobin A1C; Future; Expected date: 06/30/2023  -     TSH; Future; Expected date: 06/30/2023  -     T3, Free; Future; Expected date: 06/30/2023  -     T4, Free; Future; Expected date: 06/30/2023  -     MICROALBUMIN / CREATININE RATIO URINE    Essential hypertension, not well controlled  -     CBC Auto Differential; Future; Expected date: 06/30/2023  -     Comprehensive Metabolic Panel; Future; Expected date: 06/30/2023  -     Ambulatory referral/consult to Cardiology; Future; Expected date: 07/07/2023    Hyperlipidemia, unspecified hyperlipidemia type  -     Lipid Panel; Future; Expected date: 06/30/2023    S/P BKA (below knee amputation) unilateral, right    Hypokalemia    Open wound, face, without complication, subsequent encounter  -     Ambulatory referral/consult to Dermatology; Future; Expected date: 07/07/2023  -     Ambulatory referral/consult to Wound Clinic; Future; Expected date: 07/07/2023    Screening for HIV (human immunodeficiency virus)  -     HIV 1/2 Ag/Ab (4th Gen); Future; Expected date: 06/30/2023    Encounter for hepatitis C screening test for low risk patient  -     Hepatitis C Antibody; Future; Expected date: 06/30/2023    Encounter for prostate cancer screening  -     PSA, Screening; Future; Expected date: 06/30/2023    Elevated troponin  -     Ambulatory referral/consult to Cardiology; Future; Expected date: 07/07/2023    Other orders  -     Discontinue: mupirocin (BACTROBAN) 2 % ointment; Apply topically 3 (three) times daily. for 10 days  Dispense: 22 g; Refill: 0  -     cephALEXin  (KEFLEX) 500 MG capsule; Take 1 capsule (500 mg total) by mouth three times daily with food. for 3 doses  Dispense: 3 capsule; Refill: 0  -     mupirocin (BACTROBAN) 2 % ointment; Apply topically 3 (three) times daily. for 10 days  Dispense: 22 g; Refill: 0             [facility]   [address]  [place] [address]  Office [phone]  Fax [phone]

## 2023-07-01 ENCOUNTER — TELEPHONE (OUTPATIENT)
Dept: PRIMARY CARE CLINIC | Facility: CLINIC | Age: 63
End: 2023-07-01
Payer: MEDICAID

## 2023-07-01 DIAGNOSIS — E87.5 HYPERKALEMIA: Primary | ICD-10-CM

## 2023-07-01 NOTE — TELEPHONE ENCOUNTER
Unable to leave voice mail for patient to return call regarding high potassium level. Medication sent to the pharmacy (preferred).

## 2023-07-08 DIAGNOSIS — I10 ESSENTIAL HYPERTENSION: ICD-10-CM

## 2023-07-08 DIAGNOSIS — E11.65 TYPE 2 DIABETES MELLITUS WITH HYPERGLYCEMIA, WITHOUT LONG-TERM CURRENT USE OF INSULIN: Primary | ICD-10-CM

## 2023-07-08 DIAGNOSIS — E87.5 HYPERKALEMIA: Primary | ICD-10-CM

## 2023-07-08 DIAGNOSIS — E11.65 TYPE 2 DIABETES MELLITUS WITH HYPERGLYCEMIA, WITHOUT LONG-TERM CURRENT USE OF INSULIN: ICD-10-CM

## 2023-07-08 RX ORDER — PEN NEEDLE, DIABETIC 30 GX3/16"
1 NEEDLE, DISPOSABLE MISCELLANEOUS NIGHTLY
Qty: 50 EACH | Refills: 3 | Status: SHIPPED | OUTPATIENT
Start: 2023-07-08 | End: 2023-08-07

## 2023-07-08 RX ORDER — INSULIN GLARGINE 100 [IU]/ML
10 INJECTION, SOLUTION SUBCUTANEOUS NIGHTLY
Qty: 3 ML | Refills: 3 | Status: SHIPPED | OUTPATIENT
Start: 2023-07-08 | End: 2023-08-07

## 2023-07-10 ENCOUNTER — TELEPHONE (OUTPATIENT)
Dept: PRIMARY CARE CLINIC | Facility: CLINIC | Age: 63
End: 2023-07-10
Payer: MEDICAID

## 2023-07-10 ENCOUNTER — CARE AT HOME (OUTPATIENT)
Dept: HOME HEALTH SERVICES | Facility: CLINIC | Age: 63
End: 2023-07-10
Payer: MEDICAID

## 2023-07-10 VITALS
HEART RATE: 76 BPM | DIASTOLIC BLOOD PRESSURE: 74 MMHG | SYSTOLIC BLOOD PRESSURE: 122 MMHG | RESPIRATION RATE: 18 BRPM | OXYGEN SATURATION: 98 %

## 2023-07-10 DIAGNOSIS — E11.65 TYPE 2 DIABETES MELLITUS WITH HYPERGLYCEMIA, WITHOUT LONG-TERM CURRENT USE OF INSULIN: ICD-10-CM

## 2023-07-10 DIAGNOSIS — Z91.199 NONCOMPLIANCE: ICD-10-CM

## 2023-07-10 DIAGNOSIS — I10 ESSENTIAL HYPERTENSION: Chronic | ICD-10-CM

## 2023-07-10 DIAGNOSIS — S11.90XD OPEN NECK WOUND, SUBSEQUENT ENCOUNTER: Primary | ICD-10-CM

## 2023-07-10 DIAGNOSIS — Z89.511 S/P BKA (BELOW KNEE AMPUTATION) UNILATERAL, RIGHT: ICD-10-CM

## 2023-07-10 PROCEDURE — 99350 PR HOME VISIT,ESTAB PATIENT,LEVEL IV: ICD-10-PCS | Mod: S$GLB,,,

## 2023-07-10 PROCEDURE — 99350 HOME/RES VST EST HIGH MDM 60: CPT | Mod: S$GLB,,,

## 2023-07-10 NOTE — PROGRESS NOTES
[facility]  Medical Home Visit    Visit Date: 7/10/2023  Encounter Provider: Tin Sellers  PCP:  Claudine Saavedra DNP    Subjective:      Patient ID: Ernst May is a 63 y.o. male.    Consult Requested By:  Dr. Jasmyne Sanchez  Reason for Consult:  Follow up recent hospital encounter    Ernst is being seen at home due to being seen at home due to physical debility that presents a taxing effort to leave the home, to mitigate high risk of hospital readmission and/or due to the limited availability of reliable or safe options for transportation to the point of access to the provider. Prior to treatment on this visit the chart was reviewed and patient verbal consent was obtained.    Chief Complaint: Follow-up      Patient is a 63 y.o. male being seen today for a follow up to recent hospital admission for evaluation of rash to his neck. Patient has medical diagnoses including back abscess, CKD, Diabetes mellitus with hyperglycemia, Diabetic foot ulcer, Hypertension, Osteomyelitis of cervical spine, and Sepsis. He was hospitalized 6/22-6/23.   Patient is found in their home today, in no acute distress and agreeable to this visit.  Neck rash present, chronic.  Reports been present for approximately 6 months. Has tried various topical antibiotics in the past but none have helped.  He reports they worsened due to his eczema. Reports pruritis.  He was scratching/rubbing wound throughout visit. Primary care place referral for dermatology and wound care. Will resend referral.  Reports he was working with [facility] due to R BKA. Reports he will get back in with them. Patient reports non-compliance with all medications.  Reports he was having trouble obtaining them due to cost but ultimately decided not to continue taking them. Educated patient on importance of medication adherence.  He verbalized understanding. Patient has no further complaints or concerns at this time.  Questions elicited. Time allowed for  questions, all issues addressed. Contact info given for any concerns or changes.              Review of Systems   Constitutional: Negative.    HENT: Negative.     Eyes: Negative.    Respiratory: Negative.  Negative for chest tightness.    Cardiovascular: Negative.  Negative for leg swelling.   Gastrointestinal: Negative.    Endocrine: Negative.    Genitourinary: Negative.    Musculoskeletal:  Positive for gait problem.        R BKA   Skin:  Positive for wound.   Allergic/Immunologic: Negative.    Hematological: Negative.    Psychiatric/Behavioral: Negative.  Negative for agitation.    All other systems reviewed and are negative.    Assessments:  Environmental: Patient lives in a camper trailer. There are steps at the entrance.  There is adequate lighting and the temperature is comfortable.    Functional Status: Independent with ADL's. R BKA with prosthesis in place.   Safety: Fall precautions.   Nutritional: Adequate.  Home Health/DME/Supplies: No HH.     Objective:   Physical Exam  Vitals reviewed.   Constitutional:       General: He is not in acute distress.     Appearance: Normal appearance. He is well-developed.   HENT:      Head: Normocephalic and atraumatic.      Nose: Nose normal.      Mouth/Throat:      Mouth: Mucous membranes are dry.      Pharynx: Oropharynx is clear.   Eyes:      Pupils: Pupils are equal, round, and reactive to light.   Cardiovascular:      Rate and Rhythm: Normal rate and regular rhythm.      Pulses: Normal pulses.      Heart sounds: Normal heart sounds.   Pulmonary:      Effort: Pulmonary effort is normal.      Breath sounds: Normal breath sounds.   Abdominal:      General: Bowel sounds are normal.      Palpations: Abdomen is soft.   Musculoskeletal:         General: Normal range of motion.      Cervical back: Normal range of motion and neck supple.      Comments: prosthesis in place      Right Lower Extremity: Right leg is amputated below knee.   Skin:     General: Skin is warm and  dry.      Findings: Lesion and wound present.          Neurological:      General: No focal deficit present.      Mental Status: He is alert and oriented to person, place, and time. Mental status is at baseline.      Gait: Gait abnormal.   Psychiatric:         Mood and Affect: Mood normal.         Behavior: Behavior normal.         Thought Content: Thought content normal.         Judgment: Judgment normal.       Vitals:    07/10/23 1537   BP: 122/74   Pulse: 76   Resp: 18   SpO2: 98%     There is no height or weight on file to calculate BMI.    Assessment:     1. Open neck wound, subsequent encounter    2. Essential hypertension, not well controlled    3. Type 2 diabetes mellitus with hyperglycemia, without long-term current use of insulin    4. S/P BKA (below knee amputation) unilateral, right    5. Noncompliance        Plan:     Ethical / Legal: Advance Care Planning   Surrogate decision maker:  Jose Dukes, Relationship: Son  Code Status:  Full  [place]  Other advance directive:  None, Capacity to make medical decisions:  Yes, Conflict No       Problem List Items Addressed This Visit          Cardiac/Vascular    Essential hypertension, not well controlled (Chronic)    Current Assessment & Plan     Stable during visit  Patient reports he has not been taking any medication  Advised to take medication as prescribed  Cardiac/low sodium diet  F/U with primary            Endocrine    Diabetes mellitus with hyperglycemia    Current Assessment & Plan     Last A1C 10.2  Non compliant with meds/Accuchecks   Diabetic diet  F/U with primary            Orthopedic    S/P BKA (below knee amputation) unilateral, right    Current Assessment & Plan     Continue working with PT  Fall precautions            Palliative Care    Noncompliance    Current Assessment & Plan     Seems to be an ongoing issue  He reports not taking any medication currently  Educated on importance of taking medication as prescribed  He verbalized  understanding          Other Visit Diagnoses       Open neck wound, subsequent encounter    -  Primary    patient with wound approx 3-6 months  Tried antibiotic creams with no resolution  Recent admission with IV abx  Wound care/Derm consult  Monitor           - Continue all medications, treatments and therapies as ordered.   - Follow all instructions, recommendations as discussed.  - Maintain Safety Precautions at all times.  - Attend all medical appointments as scheduled.  - For worsening symptoms: call Primary Care Physician or Nurse Practitioner.  - For emergencies, call 911 or immediately report to the nearest emergency room.   Were controlled substances prescribed?  No  Total visit time: 49 min  Follow Up Appointments:   No future appointments.    Signature: Tin Sellers NP

## 2023-07-10 NOTE — TELEPHONE ENCOUNTER
Patient informed of results and provider orders. Patient voiced understanding.  ----- Message from Claudine Saavedra DNP sent at 7/8/2023 11:29 PM CDT -----  Please contact the patient and let them know that his potassium was elevated. I will need to send over medication to treat and lets repeat this level in 2 weeks.     HGA1c is still elevated will make adjustments with his medication regimen. Repeat level in 3 months. He may need follow up in 1 month to see me in person or virtual.    Anemia-The results of the CBC shows anemia. I'm going to send in over Ferrous Sulfate 325 mg three times daily.       The results of the PSA was normal. This laboratory test measures the amount of prostate-specific antigen (PSA) found in the blood. PSA is a protein made by the prostate gland. The amount of PSA may be higher in men who have prostate cancer, benign prostatic hyperplasia (BPH), or infection or inflammation of the prostate. If abnormal I would refer you to urology.     All other labs look good.

## 2023-07-10 NOTE — ASSESSMENT & PLAN NOTE
Stable during visit  Patient reports he has not been taking any medication  Advised to take medication as prescribed  Cardiac/low sodium diet  F/U with primary

## 2023-07-10 NOTE — ASSESSMENT & PLAN NOTE
Seems to be an ongoing issue  He reports not taking any medication currently  Educated on importance of taking medication as prescribed  He verbalized understanding

## 2023-07-11 ENCOUNTER — PATIENT OUTREACH (OUTPATIENT)
Dept: ADMINISTRATIVE | Facility: OTHER | Age: 63
End: 2023-07-11
Payer: MEDICAID

## 2023-07-11 NOTE — PROGRESS NOTES
CHW - Case Closure    This Community Health Worker spoke to patient via telephone today.   Pt reported no new information.  Pt denied any additional needs at this time and agrees with episode closure at this time.  Provided patient with Community Health Worker's contact information and encouraged him to contact this Community Health Worker if additional needs arise.

## 2023-11-22 DIAGNOSIS — E11.9 TYPE 2 DIABETES MELLITUS WITHOUT COMPLICATION: ICD-10-CM

## 2024-02-06 DIAGNOSIS — Z12.11 COLON CANCER SCREENING: ICD-10-CM

## (undated) DEVICE — CUFF TOURNIQUET 34DUAL PRT 5921-034-235

## (undated) DEVICE — TAPE CLOTH 4 MEDIPORE 2864

## (undated) DEVICE — GOWN POLY REINF BRTH SLV XL

## (undated) DEVICE — GAUZE VASELINE XEROFORM 5X9 8884433605

## (undated) DEVICE — PACK CUSTOM UNIV BASIN SLI

## (undated) DEVICE — PAD ABD 5X9   7196D

## (undated) DEVICE — TRAY GENERAL SURGERY

## (undated) DEVICE — BANDAGE MATRIX HK LOOP 4IN 5YD

## (undated) DEVICE — IMMOBILIZER KNEE UNIVERSAL 20 IN

## (undated) DEVICE — SUT 2-0 VICRYL / CT-1

## (undated) DEVICE — PADDING CAST 4 STERILE 30-321

## (undated) DEVICE — BANDAGE ACE STERILE 4 REB3114

## (undated) DEVICE — SUT 2-0 VICRYL / SH (J417)

## (undated) DEVICE — SUTURE ETHILON 2-0 BLK MONO FSLX 30

## (undated) DEVICE — TUBE CULTURE AMIES 220117

## (undated) DEVICE — UNDERGLOVE BIOGEL PI MICRO BLUE SZ 8

## (undated) DEVICE — BANDAGE ACE STERILE 6 REB3116

## (undated) DEVICE — SPONGE LAP 18X18

## (undated) DEVICE — PAD CAST SPECIALIST STRL 4

## (undated) DEVICE — GLOVE SURG ULTRA TOUCH 8

## (undated) DEVICE — SCRUB 10% POVIDONE IODINE 4OZ

## (undated) DEVICE — SUTURE VICRYL 2-0 CT-1 18 VCP839D

## (undated) DEVICE — SOLUTION PREP IODINE 4OZ

## (undated) DEVICE — TRAY LOWER EXTREMITY  SMHS029-05

## (undated) DEVICE — GLOVE BIOGEL PI   GOLD SIZE 8

## (undated) DEVICE — DRAPE U SPLIT SHEET 54X76IN

## (undated) DEVICE — SPONGE DERMACEA GAUZE 4X4

## (undated) DEVICE — BLADE SAGITTAL 2108-176-000

## (undated) DEVICE — SUTURE VICRYL 3-0 SH 27 VCP416H

## (undated) DEVICE — SUTURE SILK 0 18 A186H

## (undated) DEVICE — ELECTRODE MEGADYNE RETURN DUAL

## (undated) DEVICE — TOURNIQUET SB QC DP 18X4IN

## (undated) DEVICE — DRESSING GAUZE OIL EMUL 3X8

## (undated) DEVICE — HEMOSTAT SURGICEL 4X8IN

## (undated) DEVICE — SOL PVP-I SCRUB 7.5% 4OZ

## (undated) DEVICE — PAD BOVIE ADULT

## (undated) DEVICE — GLOVE BIOGEL PI ORTHO PRO BROWN SZ 8.0

## (undated) DEVICE — BANDAGE ESMARK ELASTIC ST 4X9

## (undated) DEVICE — SEE MEDLINE ITEM 157216

## (undated) DEVICE — SLEEVE SCD EXPRESS KNEE MEDIUM

## (undated) DEVICE — CANISTER VAC SMALL

## (undated) DEVICE — BLADE SCALPEL #10 371110

## (undated) DEVICE — SEE MEDLINE ITEM 157171

## (undated) DEVICE — BANDAGE KERLIX   441106

## (undated) DEVICE — BLADE SURG #15 CARBON STEEL

## (undated) DEVICE — TRAY DRY SPONGE SCRUB W FOAM

## (undated) DEVICE — NDL SAFETY 25G X 1.5 ECLIPSE

## (undated) DEVICE — SPONGE GAUZE 10S 4X4  442214

## (undated) DEVICE — BLADE SCALPEL #11 371111

## (undated) DEVICE — PADDING CAST 6 STERILE 30-322

## (undated) DEVICE — NDL ECLIPSE SAFETY 18GX1-1/2IN

## (undated) DEVICE — PAD PREP 50/CA

## (undated) DEVICE — STRAP OR TABLE 5IN X 72IN

## (undated) DEVICE — SYR LUER LOCK 20ML

## (undated) DEVICE — DRAPE THREE-QTR REINF 53X77IN

## (undated) DEVICE — SUTURE ETHIBOND 2 V-37 30 MX69G

## (undated) DEVICE — GLOVE BIOGEL PI MICRO INDIC 7

## (undated) DEVICE — DRAPE CLEAR SMALL 1000

## (undated) DEVICE — BOOT SHORT 1PC LOW PROF LRG

## (undated) DEVICE — SOLUTION H2O IRRIGATION 3000ML R8006

## (undated) DEVICE — BLADE NARROW LONG 29.5MMX7.0MM

## (undated) DEVICE — DRESSING FOAM LARGE VAC

## (undated) DEVICE — NDL HYPO 27G X 1 1/2

## (undated) DEVICE — PACK BASIC

## (undated) DEVICE — SOLUTION IRRI NS BOTTLE 1000ML R5200-01

## (undated) DEVICE — GLOVE BIOGEL PI ULTRA TOUCH GRAY SZ7.5

## (undated) DEVICE — PULSAEVAC 0210-158-000

## (undated) DEVICE — SUTURE VICRYL 1 CT-1 27 JJ40G

## (undated) DEVICE — GOWN POLY REINF BRTH SLV LG

## (undated) DEVICE — KIT COLLECTION E SWAB REGULAR

## (undated) DEVICE — NEEDLE SAFETY ECLIPSE 25G 1-1/2IN 305767

## (undated) DEVICE — SUT 3/0 48IN PROLENE BL MO